# Patient Record
Sex: FEMALE | Race: WHITE | Employment: OTHER | URBAN - METROPOLITAN AREA
[De-identification: names, ages, dates, MRNs, and addresses within clinical notes are randomized per-mention and may not be internally consistent; named-entity substitution may affect disease eponyms.]

---

## 2017-01-18 ENCOUNTER — ANTICOAGULATION THERAPY VISIT (OUTPATIENT)
Dept: ANTICOAGULATION | Facility: CLINIC | Age: 82
End: 2017-01-18
Payer: MEDICARE

## 2017-01-18 DIAGNOSIS — Z79.01 LONG TERM CURRENT USE OF ANTICOAGULANT THERAPY: Primary | ICD-10-CM

## 2017-01-18 DIAGNOSIS — I63.40 CEREBRAL EMBOLISM WITH CEREBRAL INFARCTION (H): ICD-10-CM

## 2017-01-18 LAB — INR POINT OF CARE: 2 (ref 0.86–1.14)

## 2017-01-18 PROCEDURE — 36416 COLLJ CAPILLARY BLOOD SPEC: CPT

## 2017-01-18 PROCEDURE — 99207 ZZC NO CHARGE NURSE ONLY: CPT

## 2017-01-18 PROCEDURE — 85610 PROTHROMBIN TIME: CPT | Mod: QW

## 2017-01-18 NOTE — PROGRESS NOTES
ANTICOAGULATION FOLLOW-UP CLINIC VISIT    Patient Name:  Shanika Stahl  Date:  1/18/2017  Contact Type:  Face to Face    SUBJECTIVE:     Patient Findings     Positives Diet Changes (had a small amount of spinach last night)           OBJECTIVE    INR PROTIME   Date Value Ref Range Status   01/18/2017 2.0* 0.86 - 1.14 Final       ASSESSMENT / PLAN  INR assessment THER    Recheck INR In: 6 WEEKS    INR Location Clinic      Anticoagulation Summary as of 1/18/2017     INR goal 2.0-3.0   Selected INR 2.0 (1/18/2017)   Maintenance plan 2.5 mg (2.5 mg x 1) on Mon, Fri; 5 mg (2.5 mg x 2) all other days   Full instructions 2.5 mg on Mon, Fri; 5 mg all other days   Weekly total 30 mg   No change documented Verenice Morillo, RN   Plan last modified Mabel Serna RN (8/25/2015)   Next INR check 3/1/2017   Priority INR   Target end date Indefinite    Indications   Atrial fibrillation (H) (Resolved) [I48.91]  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 Uses 2.5mg tablets.       Anticoagulation Care Providers     Provider Role Specialty Phone number    JaimeDebbiMilena Sara,  [facility] Internal [facility] [phone]            See the Encounter Report to view Anticoagulation Flowsheet and Dosing Calendar (Go to Encounters tab in chart review, and find the Anticoagulation Therapy Visit)        Verenice Morillo, RN

## 2017-01-18 NOTE — MR AVS SNAPSHOT
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  Please call [phone] with any problems or questions regarding your therapy          January 2017 Details    Sun Mon Tue Wed Thu Fri Sat     1               2               3               4               5               6               7                 8               9               10               11               12               13               14                 15               16               17               18      5 mg   See details      19      5 mg         20      2.5 mg         21      5 mg           22      5 mg         23      2.5 mg         24      5 mg         25      5 mg         26      5 mg         27      2.5 mg         28      5 mg           29      5 mg         30      2.5 mg         31      5 mg              Date Details   01/18 This INR check               How to take your warfarin dose     To take:  2.5 mg Take 1 of the 2.5 mg tablets.    To take:  5 mg Take 2 of the 2.5 mg tablets.           February 2017 Details    Sun Mon Tue Wed Thu Fri Sat        1      5 mg         2      5 mg         3      2.5 mg         4      5 mg           5      5 mg         6      2.5 mg         7      5 mg         8      5 mg         9      5 mg         10      2.5 mg         11      5 mg           12      5 mg         13      2.5 mg          14      5 mg         15      5 mg         16      5 mg         17      2.5 mg         18      5 mg           19      5 mg         20      2.5 mg         21      5 mg         22      5 mg         23      5 mg         24      2.5 mg         25      5 mg           26      5 mg         27      2.5 mg         28      5 mg              Date Details   No additional details            How to take your warfarin dose     To take:  2.5 mg Take 1 of the 2.5 mg tablets.    To take:  5 mg Take 2 of the 2.5 mg tablets.           March 2017 Details    Sun Mon Tue Wed Thu Fri Sat        1            2               3               4                 5               6               7               8               9               10               11                 12               13               14               15               16               17               18                 19               20               21               22               23               24               25                 26               27               28               29               30               31                 Date Details   No additional details    Date of next INR:  3/1/2017         How to take your warfarin dose     To take:  5 mg Take 2 of the 2.5 mg tablets.

## 2017-03-01 ENCOUNTER — ANTICOAGULATION THERAPY VISIT (OUTPATIENT)
Dept: ANTICOAGULATION | Facility: CLINIC | Age: 82
End: 2017-03-01
Payer: MEDICARE

## 2017-03-01 DIAGNOSIS — Z79.01 LONG TERM CURRENT USE OF ANTICOAGULANT THERAPY: ICD-10-CM

## 2017-03-01 DIAGNOSIS — I63.40 CEREBRAL EMBOLISM WITH CEREBRAL INFARCTION (H): ICD-10-CM

## 2017-03-01 LAB — INR POINT OF CARE: 2.1 (ref 0.86–1.14)

## 2017-03-01 PROCEDURE — 36416 COLLJ CAPILLARY BLOOD SPEC: CPT

## 2017-03-01 PROCEDURE — 85610 PROTHROMBIN TIME: CPT | Mod: QW

## 2017-03-01 PROCEDURE — 99207 ZZC NO CHARGE NURSE ONLY: CPT

## 2017-03-01 NOTE — PROGRESS NOTES
ANTICOAGULATION FOLLOW-UP CLINIC VISIT    Patient Name:  Shanika Stahl  Date:  3/1/2017  Contact Type:  Face to Face    SUBJECTIVE:     Patient Findings     Positives No Problem Findings           OBJECTIVE    INR Protime   Date Value Ref Range Status   03/01/2017 2.1 (A) 0.86 - 1.14 Final       ASSESSMENT / PLAN  INR assessment THER    Recheck INR In: 6 WEEKS    INR Location Clinic      Anticoagulation Summary as of 3/1/2017     INR goal 2.0-3.0   Today's INR 2.1   Maintenance plan 2.5 mg (2.5 mg x 1) on Mon, Fri; 5 mg (2.5 mg x 2) all other days   Full instructions 2.5 mg on Mon, Fri; 5 mg all other days   Weekly total 30 mg   No change documented Verenice Morilol, RN   Plan last modified Mabel Serna RN (8/25/2015)   Next INR check 4/12/2017   Priority INR   Target end date Indefinite    Indications   Atrial fibrillation (H) (Resolved) [I48.91]  Cerebral embolism with cerebral infarction (H) [I63.40]  Long term current use of anticoagulant therapy [Z79.01]         Anticoagulation Episode Summary     INR check location     Preferred lab     Send INR reminders to [facility]    Comments * Keep on low end of therapeutic range. Uses 2.5mg tablets.       Anticoagulation Care Providers     Provider Role Specialty Phone number    AddisonDebbiMilena Sara,  [facility] Internal Medicine [phone]            See the Encounter Report to view Anticoagulation Flowsheet and Dosing Calendar (Go to Encounters tab in chart review, and find the Anticoagulation Therapy Visit)        Verenice Morillo RN

## 2017-03-01 NOTE — MR AVS SNAPSHOT
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  Please call [phone] with any problems or questions regarding your therapy          March 2017 Details    Sun Mon Tue Wed Thu Fri Sat        1      5 mg   See details      2      5 mg         3      2.5 mg         4      5 mg           5      5 mg         6      2.5 mg         7      5 mg         8      5 mg         9      5 mg         10      2.5 mg         11      5 mg           12      5 mg         13      2.5 mg         14      5 mg         15      5 mg         16      5 mg         17      2.5 mg         18      5 mg           19      5 mg         20      2.5 mg         21      5 mg         22      5 mg         23      5 mg         24      2.5 mg         25      5 mg           26      5 mg         27      2.5 mg         28      5 mg         29      5 mg         30      5 mg         31      2.5 mg           Date Details   03/01 This INR check               How to take your warfarin dose     To take:  2.5 mg Take 1 of the 2.5 mg tablets.    To take:  5 mg Take 2 of the 2.5 mg tablets.           April 2017 Details    Sun Mon Tue Wed Thu Fri Sat           1      5 mg           2      5 mg         3      2.5 mg         4      5 mg         5      5 mg         6      5 mg         7      2.5 mg         8      5 mg           9      5 mg         10      2.5 mg         11       5 mg         12            13               14               15                 16               17               18               19               20               21               22                 23               24               25               26               27               28               29                 30                      Date Details   No additional details    Date of next INR:  4/12/2017         How to take your warfarin dose     To take:  2.5 mg Take 1 of the 2.5 mg tablets.    To take:  5 mg Take 2 of the 2.5 mg tablets.

## 2017-03-27 ENCOUNTER — HOSPITAL ENCOUNTER (OUTPATIENT)
Dept: CARDIOLOGY | Facility: CLINIC | Age: 82
Discharge: HOME | End: 2017-03-27
Attending: INTERNAL MEDICINE | Admitting: INTERNAL MEDICINE
Payer: MEDICARE

## 2017-03-27 PROCEDURE — 93294 REM INTERROG EVL PM/LDLS PM: CPT | Performed by: INTERNAL MEDICINE

## 2017-03-27 PROCEDURE — 93296 REM INTERROG EVL PM/IDS: CPT

## 2017-03-27 NOTE — PROGRESS NOTES
Carelink Remote PPM Device Check  : 71.1 %  Mode: VVIR        Presenting Rhythm:   Heart Rate: Adequate per the histograms. Mostly 60-70's.  Sensing: Stable    Pacing Threshold: Stable    Impedance: Stable  Battery Status: 4.5-7 yrs remain.  Atrial Arrhythmia: Chronic A-fib, pt taking Warfarin.  Ventricular Arrhythmia: None.     Care Plan: Carelink Remote PPM q 3 months.

## 2017-03-28 ENCOUNTER — TELEPHONE (OUTPATIENT)
Dept: FAMILY MEDICINE | Facility: CLINIC | Age: 82
End: 2017-03-28

## 2017-03-28 ENCOUNTER — OFFICE VISIT (OUTPATIENT)
Dept: AUDIOLOGY | Facility: CLINIC | Age: 82
End: 2017-03-28
Payer: MEDICARE

## 2017-03-28 DIAGNOSIS — H90.3 SENSORINEURAL HEARING LOSS, BILATERAL: Primary | ICD-10-CM

## 2017-03-28 PROCEDURE — V5299 HEARING SERVICE: HCPCS | Performed by: AUDIOLOGIST

## 2017-03-28 NOTE — MR AVS SNAPSHOT
After Visit Summary   3/28/2017    Shanika Stahl    MRN: [id number]           Patient Information     Date Of Birth          5/26/1924        Visit Information        Provider Department      3/28/2017 11:00 AM Gregoria Heath [facility]        Today's Diagnoses     Sensorineural hearing loss, bilateral    -  1       Follow-ups after your visit        Your next 10 appointments already scheduled     Mar 30, 2017 10:30 AM CDT   [facility]    [address] [address]   [phone]            Mar 30, 2017 11:00 AM CDT   [facility]red   [facility]    [address] [address]   [phone]            Apr 12, 2017 10:45 AM CDT   [place] Visit with [place] [facility]   [facility]    [address] [address]   [phone]            Jul 17, 2017 10:00 AM CDT   [facility]   [facility] ([facility])    [address] [address]   [phone]           This appointment is for a remote check of your pacemaker.  This is not an appointment at the office.              Who to contact     If you have questions or need follow up information about today's clinic visit or your schedule please contact [facility] directly at [phone].  Normal or non-critical lab and imaging results will be communicated to you by MyChart, letter or phone within 4 business days after the clinic has received the results. If you do not hear from us within 7 days, please contact the clinic through MyChart or phone. If you have a critical or abnormal lab result, we will notify you by phone as soon as possible.  Submit refill requests through [employer] or call your pharmacy and  "they will forward the refill request to us. Please allow 3 business days for your refill to be completed.          Additional Information About Your Visit        [employer] Information     [employer] lets you send messages to your doctor, view your test results, renew your prescriptions, schedule appointments and more. To sign up, go to [email or web address] . Click on \"Log in\" on the left side of the screen, which will take you to the Welcome page. Then click on \"Sign up Now\" on the right side of the page.     You will be asked to enter the access code listed below, as well as some personal information. Please follow the directions to create your username and password.     Your access code is: [email or web address]  Expires: 2017  2:52 PM     Your access code will  in 90 days. If you need help or a new code, please call your [facility] clinic or [phone].        Care EveryWhere ID     This is your [facility] EveryWhere ID. This could be used by other organizations to access your [facility] medical records  [id number]         Blood Pressure from Last 3 Encounters:   16 150/68   16 154/82   16 151/77    Weight from Last 3 Encounters:   16 86 lb (39 kg)   16 88 lb 9.6 oz (40.2 kg)   16 86 lb 12.8 oz (39.4 kg)              We Performed the Following     HEARING AID CHECK/NO CHARGE        Primary Care Provider Office Phone # Fax #    Milena Taylor Jaime [phone]       [facility] [address] [facility] [address] [facility] [address]        Thank you!     Thank you for choosing [facility]  for your care. Our goal is always to provide you with excellent care. Hearing back from our patients is one way we can continue to improve our services. Please take a few minutes to complete the written survey that you may receive in the mail after your visit with us. Thank you!             Your Updated Medication List - Protect others around you: Learn how to safely use, " store and throw away your medicines at www.disposemymeds.org.          This list is accurate as of: 3/28/17  2:52 PM.  Always use your most recent med list.                   Brand Name Dispense Instructions for use    calcium + D 600-200 MG-UNIT Tabs   Generic drug:  calcium carbonate-vitamin D      1 tablet by mouth daily       cholecalciferol 1000 UNIT tablet    vitamin D    30    1 TABLET DAILY       fish oil-omega-3 fatty acids 1000 MG capsule     90 capsule    Take 1 capsule by mouth 2 times daily       loratadine 10 MG tablet    CLARITIN     Take 10 mg by mouth daily       meclizine 25 MG tablet    ANTIVERT    30 tablet    Take 1 tablet (25 mg) by mouth every 6 hours as needed for dizziness       metoprolol 25 MG 24 hr tablet    TOPROL XL    180 tablet    Take 1 tablet (25 mg) by mouth 2 times daily       ONE-A-DAY WOMENS 50 PLUS PO      Take 1 tablet by mouth daily       simvastatin 20 MG tablet    ZOCOR    90 tablet    Take 1 tablet (20 mg) by mouth At Bedtime       TYLENOL 325 MG Caps   Generic drug:  Acetaminophen      Take by mouth as needed       warfarin 2.5 MG tablet    COUMADIN    200 tablet    As directed by Anticoagulation Clinic (current dose 2.5mg Mondays and Fridays, 5mg rest of the week)

## 2017-03-28 NOTE — PROGRESS NOTES
89 year old female comes in with her son for a hearing aid check. She is currently wearing 2016 [employer] V50-P hearing aids and earmolds. She reports she is having some pain with the left earmold.    Examination of her ear reveals she does not have the left earmold inserted correctly in the helix area. Again reviewed correct insertion.Replaced earmold tubing bilaterally. Verified hearing aid functionality.  See chart in the hearing aid room.     Return to clinic as needed.    Gregoria Heath M.A. -[facility], #[id number]

## 2017-03-28 NOTE — TELEPHONE ENCOUNTER
Reason for Call: Request for an order or referral:    Order or referral being requested: Audiology     Date needed: as soon as possible    Has the patient been seen by the PCP for this problem? YES    Additional comments: pt stopped in and is requesting referral for pending apt on 3/30/17 at 10:30 am With Gregoria Heath      Phone number Patient can be reached at:  Home number on file [phone] (home)    Best Time:  Any     Can we leave a detailed message on this number?  NO    Call taken on 3/28/2017 at 12:12 PM by Eri Ceja

## 2017-03-28 NOTE — TELEPHONE ENCOUNTER
Done per protocol. Left message on answering machine for patient to call back.  CSS , when she calls back, please advise her the referral for audiology has been done , thanks!       Joanna Garcia RNC

## 2017-03-30 ENCOUNTER — OFFICE VISIT (OUTPATIENT)
Dept: AUDIOLOGY | Facility: CLINIC | Age: 82
End: 2017-03-30
Payer: MEDICARE

## 2017-03-30 DIAGNOSIS — H90.3 SENSORINEURAL HEARING LOSS, BILATERAL: Primary | ICD-10-CM

## 2017-03-30 PROCEDURE — V5299 HEARING SERVICE: HCPCS | Performed by: AUDIOLOGIST

## 2017-03-30 PROCEDURE — 92557 COMPREHENSIVE HEARING TEST: CPT | Performed by: AUDIOLOGIST

## 2017-03-30 NOTE — MR AVS SNAPSHOT
"              After Visit Summary   3/30/2017    Shanika Stahl    MRN: [id number]           Patient Information     Date Of Birth          5/26/1924        Visit Information        Provider Department      3/30/2017 10:30 AM Gregoria Heath [facility]        Today's Diagnoses     Sensorineural hearing loss, bilateral    -  1       Follow-ups after your visit        Your next 10 appointments already scheduled     Apr 12, 2017 10:45 AM CDT   [place] Visit with [place] [facility]   [facility]    [address]  [facility] [address] [phone]            Jul 17, 2017 10:00 AM CDT   [place] [facility] [place] [facility] [facility]   [facility] ([facility])    [address] [facility] [address] [phone]           This appointment is for a remote check of your pacemaker.  This is not an appointment at the office.              Who to contact     If you have questions or need follow up information about today's clinic visit or your schedule please contact [facility] directly at [phone].  Normal or non-critical lab and imaging results will be communicated to you by [employer], letter or phone within 4 business days after the clinic has received the results. If you do not hear from us within 7 days, please contact the clinic through [employer] or phone. If you have a critical or abnormal lab result, we will notify you by phone as soon as possible.  Submit refill requests through [employer] or call your pharmacy and they will forward the refill request to us. Please allow 3 business days for your refill to be completed.          Additional Information About Your Visit        MyChart Information     [employer] lets you send messages to your doctor, view your test results, renew your prescriptions, schedule appointments and more. To sign up, go to [email or web address] . Click on \"Log in\" on the left " "side of the screen, which will take you to the Welcome page. Then click on \"Sign up Now\" on the right side of the page.     You will be asked to enter the access code listed below, as well as some personal information. Please follow the directions to create your username and password.     Your access code is: [email or web address]  Expires: 2017  2:52 PM     Your access code will  in 90 days. If you need help or a new code, please call your [place] clinic or [phone].        Care EveryWhere ID     This is your Care EveryWhere ID. This could be used by other organizations to access your [place] medical records  [id number]         Blood Pressure from Last 3 Encounters:   16 150/68   16 154/82   16 151/77    Weight from Last 3 Encounters:   16 86 lb (39 kg)   16 88 lb 9.6 oz (40.2 kg)   16 86 lb 12.8 oz (39.4 kg)              We Performed the Following     COMPREHENSIVE HEARING TEST        Primary Care Provider Office Phone # Fax #    Milena Jaime,  [phone]       [address] [address]        Thank you!     Thank you for choosing [facility]  for your care. Our goal is always to provide you with excellent care. Hearing back from our patients is one way we can continue to improve our services. Please take a few minutes to complete the written survey that you may receive in the mail after your visit with us. Thank you!             Your Updated Medication List - Protect others around you: Learn how to safely use, store and throw away your medicines at www.disposemymeds.org.          This list is accurate as of: 3/30/17 12:01 PM.  Always use your most recent med list.                   Brand Name Dispense Instructions for use    calcium + D 600-200 MG-UNIT Tabs   Generic drug:  calcium carbonate-vitamin D      1 tablet by mouth daily       cholecalciferol 1000 UNIT tablet    vitamin D    30    1 " TABLET DAILY       fish oil-omega-3 fatty acids 1000 MG capsule     90 capsule    Take 1 capsule by mouth 2 times daily       loratadine 10 MG tablet    CLARITIN     Take 10 mg by mouth daily       meclizine 25 MG tablet    ANTIVERT    30 tablet    Take 1 tablet (25 mg) by mouth every 6 hours as needed for dizziness       metoprolol 25 MG 24 hr tablet    TOPROL XL    180 tablet    Take 1 tablet (25 mg) by mouth 2 times daily       ONE-A-DAY WOMENS 50 PLUS PO      Take 1 tablet by mouth daily       simvastatin 20 MG tablet    ZOCOR    90 tablet    Take 1 tablet (20 mg) by mouth At Bedtime       TYLENOL 325 MG Caps   Generic drug:  Acetaminophen      Take by mouth as needed       warfarin 2.5 MG tablet    COUMADIN    200 tablet    As directed by Anticoagulation Clinic (current dose 2.5mg Mondays and Fridays, 5mg rest of the week)

## 2017-03-30 NOTE — MR AVS SNAPSHOT
"              After Visit Summary   3/30/2017    Shanika Stahl    MRN: [id number]           Patient Information     Date Of Birth          5/26/1924        Visit Information        Provider Department      3/30/2017 11:00 AM Gregoria Heath AuD [facility]        Today's Diagnoses     Sensorineural hearing loss, bilateral    -  1       Follow-ups after your visit        Your next 10 appointments already scheduled     Apr 12, 2017 10:45 AM CDT   [place]tico[place] Visit with [place] [facility]   [facility] ([facility])    [address]  [facility] [address] [phone]            Jul 17, 2017 10:00 AM CDT   Remote PPM Check with [place] [facility]   [facility] ([facility])    [address] [facility] [address] [phone]           This appointment is for a remote check of your pacemaker.  This is not an appointment at the office.              Who to contact     If you have questions or need follow up information about today's clinic visit or your schedule please contact [facility] directly at [phone].  Normal or non-critical lab and imaging results will be communicated to you by [employer], letter or phone within 4 business days after the clinic has received the results. If you do not hear from us within 7 days, please contact the clinic through [employer] or phone. If you have a critical or abnormal lab result, we will notify you by phone as soon as possible.  Submit refill requests through Spool or call your pharmacy and they will forward the refill request to us. Please allow 3 business days for your refill to be completed.          Additional Information About Your Visit        MyChart Information     Spool lets you send messages to your doctor, view your test results, renew your prescriptions, schedule appointments and more. To sign up, go to [email or web address] . Click on \"Log in\" on the left " "side of the screen, which will take you to the Welcome page. Then click on \"Sign up Now\" on the right side of the page.     You will be asked to enter the access code listed below, as well as some personal information. Please follow the directions to create your username and password.     Your access code is: [email or web address]  Expires: 2017  2:52 PM     Your access code will  in 90 days. If you need help or a new code, please call your [place] clinic or [phone].        Care EveryWhere ID     This is your Care EveryWhere ID. This could be used by other organizations to access your [place] medical records  [id number]         Blood Pressure from Last 3 Encounters:   16 150/68   16 154/82   16 151/77    Weight from Last 3 Encounters:   16 86 lb (39 kg)   16 88 lb 9.6 oz (40.2 kg)   16 86 lb 12.8 oz (39.4 kg)              We Performed the Following     AUDIOGRAM/TYMPANOGRAM - INTERFACE     HEARING AID CHECK/NO CHARGE        Primary Care Provider Office Phone # Fax #    Milena [place],  [phone]       [facility] [address] [place] [id number]        Thank you!     Thank you for choosing [facility]  for your care. Our goal is always to provide you with excellent care. Hearing back from our patients is one way we can continue to improve our services. Please take a few minutes to complete the written survey that you may receive in the mail after your visit with us. Thank you!             Your Updated Medication List - Protect others around you: Learn how to safely use, store and throw away your medicines at www.disposemymeds.org.          This list is accurate as of: 3/30/17  1:51 PM.  Always use your most recent med list.                   Brand Name Dispense Instructions for use    calcium + D 600-200 MG-UNIT Tabs   Generic drug:  calcium carbonate-vitamin D      1 tablet by mouth daily       cholecalciferol 1000 " UNIT tablet    vitamin D    30    1 TABLET DAILY       fish oil-omega-3 fatty acids 1000 MG capsule     90 capsule    Take 1 capsule by mouth 2 times daily       loratadine 10 MG tablet    CLARITIN     Take 10 mg by mouth daily       meclizine 25 MG tablet    ANTIVERT    30 tablet    Take 1 tablet (25 mg) by mouth every 6 hours as needed for dizziness       metoprolol 25 MG 24 hr tablet    TOPROL XL    180 tablet    Take 1 tablet (25 mg) by mouth 2 times daily       ONE-A-DAY WOMENS 50 PLUS PO      Take 1 tablet by mouth daily       simvastatin 20 MG tablet    ZOCOR    90 tablet    Take 1 tablet (20 mg) by mouth At Bedtime       TYLENOL 325 MG Caps   Generic drug:  Acetaminophen      Take by mouth as needed       warfarin 2.5 MG tablet    COUMADIN    200 tablet    As directed by Anticoagulation Clinic (current dose 2.5mg Mondays and Fridays, 5mg rest of the week)

## 2017-03-30 NOTE — PROGRESS NOTES
AUDIOLOGY REPORT    SUBJECTIVE:  Shanika Stahl is a 89 year old female who was seen in the Audiology Clinic at [facility] for an audiologic evaluation, referred by Sr. Jaime.  The patient has been seen previously in this clinic for assessment and results indicated a moderate to severe sensorineural hearing loss bilaterally. The patient reports her son feels she is not hearing as well. Patient is currently wearing 2016  [employer] V50-P hearing aids with custom earmolds bilaterally. The patient denies bilateral otalgia and bilateral drainage.     OBJECTIVE:  Otoscopic exam indicates ears are clear of cerumen bilaterally     Pure Tone Thresholds assessed using conventional audiometry with good  reliability from 250-8000 Hz bilaterally using circumaural headphones     RIGHT:  mild and severe sensorineural hearing loss    LEFT:    mild and severe sensorineural hearing loss    Tympanogram:    RIGHT: DNT    LEFT:   DNT    Speech Reception Threshold:    RIGHT: 60 dB HL    LEFT:   60 dB HL  Word Recognition Score:     RIGHT: 68% at 85 dB HL using W22 recorded word list.    LEFT:   80% at 85 dB HL using W22 recorded word list.      ASSESSMENT:   Mild to severe sensorineural hearing loss bilaterally.     Compared to patient's previous audiogram dated 1/4/2016, hearing has remained stable bilaterally.  Today s results were discussed with the patient in detail.     PLAN: It is recommended that the patient return to clinic for follow up as needed.. Patient was counseled regarding hearing loss and impact on communication.   Please call this clinic with questions regarding these results or recommendations.        LYNDSAY [facility]John [facility]  Clinical audiologist [place] # [id number]  3/30/2017

## 2017-03-30 NOTE — PROGRESS NOTES
89 year old female comes in with her son for possible hearing aid reprogramming. Patient feels she is hearing well.    Audiometry revealed stable hearing thresholds bilaterally.     Reviewed hearing aid use on the telephone. Also discussed use of the VC. See chart in the hearing aid room.     Return to clinic as needed.    Gregoria GIVENS, #[id number]

## 2017-04-10 ENCOUNTER — ANTICOAGULATION THERAPY VISIT (OUTPATIENT)
Dept: ANTICOAGULATION | Facility: CLINIC | Age: 82
End: 2017-04-10
Payer: MEDICARE

## 2017-04-10 DIAGNOSIS — Z79.01 LONG TERM CURRENT USE OF ANTICOAGULANT THERAPY: ICD-10-CM

## 2017-04-10 DIAGNOSIS — I63.40 CEREBRAL EMBOLISM WITH CEREBRAL INFARCTION (H): ICD-10-CM

## 2017-04-10 LAB — INR POINT OF CARE: 2.5 (ref 0.86–1.14)

## 2017-04-10 PROCEDURE — 99207 ZZC NO CHARGE NURSE ONLY: CPT

## 2017-04-10 PROCEDURE — 36416 COLLJ CAPILLARY BLOOD SPEC: CPT

## 2017-04-10 PROCEDURE — 85610 PROTHROMBIN TIME: CPT | Mod: QW

## 2017-04-10 NOTE — MR AVS SNAPSHOT
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  Please call [phone] with any problems or questions regarding your therapy          April 2017 Details    Sun Mon Tue Wed Thu Fri Sat           1                 2               3               4               5               6               7               8                 9               10      2.5 mg   See details      11      5 mg         12      5 mg         13      5 mg         14      2.5 mg         15      5 mg           16      5 mg         17      2.5 mg         18      5 mg         19      5 mg         20      5 mg         21      2.5 mg         22      5 mg           23      5 mg         24      2.5 mg         25      5 mg         26      5 mg         27      5 mg         28      2.5 mg         29      5 mg           30      5 mg                Date Details   04/10 This INR check               How to take your warfarin dose     To take:  2.5 mg Take 1 of the 2.5 mg tablets.    To take:  5 mg Take 2 of the 2.5 mg tablets.           May 2017 Details    Sun Mon Tue Wed Thu Fri Sat      1      2.5 mg         2       5 mg         3      5 mg         4      5 mg         5      2.5 mg         6      5 mg           7      5 mg         8      2.5 mg         9      5 mg         10      5 mg         11      5 mg         12      2.5 mg         13      5 mg           14      5 mg         15      2.5 mg         16      5 mg         17      5 mg         18      5 mg         19      2.5 mg         20      5 mg           21      5 mg         22            23               24               25               26               27                 28               29               30               31                   Date Details   No additional details    Date of next INR:  5/22/2017         How to take your warfarin dose     To take:  2.5 mg Take 1 of the 2.5 mg tablets.    To take:  5 mg Take 2 of the 2.5 mg tablets.

## 2017-04-10 NOTE — PROGRESS NOTES
ANTICOAGULATION FOLLOW-UP CLINIC VISIT    Patient Name:  Shanika Stahl  Date:  4/10/2017  Contact Type:  Face to Face    SUBJECTIVE:        OBJECTIVE    INR Protime   Date Value Ref Range Status   04/10/2017 2.5 (A) 0.86 - 1.14 Final       ASSESSMENT / PLAN  INR assessment THER    Recheck INR In: 6 WEEKS    INR Location Clinic      Anticoagulation Summary as of 4/10/2017     INR goal 2.0-3.0   Today's INR 2.5   Maintenance plan 2.5 mg (2.5 mg x 1) on Mon, Fri; 5 mg (2.5 mg x 2) all other days   Full instructions 2.5 mg on Mon, Fri; 5 mg all other days   Weekly total 30 mg   No change documented Jane Zhao RN   Plan last modified Mabel Serna RN (8/25/2015)   Next INR check 5/22/2017   Priority INR   Target end date Indefinite    Indications   Atrial fibrillation (H) (Resolved) [I48.91]  Cerebral embolism with cerebral infarction (H) [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 Uses 2.5mg tablets.       Anticoagulation Care Providers     Provider Role Specialty Phone number    Addison Milena Taylor,  [facility] Internal Medicine [phone]            See the Encounter Report to view Anticoagulation Flowsheet and Dosing Calendar (Go to Encounters tab in chart review, and find the Anticoagulation Therapy Visit)        Jane Zhao, RN

## 2017-04-15 DIAGNOSIS — I48.20 CHRONIC ATRIAL FIBRILLATION (H): Chronic | ICD-10-CM

## 2017-04-17 RX ORDER — WARFARIN SODIUM 2.5 MG/1
TABLET ORAL
Qty: 144 TABLET | Refills: 0 | Status: SHIPPED | OUTPATIENT
Start: 2017-04-17 | End: 2017-09-07

## 2017-04-17 NOTE — TELEPHONE ENCOUNTER
Shruthi    Last Written Prescription Date: 10/21/16  Last Fill Qty: 200, # refills: 1  Last Office Visit with G, P or [facility] prescribing provider: 12/02/16       Date and Result of Last PT/INR:   Lab Results   Component Value Date    INR 2.5 04/10/2017    INR 2.1 03/01/2017    INR 2.45 01/11/2016    INR 0.96 02/10/2014

## 2017-05-24 ENCOUNTER — ANTICOAGULATION THERAPY VISIT (OUTPATIENT)
Dept: ANTICOAGULATION | Facility: CLINIC | Age: 82
End: 2017-05-24
Payer: MEDICARE

## 2017-05-24 DIAGNOSIS — Z79.01 LONG TERM CURRENT USE OF ANTICOAGULANT THERAPY: ICD-10-CM

## 2017-05-24 LAB — INR POINT OF CARE: 2.2 (ref 0.86–1.14)

## 2017-05-24 PROCEDURE — 36416 COLLJ CAPILLARY BLOOD SPEC: CPT

## 2017-05-24 PROCEDURE — 99207 ZZC NO CHARGE NURSE ONLY: CPT

## 2017-05-24 PROCEDURE — 85610 PROTHROMBIN TIME: CPT | Mod: QW

## 2017-05-24 NOTE — MR AVS SNAPSHOT
"              After Visit Summary   5/24/2017    Shanika Stahl    MRN: [id number]           Patient Information     Date Of Birth          5/26/1924        Visit Information        Provider Department      5/24/2017 10:45 AM [facility]        Today's Diagnoses     Long term current use of anticoagulant therapy          Care Instructions      Potassium-Rich Foods  The normal adult diet usually contains 2,000 mcg to 4,000 mcg (50 to 100 mEq) of potassium per day. More potassium is needed when there is excess loss of potassium from the body. Diuretic medicines (\"water pills\"), diarrhea, and vomiting can cause this. To increase the amount of potassium in your diet, include these high potassium foods.    [The (*) indicates foods highest in potassium.]  Vegetables  Artichokes - cooked 1/2 cup, 200 mg to 300 mg*  Asparagus - cooked 1/2 cup, 200 mg to 300 mg  Beans, white, red, mujica cooked 1/2 cup, 300 mg to 500 mg*  Beets - cooked 1/2 cup, 200 mg to 300 mg  Broccoli - cooked or raw 1 cup, 200 mg to 500 mg*  [place] sprouts - cooked 1/2 cup, 200 mg to 300 mg  Cabbage - raw 1 cup, 100 mg to 200 mg  Carrots - raw or cooked 1/2 cup, 100 mg to 200 mg  Celery - raw 1 cup, 300 mg to 400 mg   Lima Beans - fresh or frozen 1/2 cup, 300 mg to 500 mg*   Mushrooms - raw or cooked 1/2 cup, 100 mg to 300 mg  Peas - cooked 1/2 cup, 200 mg to 300 mg   Potatoes - baked 1 medium, 500 mg to 900 mg*   Spinach - raw 2 cups, 300 mg to 400 mg *  Spinach - cooked 1 cups, 800 mg to 900 mg*   Squash, winter - fresh, frozen, or cooked 1/2 cup, 200 mg to 400 mg   Tomato - fresh 1 medium, 200 mg to 300 mg   Tomato juice - canned 1/2 cup, 200 mg to 300 mg   Fruits  Apple juice - unsweetened 1 cup, 200 mg to 300 mg   Apricots - canned 1/2 cup, 200 mg to 300 mg   Apricots - dried 4 pieces, 100 mg to 200 mg   Avocado - raw 1/2 cup, 300 mg to 400 mg*  Banana - fresh 1 small, 300 mg to 400 mg*   Blackberries - fresh or frozen 1 " cup, 200 mg to 300 mg   Cantaloupe - fresh 1 cup diced, 300 mg to 400 mg*   Grape juice - unsweetened 1 cup, 200 mg to 300 mg   Honeydew melon - fresh 1 cup diced, 300 mg to 400 mg*   Orange juice - unsweetened, fresh or frozen 1/2 cup, 200 mg to 300 mg  Orange - fresh 1 medium, 200 mg to 300 mg    Pineapple juice - unsweetened 1 cup, 300 mg to 400 mg   Prune juice - unsweetened 1/2 cup, 300 mg to 400 mg*   Prunes - dried 5 pieces, 300 mg to 400 mg*   Strawberries - fresh or frozen 1 cup, 200 mg to 300 mg  Meat  Red Meat - cooked 3 oz, 100 mg to 300 mg   Shrimp - cooked 3/4 cup, 100 mg to 200 mg   Tuna - fresh or canned 3/4 cup, 200 mg to 500 mg   Cod, flounder, halibut - cooked 3 oz, 100 mg to 300 mg*  [place] - cooked 3 oz, 300 mg to 400 mg*   Scallops - cooked 3 oz, 200 mg to 300 mg*    [id number] [employer]. [address] [place] [address]. All rights reserved. This information is not intended as a substitute for professional medical care. Always follow your healthcare professional's instructions.                      Follow-ups after your visit        Your next 10 appointments already scheduled     Jul 17, 2017 10:00 AM CDT   [place] PPM Check with [place] [facility]   [facility] ([facility])    [facility] [address] [facility] [address] [phone]           This appointment is for a remote check of your pacemaker.  This is not an appointment at the office.              Who to contact     If you have questions or need follow up information about today's clinic visit or your schedule please contact [facility] directly at [phone].  Normal or non-critical lab and imaging results will be communicated to you by MyChart, letter or phone within 4 business days after the clinic has received the results. If you do not hear from us within 7 days, please contact the clinic through MyChart or phone. If you have a critical or abnormal lab  "result, we will notify you by phone as soon as possible.  Submit refill requests through [employer] or call your pharmacy and they will forward the refill request to us. Please allow 3 business days for your refill to be completed.          Additional Information About Your Visit        Turnhart Information     [employer] lets you send messages to your doctor, view your test results, renew your prescriptions, schedule appointments and more. To sign up, go to [email or web address] . Click on \"Log in\" on the left side of the screen, which will take you to the Welcome page. Then click on \"Sign up Now\" on the right side of the page.     You will be asked to enter the access code listed below, as well as some personal information. Please follow the directions to create your username and password.     Your access code is: [email or web address]  Expires: 2017  2:52 PM     Your access code will  in 90 days. If you need help or a new code, please call your [place] clinic or [phone].        Care EveryWhere ID     This is your Care EveryWhere ID. This could be used by other organizations to access your [place] medical records  [id number]         Blood Pressure from Last 3 Encounters:   16 150/68   16 154/82   16 151/77    Weight from Last 3 Encounters:   16 86 lb (39 kg)   16 88 lb 9.6 oz (40.2 kg)   16 86 lb 12.8 oz (39.4 kg)              We Performed the Following     INR point of care        Primary Care Provider Office Phone # Fax #    Milena JaimeDO [phone]       [facility] [address] [facility] [address]        Thank you!     Thank you for choosing [facility]  for your care. Our goal is always to provide you with excellent care. Hearing back from our patients is one way we can continue to improve our services. Please take a few minutes to complete the written survey that you may receive in the mail after your visit with " us. Thank you!             Your Updated Medication List - Protect others around you: Learn how to safely use, store and throw away your medicines at www.disposemymeds.org.          This list is accurate as of: 5/24/17 10:55 AM.  Always use your most recent med list.                   Brand Name Dispense Instructions for use    calcium + D 600-200 MG-UNIT Tabs   Generic drug:  calcium carbonate-vitamin D      1 tablet by mouth daily       cholecalciferol 1000 UNIT tablet    vitamin D    30    1 TABLET DAILY       fish oil-omega-3 fatty acids 1000 MG capsule     90 capsule    Take 1 capsule by mouth 2 times daily       JANTOVEN 2.5 MG tablet   Generic drug:  warfarin     144 tablet    TAKE 1 TABLET BY MOUTH MONDAY AND FRIDAY AND TAKE 2 TABLETS ALL OTHER DAYS OR AS DIRECTED       loratadine 10 MG tablet    CLARITIN     Take 10 mg by mouth daily       meclizine 25 MG tablet    ANTIVERT    30 tablet    Take 1 tablet (25 mg) by mouth every 6 hours as needed for dizziness       metoprolol 25 MG 24 hr tablet    TOPROL XL    180 tablet    Take 1 tablet (25 mg) by mouth 2 times daily       ONE-A-DAY WOMENS 50 PLUS PO      Take 1 tablet by mouth daily       simvastatin 20 MG tablet    ZOCOR    90 tablet    Take 1 tablet (20 mg) by mouth At Bedtime       TYLENOL 325 MG Caps   Generic drug:  Acetaminophen      Take by mouth as needed

## 2017-05-24 NOTE — PROGRESS NOTES
ANTICOAGULATION FOLLOW-UP CLINIC VISIT    Patient Name:  Shanika Stahl  Date:  5/24/2017  Contact Type:  Face to Face    SUBJECTIVE:     Patient Findings     Positives Other complaints (Patient has had leg cramping that sounds like a robert horse. She also has brittle nails)    Comments Patient and her son asked about foods with potassium to help with leg cramps. Writer provided a list attached in patient instructions. She also might try an over the counter supplement to help with brittle nails.           OBJECTIVE    INR Protime   Date Value Ref Range Status   05/24/2017 2.2 (A) 0.86 - 1.14 Final       ASSESSMENT / PLAN  INR assessment THER    Recheck INR In: 6 WEEKS    INR Location Clinic      Anticoagulation Summary as of 5/24/2017     INR goal 2.0-3.0   Today's INR 2.2   Maintenance plan 2.5 mg (2.5 mg x 1) on Mon, Fri; 5 mg (2.5 mg x 2) all other days   Full instructions 2.5 mg on Mon, Fri; 5 mg all other days   Weekly total 30 mg   No change documented Verenice Morillo, RN   Plan last modified Mabel eSrna RN (8/25/2015)   Next INR check 7/5/2017   Priority INR   Target end date Indefinite    Indications   Atrial fibrillation (H) (Resolved) [I48.91]  Cerebral embolism with cerebral infarction (H) [I63.40]  Long term current use of anticoagulant therapy [Z79.01]         Anticoagulation Episode Summary     INR check location     Preferred lab     Send INR reminders to [employer] PO[facility]    Comments * Keep on low end of therapeutic range. Uses 2.5mg tablets.       Anticoagulation Care Providers     Provider Role Specialty Phone number    JaimeDebbiMilena Sara,  [facility] Internal Medicine [phone]            See the Encounter Report to view Anticoagulation Flowsheet and Dosing Calendar (Go to Encounters tab in chart review, and find the Anticoagulation Therapy Visit)        Verenice Morillo, RN

## 2017-05-24 NOTE — PATIENT INSTRUCTIONS
"  Potassium-Rich Foods  The normal adult diet usually contains 2,000 mcg to 4,000 mcg (50 to 100 mEq) of potassium per day. More potassium is needed when there is excess loss of potassium from the body. Diuretic medicines (\"water pills\"), diarrhea, and vomiting can cause this. To increase the amount of potassium in your diet, include these high potassium foods.    [The (*) indicates foods highest in potassium.]  Vegetables  Artichokes - cooked 1/2 cup, 200 mg to 300 mg*  Asparagus - cooked 1/2 cup, 200 mg to 300 mg  Beans, white, red, mujica cooked 1/2 cup, 300 mg to 500 mg*  Beets - cooked 1/2 cup, 200 mg to 300 mg  Broccoli - cooked or raw 1 cup, 200 mg to 500 mg*  [place] sprouts - cooked 1/2 cup, 200 mg to 300 mg  Cabbage - raw 1 cup, 100 mg to 200 mg  Carrots - raw or cooked 1/2 cup, 100 mg to 200 mg  Celery - raw 1 cup, 300 mg to 400 mg   Lima Beans - fresh or frozen 1/2 cup, 300 mg to 500 mg*   Mushrooms - raw or cooked 1/2 cup, 100 mg to 300 mg  Peas - cooked 1/2 cup, 200 mg to 300 mg   Potatoes - baked 1 medium, 500 mg to 900 mg*   Spinach - raw 2 cups, 300 mg to 400 mg *  Spinach - cooked 1 cups, 800 mg to 900 mg*   Squash, winter - fresh, frozen, or cooked 1/2 cup, 200 mg to 400 mg   Tomato - fresh 1 medium, 200 mg to 300 mg   Tomato juice - canned 1/2 cup, 200 mg to 300 mg   Fruits  Apple juice - unsweetened 1 cup, 200 mg to 300 mg   Apricots - canned 1/2 cup, 200 mg to 300 mg   Apricots - dried 4 pieces, 100 mg to 200 mg   Avocado - raw 1/2 cup, 300 mg to 400 mg*  Banana - fresh 1 small, 300 mg to 400 mg*   Blackberries - fresh or frozen 1 cup, 200 mg to 300 mg   Cantaloupe - fresh 1 cup diced, 300 mg to 400 mg*   Grape juice - unsweetened 1 cup, 200 mg to 300 mg   Honeydew melon - fresh 1 cup diced, 300 mg to 400 mg*   Orange juice - unsweetened, fresh or frozen 1/2 cup, 200 mg to 300 mg  Orange - fresh 1 medium, 200 mg to 300 mg    Pineapple juice - unsweetened 1 cup, 300 mg to 400 mg   Prune juice - " unsweetened 1/2 cup, 300 mg to 400 mg*   Prunes - dried 5 pieces, 300 mg to 400 mg*   Strawberries - fresh or frozen 1 cup, 200 mg to 300 mg  Meat  Red Meat - cooked 3 oz, 100 mg to 300 mg   Shrimp - cooked 3/4 cup, 100 mg to 200 mg   Tuna - fresh or canned 3/4 cup, 200 mg to 500 mg   Cod, flounder, halibut - cooked 3 oz, 100 mg to 300 mg*  [place] - cooked 3 oz, 300 mg to 400 mg*   Scallops - cooked 3 oz, 200 mg to 300 mg*    [id number] [employer]. [address] [place] [address], [place] [address]. All rights reserved. This information is not intended as a substitute for professional medical care. Always follow your healthcare professional's instructions.

## 2017-05-24 NOTE — MR AVS SNAPSHOT
"     Shanika EMILY Stahl   5/24/2017 10:45 AM   Anticoagulation Therapy Visit    Description:  89 year old female   Provider:  [facility]   Department:  [facility]           INR as of 5/24/2017     Today's INR 2.2      Anticoagulation Summary as of 5/24/2017     INR goal 2.0-3.0   Today's INR 2.2   Full instructions 2.5 mg on Mon, Fri; 5 mg all other days   Next INR check 7/5/2017    Indications   Atrial fibrillation (H) (Resolved) [I48.91]  Cerebral embolism with cerebral infarction (H) [I63.40]  Long term current use of anticoagulant therapy [Z79.01]         Instructions      Potassium-Rich Foods  The normal adult diet usually contains 2,000 mcg to 4,000 mcg (50 to 100 mEq) of potassium per day. More potassium is needed when there is excess loss of potassium from the body. Diuretic medicines (\"water pills\"), diarrhea, and vomiting can cause this. To increase the amount of potassium in your diet, include these high potassium foods.    [The (*) indicates foods highest in potassium.]  Vegetables  Artichokes - cooked 1/2 cup, 200 mg to 300 mg*  Asparagus - cooked 1/2 cup, 200 mg to 300 mg  Beans, white, red, mujica cooked 1/2 cup, 300 mg to 500 mg*  Beets - cooked 1/2 cup, 200 mg to 300 mg  Broccoli - cooked or raw 1 cup, 200 mg to 500 mg*  [place] sprouts - cooked 1/2 cup, 200 mg to 300 mg  Cabbage - raw 1 cup, 100 mg to 200 mg  Carrots - raw or cooked 1/2 cup, 100 mg to 200 mg  Celery - raw 1 cup, 300 mg to 400 mg   [place] Beans - fresh or frozen 1/2 cup, 300 mg to 500 mg*   Mushrooms - raw or cooked 1/2 cup, 100 mg to 300 mg  Peas - cooked 1/2 cup, 200 mg to 300 mg   Potatoes - baked 1 medium, 500 mg to 900 mg*   Spinach - raw 2 cups, 300 mg to 400 mg *  Spinach - cooked 1 cups, 800 mg to 900 mg*   Squash, winter - fresh, frozen, or cooked 1/2 cup, 200 mg to 400 mg   Tomato - fresh 1 medium, 200 mg to 300 mg   Tomato juice - canned 1/2 cup, 200 mg to 300 mg   Fruits  Apple juice - unsweetened 1 cup, 200 mg to 300 " mg   Apricots - canned 1/2 cup, 200 mg to 300 mg   Apricots - dried 4 pieces, 100 mg to 200 mg   Avocado - raw 1/2 cup, 300 mg to 400 mg*  Banana - fresh 1 small, 300 mg to 400 mg*   Blackberries - fresh or frozen 1 cup, 200 mg to 300 mg   Cantaloupe - fresh 1 cup diced, 300 mg to 400 mg*   Grape juice - unsweetened 1 cup, 200 mg to 300 mg   Honeydew melon - fresh 1 cup diced, 300 mg to 400 mg*   Orange juice - unsweetened, fresh or frozen 1/2 cup, 200 mg to 300 mg  Orange - fresh 1 medium, 200 mg to 300 mg    Pineapple juice - unsweetened 1 cup, 300 mg to 400 mg   Prune juice - unsweetened 1/2 cup, 300 mg to 400 mg*   Prunes - dried 5 pieces, 300 mg to 400 mg*   Strawberries - fresh or frozen 1 cup, 200 mg to 300 mg  Meat  Red Meat - cooked 3 oz, 100 mg to 300 mg   Shrimp - cooked 3/4 cup, 100 mg to 200 mg   Tuna - fresh or canned 3/4 cup, 200 mg to 500 mg   Cod, flounder, halibut - cooked 3 oz, 100 mg to 300 mg*  [place] - cooked 3 oz, 300 mg to 400 mg*   Scallops - cooked 3 oz, 200 mg to 300 mg*    1909-3303 [employer]. [address] [place] [address]. All rights reserved. This information is not intended as a substitute for professional medical care. Always follow your healthcare professional's instructions.                     Contact Numbers     Please call [phone] to cancel and/or reschedule your appointment.  Please call [phone] with any problems or questions regarding your therapy          May 2017 Details    Sun Mon Tue Wed Thu Fri Sat      1               2               3               4               5               6                 7               8               9               10               11               12               13                 14               15               16               17               18               19               20                 21               22               23               24      5 mg   See details      25      5 mg          26      2.5 mg         27      5 mg           28      5 mg         29      2.5 mg         30      5 mg         31      5 mg             Date Details   05/24 This INR check               How to take your warfarin dose     To take:  2.5 mg Take 1 of the 2.5 mg tablets.    To take:  5 mg Take 2 of the 2.5 mg tablets.           June 2017 Details    Sun Mon Tue Wed Thu Fri Sat         1      5 mg         2      2.5 mg         3      5 mg           4      5 mg         5      2.5 mg         6      5 mg         7      5 mg         8      5 mg         9      2.5 mg         10      5 mg           11      5 mg         12      2.5 mg         13      5 mg         14      5 mg         15      5 mg         16      2.5 mg         17      5 mg           18      5 mg         19      2.5 mg         20      5 mg         21      5 mg         22      5 mg         23      2.5 mg         24      5 mg           25      5 mg         26      2.5 mg         27      5 mg         28      5 mg         29      5 mg         30      2.5 mg           Date Details   No additional details            How to take your warfarin dose     To take:  2.5 mg Take 1 of the 2.5 mg tablets.    To take:  5 mg Take 2 of the 2.5 mg tablets.           July 2017 Details    Sun Mon Tue Wed Thu Fri Sat           1      5 mg           2      5 mg         3      2.5 mg         4      5 mg         5            6               7               8                 9               10               11               12               13               14               15                 16               17               18               19               20               21               22                 23               24               25               26               27               28               29                 30               31                     Date Details   No additional details    Date of next INR:  7/5/2017         How to take your warfarin dose     To take:   2.5 mg Take 1 of the 2.5 mg tablets.    To take:  5 mg Take 2 of the 2.5 mg tablets.

## 2017-06-05 DIAGNOSIS — I12.9 RENAL HYPERTENSION, STAGE 1-4 OR UNSPECIFIED CHRONIC KIDNEY DISEASE: ICD-10-CM

## 2017-06-05 NOTE — TELEPHONE ENCOUNTER
[employer]     Last Written Prescription Date: 10/21/16  Last Fill Quantity: 180, # refills: 1    Last Office Visit with G, P or [facility] prescribing provider:  12/02/16   Future Office Visit:        BP Readings from Last 3 Encounters:   12/02/16 150/68   11/01/16 154/82   09/30/16 151/77

## 2017-06-07 RX ORDER — METOPROLOL SUCCINATE 25 MG/1
TABLET, EXTENDED RELEASE ORAL
Qty: 180 TABLET | Refills: 0 | Status: SHIPPED | OUTPATIENT
Start: 2017-06-07 | End: 2017-09-07

## 2017-07-05 ENCOUNTER — ANTICOAGULATION THERAPY VISIT (OUTPATIENT)
Dept: ANTICOAGULATION | Facility: CLINIC | Age: 82
End: 2017-07-05
Payer: MEDICARE

## 2017-07-05 DIAGNOSIS — Z79.01 LONG TERM CURRENT USE OF ANTICOAGULANT THERAPY: ICD-10-CM

## 2017-07-05 LAB — INR POINT OF CARE: 2.6 (ref 0.86–1.14)

## 2017-07-05 PROCEDURE — 85610 PROTHROMBIN TIME: CPT | Mod: QW

## 2017-07-05 PROCEDURE — 99207 ZZC NO CHARGE NURSE ONLY: CPT

## 2017-07-05 PROCEDURE — 36416 COLLJ CAPILLARY BLOOD SPEC: CPT

## 2017-07-05 NOTE — PROGRESS NOTES
ANTICOAGULATION FOLLOW-UP CLINIC VISIT    Patient Name:  Shanika Stahl  Date:  7/5/2017  Contact Type:  Face to Face with patient and her son    SUBJECTIVE:     Patient Findings     Positives No Problem Findings           OBJECTIVE    INR Protime   Date Value Ref Range Status   07/05/2017 2.6 (A) 0.86 - 1.14 Final       ASSESSMENT / PLAN  INR assessment THER    Recheck INR In: 6 WEEKS    INR Location Clinic      Anticoagulation Summary as of 7/5/2017     INR goal 2.0-3.0   Today's INR 2.6   Maintenance plan 2.5 mg (2.5 mg x 1) on Mon, Fri; 5 mg (2.5 mg x 2) all other days   Full instructions 2.5 mg on Mon, Fri; 5 mg all other days   Weekly total 30 mg   No change documented Ellen Winchester RPH   Plan last modified Mabel Serna RN (8/25/2015)   Next INR check 8/16/2017   Priority INR   Target end date Indefinite    Indications   Atrial fibrillation (H) (Resolved) [I48.91]  Cerebral embolism with cerebral infarction (H) [I63.40]  Long term current use of anticoagulant therapy [Z79.01]         Anticoagulation Episode Summary     INR check location     Preferred lab     Send INR reminders to [facility] POOL    Comments * Keep on low end of therapeutic range. Uses 2.5mg tablets.       Anticoagulation Care Providers     Provider Role Specialty Phone number    Milena Jaime,  [facility] Internal Medicine [phone]            See the Encounter Report to view Anticoagulation Flowsheet and Dosing Calendar (Go to Encounters tab in chart review, and find the Anticoagulation Therapy Visit)      Ellen Winchester RPH

## 2017-07-05 NOTE — MR AVS SNAPSHOT
Shanika Stahl   7/5/2017 10:45 AM   Anticoagulation Therapy Visit    Description:  89 year old female   Provider:  [facility]   Department:  [facility]           INR as of 7/5/2017     Today's INR 2.6      Anticoagulation Summary as of 7/5/2017     INR goal 2.0-3.0   Today's INR 2.6   Full instructions 2.5 mg on Mon, Fri; 5 mg all other days   Next INR check 8/16/2017    Indications   Atrial fibrillation (H) (Resolved) [I48.91]  Cerebral embolism with cerebral infarction (H) [I63.40]  Long term current use of anticoagulant therapy [Z79.01]         Description     Continue 2.5 mg on Mon, Fri and 5 mg rest of week. Recheck INR on 8/16/17 at 10:45 am.      July 2017 Details    Sun Mon Tue Wed Thu Fri Sat           1                 2               3               4               5      5 mg   See details      6      5 mg         7      2.5 mg         8      5 mg           9      5 mg         10      2.5 mg         11      5 mg         12      5 mg         13      5 mg         14      2.5 mg         15      5 mg           16      5 mg         17      2.5 mg         18      5 mg         19      5 mg         20      5 mg         21      2.5 mg         22      5 mg           23      5 mg         24      2.5 mg         25      5 mg         26      5 mg         27      5 mg         28      2.5 mg         29      5 mg           30      5 mg         31      2.5 mg               Date Details   07/05 This INR check               How to take your warfarin dose     To take:  2.5 mg Take 1 of the 2.5 mg tablets.    To take:  5 mg Take 2 of the 2.5 mg tablets.           August 2017 Details    Sun Mon Tue Wed Thu Fri Sat       1      5 mg         2      5 mg         3      5 mg         4      2.5 mg         5      5 mg           6      5 mg         7      2.5 mg         8      5 mg         9      5 mg         10      5 mg         11      2.5 mg         12      5 mg           13      5 mg         14      2.5 mg         15       5 mg         16            17               18               19                 20               21               22               23               24               25               26                 27               28               29               30               31                  Date Details   No additional details    Date of next INR:  8/16/2017         How to take your warfarin dose     To take:  2.5 mg Take 1 of the 2.5 mg tablets.    To take:  5 mg Take 2 of the 2.5 mg tablets.

## 2017-07-07 ENCOUNTER — OFFICE VISIT (OUTPATIENT)
Dept: FAMILY MEDICINE | Facility: CLINIC | Age: 82
End: 2017-07-07
Payer: MEDICARE

## 2017-07-07 ENCOUNTER — RADIANT APPOINTMENT (OUTPATIENT)
Dept: GENERAL RADIOLOGY | Facility: CLINIC | Age: 82
End: 2017-07-07
Attending: INTERNAL MEDICINE
Payer: MEDICARE

## 2017-07-07 VITALS
HEIGHT: 62 IN | TEMPERATURE: 98.2 F | DIASTOLIC BLOOD PRESSURE: 58 MMHG | HEART RATE: 65 BPM | SYSTOLIC BLOOD PRESSURE: 123 MMHG | WEIGHT: 88.2 LBS | BODY MASS INDEX: 16.23 KG/M2

## 2017-07-07 DIAGNOSIS — M25.552 BILATERAL HIP PAIN: ICD-10-CM

## 2017-07-07 DIAGNOSIS — M25.551 BILATERAL HIP PAIN: ICD-10-CM

## 2017-07-07 DIAGNOSIS — M79.89 LEG SWELLING: ICD-10-CM

## 2017-07-07 DIAGNOSIS — I27.20 PULMONARY HYPERTENSION (H): Primary | ICD-10-CM

## 2017-07-07 PROCEDURE — 72170 X-RAY EXAM OF PELVIS: CPT

## 2017-07-07 PROCEDURE — 99214 OFFICE O/P EST MOD 30 MIN: CPT | Performed by: INTERNAL MEDICINE

## 2017-07-07 RX ORDER — FUROSEMIDE 20 MG
20 TABLET ORAL DAILY
Qty: 30 TABLET | Refills: 0 | Status: SHIPPED | OUTPATIENT
Start: 2017-07-07 | End: 2017-07-31

## 2017-07-07 NOTE — LETTER
[facility]  [address] [facility] [address]  Phone: [phone]    July 7, 2017        Shanika Stahl  [address] [address]          Dear Shanika,      IMAGING RESULTS:     The results of your recent pelvic x-ray were No fracture.  No significant hip arthritis is seen.  Continue with plan of care discussed during office visit .  If you have any further questions or problems, please contact our office.      Sincerely,        Milena Jaime DO / woo

## 2017-07-07 NOTE — PATIENT INSTRUCTIONS
1. Xray of hips today  2. Referral to Sports Medicine for consideration of injection.  3. Consider physical therapy to improve strength  4. For leg swelling, start lasix 20 mg once daily.  Will make you urinate more.  Try to eat higher potassium foods.  High Potassium Foods: banana, apricot, artichoke, cantaloupe, broccoli, brussel sprouts, carrots, beets and beet greens, orange juice sweet potato, baked potato, tomatos, yogurt, squash  5. Come back and see Dr. Jaime in 2 weeks, get blood work prior to visit.        Low-Salt Diet  This diet removes foods that are high in salt. It also limits the amount of salt you use when cooking. It is most often used for people with high blood pressure, edema (fluid retention), and kidney, liver, or heart disease.  Table salt contains the mineral sodium. Your body needs sodium to work normally. But too much sodium can make your health problems worse. Your healthcare provider is recommending a low-salt (also called low-sodium) diet for you. Your total daily allowance of salt is 1,500 to 2,300 milligrams (mg). It is less than 1 teaspoon of table salt. This means you can have only about 500 to 700 mg of sodium at each meal. People with certain health problems should limit salt intake to the lower end of the recommended range.    When you cook, don t add much salt. If you can cook without using salt, even better. Don t add salt to your food at the table.  When shopping, read food labels. Salt is often called sodium on the label. Choose foods that are salt-free, low salt, or very low salt. Note that foods with reduced salt may not lower your salt intake enough.    Beans, potatoes, and pasta  Ok: Dry beans, split peas, lentils, potatoes, rice, macaroni, pasta, spaghetti without added salt  Avoid: Potato chips, tortilla chips, and similar products  Breads and cereals  Ok: Low-sodium breads, rolls, cereals, and cakes; low-salt crackers, matzo crackers  Avoid: Salted crackers, pretzels,  popcorn, Romansh toast, pancakes, muffins  Dairy  Ok: Milk, chocolate milk, hot chocolate mix, low-salt cheeses, and yogurt  Avoid: Processed cheese and cheese spreads; Roquefort, Camembert, and cottage cheese; buttermilk, instant breakfast drink  Desserts  Ok: Ice cream, frozen yogurt, juice bars, gelatin, cookies and pies, sugar, honey, jelly, hard candy  Avoid: Most pies, cakes and cookies prepared or processed with salt; instant pudding  Drinks  Ok: Tea, coffee, fizzy (carbonated) drinks, juices  Avoid: Flavored coffees, electrolyte replacement drinks, sports drinks  Meats  Ok: All fresh meat, fish, poultry, low-salt tuna, eggs, egg substitute  Avoid: Smoked, pickled, brine-cured, or salted meats and fish. This includes abbott, chipped beef, corned beef, hot dogs, deli meats, ham, kosher meats, salt pork, sausage, canned tuna, salted codfish, smoked salmon, herring, sardines, or anchovies.  Seasonings and spices  Ok: Most seasonings are okay. Good substitutes for salt include: fresh herb blends, hot sauce, lemon, garlic, avila, vinegar, dry mustard, parsley, cilantro, horseradish, tomato paste, regular margarine, mayonnaise, unsalted butter, cream cheese, vegetable oil, cream, low-salt salad dressing and gravy.  Avoid: Regular ketchup, relishes, pickles, soy sauce, teriyaki sauce, Worcestershire sauce, BBQ sauce, tartar sauce, meat tenderizer, chili sauce, regular gravy, regular salad dressing, salted butter  Soups  Ok: Low-salt soups and broths made with allowed foods  Avoid: Bouillon cubes, soups with smoked or salted meats, regular soup and broth  Vegetables  Ok: Most vegetables are okay; also low-salt tomato and vegetable juices  Avoid: Sauerkraut and other brine-soaked vegetables; pickles and other pickled vegetables; tomato juice, olives  Date Last Reviewed: 8/1/2016 2000-2017 [employer]. [address] [place] [address], [place] [address]. All rights reserved. This information is not intended as a  substitute for professional medical care. Always follow your healthcare professional's instructions.

## 2017-07-07 NOTE — NURSING NOTE
"Chief Complaint   Patient presents with     Swelling     swelling in both legs for several months.  Also a red like area to check.      Musculoskeletal Problem     wondering if can get another hip injection.  Taking tylenol more often and wondering if affecting swelling.        Initial /67  Pulse 71  Temp 98.2  F (36.8  C) (Tympanic)  Ht 5' 2\" (1.575 m)  Wt 88 lb 3.2 oz (40 kg)  BMI 16.13 kg/m2 Estimated body mass index is 16.13 kg/(m^2) as calculated from the following:    Height as of this encounter: 5' 2\" (1.575 m).    Weight as of this encounter: 88 lb 3.2 oz (40 kg).  Medication Reconciliation: complete  "

## 2017-07-07 NOTE — PROGRESS NOTES
No fracture.  No significant hip arthritis is seen.  Continue with plan of care discussed during office visit

## 2017-07-07 NOTE — PROGRESS NOTES
SUBJECTIVE:                                                    Shanika Stahl is a 89 year old female who presents to clinic today for the following health issues:    Chief Complaint   Patient presents with     Swelling     swelling in both legs for several months.  Also a red like area to check.      Musculoskeletal Problem     wondering if can get another hip injection.  Taking tylenol more often and wondering if affecting swelling.    Use to be on something like HCTZ and wondering if needs to go back on.     CHF:  Last echo 12/2016 - EF 55-60%; severe TR, moderately severe pulmonary HTN; likely mild to moderate MR and AI. Appears MR and AI are relatively stable but TR is worse and pulmonary HTN is new  2 months worsening leg swelling.  Has mild dyspnea with exertion x 1-2 months.  No chest pain.  No orthopnea, PND.  Hasn't noted any weight gain.  No cough.    Cardiology notes mild intermittent leg swelling for years, but patient denies this.  She states this is the worst her legs have ever been swollen.    Wt Readings from Last 5 Encounters:   07/07/17 88 lb 3.2 oz (40 kg)   12/02/16 86 lb (39 kg)   11/01/16 88 lb 9.6 oz (40.2 kg)   09/30/16 86 lb 12.8 oz (39.4 kg)   02/16/16 91 lb (41.3 kg)     Back/hip pain:  Worsening pain, needing more Tylenol.  Son reports cortisone shot years ago, but isn't sure if the shot was done in the back or hip.  Son and patient are hoping for another shot to help with pain.    Xray lumbar spine 2016 - Degenerative disc disease and narrowing of the lumbosacral interspace which has developed since the previous study. In addition there has been interval development of low-grade degenerative spondylolisthesis of L5 on S1 of 6 mm of anterior subluxation of L5 on S1. Osteoporosis. Calcification of the abdominal aorta.     Current Outpatient Prescriptions   Medication Sig Dispense Refill     Acetaminophen (TYLENOL) 325 MG CAPS Take by mouth as needed       metoprolol (TOPROL-XL) 25 MG 24  "hr tablet TAKE 1 TABLET BY MOUTH TWIC E DAILY 180 tablet 0     JANTOVEN 2.5 MG tablet TAKE 1 TABLET BY MOUTH MONDAY AND FRIDAY AND TAKE 2 TABLETS ALL OTHER DAYS OR AS DIRECTED 144 tablet 0     simvastatin (ZOCOR) 20 MG tablet Take 1 tablet (20 mg) by mouth At Bedtime 90 tablet 3     meclizine (ANTIVERT) 25 MG tablet Take 1 tablet (25 mg) by mouth every 6 hours as needed for dizziness 30 tablet 1     loratadine (CLARITIN) 10 MG tablet Take 10 mg by mouth daily       Multiple Vitamins-Minerals (ONE-A-DAY WOMENS 50 PLUS PO) Take 1 tablet by mouth daily        fish oil-omega-3 fatty acids (OMEGA 3) 1000 MG capsule Take 1 capsule by mouth 2 times daily 90 capsule 0     CALCIUM + D 600-200 MG-UNIT PO TABS 1 tablet by mouth daily       VITAMIN D 1000 UNIT OR TABS 1 TABLET DAILY 30 0       Reviewed and updated as needed this visit by clinical staff  Tobacco  Allergies  Meds  Problems       Reviewed and updated as needed this visit by Provider  Allergies  Meds  Problems         ROS:  Constitutional, HEENT, cardiovascular, pulmonary, gi and gu systems are negative, except as otherwise noted.    OBJECTIVE:     /67  Pulse 71  Temp 98.2  F (36.8  C) (Tympanic)  Ht 5' 2\" (1.575 m)  Wt 88 lb 3.2 oz (40 kg)  BMI 16.13 kg/m2  Body mass index is 16.13 kg/(m^2).  GENERAL APPEARANCE: healthy, alert and no distress  RESP: lungs clear to auscultation - no rales, rhonchi or wheezes  CV: regular rate, irreg rhythm  LYMPHATICS: 3+ pitting edema bilateral legs to knees.  Skin: hemosiderin deposits bilateral   MS: no pain with palpation of bilateral hips, paralumbar muscles, lumbar spinous processes.  4/5 strength bilateral LE      ASSESSMENT/PLAN:       1. Pulmonary hypertension (H) - seen on Echo in dec.  Likely cause of her leg swelling.  Minimal respiratory symptoms, weight is stable.  consider cards visit if swelling not significantly improved at next visit    2. Leg swelling - recheck in 2 weeks.  BMP prior.    - " furosemide (LASIX) 20 MG tablet; Take 1 tablet (20 mg) by mouth daily  Dispense: 30 tablet; Refill: 0  - **Basic metabolic panel FUTURE anytime; Future    3. Bilateral hip pain - OA of hips vs spinal pathology.  - XR Pelvis 1/2 Views; Future  - ORTHO  REFERRAL  - PHYSICAL THERAPY REFERRAL      1. Xray of hips today  2. Referral to Sports Medicine for consideration of injection.  3. Consider physical therapy to improve strength  4. For leg swelling, start lasix 20 mg once daily.  Will make you urinate more.  Try to eat higher potassium foods.  High Potassium Foods: banana, apricot, artichoke, cantaloupe, broccoli, brussel sprouts, carrots, beets and beet greens, orange juice sweet potato, baked potato, tomatos, yogurt, squash  5. Come back and see Dr. Jaime in 2 weeks, get blood work prior to visit.    Milena Jaime,   [facility]

## 2017-07-07 NOTE — MR AVS SNAPSHOT
After Visit Summary   7/7/2017    Shanika Stahl    MRN: [id number]           Patient Information     Date Of Birth          5/26/1924        Visit Information        Provider Department      7/7/2017 10:40 AM Milena Jaime,  [facility]        Today's Diagnoses     Bilateral hip pain    -  1    Leg swelling          Care Instructions    1. Xray of hips today  2. Referral to Sports Medicine for consideration of injection.  3. Consider physical therapy to improve strength  4. For leg swelling, start lasix 20 mg once daily.  Will make you urinate more.  Try to eat higher potassium foods.  High Potassium Foods: banana, apricot, artichoke, cantaloupe, broccoli, brussel sprouts, carrots, beets and beet greens, orange juice sweet potato, baked potato, tomatos, yogurt, squash  5. Come back and see Dr. Jaime in 2 weeks, get blood work prior to visit.        Low-Salt Diet  This diet removes foods that are high in salt. It also limits the amount of salt you use when cooking. It is most often used for people with high blood pressure, edema (fluid retention), and kidney, liver, or heart disease.  Table salt contains the mineral sodium. Your body needs sodium to work normally. But too much sodium can make your health problems worse. Your healthcare provider is recommending a low-salt (also called low-sodium) diet for you. Your total daily allowance of salt is 1,500 to 2,300 milligrams (mg). It is less than 1 teaspoon of table salt. This means you can have only about 500 to 700 mg of sodium at each meal. People with certain health problems should limit salt intake to the lower end of the recommended range.    When you cook, don t add much salt. If you can cook without using salt, even better. Don t add salt to your food at the table.  When shopping, read food labels. Salt is often called sodium on the label. Choose foods that are salt-free, low salt, or very low salt. Note that foods with  reduced salt may not lower your salt intake enough.    Beans, potatoes, and pasta  Ok: Dry beans, split peas, lentils, potatoes, rice, macaroni, pasta, spaghetti without added salt  Avoid: Potato chips, tortilla chips, and similar products  Breads and cereals  Ok: Low-sodium breads, rolls, cereals, and cakes; low-salt crackers, matzo crackers  Avoid: Salted crackers, pretzels, popcorn, [place]ican toast, pancakes, muffins  Dairy  Ok: Milk, chocolate milk, hot chocolate mix, low-salt cheeses, and yogurt  Avoid: Processed cheese and cheese spreads; Roquefort, Camembert, and cottage cheese; buttermilk, instant breakfast drink  Desserts  Ok: Ice cream, frozen yogurt, juice bars, gelatin, cookies and pies, sugar, honey, jelly, hard candy  Avoid: Most pies, cakes and cookies prepared or processed with salt; instant pudding  Drinks  Ok: Tea, coffee, fizzy (carbonated) drinks, juices  Avoid: Flavored coffees, electrolyte replacement drinks, sports drinks  Meats  Ok: All fresh meat, fish, poultry, low-salt tuna, eggs, egg substitute  Avoid: Smoked, pickled, brine-cured, or salted meats and fish. This includes abbott, chipped beef, corned beef, hot dogs, deli meats, ham, kosher meats, salt pork, sausage, canned tuna, salted codfish, smoked salmon, herring, sardines, or anchovies.  Seasonings and spices  Ok: Most seasonings are okay. Good substitutes for salt include: fresh herb blends, hot sauce, lemon, garlic, avila, vinegar, dry mustard, parsley, cilantro, horseradish, tomato paste, regular margarine, mayonnaise, unsalted butter, cream cheese, vegetable oil, cream, low-salt salad dressing and gravy.  Avoid: Regular ketchup, relishes, pickles, soy sauce, teriyaki sauce, Worcestershire sauce, BBQ sauce, tartar sauce, meat tenderizer, chili sauce, regular gravy, regular salad dressing, salted butter  Soups  Ok: Low-salt soups and broths made with allowed foods  Avoid: Bouillon cubes, soups with smoked or salted meats, regular soup  and broth  Vegetables  Ok: Most vegetables are okay; also low-salt tomato and vegetable juices  Avoid: Sauerkraut and other brine-soaked vegetables; pickles and other pickled vegetables; tomato juice, olives  Date Last Reviewed: 8/1/2016 2000-2017 [employer]. [address] [place] [address]. All rights reserved. This information is not intended as a substitute for professional medical care. Always follow your healthcare professional's instructions.                Follow-ups after your visit        Additional Services     ORTHO  REFERRAL       [facility] is referring you to the Orthopedic  Services at [facility].       The  Representative will assist you in the coordination of your Orthopedic and Musculoskeletal Care as prescribed by your physician.    The  Representative will call you within 1 business day to help schedule your appointment, or you may contact the  Representative at:    All areas ~ [phone]     Type of Referral : Non Surgical       Timeframe requested: Within 2 weeks    Coverage of these services is subject to the terms and limitations of your health insurance plan.  Please call member services at your health plan with any benefit or coverage questions.      If X-rays, CT or MRI's have been performed, please contact the facility where they were done to arrange for , prior to your scheduled appointment.  Please bring this referral request to your appointment and present it to your specialist.            PHYSICAL THERAPY REFERRAL       *This therapy referral will be filtered to a centralized scheduling office at [facility] and the patient will receive a call to schedule an appointment at a [facility] location most convenient for them. *     [facility] provides Physical Therapy evaluation and treatment and many specialty services across  "the [facility] system.  If requesting a specialty program, please choose from the list below.    If you have not heard from the scheduling office within 2 business days, please call [phone] for all locations, with the exception of [place], please call [phone].  Treatment: Evaluation & Treatment  Special Instructions/Modalities:   Special Programs:     Please be aware that coverage of these services is subject to the terms and limitations of your health insurance plan.  Call member services at your health plan with any benefit or coverage questions.      **Note to Provider:  If you are referring outside of [facility] for the therapy appointment, please list the name of the location in the \"special instructions\" above, print the referral and give to the patient to schedule the appointment.                  Your next 10 appointments already scheduled     Jul 17, 2017 10:00 AM CDT   Remote PPM Check with [facility]   [facility] ([facility])    [address] [id number] [phone]           This appointment is for a remote check of your pacemaker.  This is not an appointment at the office.              Future tests that were ordered for you today     Open Future Orders        Priority Expected Expires Ordered    **Basic metabolic panel FUTURE anytime STAT 7/7/2017 7/7/2018 7/7/2017    XR Pelvis 1/2 Views Routine 7/7/2017 7/7/2018 7/7/2017            Who to contact     If you have questions or need follow up information about today's clinic visit or your schedule please contact [facility] directly at [phone].  Normal or non-critical lab and imaging results will be communicated to you by MyChart, letter or phone within 4 business days after the clinic has received the results. If you do not hear from us within 7 days, please contact the clinic through MyChart or phone. If you have a critical or abnormal lab result, we will notify you by " "phone as soon as possible.  Submit refill requests through [employer] or call your pharmacy and they will forward the refill request to us. Please allow 3 business days for your refill to be completed.          Additional Information About Your Visit        [employer] Information     [employer] lets you send messages to your doctor, view your test results, renew your prescriptions, schedule appointments and more. To sign up, go to [email or web address] . Click on \"Log in\" on the left side of the screen, which will take you to the Welcome page. Then click on \"Sign up Now\" on the right side of the page.     You will be asked to enter the access code listed below, as well as some personal information. Please follow the directions to create your username and password.     Your access code is: [email or web address]  Expires: 10/5/2017 11:02 AM     Your access code will  in 90 days. If you need help or a new code, please call your [facility] clinic or [phone].        Care EveryWhere ID     This is your Care EveryWhere ID. This could be used by other organizations to access your [facility] medical records  [id number]        Your Vitals Were     Pulse Temperature Height BMI (Body Mass Index)          71 98.2  F (36.8  C) (Tympanic) 5' 2\" (1.575 m) 16.13 kg/m2         Blood Pressure from Last 3 Encounters:   17 151/67   16 150/68   16 154/82    Weight from Last 3 Encounters:   17 88 lb 3.2 oz (40 kg)   16 86 lb (39 kg)   16 88 lb 9.6 oz (40.2 kg)              We Performed the Following     ORTHO  REFERRAL     PHYSICAL THERAPY REFERRAL          Today's Medication Changes          These changes are accurate as of: 17 11:02 AM.  If you have any questions, ask your nurse or doctor.               Start taking these medicines.        Dose/Directions    furosemide 20 MG tablet   Commonly known as:  LASIX   Used for:  Leg swelling   Started by:  Milena Jaime, DO        Dose:  20 mg "   Take 1 tablet (20 mg) by mouth daily   Quantity:  30 tablet   Refills:  0            Where to get your medicines      These medications were sent to [id number] - [place], [place] - [address]  [address], [facility] [address]     Phone:  [phone]     furosemide 20 MG tablet                Primary Care Provider Office Phone # Fax #    Milena Jaime, [phone]       [facility] [address] [address]        Equal Access to Services     JAYCEE MOLINA : Hadii aad ku hadasho Soomaali, waaxda luqadaha, qaybta kaalmada adeegyada, waxtejas gillis . So [facility] [phone].    ATENCIÓN: Si habla español, tiene a holly disposición servicios gratuitos de asistencia lingüística. Llame al [phone].    We comply with applicable federal civil rights laws and [place] laws. We do not discriminate on the basis of race, color, national origin, age, disability sex, sexual orientation or gender identity.            Thank you!     Thank you for choosing [facility]  for your care. Our goal is always to provide you with excellent care. Hearing back from our patients is one way we can continue to improve our services. Please take a few minutes to complete the written survey that you may receive in the mail after your visit with us. Thank you!             Your Updated Medication List - Protect others around you: Learn how to safely use, store and throw away your medicines at www.disposemymeds.org.          This list is accurate as of: 7/7/17 11:02 AM.  Always use your most recent med list.                   Brand Name Dispense Instructions for use Diagnosis    calcium + D 600-200 MG-UNIT Tabs   Generic drug:  calcium carbonate-vitamin D      1 tablet by mouth daily        cholecalciferol 1000 UNIT tablet    vitamin D    30    1 TABLET DAILY        fish oil-omega-3 fatty acids 1000 MG capsule     90 capsule     Take 1 capsule by mouth 2 times daily        furosemide 20 MG tablet    LASIX    30 tablet    Take 1 tablet (20 mg) by mouth daily    Leg swelling       JANTOVEN 2.5 MG tablet   Generic drug:  warfarin     144 tablet    TAKE 1 TABLET BY MOUTH MONDAY AND FRIDAY AND TAKE 2 TABLETS ALL OTHER DAYS OR AS DIRECTED    Chronic atrial fibrillation (H)       loratadine 10 MG tablet    CLARITIN     Take 10 mg by mouth daily        meclizine 25 MG tablet    ANTIVERT    30 tablet    Take 1 tablet (25 mg) by mouth every 6 hours as needed for dizziness        metoprolol 25 MG 24 hr tablet    TOPROL-XL    180 tablet    TAKE 1 TABLET BY MOUTH TWIC E DAILY    Renal hypertension, stage 1-4 or unspecified chronic kidney disease       ONE-A-DAY WOMENS 50 PLUS PO      Take 1 tablet by mouth daily        simvastatin 20 MG tablet    ZOCOR    90 tablet    Take 1 tablet (20 mg) by mouth At Bedtime    Hyperlipidemia LDL goal <100       TYLENOL 325 MG Caps   Generic drug:  Acetaminophen      Take by mouth as needed

## 2017-07-17 ENCOUNTER — HOSPITAL ENCOUNTER (OUTPATIENT)
Dept: CARDIOLOGY | Facility: CLINIC | Age: 82
Discharge: HOME | End: 2017-07-17
Attending: INTERNAL MEDICINE | Admitting: INTERNAL MEDICINE
Payer: MEDICARE

## 2017-07-17 PROCEDURE — 93296 REM INTERROG EVL PM/IDS: CPT

## 2017-07-17 PROCEDURE — 93294 REM INTERROG EVL PM/LDLS PM: CPT | Performed by: INTERNAL MEDICINE

## 2017-07-17 NOTE — PROGRESS NOTES
[facility] Remote PPM Device Check  AP: N/A  % : 71.1 %  Mode: VVIR        Presenting Rhythm:   Heart Rate: Adequate per the histograms.  Sensing: Stable    Pacing Threshold: Stable    Impedance: Stable  Battery Status: 4.5-7 yrs remain.  Atrial Arrhythmia: Chronic A-Fib. Pt taking Warfarin.  Ventricular Arrhythmia: None.     Care Plan: Annual thresholds due. Pt called with results and appt time.

## 2017-07-26 ENCOUNTER — OFFICE VISIT (OUTPATIENT)
Dept: ORTHOPEDICS | Facility: CLINIC | Age: 82
End: 2017-07-26
Payer: MEDICARE

## 2017-07-26 VITALS
DIASTOLIC BLOOD PRESSURE: 76 MMHG | BODY MASS INDEX: 15.7 KG/M2 | WEIGHT: 85.3 LBS | SYSTOLIC BLOOD PRESSURE: 112 MMHG | HEIGHT: 62 IN

## 2017-07-26 DIAGNOSIS — M43.16 SPONDYLOLISTHESIS OF LUMBAR REGION: Primary | ICD-10-CM

## 2017-07-26 DIAGNOSIS — M79.89 LEG SWELLING: ICD-10-CM

## 2017-07-26 LAB
ANION GAP SERPL CALCULATED.3IONS-SCNC: 5 MMOL/L (ref 3–14)
BUN SERPL-MCNC: 25 MG/DL (ref 7–30)
CALCIUM SERPL-MCNC: 9.3 MG/DL (ref 8.5–10.1)
CHLORIDE SERPL-SCNC: 107 MMOL/L (ref 94–109)
CO2 SERPL-SCNC: 29 MMOL/L (ref 20–32)
CREAT SERPL-MCNC: 0.73 MG/DL (ref 0.52–1.04)
GFR SERPL CREATININE-BSD FRML MDRD: 74 ML/MIN/1.7M2
GLUCOSE SERPL-MCNC: 106 MG/DL (ref 70–99)
POTASSIUM SERPL-SCNC: 4 MMOL/L (ref 3.4–5.3)
SODIUM SERPL-SCNC: 141 MMOL/L (ref 133–144)

## 2017-07-26 PROCEDURE — 36415 COLL VENOUS BLD VENIPUNCTURE: CPT | Performed by: INTERNAL MEDICINE

## 2017-07-26 PROCEDURE — 80048 BASIC METABOLIC PNL TOTAL CA: CPT | Performed by: INTERNAL MEDICINE

## 2017-07-26 PROCEDURE — 99204 OFFICE O/P NEW MOD 45 MIN: CPT | Performed by: PEDIATRICS

## 2017-07-26 NOTE — PROGRESS NOTES
Sports Medicine Clinic Visit    PCP: Milena Jaime EMILY Stahl is a 89 year old female who is seen  in consultation at the request of Milena Jaime presenting with low back pain.  - Patient is seen with her son and is a difficult historian.    Injury: Patient reports low back pain, both chronically and over the past 2 weeks.  No injury or overuse.  Pain is across her low back, worse on the left side.  Worse when she's getting up from a seated position.  No radiating pain into legs, no numbness or tingling.  - Possibly had a steroid injection some time ago, is unsure where (hip or back)    Location of Pain: lower back  Duration of Pain: 2 week(s)  Rating of Pain at worst: 9/10  Rating of Pain Currently: 2/10  Symptoms are better with: sitting, lying down  Symptoms are worse with: first thing in the morning, bending, sit to stand transition   Additional Features:   Positive: pain   Negative: swelling, bruising, popping, grinding, catching, locking, instability, paresthesias, numbness, weakness, pain in other joints and systemic symptoms  Other evaluation and/or treatments so far consists of: physical therapy, tylenol, heat patches  Prior History of related problems: previous back pain years ago    Social History: She lives with her son    Review of Systems  Skin: no bruising, no swelling  Musculoskeletal: as above  Neurologic: no numbness, paresthesias  Remainder of review of systems is negative including constitutional, CV, pulmonary, GI, except as noted in HPI or medical history.    Patient's current problem list, past medical and surgical history, and family history were reviewed.    Patient Active Problem List   Diagnosis     Renal hypertension     Atherosclerosis of renal artery (H)     Paroxysmal supraventricular tachycardia (H)     Other osteoporosis     Abnormal CXR (chest x-ray)     Family history of breast cancer     Hyperlipidemia LDL goal <100     Advanced directives, counseling/discussion  "    Syncope     Cerebral embolism with cerebral infarction (H)     Health Care Home     Long term current use of anticoagulant therapy     Chronic atrial fibrillation (H)     Pacemaker     Sensorineural hearing loss, bilateral     Past Medical History:   Diagnosis Date     Abnormal CXR (chest x-ray)     Read as emphysema     Atherosclerosis of renal artery (H)      Atrial fibrillation (H) 3/27/2014     Basal cell carcinoma      ESSENTIAL TREMOR 5/9/2005     Family history of breast cancer      Hyperlipidemia LDL goal <100 10/31/2010    CHOL      175   7/6/2011 HDL       69   7/6/2011 LDL       93   7/6/2011 TRIG       61   7/6/2011 CHOLHDLRATIO      3.0   7/6/2011 On simvastatin 20mg      Hypertension goal BP (blood pressure) < 140/90 10/3/2013     Other and unspecified hyperlipidemia      Other osteoporosis      Pacemaker      Paroxysmal supraventricular tachycardia (H)      Postmenopausal atrophic vaginitis      Stroke (H) 2/10/2014     Syncope 10/9/2013     Unspecified essential hypertension      Past Surgical History:   Procedure Laterality Date     EYE SURGERY  2/21/12    left eye cataract     IMPLANT PACEMAKER       SURGICAL HISTORY OF -   1965    FEMORAL HERNIA REPAIR     Family History   Problem Relation Age of Onset     CEREBROVASCULAR DISEASE Mother      DIABETES Mother      Breast Cancer Sister      HEART DISEASE Brother      CEREBROVASCULAR DISEASE Brother      HEART DISEASE Brother      Prostate Cancer Brother      HEART DISEASE Brother      HEART DISEASE Sister      Gynecology Daughter      CANCER Son      Lung       Objective  /76  Ht 5' 2\" (1.575 m)  Wt 85 lb 4.8 oz (38.7 kg)  BMI 15.6 kg/m2    GENERAL APPEARANCE: healthy and alert   GAIT: antalgic  SKIN: no suspicious lesions or rashes  HEENT: Sclera clear, anicteric  CV: good peripheral pulses  RESP: Breathing not labored  NEURO: Normal strength and tone and mentation intact  PSYCH:  mentation appears normal and affect flat    Low back " exam:    Inspection:     no visible deformity in the low back       normal skin       normal vascular       normal lymphatic    Posture:      rounded shoulders and upper back    Tender:     midline       paraspinal muscles    Non Tender:     remainder of lumbar spine    ROM:      limited flexion due to pain       limited extension due to pain    Strength:     hip flexion 5/5 bilateral       knee extension 5/5 bilateral       ankle dorsiflexion 5/5 bilateral       ankle plantarflexion 5/5 bilateral       dorsiflexion of the great toe 5/5 bilateral    Reflexes:     patellar (L3, L4) symmetric normal       achilles tendons (S1) symmetric normal    Sensation:    grossly intact throughout lower extremities    Special tests:      straight leg raise left positive with pain in low back        straight leg raise right positive with pain in low back       positive (+) MIRNA  with pain in low back       positive (+) FADIR  with pain in low back    Bilateral hip exam    Inspection:      no edema or ecchymosis in hip area    Tender:      SI joint left    Non Tender:      remainder of the hip area bilateral    ROM:     Full active and passive ROM  bilateral    Strength:      flexion 4/5 bilateral       abduction 4/5 bilateral       adduction 4/5 bilateral    Sensation:      grossly intact in hip and thigh    Radiology  I visualized and reviewed these images with the patient  LUMBAR SPINE TWO TO THREE VIEWS September 30, 2016 10:57 AM   HISTORY: Low back pain.  COMPARISON: 1/8/2009.  IMPRESSION: Degenerative disc disease and narrowing of the lumbosacral  interspace which has developed since the previous study. In addition  there has been interval development of low-grade degenerative  spondylolisthesis of L5 on S1 of 6 mm of anterior subluxation of L5 on  S1. Osteoporosis. Calcification of the abdominal aorta.     XR PELVIS 1/2 VW 7/7/2017 11:30 AM   HISTORY: Bilateral hip pain  COMPARISON: None  IMPRESSION: No evidence of  fracture on this single view. The hip joint  spaces are maintained.    Assessment:  1. Spondylolisthesis of lumbar region      Low back pain with evidence of spondylolisthesis on prior x-ray.  Unlikely hip joint related pain given today's exam.  I discussed that she would likely benefit from physical therapy given her core weakness and pain with transitions.  We discussed referral to PM&R for consideration of injections in low back as well.    Plan:  - Today's Plan of Care:  Referral to PM&R  Begin physical therapy as ordered    Follow Up: as needed    Concerning signs and symptoms were reviewed.  The patient expressed understanding of this management plan and all questions were answered at this time.    Thanks for the opportunity to participate in the care of this patient, I will keep you updated on their progress.    CC: Milena Morales MD [place]  Primary Care Sports Medicine  [facility]

## 2017-07-26 NOTE — MR AVS SNAPSHOT
After Visit Summary   7/26/2017    Shanika Stahl    MRN: [id number]           Patient Information     Date Of Birth          5/26/1924        Visit Information        Provider Department      7/26/2017 2:20 PM Carey Morales MD [facility] Orthopedic [facility]        Today's Diagnoses     Spondylolisthesis    -  1      Care Instructions    Plan:  - Today's Plan of Care:  Referral to a Spine Surgeon  Begin physical therapy as ordered    Follow Up: as needed              Follow-ups after your visit        Additional Services     ORTHO  REFERRAL       [facility] is referring you to the Orthopedic  Services at [facility].       The  Representative will assist you in the coordination of your Orthopedic and Musculoskeletal Care as prescribed by your physician.    The  Representative will call you within 24 hours to help schedule your appointment, or you may contact the  Representative at:    [facility] ~ [phone]  [facility] ~ [phone]  [facility] ~ [phone]    Type of Referral : Spine: Lumbar  **Choose Medical Spine Specialist (unless patient was seen by a Medical Spine Specialist within the past 6 months).**  Surgical Evaluation is advised if the patient presents with one or more of the following red flags: Evidence of Spinal Tumor, Infection or Fracture, Cauda Equina Syndrome, Sudden or Progressive Weakness, Loss of Bowel or Bladder Control, or any other documented emergent neurological condition resulting from a Lumbar Spinal Condition. Spine Surgeon  - Dr. Martinez      Timeframe requested: 3 - 5 days     Coverage of these services is subject to the terms and limitations of your health insurance plan.  Please call member services at your health plan with any benefit or coverage questions.      If X-rays, CT or MRI's have been performed, please contact  "the facility where they were done to arrange for , prior to your scheduled appointment.  Please bring this referral request to your appointment and present it to your specialist.                  Your next 10 appointments already scheduled     Jul 31, 2017  9:40 AM CDT   Office [facility] Milena Jaime DO   [facility]    [address] [facility] [address] [phone]           Bring a current list of meds and any records pertaining to this visit. For Physicals, please bring immunization records and any forms needing to be filled out. Please arrive 10 minutes early to complete paperwork.            Oct 10, 2017 10:00 AM CDT   Pacemaker Check with [place] [facility]   [facility] ([facility])    [address] [address] [phone]              Who to contact     If you have questions or need follow up information about today's clinic visit or your schedule please contact [facility] directly at [phone].  Normal or non-critical lab and imaging results will be communicated to you by Zen[employer], letter or phone within 4 business days after the clinic has received the results. If you do not hear from us within 7 days, please contact the clinic through Zenprise[employer] or phone. If you have a critical or abnormal lab result, we will notify you by phone as soon as possible.  Submit refill requests through [employer] or call your pharmacy and they will forward the refill request to us. Please allow 3 business days for your refill to be completed.          Additional Information About Your Visit        [employer] Information     [employer] lets you send messages to your doctor, view your test results, renew your prescriptions, schedule appointments and more. To sign up, go to [email or web address] . Click on \"Log in\" on the left side of the screen, which will take you to the Welcome " "page. Then click on \"Sign up Now\" on the right side of the page.     You will be asked to enter the access code listed below, as well as some personal information. Please follow the directions to create your username and password.     Your access code is: [email or web address]  Expires: 10/5/2017 11:02 AM     Your access code will  in 90 days. If you need help or a new code, please call your [place] clinic or [phone].        Care EveryWhere ID     This is your Care EveryWhere ID. This could be used by other organizations to access your [place] medical records  [id number]        Your Vitals Were     Height BMI (Body Mass Index)                5' 2\" (1.575 m) 15.6 kg/m2           Blood Pressure from Last 3 Encounters:   17 112/76   17 123/58   16 150/68    Weight from Last 3 Encounters:   17 85 lb 4.8 oz (38.7 kg)   17 88 lb 3.2 oz (40 kg)   16 86 lb (39 kg)              We Performed the Following     ORTHO  REFERRAL        Primary Care Provider Office Phone # Fax #    Milena Taylor Jaime [phone]       [facility] [address] [address]        Equal Access to Services     JAYCEE MOLINA AH: Hadii aad ku hadasho Soomaali, waaxda luqadaha, qaybta kaalmada adestanley, shaquille garcia. So [facility] [phone].    ATENCIÓN: Si habla español, tiene a holly disposición servicios gratuitos de asistencia lingüística. [place] al [phone].    We comply with applicable federal civil rights laws and [place] laws. We do not discriminate on the basis of race, color, national origin, age, disability sex, sexual orientation or gender identity.            Thank you!     Thank you for choosing [facility]  for your care. Our goal is always to provide you with excellent care. Hearing back from our patients is one way we can continue to improve our services. Please take a few minutes to " complete the written survey that you may receive in the mail after your visit with us. Thank you!             Your Updated Medication List - Protect others around you: Learn how to safely use, store and throw away your medicines at www.disposemymeds.org.          This list is accurate as of: 7/26/17  3:17 PM.  Always use your most recent med list.                   Brand Name Dispense Instructions for use Diagnosis    calcium + D 600-200 MG-UNIT Tabs   Generic drug:  calcium carbonate-vitamin D      1 tablet by mouth daily        cholecalciferol 1000 UNIT tablet    vitamin D    30    1 TABLET DAILY        fish oil-omega-3 fatty acids 1000 MG capsule     90 capsule    Take 1 capsule by mouth 2 times daily        furosemide 20 MG tablet    LASIX    30 tablet    Take 1 tablet (20 mg) by mouth daily    Leg swelling       JANTOVEN 2.5 MG tablet   Generic drug:  warfarin     144 tablet    TAKE 1 TABLET BY MOUTH MONDAY AND FRIDAY AND TAKE 2 TABLETS ALL OTHER DAYS OR AS DIRECTED    Chronic atrial fibrillation (H)       loratadine 10 MG tablet    CLARITIN     Take 10 mg by mouth daily        meclizine 25 MG tablet    ANTIVERT    30 tablet    Take 1 tablet (25 mg) by mouth every 6 hours as needed for dizziness        metoprolol 25 MG 24 hr tablet    TOPROL-XL    180 tablet    TAKE 1 TABLET BY MOUTH TWIC E DAILY    Renal hypertension, stage 1-4 or unspecified chronic kidney disease       ONE-A-DAY WOMENS 50 PLUS PO      Take 1 tablet by mouth daily        simvastatin 20 MG tablet    ZOCOR    90 tablet    Take 1 tablet (20 mg) by mouth At Bedtime    Hyperlipidemia LDL goal <100       TYLENOL 325 MG Caps   Generic drug:  Acetaminophen      Take by mouth as needed

## 2017-07-26 NOTE — PATIENT INSTRUCTIONS
Plan:  - Today's Plan of Care:  Referral to a Spine Surgeon  Begin physical therapy as ordered    Follow Up: as needed

## 2017-07-31 ENCOUNTER — OFFICE VISIT (OUTPATIENT)
Dept: FAMILY MEDICINE | Facility: CLINIC | Age: 82
End: 2017-07-31
Payer: MEDICARE

## 2017-07-31 VITALS
HEART RATE: 68 BPM | BODY MASS INDEX: 15.36 KG/M2 | SYSTOLIC BLOOD PRESSURE: 130 MMHG | WEIGHT: 84 LBS | OXYGEN SATURATION: 97 % | DIASTOLIC BLOOD PRESSURE: 80 MMHG

## 2017-07-31 DIAGNOSIS — I27.20 PULMONARY HYPERTENSION (H): ICD-10-CM

## 2017-07-31 DIAGNOSIS — M79.89 LEG SWELLING: Primary | ICD-10-CM

## 2017-07-31 PROCEDURE — 99213 OFFICE O/P EST LOW 20 MIN: CPT | Performed by: INTERNAL MEDICINE

## 2017-07-31 RX ORDER — FUROSEMIDE 20 MG
20 TABLET ORAL EVERY OTHER DAY
Qty: 45 TABLET | Refills: 3 | Status: SHIPPED | OUTPATIENT
Start: 2017-07-31 | End: 2018-06-06

## 2017-07-31 NOTE — PROGRESS NOTES
"  SUBJECTIVE:                                                    Shanika Stahl is a 89 year old female who presents to clinic today for the following health issues:      Follow up Edema, states it is better but her left leg had some pain.      Last visit 7/7 for leg swelling \"2 months worsening leg swelling.  Has mild dyspnea with exertion x 1-2 months.  No chest pain.  No orthopnea, PND.  Hasn't noted any weight gain.  No cough.    Cardiology notes mild intermittent leg swelling for years, but patient denies this.  She states this is the worst her legs have ever been swollen\"  We started lasix 20 mg daily.  BMP recheck is normal.  Was consider cardiology referral due to degree of pulm hypertension on echo in 2016.  She had 3+ edema in legs.    She and son report the leg swelling is still present but much improved.  She reports she tolerated the lasix well, did not notice any significant urinary frequency, other side effects.  She hasn't noted any change in her breathing.  She has tried compression stockings in the past but they were too tight.    Wt Readings from Last 5 Encounters:   07/31/17 84 lb (38.1 kg)   07/26/17 85 lb 4.8 oz (38.7 kg)   07/07/17 88 lb 3.2 oz (40 kg)   12/02/16 86 lb (39 kg)   11/01/16 88 lb 9.6 oz (40.2 kg)       Current Outpatient Prescriptions   Medication Sig Dispense Refill     furosemide (LASIX) 20 MG tablet Take 1 tablet (20 mg) by mouth daily 30 tablet 0     metoprolol (TOPROL-XL) 25 MG 24 hr tablet TAKE 1 TABLET BY MOUTH TWIC E DAILY 180 tablet 0     JANTOVEN 2.5 MG tablet TAKE 1 TABLET BY MOUTH MONDAY AND FRIDAY AND TAKE 2 TABLETS ALL OTHER DAYS OR AS DIRECTED 144 tablet 0     simvastatin (ZOCOR) 20 MG tablet Take 1 tablet (20 mg) by mouth At Bedtime 90 tablet 3     meclizine (ANTIVERT) 25 MG tablet Take 1 tablet (25 mg) by mouth every 6 hours as needed for dizziness 30 tablet 1     loratadine (CLARITIN) 10 MG tablet Take 10 mg by mouth daily       Multiple Vitamins-Minerals " (ONE-A-DAY WOMENS 50 PLUS PO) Take 1 tablet by mouth daily        fish oil-omega-3 fatty acids (OMEGA 3) 1000 MG capsule Take 1 capsule by mouth 2 times daily 90 capsule 0     CALCIUM + D 600-200 MG-UNIT PO TABS 1 tablet by mouth daily       VITAMIN D 1000 UNIT OR TABS 1 TABLET DAILY 30 0     Acetaminophen (TYLENOL) 325 MG CAPS Take by mouth as needed         Reviewed and updated as needed this visit by clinical staff  Allergies  Meds  Problems       Reviewed and updated as needed this visit by Provider  Allergies  Meds  Problems         ROS:  Constitutional, HEENT, cardiovascular, pulmonary, gi and gu systems are negative, except as otherwise noted.      OBJECTIVE:   /60 (BP Location: Right arm, Patient Position: Chair, Cuff Size: Adult Regular)  Pulse 68  Wt 84 lb (38.1 kg)  SpO2 97%  BMI 15.36 kg/m2  Body mass index is 15.36 kg/(m^2).  GENERAL APPEARANCE: healthy, alert, no distress and very thin  RESP: lungs clear to auscultation - no rales, rhonchi or wheezes  CV: irreg irreg  Lymph: 1-2+ pitting edema to mid-shin bilateral   Skin:  Hemosiderin deposits in bilateral lower legs    Diagnostic Test Results:  Results for orders placed or performed in visit on 07/26/17   **Basic metabolic panel FUTURE anytime   Result Value Ref Range    Sodium 141 133 - 144 mmol/L    Potassium 4.0 3.4 - 5.3 mmol/L    Chloride 107 94 - 109 mmol/L    Carbon Dioxide 29 20 - 32 mmol/L    Anion Gap 5 3 - 14 mmol/L    Glucose 106 (H) 70 - 99 mg/dL    Urea Nitrogen 25 7 - 30 mg/dL    Creatinine 0.73 0.52 - 1.04 mg/dL    GFR Estimate 74 >60 mL/min/1.7m2    GFR Estimate If Black 89 >60 mL/min/1.7m2    Calcium 9.3 8.5 - 10.1 mg/dL       ASSESSMENT/PLAN:     1. Leg swelling - improved.  Continue low dose intermittent lasix, start compression if tolerates  - order for DME; Knee high compression stockings 10-15 mm Hg  Dispense: 1 each; Refill: 1  - furosemide (LASIX) 20 MG tablet; Take 1 tablet (20 mg) by mouth every other day   Dispense: 45 tablet; Refill: 3    2. Pulmonary hypertension (H) - no pulm symptoms.    1. Order written for compression stockings  2. Take the lasix 20 mg every other day.  Do not take if feeling dizzy, prone to dehydration (sitting out in sun for the day, stomach flu with vomiting or diarrhea).  3. Agree with plan for physical therapy for back/hip pain    Milena Jaime, DO  [facility]

## 2017-07-31 NOTE — NURSING NOTE
"Initial /60 (BP Location: Right arm, Patient Position: Chair, Cuff Size: Adult Regular)  Pulse 68  Wt 84 lb (38.1 kg)  SpO2 97%  BMI 15.36 kg/m2 Estimated body mass index is 15.36 kg/(m^2) as calculated from the following:    Height as of 7/26/17: 5' 2\" (1.575 m).    Weight as of this encounter: 84 lb (38.1 kg). .    Madelin Cheng    "

## 2017-07-31 NOTE — MR AVS SNAPSHOT
After Visit Summary   7/31/2017    Shanika Stahl    MRN: [id number]           Patient Information     Date Of Birth          5/26/1924        Visit Information        Provider Department      7/31/2017 9:40 AM Milena Jaime, DO [facility]        Today's Diagnoses     Leg swelling    -  1      Care Instructions    1. Order written for compression stockings  2. Take the lasix 20 mg every other day.  Do not take if feeling dizzy, prone to dehydration (sitting out in sun for the day, stomach flu with vomiting or diarrhea).  3. Agree with plan for physical therapy           Follow-ups after your visit        Your next 10 appointments already scheduled     Aug 01, 2017  3:00 PM CDT   Evaluation with Ann Luu PT   [facility] ([facility])    [address]  [address]  [facility] [place] [facility] [place] [address] [phone]            Oct 10, 2017 10:00 AM CDT   Pacemaker Check with [place] [facility]   [facility] ([facility])    [address] [id number] [phone]              Who to contact     If you have questions or need follow up information about today's clinic visit or your schedule please contact [facility] directly at [phone].  Normal or non-critical lab and imaging results will be communicated to you by [employer], letter or phone within 4 business days after the clinic has received the results. If you do not hear from us within 7 days, please contact the clinic through [employer] or phone. If you have a critical or abnormal lab result, we will notify you by phone as soon as possible.  Submit refill requests through [employer] or call your pharmacy and they will forward the refill request to us. Please allow 3 business days for your refill to be completed.          Additional Information About Your Visit        [employer] Information     [employer] lets you send messages to your doctor,  "view your test results, renew your prescriptions, schedule appointments and more. To sign up, go to [email or web address] . Click on \"Log in\" on the left side of the screen, which will take you to the Welcome page. Then click on \"Sign up Now\" on the right side of the page.     You will be asked to enter the access code listed below, as well as some personal information. Please follow the directions to create your username and password.     Your access code is: [email or web address]  Expires: 10/5/2017 11:02 AM     Your access code will  in 90 days. If you need help or a new code, please call your [place] clinic or [phone].        Care EveryWhere ID     This is your Care EveryWhere ID. This could be used by other organizations to access your [facility] medical records  [id number]        Your Vitals Were     Pulse Pulse Oximetry BMI (Body Mass Index)             68 97% 15.36 kg/m2          Blood Pressure from Last 3 Encounters:   17 160/60   17 112/76   17 123/58    Weight from Last 3 Encounters:   17 84 lb (38.1 kg)   17 85 lb 4.8 oz (38.7 kg)   17 88 lb 3.2 oz (40 kg)              Today, you had the following     No orders found for display         Today's Medication Changes          These changes are accurate as of: 17 10:08 AM.  If you have any questions, ask your nurse or doctor.               Start taking these medicines.        Dose/Directions    order for DME   Used for:  Leg swelling        Knee high compression stockings 10-15 mm Hg   Quantity:  1 each   Refills:  1         These medicines have changed or have updated prescriptions.        Dose/Directions    furosemide 20 MG tablet   Commonly known as:  LASIX   This may have changed:  when to take this   Used for:  Leg swelling        Dose:  20 mg   Take 1 tablet (20 mg) by mouth every other day   Quantity:  45 tablet   Refills:  3            Where to get your medicines      [facility] [place], [place] - [address]  [address], [facility] [place] [facility] [address]     Phone:  [phone]     furosemide 20 MG tablet         Some of these will need a paper prescription and others can be bought over the counter.  Ask your nurse if you have questions.     Bring a paper prescription for each of these medications     order for DME                Primary Care Provider Office Phone # Fax #    Milena Jaime,  [phone]       [facility] [address] [address]        Equal Access to Services     JAYCEE MOLINA : Hadii matthew ku hadasho Soomaali, waaxda luqadaha, qaybta kaalmada adeletyyabaylee, shaquille garcia. So [facility] [phone].    ATENCIÓN: Si habla español, tiene a holly disposición servicios gratuitos de asistencia lingüística. [place] [facility] [phone].    We comply with applicable federal civil rights laws and [place] laws. We do not discriminate on the basis of race, color, national origin, age, disability sex, sexual orientation or gender identity.            Thank you!     Thank you for choosing [facility]  for your care. Our goal is always to provide you with excellent care. Hearing back from our patients is one way we can continue to improve our services. Please take a few minutes to complete the written survey that you may receive in the mail after your visit with us. Thank you!             Your Updated Medication List - Protect others around you: Learn how to safely use, store and throw away your medicines at www.disposemymeds.org.          This list is accurate as of: 7/31/17 10:08 AM.  Always use your most recent med list.                   Brand Name Dispense Instructions for use Diagnosis    calcium + D 600-200 MG-UNIT Tabs   Generic drug:  calcium carbonate-vitamin D      1 tablet by mouth daily        cholecalciferol 1000 UNIT tablet    vitamin D    30    1 TABLET DAILY        fish  oil-omega-3 fatty acids 1000 MG capsule     90 capsule    Take 1 capsule by mouth 2 times daily        furosemide 20 MG tablet    LASIX    45 tablet    Take 1 tablet (20 mg) by mouth every other day    Leg swelling       JANTOVEN 2.5 MG tablet   Generic drug:  warfarin     144 tablet    TAKE 1 TABLET BY MOUTH MONDAY AND FRIDAY AND TAKE 2 TABLETS ALL OTHER DAYS OR AS DIRECTED    Chronic atrial fibrillation (H)       loratadine 10 MG tablet    CLARITIN     Take 10 mg by mouth daily        meclizine 25 MG tablet    ANTIVERT    30 tablet    Take 1 tablet (25 mg) by mouth every 6 hours as needed for dizziness        metoprolol 25 MG 24 hr tablet    TOPROL-XL    180 tablet    TAKE 1 TABLET BY MOUTH TWIC E DAILY    Renal hypertension, stage 1-4 or unspecified chronic kidney disease       ONE-A-DAY WOMENS 50 PLUS PO      Take 1 tablet by mouth daily        order for DME     1 each    Knee high compression stockings 10-15 mm Hg    Leg swelling       simvastatin 20 MG tablet    ZOCOR    90 tablet    Take 1 tablet (20 mg) by mouth At Bedtime    Hyperlipidemia LDL goal <100       TYLENOL 325 MG Caps   Generic drug:  Acetaminophen      Take by mouth as needed

## 2017-08-01 ENCOUNTER — HOSPITAL ENCOUNTER (OUTPATIENT)
Dept: PHYSICAL THERAPY | Facility: CLINIC | Age: 82
Setting detail: THERAPIES SERIES
End: 2017-08-01
Attending: PEDIATRICS
Payer: MEDICARE

## 2017-08-01 PROCEDURE — 97161 PT EVAL LOW COMPLEX 20 MIN: CPT | Mod: GP | Performed by: PHYSICAL THERAPIST

## 2017-08-01 PROCEDURE — G8982 BODY POS GOAL STATUS: HCPCS | Mod: GP,CI | Performed by: PHYSICAL THERAPIST

## 2017-08-01 PROCEDURE — G8981 BODY POS CURRENT STATUS: HCPCS | Mod: GP,CJ | Performed by: PHYSICAL THERAPIST

## 2017-08-01 PROCEDURE — 97110 THERAPEUTIC EXERCISES: CPT | Mod: GP | Performed by: PHYSICAL THERAPIST

## 2017-08-01 PROCEDURE — 40000718 ZZHC STATISTIC PT DEPARTMENT ORTHO VISIT: Performed by: PHYSICAL THERAPIST

## 2017-08-01 NOTE — PROGRESS NOTES
[facility]          OUTPATIENT PHYSICAL THERAPY ORTHOPEDIC EVALUATION  PLAN OF TREATMENT FOR OUTPATIENT REHABILITATION  (COMPLETE FOR INITIAL CLAIMS ONLY)  Patient's Last Name, First Name, M.I.  YOB: 1924  FavioShanika  W    Provider s Name:  [facility]   Medical Record No.  [id number]   Start of Care Date:  08/01/17   Onset Date:  04/01/17   Type:     _X__PT   ___OT   ___SLP Medical Diagnosis:  lumbar spondylolisthesis     PT Diagnosis:  LBP, spondylolisthesis   Visits from SOC:  1      _________________________________________________________________________________  Plan of Treatment/Functional Goals:  strengthening, stretching, manual therapy  Heat/ ice on own      Goals  Goal Description: 1.  Pt will be able to transfer sit to stand w/ back pain no > 3/10  Target Date: 09/15/17    Goal Description: 2.  Pt will be independent and consistent w/ HEP  Target Date: 09/15/17          Therapy Frequency:  1 time/week  Predicted Duration of Therapy Intervention:  3 weeks then 1X in 2 weeks  = 4 visits.      Ann Luu, PT                 I CERTIFY THE NEED FOR THESE SERVICES FURNISHED UNDER        THIS PLAN OF TREATMENT AND WHILE UNDER MY CARE     (Physician co-signature of this document indicates review and certification of the therapy plan).                         Certification Date From:  08/01/17   Certification Date To:  09/15/17    Referring Provider:  Dr. Morales    Initial Assessment        See Epic Evaluation Start of Care Date: 08/01/17

## 2017-08-01 NOTE — PROGRESS NOTES
08/01/17 1500   General Information   Type of Visit Initial OP Ortho PT Evaluation   Start of Care Date 08/01/17   Referring Physician Dr. Morales   Patient/Family Goals Statement For my back not to hurt w/ walking.  To get stronger   Orders Evaluate and Treat   Date of Order 07/26/17   Insurance Type Medicare  ([employer] [facility])   Medical Diagnosis LBP,  spondylolisthesis   Body Part(s)   Body Part(s) Lumbar Spine/SI   Presentation and Etiology   Pertinent history of current problem (include personal factors and/or comorbidities that impact the POC) Pt presents w/ B LBP L > R X 4 months.  No specific injjury.  No radiating pain.  No numbness / tingling.  Negative cough/ sneeze.  Pt notes intermittent pain 7/10.   Xray in chart:  deg changes , spondylolisthesis and osteoporosis.  Meds:  tylenol prn.   PMHX:   minor stroke, PACEMAKER,  osteoporosis,  HBP--medication controlled.  LE swelling just started latex.  Mod complexity:  comorbidiities   Impairments A. Pain;B. Decreased WB tolerance;D. Decreased ROM;E. Decreased flexibility;F. Decreased strength and endurance;I. Impaired skin integrity;P. Bowel or bladder problems   Onset date of current episode/exacerbation 04/01/17   Pain quality C. Aching   Pain exacerbation comment Sit to stand especially from soft couch   Pain/symptoms eased by C. Rest;A. Sitting   Progression of symptoms since onset: Worsened  (noting pain more frequently)   Prior Level of Function   Functional Level Prior Comment furniture walker.  Walks w/ someone holding onto her when out in public    Pt states she has a cane / walker but doesn't use.  Has used UE to assist sit to stand since her stroke.    Pt states she does treadmill 20-30 min /day.     Current Level of Function   Patient role/employment history F. Retired   [facility]   Fall Risk Screen   Fall screen completed by PT   Per patient - Fall 2 or more times in past year? No   Per patient - Fall with  injury in past year? No   Is patient a fall risk? No   Lumbar Spine/SI Objective Findings   Observation sit to stand w/ UE assist   Posture increased kyphosis, FH,  decreased lumbar lordosis.    PSIS/ crest level.     Gait/Locomotion Pt walked into clinic w/ assist of her son.  In treatment room walked independently no limp --slower pace.     Flexion ROM WFL    Extension ROM NT   Right Side Bending ROM WFL   Left Side Bending ROM WFL   Hip Screen B scour/ MIRNA/ FADIR negative.   Hip Flexion (L2) Strength B 5/5   Knee Flexion Strength B 5-/5   Knee Extension (L3) Strength B 5/5   Ankle Dorsiflexion (L4) Strength B 5/5   Hamstring Flexibility moderate tightness B    Palpation Moderate tenderness L piriformis,  Mild tenderness R QL and piriformis   Planned Therapy Interventions   Planned Therapy Interventions strengthening;stretching;manual therapy   Clinical Impression   Criteria for Skilled Therapeutic Interventions Met yes, treatment indicated   PT Diagnosis LBP, spondylolisthesis   Influenced by the following impairments pain, stiffness, decreased strength   Functional limitations due to impairments sit to stand   Clinical Presentation Stable/Uncomplicated   Clinical Presentation Rationale no recent changes   Clinical Decision Making (Complexity) Low complexity   Therapy Frequency 1 time/week   Predicted Duration of Therapy Intervention (days/wks) 3 weeks then 1X in 2 weeks  = 4 visits.     Risk & Benefits of therapy have been explained Yes   Patient, Family & other staff in agreement with plan of care Yes   Ortho Goal 1   Goal Description 1.  Pt will be able to transfer sit to stand w/ back pain no > 3/10   Target Date 09/15/17   Ortho Goal 2   Goal Description 2.  Pt will be independent and consistent w/ HEP   Target Date 09/15/17   Total Evaluation Time   Total Evaluation Time 30   Therapy Certification   Certification date from 08/01/17   Certification date to 09/15/17   Medical Diagnosis lumbar  spondylolisthesis     Thank you for this referral,    Ann Luu, PT,  CEAS   #[id number]  [facility]  [phone]

## 2017-08-10 ENCOUNTER — HOSPITAL ENCOUNTER (OUTPATIENT)
Dept: PHYSICAL THERAPY | Facility: CLINIC | Age: 82
Setting detail: THERAPIES SERIES
End: 2017-08-10
Attending: PEDIATRICS
Payer: MEDICARE

## 2017-08-10 PROCEDURE — 40000718 ZZHC STATISTIC PT DEPARTMENT ORTHO VISIT: Performed by: PHYSICAL THERAPIST

## 2017-08-10 PROCEDURE — 97110 THERAPEUTIC EXERCISES: CPT | Mod: GP | Performed by: PHYSICAL THERAPIST

## 2017-08-15 ENCOUNTER — HOSPITAL ENCOUNTER (OUTPATIENT)
Dept: PHYSICAL THERAPY | Facility: CLINIC | Age: 82
Setting detail: THERAPIES SERIES
End: 2017-08-15
Attending: PEDIATRICS
Payer: MEDICARE

## 2017-08-15 PROCEDURE — 40000718 ZZHC STATISTIC PT DEPARTMENT ORTHO VISIT: Performed by: PHYSICAL THERAPIST

## 2017-08-15 PROCEDURE — 97110 THERAPEUTIC EXERCISES: CPT | Mod: GP | Performed by: PHYSICAL THERAPIST

## 2017-08-23 ENCOUNTER — ANTICOAGULATION THERAPY VISIT (OUTPATIENT)
Dept: ANTICOAGULATION | Facility: CLINIC | Age: 82
End: 2017-08-23
Payer: MEDICARE

## 2017-08-23 DIAGNOSIS — I48.20 CHRONIC ATRIAL FIBRILLATION (H): Primary | ICD-10-CM

## 2017-08-23 DIAGNOSIS — Z79.01 LONG TERM CURRENT USE OF ANTICOAGULANT THERAPY: ICD-10-CM

## 2017-08-23 DIAGNOSIS — I63.40 CEREBRAL EMBOLISM WITH CEREBRAL INFARCTION (H): ICD-10-CM

## 2017-08-23 DIAGNOSIS — I48.91 ATRIAL FIBRILLATION (H): ICD-10-CM

## 2017-08-23 LAB — INR POINT OF CARE: 2.6 (ref 0.86–1.14)

## 2017-08-23 PROCEDURE — 85610 PROTHROMBIN TIME: CPT | Mod: QW

## 2017-08-23 PROCEDURE — 36416 COLLJ CAPILLARY BLOOD SPEC: CPT

## 2017-08-23 PROCEDURE — 99207 ZZC NO CHARGE NURSE ONLY: CPT

## 2017-08-23 NOTE — MR AVS SNAPSHOT
Shanika EMILY Stahl   8/23/2017 11:00 AM   Anticoagulation Therapy Visit    Description:  89 year old female   Provider:  [facility]   Department:  [facility]           INR as of 8/23/2017     Today's INR 2.6      Anticoagulation Summary as of 8/23/2017     INR goal 2.0-3.0   Today's INR 2.6   Full instructions 2.5 mg on Mon, Fri; 5 mg all other days   Next INR check 10/4/2017    Indications   Atrial fibrillation (H) (Resolved) [I48.91]  Cerebral embolism with cerebral infarction (H) [I63.40]  Long term current use of anticoagulant therapy [Z79.01]         Your next Anticoagulation Clinic appointment(s)     Oct 04, 2017 10:45 AM CDT   [facility]agulation Visit with [facility]   [facility]    [address] [address] [phone]              Contact Numbers     Please call [phone] to cancel and/or reschedule your appointment.  Please call [phone] with any problems or questions regarding your therapy          August 2017 Details    Sun Mon Tue Wed Thu Fri Sat       1               2               3               4               5                 6               7               8               9               10               11               12                 13               14               15               16               17               18               19                 20               21               22               23      5 mg   See details      24      5 mg         25      2.5 mg         26      5 mg           27      5 mg         28      2.5 mg         29      5 mg         30      5 mg         31      5 mg            Date Details   08/23 This INR check               How to take your warfarin dose     To take:  2.5 mg Take 1 of the 2.5 mg tablets.    To take:  5 mg Take 2 of the 2.5 mg tablets.           September 2017 Details    Sun Mon Tue Wed Thu Fri Sat          1      2.5 mg         2      5 mg           3      5 mg          4      2.5 mg         5      5 mg         6      5 mg         7      5 mg         8      2.5 mg         9      5 mg           10      5 mg         11      2.5 mg         12      5 mg         13      5 mg         14      5 mg         15      2.5 mg         16      5 mg           17      5 mg         18      2.5 mg         19      5 mg         20      5 mg         21      5 mg         22      2.5 mg         23      5 mg           24      5 mg         25      2.5 mg         26      5 mg         27      5 mg         28      5 mg         29      2.5 mg         30      5 mg          Date Details   No additional details            How to take your warfarin dose     To take:  2.5 mg Take 1 of the 2.5 mg tablets.    To take:  5 mg Take 2 of the 2.5 mg tablets.           October 2017 Details    Sun Mon Tue Wed Thu Fri Sat     1      5 mg         2      2.5 mg         3      5 mg         4            5               6               7                 8               9               10               11               12               13               14                 15               16               17               18               19               20               21                 22               23               24               25               26               27               28                 29               30               31                    Date Details   No additional details    Date of next INR:  10/4/2017         How to take your warfarin dose     To take:  2.5 mg Take 1 of the 2.5 mg tablets.    To take:  5 mg Take 2 of the 2.5 mg tablets.

## 2017-08-23 NOTE — PROGRESS NOTES
ANTICOAGULATION FOLLOW-UP CLINIC VISIT    Patient Name:  Shanika Stahl  Date:  8/23/2017  Contact Type:  Face to Face    SUBJECTIVE:     Patient Findings     Positives Change in medications (lasix 20 mg every other day for leg edema. this has improved since starting lasix.)           OBJECTIVE    INR Protime   Date Value Ref Range Status   08/23/2017 2.6 (A) 0.86 - 1.14 Final       ASSESSMENT / PLAN  INR assessment THER    Recheck INR In: 6 WEEKS    INR Location Clinic      Anticoagulation Summary as of 8/23/2017     INR goal 2.0-3.0   Today's INR 2.6   Maintenance plan 2.5 mg (2.5 mg x 1) on Mon, Fri; 5 mg (2.5 mg x 2) all other days   Full instructions 2.5 mg on Mon, Fri; 5 mg all other days   Weekly total 30 mg   No change documented Verenice Morillo, RN   Plan last modified Mabel Serna RN (8/25/2015)   Next INR check 10/4/2017   Priority INR   Target end date Indefinite    Indications   Atrial fibrillation (H) (Resolved) [I48.91]  Cerebral embolism with cerebral infarction (H) [I63.40]  Long term current use of anticoagulant therapy [Z79.01]         Anticoagulation Episode Summary     INR check location     Preferred lab     Send INR reminders to [facility]    Comments * Keep on low end of therapeutic range. Uses 2.5mg tablets.       Anticoagulation Care Providers     Provider Role Specialty Phone number    Addison Milena DO [facility] Medicine [phone]            See the Encounter Report to view Anticoagulation Flowsheet and Dosing Calendar (Go to Encounters tab in chart review, and find the Anticoagulation Therapy Visit)        Verenice Morillo RN

## 2017-09-07 DIAGNOSIS — I48.20 CHRONIC ATRIAL FIBRILLATION (H): Chronic | ICD-10-CM

## 2017-09-07 DIAGNOSIS — I12.9 RENAL HYPERTENSION, STAGE 1-4 OR UNSPECIFIED CHRONIC KIDNEY DISEASE: ICD-10-CM

## 2017-09-07 DIAGNOSIS — E78.5 HYPERLIPIDEMIA LDL GOAL <100: Chronic | ICD-10-CM

## 2017-09-07 NOTE — TELEPHONE ENCOUNTER
Shruthi    Last Written Prescription Date: 04/17/2017  Last Fill Qty: 144, # refills: 0  Last Office Visit with [facility] or  Health prescribing provider: 07/31/2017       Date and Result of Last PT/INR:   Lab Results   Component Value Date    INR 2.6 08/23/2017    INR 2.6 07/05/2017    INR 2.45 01/11/2016    INR 0.96 02/10/2014          Metoprolol      Last Written Prescription Date: 06/07/2017  Last Fill Quantity: 180, # refills: 0  Last Office Visit with [facility], [facility] or [facility] prescribing provider: 07/31/2017       Potassium   Date Value Ref Range Status   07/26/2017 4.0 3.4 - 5.3 mmol/L Final     Creatinine   Date Value Ref Range Status   07/26/2017 0.73 0.52 - 1.04 mg/dL Final     BP Readings from Last 3 Encounters:   07/31/17 130/80   07/26/17 112/76   07/07/17 123/58     Simvastatin     Last Written Prescription Date: 10/21/2016  Last Fill Quantity: 90, # refills: 3  Last Office Visit with [facility], [facility] or  Health prescribing provider: 07/31/2017       Lab Results   Component Value Date    CHOL 131 02/11/2014     Lab Results   Component Value Date    HDL 42 02/11/2014     Lab Results   Component Value Date    LDL 78 02/11/2014     Lab Results   Component Value Date    TRIG 56 02/11/2014     Lab Results   Component Value Date    CHOLHDLRATIO 3.1 02/11/2014     Jayy KEATING (R)

## 2017-09-11 RX ORDER — WARFARIN SODIUM 2.5 MG/1
TABLET ORAL
Qty: 180 TABLET | Refills: 0 | Status: SHIPPED | OUTPATIENT
Start: 2017-09-11 | End: 2017-12-02

## 2017-09-11 RX ORDER — METOPROLOL SUCCINATE 25 MG/1
TABLET, EXTENDED RELEASE ORAL
Qty: 180 TABLET | Refills: 2 | Status: SHIPPED | OUTPATIENT
Start: 2017-09-11 | End: 2018-06-06

## 2017-09-11 NOTE — TELEPHONE ENCOUNTER
Refilled jantoven and metoprolol per protocol.     Routing refill request to provider for review/approval because:  Labs not current:    Recent Labs   Lab Test  02/11/14   0645  04/04/13   0914   CHOL  131  184   HDL  42*  68   LDL  78  104   TRIG  56  60   CHOLHDLRATIO  3.1  3.0     Joanna Garcia RNC

## 2017-09-12 RX ORDER — SIMVASTATIN 20 MG
TABLET ORAL
Qty: 90 TABLET | Refills: 3 | Status: SHIPPED | OUTPATIENT
Start: 2017-09-12 | End: 2018-06-06

## 2017-09-20 NOTE — ADDENDUM NOTE
Encounter addended by: Ann Luu, PT on: 9/20/2017 12:01 PM<BR>     Actions taken: Sign clinical note, Flowsheet accepted, Episode resolved

## 2017-09-20 NOTE — PROGRESS NOTES
OUTPATIENT PHYSICAL THERAPY DISCHARGE SUMMARY   Dr. Carey Morales 8/1/17 to 08/15/17 1600   Signing Clinician's Name / Credentials   Signing clinician's name / credentials Ann Luu, PT [id number]   Session Number   [facility] Number 3  [facility]   Ortho Goal 1   Goal Description 1.  Pt will be able to transfer sit to stand w/ back pain no > 3/10.  8/15/17 pt notes a little bit of pain w/ sit to stand.   MET   Target Date 09/15/17   Ortho Goal 2   Goal Description 2.  Pt will be independent and consistent w/ HEP.  8/15/17 MET   Target Date 09/15/17   Subjective Report   Subjective Report Pt states she is doing pretty good.  Pt needs ex sheets to remember ex.  Pain level 3-4/10.     Therapeutic Procedure/exercise   Skilled Intervention ex to improve mobility/strength to improve function   Patient Response Pt needed some cuing on technique.  Pt tended to overdo w/ the clam ex   Treatment Detail LTRS .  Piriformis stretch > 90* B.    B clam X 15.   Sitting TA sets.  Sitting marching X 20 B.  Sitting KE  X 20 B   Sit to stand from plinth lower thigh level x 10 w/ hands resting on thighs.  Reinstructed pt on using pillow for LE when sleeping.  Instructed pt how to get in/out of car to avoid back irritation   Plan   Home program ex as above   Plan  Pt will cont on own w/ ex program.  Pt did not need to return for final visit.  Discharge from physical therapy.   Comments   Comments Pt met goals.  No final G code as patient did not return for final visit.

## 2017-10-04 ENCOUNTER — ALLIED HEALTH/NURSE VISIT (OUTPATIENT)
Dept: FAMILY MEDICINE | Facility: CLINIC | Age: 82
End: 2017-10-04
Payer: MEDICARE

## 2017-10-04 ENCOUNTER — ANTICOAGULATION THERAPY VISIT (OUTPATIENT)
Dept: ANTICOAGULATION | Facility: CLINIC | Age: 82
End: 2017-10-04
Payer: MEDICARE

## 2017-10-04 DIAGNOSIS — Z79.01 LONG TERM CURRENT USE OF ANTICOAGULANT THERAPY: ICD-10-CM

## 2017-10-04 DIAGNOSIS — Z23 NEED FOR VACCINATION: ICD-10-CM

## 2017-10-04 DIAGNOSIS — Z23 NEED FOR PROPHYLACTIC VACCINATION AND INOCULATION AGAINST INFLUENZA: Primary | ICD-10-CM

## 2017-10-04 LAB — INR POINT OF CARE: 2.3 (ref 0.86–1.14)

## 2017-10-04 PROCEDURE — G0008 ADMIN INFLUENZA VIRUS VAC: HCPCS

## 2017-10-04 PROCEDURE — 36416 COLLJ CAPILLARY BLOOD SPEC: CPT

## 2017-10-04 PROCEDURE — 90670 PCV13 VACCINE IM: CPT

## 2017-10-04 PROCEDURE — G0009 ADMIN PNEUMOCOCCAL VACCINE: HCPCS

## 2017-10-04 PROCEDURE — 90662 IIV NO PRSV INCREASED AG IM: CPT

## 2017-10-04 PROCEDURE — 99207 ZZC NO CHARGE NURSE ONLY: CPT

## 2017-10-04 PROCEDURE — 85610 PROTHROMBIN TIME: CPT | Mod: QW

## 2017-10-04 NOTE — MR AVS SNAPSHOT
After Visit Summary   10/4/2017    Shanika Stahl    MRN: [id number]           Patient Information     Date Of Birth          5/26/1924        Visit Information        Provider Department      10/4/2017 10:20 AM [place] [facility]        Today's Diagnoses     Need for prophylactic vaccination and inoculation against influenza    -  1    Need for vaccination           Follow-ups after your visit        Your next 10 appointments already scheduled     Oct 04, 2017 10:45 AM CDT   Anticoagulation Visit with [facility]   [facility] ([facility])    [address] [address]   [phone]            Oct 10, 2017 10:00 AM CDT   Pacemaker Check with [facility] Device Rn   [facility] ([facility])    [address] [address]   [phone]            Nov 01, 2017 10:30 AM CDT   Return Visit with Jazimn Allred   [facility] ([facility])    [address] [address]   [phone]              Who to contact     If you have questions or need follow up information about today's clinic visit or your schedule please contact [facility] directly at [phone].  Normal or non-critical lab and imaging results will be communicated to you by [employer], letter or phone within 4 business days after the clinic has received the results. If you do not hear from us within 7 days, please contact the clinic through [employer] or phone. If you have a critical or abnormal lab result, we will notify you by phone as soon as possible.  Submit refill requests through [employer] or call your pharmacy and they will forward the refill request to us. Please allow 3 business days for your refill to be completed.          Additional Information About Your Visit        [employer] Information     [employer] gives you secure access to your electronic health record. If  you see a primary care provider, you can also send messages to your care team and make appointments. If you have questions, please call your primary care clinic.  If you do not have a primary care provider, please call [phone] and they will assist you.        Care EveryWhere ID     This is your Care EveryWhere ID. This could be used by other organizations to access your [place] medical records  [id number]         Blood Pressure from Last 3 Encounters:   07/31/17 130/80   07/26/17 112/76   07/07/17 123/58    Weight from Last 3 Encounters:   07/31/17 84 lb (38.1 kg)   07/26/17 85 lb 4.8 oz (38.7 kg)   07/07/17 88 lb 3.2 oz (40 kg)              We Performed the Following     ADMIN INFLUENZA (For MEDICARE Patients ONLY) []     EA ADD'L VACCINE     FLU VACCINE, INCREASED ANTIGEN, PRESV FREE, AGE 65+ [37955]     Pneumococcal vaccine 13 valent PCV13 IM (Prevnar) [94092]        Primary Care Provider Office Phone # Fax #    Milena Vazquez DO [facility] [phone] [address] [address]        Equal Access to Services     JAYCEE MOLINA AH: Hadii aad ku hadasho Soomaali, waaxda luqadaha, qaybta kaalmada adeegyada, shaquille garcia. So [facility] [phone].    ATENCIÓN: Si habla español, tiene a holly disposición servicios gratuitos de asistencia lingüística. Llame al [phone].    We comply with applicable federal civil rights laws and [place] laws. We do not discriminate on the basis of race, color, national origin, age, disability, sex, sexual orientation, or gender identity.            Thank you!     Thank you for choosing [facility]  for your care. Our goal is always to provide you with excellent care. Hearing back from our patients is one way we can continue to improve our services. Please take a few minutes to complete the written survey that you may receive in the mail after your visit with us. Thank you!             Your Updated Medication  List - Protect others around you: Learn how to safely use, store and throw away your medicines at www.disposemymeds.org.          This list is accurate as of: 10/4/17 10:41 AM.  Always use your most recent med list.                   Brand Name Dispense Instructions for use Diagnosis    calcium + D 600-200 MG-UNIT Tabs   Generic drug:  calcium carbonate-vitamin D      1 tablet by mouth daily        cholecalciferol 1000 UNIT tablet    vitamin D    30    1 TABLET DAILY        fish oil-omega-3 fatty acids 1000 MG capsule     90 capsule    Take 1 capsule by mouth 2 times daily        furosemide 20 MG tablet    LASIX    45 tablet    Take 1 tablet (20 mg) by mouth every other day    Leg swelling       JANTOVEN 2.5 MG tablet   Generic drug:  warfarin     180 tablet    TAKE 1 TABLET BY MOUTH DAILY ON MONDAY AND FRIDAY AND TAKE 2 TABLETSBY MOUTH ALL OTHER DAYS AS DIRECTED    Chronic atrial fibrillation (H)       loratadine 10 MG tablet    CLARITIN     Take 10 mg by mouth daily        meclizine 25 MG tablet    ANTIVERT    30 tablet    Take 1 tablet (25 mg) by mouth every 6 hours as needed for dizziness        metoprolol 25 MG 24 hr tablet    TOPROL-XL    180 tablet    TAKE 1 TABLET BY MOUTH TWICE DAILY    Renal hypertension, stage 1-4 or unspecified chronic kidney disease       ONE-A-DAY WOMENS 50 PLUS PO      Take 1 tablet by mouth daily        order for DME     1 each    Knee high compression stockings 10-15 mm Hg    Leg swelling       simvastatin 20 MG tablet    ZOCOR    90 tablet    TAKE 1 TABLET BY MOUTH DAILY AT BEDTIME    Hyperlipidemia LDL goal <100       TYLENOL 325 MG Caps   Generic drug:  Acetaminophen      Take by mouth as needed

## 2017-10-04 NOTE — MR AVS SNAPSHOT
Shanika EMILY Stahl   10/4/2017 10:45 AM   Anticoagulation Therapy Visit    Description:  89 year old female   Provider:  [facility]   Department:  [facility]           INR as of 10/4/2017     Today's INR 2.3      Anticoagulation Summary as of 10/4/2017     INR goal 2.0-3.0   Today's INR 2.3   Full instructions 2.5 mg on Mon, Fri; 5 mg all other days   Next INR check 11/15/2017    Indications   Atrial fibrillation (H) (Resolved) [I48.91]  Cerebral embolism with cerebral infarction (H) [I63.40]  Long term current use of anticoagulant therapy [Z79.01]         Your next Anticoagulation Clinic appointment(s)     Nov 15, 2017 10:45 AM CST   Anticoagulation Visit with [facility] [facility]   [facility]    [address] [address] [phone]              Contact Numbers     Please call [phone] to cancel and/or reschedule your appointment.  Please call [phone] with any problems or questions regarding your therapy          October 2017 Details    Sun Mon Tue Wed Thu Fri Sat     1               2               3               4      5 mg   See details      5      5 mg         6      2.5 mg         7      5 mg           8      5 mg         9      2.5 mg         10      5 mg         11      5 mg         12      5 mg         13      2.5 mg         14      5 mg           15      5 mg         16      2.5 mg         17      5 mg         18      5 mg         19      5 mg         20      2.5 mg         21      5 mg           22      5 mg         23      2.5 mg         24      5 mg         25      5 mg         26      5 mg         27      2.5 mg         28      5 mg           29      5 mg         30      2.5 mg         31      5 mg              Date Details   10/04 This INR check               How to take your warfarin dose     To take:  2.5 mg Take 1 of the 2.5 mg tablets.    To take:  5 mg Take 2 of the 2.5 mg tablets.           November 2017 Details    Sun Mon  Tue Wed Thu Fri Sat        1      5 mg         2      5 mg         3      2.5 mg         4      5 mg           5      5 mg         6      2.5 mg         7      5 mg         8      5 mg         9      5 mg         10      2.5 mg         11      5 mg           12      5 mg         13      2.5 mg         14      5 mg         15            16               17               18                 19               20               21               22               23               24               25                 26               27               28               29               30                  Date Details   No additional details    Date of next INR:  11/15/2017         How to take your warfarin dose     To take:  2.5 mg Take 1 of the 2.5 mg tablets.    To take:  5 mg Take 2 of the 2.5 mg tablets.

## 2017-10-04 NOTE — PROGRESS NOTES
ANTICOAGULATION FOLLOW-UP CLINIC VISIT    Patient Name:  Shanika Stahl  Date:  10/4/2017  Contact Type:  Face to Face    SUBJECTIVE:     Patient Findings     Positives No Problem Findings    Comments Writer discussed fish oil and the contraindication with warfarin. This has not been prescribed by a physician. Patient will likely cut back on the dose of fish oil she has been taking.           OBJECTIVE    INR Protime   Date Value Ref Range Status   10/04/2017 2.3 (A) 0.86 - 1.14 Final       ASSESSMENT / PLAN  INR assessment THER    Recheck INR In: 6 WEEKS    INR Location Clinic      Anticoagulation Summary as of 10/4/2017     INR goal 2.0-3.0   Today's INR 2.3   Maintenance plan 2.5 mg (2.5 mg x 1) on Mon, Fri; 5 mg (2.5 mg x 2) all other days   Full instructions 2.5 mg on Mon, Fri; 5 mg all other days   Weekly total 30 mg   No change documented Verenice Morillo, RN   Plan last modified Mabel Serna RN (8/25/2015)   Next INR check 11/15/2017   Priority INR   Target end date Indefinite    Indications   Atrial fibrillation (H) (Resolved) [I48.91]  Cerebral embolism with cerebral infarction (H) [I63.40]  Long term current use of anticoagulant therapy [Z79.01]         Anticoagulation Episode Summary     INR check location     Preferred lab     Send INR reminders to [facility]    Comments * Keep on low end of therapeutic range. Uses 2.5mg tablets.       Anticoagulation Care Providers     Provider Role Specialty Phone number    Milena Jaime DO [facility] Internal Medicine [phone]            See the Encounter Report to view Anticoagulation Flowsheet and Dosing Calendar (Go to Encounters tab in chart review, and find the Anticoagulation Therapy Visit)        Verenice Morillo, RN

## 2017-10-04 NOTE — PROGRESS NOTES
Injectable Influenza Immunization Documentation    1.  Is the person to be vaccinated sick today?   No    2. Does the person to be vaccinated have an allergy to a component   of the vaccine?   No    3. Has the person to be vaccinated ever had a serious reaction   to influenza vaccine in the past?   No    4. Has the person to be vaccinated ever had Guillain-Barré syndrome?   No    Form completed by Vonnie Julio CMA

## 2017-10-04 NOTE — NURSING NOTE
Screening Questionnaire for Adult Immunization    Are you sick today?   No   Do you have allergies to medications, food, a vaccine component or latex?   No   Have you ever had a serious reaction after receiving a vaccination?   No   Do you have a long-term health problem with heart disease, lung disease, asthma, kidney disease, metabolic disease (e.g. diabetes), anemia, or other blood disorder?   No   Do you have cancer, leukemia, HIV/AIDS, or any other immune system problem?   No   In the past 3 months, have you taken medications that affect  your immune system, such as prednisone, other steroids, or anticancer drugs; drugs for the treatment of rheumatoid arthritis, Crohn s disease, or psoriasis; or have you had radiation treatments?   No   Have you had a seizure, or a brain or other nervous system problem?   No   During the past year, have you received a transfusion of blood or blood     products, or been given immune (gamma) globulin or antiviral drug?   No   For women: Are you pregnant or is there a chance you could become        pregnant during the next month?   No   Have you received any vaccinations in the past 4 weeks?   No     Immunization questionnaire answers were all negative.        Per orders of Dr. Jaime, injection of prevnar 13 given by Vonnie Julio. Patient instructed to remain in clinic for 15 minutes afterwards, and to report any adverse reaction to me immediately.       Screening performed by Vonnie Julio on 10/4/2017 at 10:44 AM.

## 2017-10-10 ENCOUNTER — HOSPITAL ENCOUNTER (OUTPATIENT)
Dept: CARDIOLOGY | Facility: CLINIC | Age: 82
Discharge: HOME | End: 2017-10-10
Attending: INTERNAL MEDICINE | Admitting: INTERNAL MEDICINE
Payer: MEDICARE

## 2017-10-10 ENCOUNTER — DOCUMENTATION ONLY (OUTPATIENT)
Dept: CARDIOLOGY | Facility: CLINIC | Age: 82
End: 2017-10-10

## 2017-10-10 PROCEDURE — 93288 INTERROG EVL PM/LDLS PM IP: CPT | Mod: 26 | Performed by: INTERNAL MEDICINE

## 2017-10-10 PROCEDURE — 93288 INTERROG EVL PM/LDLS PM IP: CPT

## 2017-10-10 NOTE — PROGRESS NOTES
Medtronic (S) Remote PPM Device Check/ [place]  : 71 %  Mode: VVIR        Presenting Rhythm: AF/ Controlled VR  Heart Rate: Stable and adequate HR  Sensing: Stable    Pacing Threshold: Stable    Impedance: WNL  Battery Status: 4 years estimated longevity  Atrial Arrhythmia: permanent AF/ continues on Jantoven  Ventricular Arrhythmia: NONE     Care Plan: Remote in 3 months. sml

## 2017-11-01 ENCOUNTER — OFFICE VISIT (OUTPATIENT)
Dept: AUDIOLOGY | Facility: CLINIC | Age: 82
End: 2017-11-01
Payer: MEDICARE

## 2017-11-01 DIAGNOSIS — H90.3 SENSORINEURAL HEARING LOSS, BILATERAL: Primary | ICD-10-CM

## 2017-11-01 PROCEDURE — 99207 ZZC NO CHARGE LOS: CPT | Performed by: AUDIOLOGIST

## 2017-11-01 PROCEDURE — V5299 HEARING SERVICE: HCPCS | Performed by: AUDIOLOGIST

## 2017-11-01 NOTE — MR AVS SNAPSHOT
After Visit Summary   11/1/2017    Shanika Stahl    MRN: [id number]           Patient Information     Date Of Birth          5/26/1924        Visit Information        Provider Department      11/1/2017 10:30 AM Gregoria Heath AuD [facility]        Today's Diagnoses     Sensorineural hearing loss, bilateral    -  1       Follow-ups after your visit        Your next 10 appointments already scheduled     Nov 15, 2017 10:45 AM CST   [place]ticoagulation Visit with [place] [facility]   [facility]    [address]  [facility] [address] [phone]            Feb 05, 2018  7:30 AM CST   Remote PPM Check with [place] [facility]   [facility] ([facility])    [address] [facility] [address] [facility] [address] [phone]           This appointment is for a remote check of your pacemaker.  This is not an appointment at the office.              Who to contact     If you have questions or need follow up information about today's clinic visit or your schedule please contact [facility] directly at [phone].  Normal or non-critical lab and imaging results will be communicated to you by [employer], letter or phone within 4 business days after the clinic has received the results. If you do not hear from us within 7 days, please contact the clinic through [employer] or phone. If you have a critical or abnormal lab result, we will notify you by phone as soon as possible.  Submit refill requests through [employer] or call your pharmacy and they will forward the refill request to us. Please allow 3 business days for your refill to be completed.          Additional Information About Your Visit        MyChart Information     [employer] gives you secure access to your electronic health record. If you see a primary care provider, you can also send messages to your care team and make appointments. If you have questions, please call  your primary care clinic.  If you do not have a primary care provider, please call [phone] and they will assist you.        Care EveryWhere ID     This is your Care EveryWhere ID. This could be used by other organizations to access your [place] medical records  [id number]         Blood Pressure from Last 3 Encounters:   07/31/17 130/80   07/26/17 112/76   07/07/17 123/58    Weight from Last 3 Encounters:   07/31/17 84 lb (38.1 kg)   07/26/17 85 lb 4.8 oz (38.7 kg)   07/07/17 88 lb 3.2 oz (40 kg)              We Performed the Following     HEARING AID CHECK/NO CHARGE        Primary Care Provider Office Phone # Fax #    Milena JaimeDO [phone] [address] [facility] [address]        Equal Access to Services     JAYCEE MOLINA : Hadii aad ku hadasho Soomaali, waaxda luqadaha, qaybta kaalmada adeegyada, waxay briannein haypiedadn russell gillis . So [facility] [phone].    ATENCIÓN: Si habla español, tiene a holly disposición servicios gratuitos de asistencia lingüística. Llame al [phone].    We comply with applicable federal civil rights laws and [place] laws. We do not discriminate on the basis of race, color, national origin, age, disability, sex, sexual orientation, or gender identity.            Thank you!     Thank you for choosing [facility]  for your care. Our goal is always to provide you with excellent care. Hearing back from our patients is one way we can continue to improve our services. Please take a few minutes to complete the written survey that you may receive in the mail after your visit with us. Thank you!             Your Updated Medication List - Protect others around you: Learn how to safely use, store and throw away your medicines at www.disposemymeds.org.          This list is accurate as of: 11/1/17 11:44 AM.  Always use your most recent med list.                   Brand Name Dispense Instructions for use Diagnosis    calcium + D 600-200  MG-UNIT Tabs   Generic drug:  calcium carbonate-vitamin D      1 tablet by mouth daily        cholecalciferol 1000 UNIT tablet    vitamin D3    30    1 TABLET DAILY        fish oil-omega-3 fatty acids 1000 MG capsule     90 capsule    Take 1 capsule by mouth 2 times daily        furosemide 20 MG tablet    LASIX    45 tablet    Take 1 tablet (20 mg) by mouth every other day    Leg swelling       JANTOVEN 2.5 MG tablet   Generic drug:  warfarin     180 tablet    TAKE 1 TABLET BY MOUTH DAILY ON MONDAY AND FRIDAY AND TAKE 2 TABLETSBY MOUTH ALL OTHER DAYS AS DIRECTED    Chronic atrial fibrillation (H)       loratadine 10 MG tablet    CLARITIN     Take 10 mg by mouth daily        meclizine 25 MG tablet    ANTIVERT    30 tablet    Take 1 tablet (25 mg) by mouth every 6 hours as needed for dizziness        metoprolol 25 MG 24 hr tablet    TOPROL-XL    180 tablet    TAKE 1 TABLET BY MOUTH TWICE DAILY    Renal hypertension, stage 1-4 or unspecified chronic kidney disease       ONE-A-DAY WOMENS 50 PLUS PO      Take 1 tablet by mouth daily        order for DME     1 each    Knee high compression stockings 10-15 mm Hg    Leg swelling       simvastatin 20 MG tablet    ZOCOR    90 tablet    TAKE 1 TABLET BY MOUTH DAILY AT BEDTIME    Hyperlipidemia LDL goal <100       TYLENOL 325 MG Caps   Generic drug:  Acetaminophen      Take by mouth as needed

## 2017-11-01 NOTE — PROGRESS NOTES
89 year old female comes in with her son for a hearing aid recheck. She is currently wearing 2016 Phonak Bolero V50-P hearing aids with custom earmolds that were fit 2/3/2016. Her son lives with her and helps with the hearing aid care and cleaning.    Replaced earmold tubing and hearing aid earhook. Verified hearing aid functionality.  Reviewed care and cleaning with patient and son. Discussed use of the VC and correct phone placement. Reviewed warranty expiration date. See chart in the hearing aid room.     Return to clinic for recheck and reprogramming prior to the warranty expiring.    Gregoria TORRES-CHELSEA, #[id number]

## 2017-11-14 DIAGNOSIS — I48.20 CHRONIC ATRIAL FIBRILLATION (H): Primary | Chronic | ICD-10-CM

## 2017-11-14 DIAGNOSIS — I27.20 PULMONARY HYPERTENSION (H): ICD-10-CM

## 2017-11-14 DIAGNOSIS — E78.5 HYPERLIPIDEMIA LDL GOAL <100: Chronic | ICD-10-CM

## 2017-11-15 ENCOUNTER — ANTICOAGULATION THERAPY VISIT (OUTPATIENT)
Dept: ANTICOAGULATION | Facility: CLINIC | Age: 82
End: 2017-11-15
Payer: MEDICARE

## 2017-11-15 DIAGNOSIS — Z79.01 LONG TERM CURRENT USE OF ANTICOAGULANT THERAPY: ICD-10-CM

## 2017-11-15 LAB — INR POINT OF CARE: 2.7 (ref 0.86–1.14)

## 2017-11-15 PROCEDURE — 85610 PROTHROMBIN TIME: CPT | Mod: QW

## 2017-11-15 PROCEDURE — 99207 ZZC NO CHARGE NURSE ONLY: CPT

## 2017-11-15 PROCEDURE — 36416 COLLJ CAPILLARY BLOOD SPEC: CPT

## 2017-11-15 NOTE — MR AVS SNAPSHOT
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  Please call [phone] with any problems or questions regarding your therapy          November 2017 Details    Sun Mon Tue Wed Thu Fri Sat        1               2               3               4                 5               6               7               8               9               10               11                 12               13               14               15      5 mg   See details      16      5 mg         17      2.5 mg         18      5 mg           19      5 mg         20      2.5 mg         21      5 mg         22      5 mg         23      5 mg         24      2.5 mg         25      5 mg           26      5 mg         27      2.5 mg         28      5 mg         29      5 mg         30      5 mg            Date Details   11/15 This INR check               How to take your warfarin dose     To take:  2.5 mg Take 1 of the 2.5 mg tablets.    To take:  5 mg Take 2 of the 2.5 mg tablets.           December 2017 Details    Sun Mon Tue Wed Thu Fri Sat          1      2.5 mg         2      5 mg            3      5 mg         4      2.5 mg         5      5 mg         6      5 mg         7      5 mg         8      2.5 mg         9      5 mg           10      5 mg         11      2.5 mg         12      5 mg         13      5 mg         14      5 mg         15      2.5 mg         16      5 mg           17      5 mg         18      2.5 mg         19      5 mg         20      5 mg         21      5 mg         22      2.5 mg         23      5 mg           24      5 mg         25      2.5 mg         26      5 mg         27            28               29               30                 31                      Date Details   No additional details    Date of next INR:  12/27/2017         How to take your warfarin dose     To take:  2.5 mg Take 1 of the 2.5 mg tablets.    To take:  5 mg Take 2 of the 2.5 mg tablets.

## 2017-11-15 NOTE — PROGRESS NOTES
ANTICOAGULATION FOLLOW-UP CLINIC VISIT    Patient Name:  Shanika Stahl  Date:  11/15/2017  Contact Type:  Face to Face    SUBJECTIVE:     Patient Findings     Positives No Problem Findings           OBJECTIVE    INR Protime   Date Value Ref Range Status   11/15/2017 2.7 (A) 0.86 - 1.14 Final       ASSESSMENT / PLAN  INR assessment THER    Recheck INR In: 7 WEEKS    INR Location Clinic      Anticoagulation Summary as of 11/15/2017     INR goal 2.0-3.0   Today's INR 2.7   Maintenance plan 2.5 mg (2.5 mg x 1) on Mon, Fri; 5 mg (2.5 mg x 2) all other days   Full instructions 2.5 mg on Mon, Fri; 5 mg all other days   Weekly total 30 mg   No change documented Verenice Morillo, RN   Plan last modified Mabel Serna RN (8/25/2015)   Next INR check 1/3/2018   Priority INR   Target end date Indefinite    Indications   Atrial fibrillation (H) (Resolved) [I48.91]  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    Comments * Keep on low end of therapeutic range. Uses 2.5mg tablets.       Anticoagulation Care Providers     Provider Role Specialty Phone number    AddisonDebbiMilena Sara,  [facility] [phone]            See the Encounter Report to view Anticoagulation Flowsheet and Dosing Calendar (Go to Encounters tab in chart review, and find the Anticoagulation Therapy Visit)        Verenice Morillo RN

## 2017-12-02 DIAGNOSIS — I48.20 CHRONIC ATRIAL FIBRILLATION (H): Chronic | ICD-10-CM

## 2017-12-05 RX ORDER — WARFARIN SODIUM 2.5 MG/1
TABLET ORAL
Qty: 155 TABLET | Refills: 0 | Status: SHIPPED | OUTPATIENT
Start: 2017-12-05 | End: 2018-03-24

## 2017-12-05 NOTE — TELEPHONE ENCOUNTER
Signed Prescriptions:                        Disp   Refills    JANTOVEN 2.5 MG tablet                     155 ta*0        Sig: TAKE 1 TABLET BY MOUTH ON MONDAY AND FRIDAY AND TAKE           2 TABLETS ALL OTHER DAYS AS DIRECTED  Authorizing Provider: PRINCE BURGESS  Ordering User: IVANNA MORILLO RN refilled medication per [facility] refill protocol.  Ivanna Morillo RN

## 2017-12-14 ENCOUNTER — HOSPITAL ENCOUNTER (OUTPATIENT)
Dept: CARDIOLOGY | Facility: CLINIC | Age: 82
Discharge: HOME | End: 2017-12-14
Attending: PHYSICIAN ASSISTANT | Admitting: PHYSICIAN ASSISTANT
Payer: MEDICARE

## 2017-12-14 DIAGNOSIS — I27.20 PULMONARY HYPERTENSION (H): ICD-10-CM

## 2017-12-14 PROCEDURE — 93306 TTE W/DOPPLER COMPLETE: CPT

## 2017-12-14 PROCEDURE — 93306 TTE W/DOPPLER COMPLETE: CPT | Mod: 26 | Performed by: INTERNAL MEDICINE

## 2017-12-19 ENCOUNTER — OFFICE VISIT (OUTPATIENT)
Dept: CARDIOLOGY | Facility: CLINIC | Age: 82
End: 2017-12-19
Attending: PHYSICIAN ASSISTANT
Payer: MEDICARE

## 2017-12-19 VITALS
BODY MASS INDEX: 15.95 KG/M2 | OXYGEN SATURATION: 99 % | SYSTOLIC BLOOD PRESSURE: 165 MMHG | WEIGHT: 87.2 LBS | HEART RATE: 93 BPM | DIASTOLIC BLOOD PRESSURE: 86 MMHG

## 2017-12-19 DIAGNOSIS — E78.5 HYPERLIPIDEMIA LDL GOAL <100: Chronic | ICD-10-CM

## 2017-12-19 DIAGNOSIS — I27.20 PULMONARY HYPERTENSION (H): ICD-10-CM

## 2017-12-19 DIAGNOSIS — I48.20 CHRONIC ATRIAL FIBRILLATION (H): Chronic | ICD-10-CM

## 2017-12-19 PROCEDURE — 99214 OFFICE O/P EST MOD 30 MIN: CPT | Performed by: INTERNAL MEDICINE

## 2017-12-19 NOTE — MR AVS SNAPSHOT
After Visit Summary   12/19/2017    Shanika Stahl    MRN: [id number]           Patient Information     Date Of Birth          5/26/1924        Visit Information        Provider Department      12/19/2017 10:30 AM Dixon Amato MD [facility]        Today's Diagnoses     Chronic atrial fibrillation (H)        Hyperlipidemia LDL goal <100        Pulmonary hypertension           Follow-ups after your visit        Additional Services     Follow-Up with Cardiac Advanced Practice Provider                 Your next 10 appointments already scheduled     Jan 03, 2018 10:45 AM CST   Anticoagulation Visit with [place] [facility]   [facility]    [address] [address]   [phone]            Feb 05, 2018  7:30 AM CST   Remote PPM Check with [place] [facility]   [facility] ([facility])    [address] [address]   [phone]           This appointment is for a remote check of your pacemaker.  This is not an appointment at the office.              Future tests that were ordered for you today     Open Future Orders        Priority Expected Expires Ordered    Follow-Up with Cardiac Advanced Practice Provider Routine 12/19/2018 12/20/2018 12/19/2017            Who to contact     If you have questions or need follow up information about today's clinic visit or your schedule please contact [facility] directly at [phone].  Normal or non-critical lab and imaging results will be communicated to you by MyChart, letter or phone within 4 business days after the clinic has received the results. If you do not hear from us within 7 days, please contact the clinic through MyChart or phone. If you have a critical or abnormal lab result, we will notify you by phone as soon as possible.  Submit refill requests through  [employer] or call your pharmacy and they will forward the refill request to us. Please allow 3 business days for your refill to be completed.          Additional Information About Your Visit        MyChart Information     [employer] gives you secure access to your electronic health record. If you see a primary care provider, you can also send messages to your care team and make appointments. If you have questions, please call your primary care clinic.  If you do not have a primary care provider, please call [phone] and they will assist you.        Care EveryWhere ID     This is your Care EveryWhere ID. This could be used by other organizations to access your [place] medical records  [id number]        Your Vitals Were     Pulse Pulse Oximetry BMI (Body Mass Index)             93 99% 15.95 kg/m2          Blood Pressure from Last 3 Encounters:   12/19/17 165/86   07/31/17 130/80   07/26/17 112/76    Weight from Last 3 Encounters:   12/19/17 39.6 kg (87 lb 3.2 oz)   07/31/17 38.1 kg (84 lb)   07/26/17 38.7 kg (85 lb 4.8 oz)              We Performed the Following     Follow-Up with Cardiologist        Primary Care Provider Office Phone # Fax #    Milena Taylor Jaime [phone] [address] [address]        Equal Access to Services     JAYCEE MOLINA : Hadii aad ku hadasho Soomaali, waaxda luqadaha, qaybta kaalmada adeegyada, shaquille garcia. So [facility] [phone].    ATENCIÓN: Si habla español, tiene a holly disposición servicios gratuitos de asistencia lingüística. Llame al [phone].    We comply with applicable federal civil rights laws and [place] laws. We do not discriminate on the basis of race, color, national origin, age, disability, sex, sexual orientation, or gender identity.            Thank you!     Thank you for choosing [facility]  for your care. Our goal is always to provide you with excellent care.  Hearing back from our patients is one way we can continue to improve our services. Please take a few minutes to complete the written survey that you may receive in the mail after your visit with us. Thank you!             Your Updated Medication List - Protect others around you: Learn how to safely use, store and throw away your medicines at www.disposemymeds.org.          This list is accurate as of: 12/19/17 10:45 AM.  Always use your most recent med list.                   Brand Name Dispense Instructions for use Diagnosis    calcium + D 600-200 MG-UNIT Tabs   Generic drug:  calcium carbonate-vitamin D      1 tablet by mouth daily        cholecalciferol 1000 UNIT tablet    vitamin D3    30    1 TABLET DAILY        fish oil-omega-3 fatty acids 1000 MG capsule     90 capsule    Take 1 capsule by mouth 2 times daily        furosemide 20 MG tablet    LASIX    45 tablet    Take 1 tablet (20 mg) by mouth every other day    Leg swelling       JANTOVEN 2.5 MG tablet   Generic drug:  warfarin     155 tablet    TAKE 1 TABLET BY MOUTH ON MONDAY AND FRIDAY AND TAKE 2 TABLETS ALL OTHER DAYS AS DIRECTED    Chronic atrial fibrillation (H)       loratadine 10 MG tablet    CLARITIN     Take 10 mg by mouth daily        meclizine 25 MG tablet    ANTIVERT    30 tablet    Take 1 tablet (25 mg) by mouth every 6 hours as needed for dizziness        metoprolol 25 MG 24 hr tablet    TOPROL-XL    180 tablet    TAKE 1 TABLET BY MOUTH TWICE DAILY    Renal hypertension, stage 1-4 or unspecified chronic kidney disease       ONE-A-DAY WOMENS 50 PLUS PO      Take 1 tablet by mouth daily        order for DME     1 each    Knee high compression stockings 10-15 mm Hg    Leg swelling       simvastatin 20 MG tablet    ZOCOR    90 tablet    TAKE 1 TABLET BY MOUTH DAILY AT BEDTIME    Hyperlipidemia LDL goal <100       TYLENOL 325 MG Caps   Generic drug:  Acetaminophen      Take by mouth as needed

## 2017-12-19 NOTE — LETTER
12/19/2017    Milena Taylor Addison, DO  [address] [address] [facility] [facility] [address]    RE: Shanika Stahl       Dear Colleague,    I had the pleasure of seeing Shanika Stahl in the [facility].    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     Overall she's been doing well in the past one year.  She has a small house and is able to walk around her house with no difficulty.  She feel steady on her feet, she does not use a walker or cane.  In the summer she had some mild lower extremity edema and was started on Lasix every other day.  She denies any lightheadedness, dizziness, or palpitations.    Echo December 2017 showed EF 60%, normal RV size and function, moderate biatrial enlargement, moderate TR, RVSP 31 mmHg.     Device interrogation in October 2017 showed normal functioning device, 5 years battery remaining, 71% of the paced (mode VVIR).     MEDICATIONS:  Current Outpatient Prescriptions   Medication     JANTOVEN 2.5 MG tablet     simvastatin (ZOCOR) 20 MG tablet     metoprolol (TOPROL-XL) 25 MG 24 hr tablet     order for DME     furosemide (LASIX) 20 MG tablet     Acetaminophen (TYLENOL) 325 MG CAPS     meclizine (ANTIVERT) 25 MG tablet     loratadine (CLARITIN) 10 MG tablet     Multiple Vitamins-Minerals (ONE-A-DAY WOMENS 50 PLUS PO)     fish oil-omega-3 fatty acids (OMEGA 3) 1000 MG capsule     CALCIUM + D 600-200 MG-UNIT PO TABS     VITAMIN D 1000 UNIT OR TABS     No current facility-administered medications for this visit.        ALLERGIES:  Allergies   Allergen Reactions     Allopurinol      Ampicillin Diarrhea     Cipro [Benzyl Alcohol] Nausea and Vomiting     Levaquin GI Disturbance     Lisinopril Swelling     AngioEdema     Paxil [Paroxetine] Nausea and Vomiting     Penicillins Diarrhea     Sulfa  Drugs GI Disturbance     Zithromax [Macrolides] Nausea and Vomiting     Zoloft Nausea and Vomiting       REVIEW OF SYSTEMS:  Constitutional:  No weight loss, fever, chills, weakness or fatigue.  HEENT:  Eyes:  No visual loss, blurred vision, double vision or yellow sclerae. No hearing loss, sneezing, congestion, runny nose or sore throat.  Skin:  No rash or itching.  Cardiovascular: per HPI  Respiratory: per HPI  GI:  No anorexia, nausea, vomiting or diarrhea. No abdominal pain or blood.  :  No dysurea, hematuria  Neurologic:  No headache, dizziness, syncope, paralysis, ataxia, numbness or tingling in the extremities. No change in bowel or bladder control.  Musculoskeletal:  No muscle, back pain, joint pain or stiffness.  Hematologic:  No anemia, bleeding or bruising.  Lymphatics:  No enlarged nodes. No history of splenectomy.  Psychiatric:  No history of depression or anxiety.  Endocrine:  No reports of sweating, cold or heat intolerance. No polyuria or polydipsia.  Allergies:  No history of asthma, hives, eczema or rhinitis.    PHYSICAL EXAM:  /86  Pulse 93  Wt 39.6 kg (87 lb 3.2 oz)  SpO2 99%  BMI 15.95 kg/m2  Constitutional: awake, alert, no distress  Eyes: PERRL, sclera nonicteric  ENT: trachea midline  Respiratory: Lungs clear  Cardiovascular: Regular rate and rhythm, 2/6 systolic murmur at the upper sternal border, no lower extremity edema  GI: nondistended, nontender, bowel sounds present  Lymph/Hematologic: no lymphadenopathy  Skin: dry, no rash  Musculoskeletal: good muscle tone, strength 5/5 in upper and lower extremities  Neurologic: no focal deficits  Neuropsychiatric: appropriate affact    ASSESSMENT:  89-year-old female seen for follow-up of atrial fibrillation and pacemaker.  Clinically she is doing very well.  She is euvolemic on exam.  She had some moderate tricuspid regurgitation on her recent echo, which could be from the RV pacemaker lead.  This could be contributing to her lower  extremity edema.  However it seems under good control on the low-dose Lasix.  She seems stable on the Coumadin.  She has not had any bleeding issues or any falls.    RECOMMENDATIONS:  1. Persistent atrial fibrillation  - Rates overall are good  - Continue warfarin    2.  Pacemaker  - Normal function, continue with routine device checks  - About 5 years battery remaining    Follow-up in 1 year.    Dixon Amato MD  Cardiology - [facility]  Pager:  [phone]  Text Page  December 19, 2017    Thank you for allowing me to participate in the care of your patient.    Sincerely,     Dixon Amato MD     [facility]

## 2018-01-01 ENCOUNTER — MEDICAL CORRESPONDENCE (OUTPATIENT)
Dept: HEALTH INFORMATION MANAGEMENT | Facility: CLINIC | Age: 83
End: 2018-01-01

## 2018-01-01 ENCOUNTER — RECORDS - HEALTHEAST (OUTPATIENT)
Dept: LAB | Facility: CLINIC | Age: 83
End: 2018-01-01

## 2018-01-01 ENCOUNTER — DOCUMENTATION ONLY (OUTPATIENT)
Dept: CARDIOLOGY | Facility: CLINIC | Age: 83
End: 2018-01-01

## 2018-01-01 ENCOUNTER — HOSPITAL ENCOUNTER (OUTPATIENT)
Dept: CARDIOLOGY | Facility: CLINIC | Age: 83
Discharge: HOME | End: 2018-11-27
Attending: INTERNAL MEDICINE | Admitting: INTERNAL MEDICINE
Payer: MEDICARE

## 2018-01-01 ENCOUNTER — ANTICOAGULATION THERAPY VISIT (OUTPATIENT)
Dept: ANTICOAGULATION | Facility: CLINIC | Age: 83
End: 2018-01-01
Payer: MEDICARE

## 2018-01-01 ENCOUNTER — OFFICE VISIT (OUTPATIENT)
Dept: FAMILY MEDICINE | Facility: CLINIC | Age: 83
End: 2018-01-01
Payer: MEDICARE

## 2018-01-01 ENCOUNTER — TELEPHONE (OUTPATIENT)
Dept: FAMILY MEDICINE | Facility: CLINIC | Age: 83
End: 2018-01-01

## 2018-01-01 VITALS
BODY MASS INDEX: 15.99 KG/M2 | SYSTOLIC BLOOD PRESSURE: 174 MMHG | OXYGEN SATURATION: 98 % | RESPIRATION RATE: 14 BRPM | DIASTOLIC BLOOD PRESSURE: 68 MMHG | WEIGHT: 84.6 LBS | TEMPERATURE: 98.7 F | HEART RATE: 68 BPM

## 2018-01-01 DIAGNOSIS — I63.40 CEREBRAL EMBOLISM WITH CEREBRAL INFARCTION (H): ICD-10-CM

## 2018-01-01 DIAGNOSIS — L72.0 EPIDERMOID CYST OF FACE: Primary | ICD-10-CM

## 2018-01-01 DIAGNOSIS — Z79.01 LONG TERM CURRENT USE OF ANTICOAGULANT THERAPY: ICD-10-CM

## 2018-01-01 LAB
INR PPP: 2.39
INR PPP: 2.39 (ref 0.9–1.1)
INR PPP: 2.4
INR PPP: 2.44 (ref 0.9–1.1)

## 2018-01-01 PROCEDURE — 99207 ZZC NO CHARGE NURSE ONLY: CPT

## 2018-01-01 PROCEDURE — 93288 INTERROG EVL PM/LDLS PM IP: CPT

## 2018-01-01 PROCEDURE — 93288 INTERROG EVL PM/LDLS PM IP: CPT | Mod: 26 | Performed by: INTERNAL MEDICINE

## 2018-01-01 PROCEDURE — 10060 I&D ABSCESS SIMPLE/SINGLE: CPT | Performed by: INTERNAL MEDICINE

## 2018-01-01 RX ORDER — WARFARIN SODIUM 5 MG/1
TABLET ORAL
Qty: 30 TABLET | Refills: 0 | COMMUNITY
Start: 2018-01-01 | End: 2019-01-01

## 2018-01-03 ENCOUNTER — ANTICOAGULATION THERAPY VISIT (OUTPATIENT)
Dept: ANTICOAGULATION | Facility: CLINIC | Age: 83
End: 2018-01-03
Payer: MEDICARE

## 2018-01-03 DIAGNOSIS — Z79.01 LONG TERM CURRENT USE OF ANTICOAGULANT THERAPY: ICD-10-CM

## 2018-01-03 LAB — INR POINT OF CARE: 2.4 (ref 0.86–1.14)

## 2018-01-03 PROCEDURE — 36416 COLLJ CAPILLARY BLOOD SPEC: CPT

## 2018-01-03 PROCEDURE — 85610 PROTHROMBIN TIME: CPT | Mod: QW

## 2018-01-03 PROCEDURE — 99207 ZZC NO CHARGE NURSE ONLY: CPT

## 2018-01-03 NOTE — PROGRESS NOTES
ANTICOAGULATION FOLLOW-UP CLINIC VISIT    Patient Name:  Shanika Stahl  Date:  1/3/2018  Contact Type:  Face to Face    SUBJECTIVE:     Patient Findings     Positives No Problem Findings    Comments No changes or concerns.           OBJECTIVE    INR Protime   Date Value Ref Range Status   01/03/2018 2.4 (A) 0.86 - 1.14 Final       ASSESSMENT / PLAN  INR assessment THER    Recheck INR In: 7 WEEKS    INR Location Clinic      Anticoagulation Summary as of 1/3/2018     INR goal 2.0-3.0   Today's INR 2.4   Maintenance plan 2.5 mg (2.5 mg x 1) on Mon, Fri; 5 mg (2.5 mg x 2) all other days   Full instructions 2.5 mg on Mon, Fri; 5 mg all other days   Weekly total 30 mg   No change documented Verenice Morillo, RN   Plan last modified Mabel Serna RN (8/25/2015)   Next INR check 2/21/2018   Priority INR   Target end date Indefinite    Indications   Atrial fibrillation (H) (Resolved) [I48.91]  Cerebral embolism with cerebral infarction (H) [I63.40]  Long term current use of anticoagulant therapy [Z79.01]         Anticoagulation Episode Summary     INR check location     Preferred lab     Send INR reminders to [facility] POOL    Comments * Keep on low end of therapeutic range. Uses 2.5mg tablets.       Anticoagulation Care Providers     Provider Role Specialty Phone number    Milena Jaime,  [facility] [phone]            See the Encounter Report to view Anticoagulation Flowsheet and Dosing Calendar (Go to Encounters tab in chart review, and find the Anticoagulation Therapy Visit)        Verenice Morillo, RN

## 2018-01-03 NOTE — MR AVS SNAPSHOT
Shanika Stahl   1/3/2018 10:45 AM   Anticoagulation Therapy Visit    Description:  89 year old female   Provider:  [facility]   Department:  [facility]           INR as of 1/3/2018     Today's INR 2.4      Anticoagulation Summary as of 1/3/2018     INR goal 2.0-3.0   Today's INR 2.4   Full instructions 2.5 mg on Mon, Fri; 5 mg all other days   Next INR check 2/21/2018    Indications   Atrial fibrillation (H) (Resolved) [I48.91]  Cerebral embolism with cerebral infarction (H) [I63.40]  Long term current use of anticoagulant therapy [Z79.01]         January 2018 Details    Sun Mon Tue Wed Thu Fri Sat      1               2               3      5 mg   See details      4      5 mg         5      2.5 mg         6      5 mg           7      5 mg         8      2.5 mg         9      5 mg         10      5 mg         11      5 mg         12      2.5 mg         13      5 mg           14      5 mg         15      2.5 mg         16      5 mg         17      5 mg         18      5 mg         19      2.5 mg         20      5 mg           21      5 mg         22      2.5 mg         23      5 mg         24      5 mg         25      5 mg         26      2.5 mg         27      5 mg           28      5 mg         29      2.5 mg         30      5 mg         31      5 mg             Date Details   01/03 This INR check               How to take your warfarin dose     To take:  2.5 mg Take 1 of the 2.5 mg tablets.    To take:  5 mg Take 2 of the 2.5 mg tablets.           February 2018 Details    Sun Mon Tue Wed Thu Fri Sat         1      5 mg         2      2.5 mg         3      5 mg           4      5 mg         5      2.5 mg         6      5 mg         7      5 mg         8      5 mg         9      2.5 mg         10      5 mg           11      5 mg         12      2.5 mg         13      5 mg         14      5 mg         15      5 mg         16      2.5 mg         17      5 mg           18      5 mg         19      2.5 mg          20      5 mg         21            22               23               24                 25               26               27               28                   Date Details   No additional details    Date of next INR:  2/21/2018         How to take your warfarin dose     To take:  2.5 mg Take 1 of the 2.5 mg tablets.    To take:  5 mg Take 2 of the 2.5 mg tablets.

## 2018-02-01 ENCOUNTER — APPOINTMENT (OUTPATIENT)
Dept: GENERAL RADIOLOGY | Facility: CLINIC | Age: 83
End: 2018-02-01
Attending: FAMILY MEDICINE
Payer: MEDICARE

## 2018-02-01 ENCOUNTER — HOSPITAL ENCOUNTER (EMERGENCY)
Facility: CLINIC | Age: 83
Discharge: ANOTHER HOSPITAL | End: 2018-02-02
Attending: FAMILY MEDICINE | Admitting: FAMILY MEDICINE
Payer: MEDICARE

## 2018-02-01 ENCOUNTER — APPOINTMENT (OUTPATIENT)
Dept: CT IMAGING | Facility: CLINIC | Age: 83
End: 2018-02-01
Attending: FAMILY MEDICINE
Payer: MEDICARE

## 2018-02-01 DIAGNOSIS — J18.9 COMMUNITY ACQUIRED PNEUMONIA OF LEFT LOWER LOBE OF LUNG: ICD-10-CM

## 2018-02-01 DIAGNOSIS — J96.01 ACUTE RESPIRATORY FAILURE WITH HYPOXIA (H): ICD-10-CM

## 2018-02-01 LAB
BASOPHILS # BLD AUTO: 0 10E9/L (ref 0–0.2)
BASOPHILS NFR BLD AUTO: 0.2 %
DIFFERENTIAL METHOD BLD: ABNORMAL
EOSINOPHIL # BLD AUTO: 0 10E9/L (ref 0–0.7)
EOSINOPHIL NFR BLD AUTO: 0.1 %
ERYTHROCYTE [DISTWIDTH] IN BLOOD BY AUTOMATED COUNT: 13.4 % (ref 10–15)
HCT VFR BLD AUTO: 43.4 % (ref 35–47)
HGB BLD-MCNC: 14 G/DL (ref 11.7–15.7)
IMM GRANULOCYTES # BLD: 0 10E9/L (ref 0–0.4)
IMM GRANULOCYTES NFR BLD: 0.2 %
INR PPP: 2.41 (ref 0.86–1.14)
LYMPHOCYTES # BLD AUTO: 0.8 10E9/L (ref 0.8–5.3)
LYMPHOCYTES NFR BLD AUTO: 6.3 %
MCH RBC QN AUTO: 29.7 PG (ref 26.5–33)
MCHC RBC AUTO-ENTMCNC: 32.3 G/DL (ref 31.5–36.5)
MCV RBC AUTO: 92 FL (ref 78–100)
MONOCYTES # BLD AUTO: 0.8 10E9/L (ref 0–1.3)
MONOCYTES NFR BLD AUTO: 6.3 %
NEUTROPHILS # BLD AUTO: 10.4 10E9/L (ref 1.6–8.3)
NEUTROPHILS NFR BLD AUTO: 86.9 %
PLATELET # BLD AUTO: 195 10E9/L (ref 150–450)
RBC # BLD AUTO: 4.71 10E12/L (ref 3.8–5.2)
WBC # BLD AUTO: 12 10E9/L (ref 4–11)

## 2018-02-01 PROCEDURE — 80053 COMPREHEN METABOLIC PANEL: CPT | Performed by: STUDENT IN AN ORGANIZED HEALTH CARE EDUCATION/TRAINING PROGRAM

## 2018-02-01 PROCEDURE — 70450 CT HEAD/BRAIN W/O DYE: CPT

## 2018-02-01 PROCEDURE — 99285 EMERGENCY DEPT VISIT HI MDM: CPT | Mod: 25 | Performed by: FAMILY MEDICINE

## 2018-02-01 PROCEDURE — 87804 INFLUENZA ASSAY W/OPTIC: CPT | Performed by: FAMILY MEDICINE

## 2018-02-01 PROCEDURE — 85025 COMPLETE CBC W/AUTO DIFF WBC: CPT | Performed by: STUDENT IN AN ORGANIZED HEALTH CARE EDUCATION/TRAINING PROGRAM

## 2018-02-01 PROCEDURE — 93005 ELECTROCARDIOGRAM TRACING: CPT

## 2018-02-01 PROCEDURE — 96374 THER/PROPH/DIAG INJ IV PUSH: CPT

## 2018-02-01 PROCEDURE — 84484 ASSAY OF TROPONIN QUANT: CPT | Performed by: FAMILY MEDICINE

## 2018-02-01 PROCEDURE — 93010 ELECTROCARDIOGRAM REPORT: CPT | Mod: Z6 | Performed by: FAMILY MEDICINE

## 2018-02-01 PROCEDURE — 71046 X-RAY EXAM CHEST 2 VIEWS: CPT

## 2018-02-01 PROCEDURE — 85610 PROTHROMBIN TIME: CPT | Performed by: FAMILY MEDICINE

## 2018-02-01 PROCEDURE — 96375 TX/PRO/DX INJ NEW DRUG ADDON: CPT

## 2018-02-01 PROCEDURE — 25000128 H RX IP 250 OP 636: Performed by: EMERGENCY MEDICINE

## 2018-02-01 PROCEDURE — 99285 EMERGENCY DEPT VISIT HI MDM: CPT | Mod: 25

## 2018-02-01 RX ORDER — ONDANSETRON 2 MG/ML
4 INJECTION INTRAMUSCULAR; INTRAVENOUS ONCE
Status: COMPLETED | OUTPATIENT
Start: 2018-02-01 | End: 2018-02-01

## 2018-02-01 RX ADMIN — ONDANSETRON 4 MG: 2 INJECTION INTRAMUSCULAR; INTRAVENOUS at 22:41

## 2018-02-02 VITALS
TEMPERATURE: 97.6 F | DIASTOLIC BLOOD PRESSURE: 80 MMHG | RESPIRATION RATE: 16 BRPM | SYSTOLIC BLOOD PRESSURE: 147 MMHG | OXYGEN SATURATION: 94 %

## 2018-02-02 LAB
ALBUMIN SERPL-MCNC: 3.5 G/DL (ref 3.4–5)
ALP SERPL-CCNC: 124 U/L (ref 40–150)
ALT SERPL W P-5'-P-CCNC: 45 U/L (ref 0–50)
ANION GAP SERPL CALCULATED.3IONS-SCNC: 12 MMOL/L (ref 3–14)
AST SERPL W P-5'-P-CCNC: 58 U/L (ref 0–45)
BILIRUB SERPL-MCNC: 1.2 MG/DL (ref 0.2–1.3)
BUN SERPL-MCNC: 24 MG/DL (ref 7–30)
CALCIUM SERPL-MCNC: 8.9 MG/DL (ref 8.5–10.1)
CHLORIDE SERPL-SCNC: 105 MMOL/L (ref 94–109)
CO2 SERPL-SCNC: 25 MMOL/L (ref 20–32)
CREAT SERPL-MCNC: 0.57 MG/DL (ref 0.52–1.04)
FLUAV+FLUBV AG SPEC QL: NEGATIVE
FLUAV+FLUBV AG SPEC QL: NEGATIVE
GFR SERPL CREATININE-BSD FRML MDRD: >90 ML/MIN/1.7M2
GLUCOSE SERPL-MCNC: 215 MG/DL (ref 70–99)
POTASSIUM SERPL-SCNC: 4.5 MMOL/L (ref 3.4–5.3)
PROT SERPL-MCNC: 7.8 G/DL (ref 6.8–8.8)
SODIUM SERPL-SCNC: 142 MMOL/L (ref 133–144)
SPECIMEN SOURCE: NORMAL
TROPONIN I SERPL-MCNC: 0.05 UG/L (ref 0–0.04)

## 2018-02-02 PROCEDURE — 96375 TX/PRO/DX INJ NEW DRUG ADDON: CPT

## 2018-02-02 PROCEDURE — 25000128 H RX IP 250 OP 636: Performed by: FAMILY MEDICINE

## 2018-02-02 RX ORDER — CEFTRIAXONE SODIUM 2 G/50ML
2 INJECTION, SOLUTION INTRAVENOUS EVERY 24 HOURS
Status: DISCONTINUED | OUTPATIENT
Start: 2018-02-02 | End: 2018-02-02 | Stop reason: HOSPADM

## 2018-02-02 RX ADMIN — SODIUM CHLORIDE 500 MG: 9 INJECTION, SOLUTION INTRAVENOUS at 00:48

## 2018-02-02 RX ADMIN — SODIUM CHLORIDE 1000 ML: 9 INJECTION, SOLUTION INTRAVENOUS at 00:48

## 2018-02-02 NOTE — ED NOTES
Son reports pt is not at her baseline; pt is usually really sharp. All parties comfortable with plan to transfer.

## 2018-02-02 NOTE — ED NOTES
Pt presents to ED by EMS with concerns of nausea and vomiting. Pt has vomit on night gown on arrival. Pt does not have any complaints. Pt is confused on arrival; oriented to self. Pt unable to answer most questions during assessment. EMS reports decreased SpO2 on room air, mid 80's.

## 2018-02-02 NOTE — ED PROVIDER NOTES
HPI  Patient is a 89-year-old female presenting by EMS transport for nausea, vomiting, and change in mental status.  The patient's son is present in the room.  He lives with her on a regular basis in a private home.  She has a known history of atrial fibrillation and pacemaker placement.  She takes Coumadin.      The patient was seen by her sone as well early this afternoon.  The patient told EMS that she began to feel sick at about 4:00 PM.  She told them she laid down on the couch at about that time.  When the patient's son came home tonight she was still lying on the couch and all of the lights in the house were off.  He turned the lights on and found that she had thrown up on her self.  She was lying on her back on the couch.  She seemed to be tired and confused.  She was not responsive as she normally is.  The patient has apparently been sick with a cough and rhinorrhea and fever symptoms over the past 24 hours.  The patient tells me she has a mild to moderate headache.  No neck pain or stiffness.  No sore throat reported.  She does acknowledge that she has been coughing.  She denies feeling shortness of breath.  She acknowledges that she has been nauseous recently and has thrown up but she denies nausea currently.  No report of diarrhea.  No reported falls or injury.  She denies chest pain.    ROS: All other review of systems are negative other than that noted above.     Past Medical History:   Diagnosis Date     Abnormal CXR (chest x-ray)     Read as emphysema     Atherosclerosis of renal artery (H)      Atrial fibrillation (H) 3/27/2014     Basal cell carcinoma      ESSENTIAL TREMOR 5/9/2005     Family history of breast cancer      Hyperlipidemia LDL goal <100 10/31/2010    CHOL      175   7/6/2011 HDL       69   7/6/2011 LDL       93   7/6/2011 TRIG       61   7/6/2011 CHOLHDLRATIO      3.0   7/6/2011 On simvastatin 20mg      Hypertension goal BP (blood pressure) < 140/90 10/3/2013     Other and unspecified  hyperlipidemia      Other osteoporosis      Pacemaker      Paroxysmal supraventricular tachycardia (H)      Postmenopausal atrophic vaginitis      Stroke (H) 2/10/2014     Syncope 10/9/2013     Unspecified essential hypertension      Past Surgical History:   Procedure Laterality Date     EYE SURGERY  2/21/12    left eye cataract     IMPLANT PACEMAKER       SURGICAL HISTORY OF -   1965    FEMORAL HERNIA REPAIR     Family History   Problem Relation Age of Onset     CEREBROVASCULAR DISEASE Mother      DIABETES Mother      Breast Cancer Sister      HEART DISEASE Brother      CEREBROVASCULAR DISEASE Brother      HEART DISEASE Brother      Prostate Cancer Brother      HEART DISEASE Brother      HEART DISEASE Sister      Gynecology Daughter      CANCER Son      Lung     Social History   Substance Use Topics     Smoking status: Never Smoker     Smokeless tobacco: Never Used     Alcohol use No         PHYSICAL  /87  Temp 97.6  F (36.4  C) (Oral)  Resp 16  SpO2 96%  General: Patient is alert and in moderate to severe distress.  He is lying supine with her head turned to the left side.  She will straighten her head and cooperate with examination.  She will open her eyes and talk to me if I ask questions.  She is answering appropriately.  She is slow to respond though and will close her eyes in between questions.  Neurological: Responds to questions by opening her eyes and communicating verbally.  She then closes her eyes between questions.  Moving upper and lower extremities equally, bilaterally.  Cranial nerves II through VIII are intact grossly.  No dysarthria or dysphasia.  No evidence of incoordination identified with finger to nose testing.  Head / Neck: Atraumatic.  Ears: Not done.  Eyes: Pupils are equal, round, and reactive.  Normal conjunctiva.  Nose: Midline.  No epistaxis.  Mouth / Throat: No ulcerations or lesions.  Upper pharynx is not erythematous.  Dry throughout.  Respiratory: Mild respiratory  distress.  Decreased breath sounds at the bases?  Perhaps right greater than left question.  Cardiovascular: Regular rhythm.  Peripheral extremities are warm.  Trace edema.  No calf tenderness.  Abdomen / Pelvis: Not tender.  No distention.  Soft throughout.  Genitalia: Not done.  Musculoskeletal: No tenderness over major muscles and joints.  Skin: No evidence of rash or trauma.        ED COURSE  2330.  Patient presents by EMS with reported nausea and vomiting and decreased mental status.  Nonspecific findings on the examination as described above.  EKG is abnormal compared to one performed about two years and four years ago.  Troponin pending.  CT of her head.  Chest x-ray.  Other labs and urine analysis pending as well.  O2 saturation is 87% on RA.    Labs Ordered and Resulted from Time of ED Arrival Up to the Time of Departure from the ED   CBC WITH PLATELETS DIFFERENTIAL - Abnormal; Notable for the following:        Result Value    WBC 12.0 (*)     Absolute Neutrophil 10.4 (*)     All other components within normal limits   COMPREHENSIVE METABOLIC PANEL - Abnormal; Notable for the following:     Glucose 215 (*)     AST 58 (*)     All other components within normal limits   TROPONIN I - Abnormal; Notable for the following:     Troponin I ES 0.051 (*)     All other components within normal limits   INR - Abnormal; Notable for the following:     INR 2.41 (*)     All other components within normal limits   INFLUENZA A/B ANTIGEN     IMAGING  Images reviewed by me.  Radiology report also reviewed.  CT Head w/o Contrast   Preliminary Result   IMPRESSION: No acute abnormality.         Chest XR,  PA & LAT   Preliminary Result   IMPRESSION:   1. Cardiomegaly and interstitial pulmonary edema.   2. Superimposed left-sided pneumonia.        EKG  (2328)   Interpretation performed by me.  Rate: 87     Rhythm: atrial fib     Axis: nl  Intervals: DE (12-2) -, QRS (<12) 92, QTc (>5) 433  P wave: -     QRS complex: nl  ST segment  / T-wave: T-wave inversion V1-V5  Conclusion: Patient is atrial fibrillation with normal rate.  New T-wave inversion in the lateral leads, unknown source.    0026.  Patient has an infiltrate seen in the left lung, consistent with pneumonia.  Ceftriaxone and azithromycin given.  Troponin negative.  CT scan of the head negative.  White blood cell count elevated consistent with pneumonia.  No evidence of sepsis at this time.  The patient will need hospitalization for oxygen and IV antibiotics.  I am told we do not have beds available here.    0049.  Dr. Castillo at [facility] is accepting the patient to her service.      IMPRESSION    ICD-10-CM    1. Community acquired pneumonia of left lower lobe of lung (H) J18.1    2. Acute respiratory failure with hypoxia (H) J96.01        Critical Care time:  none                    Kaiser Carmichael MD  02/02/18 005

## 2018-02-05 ENCOUNTER — HOSPITAL ENCOUNTER (OUTPATIENT)
Dept: CARDIOLOGY | Facility: CLINIC | Age: 83
Discharge: HOME | End: 2018-02-05
Attending: INTERNAL MEDICINE | Admitting: INTERNAL MEDICINE
Payer: MEDICARE

## 2018-02-05 PROCEDURE — 93296 REM INTERROG EVL PM/IDS: CPT

## 2018-02-05 PROCEDURE — 93294 REM INTERROG EVL PM/LDLS PM: CPT | Performed by: INTERNAL MEDICINE

## 2018-02-06 ENCOUNTER — DOCUMENTATION ONLY (OUTPATIENT)
Dept: CARDIOLOGY | Facility: CLINIC | Age: 83
End: 2018-02-06

## 2018-02-06 NOTE — PROGRESS NOTES
[facility] PPM Device Check  : 67.1 %  Mode: VVIR        Presenting Rhythm: /VS(92bpm).  Heart Rate: Adequate per the histograms, mostly 60's.  Sensing: Stable    Pacing Threshold: Stable   Impedance: Stable  Battery Status: 3.5-6 yrs remain.  Atrial Arrhythmia: Permanent A-Fib, continues on Jantoven.  Ventricular Arrhythmia: 1 VHR episode lasting 4 secs with the rate of 183 bpm. EGM's show fairly regular cycle length, no change in morphology. EF 60-65 % (echo 12/14/14). Reviewed w/ S. Langebrunner RN.     Care Plan: [facility] Remote q 3 months. Pt mailed letter w/ results and next appt date.

## 2018-02-08 ENCOUNTER — OFFICE VISIT (OUTPATIENT)
Dept: FAMILY MEDICINE | Facility: CLINIC | Age: 83
End: 2018-02-08
Payer: MEDICARE

## 2018-02-08 ENCOUNTER — TELEPHONE (OUTPATIENT)
Dept: FAMILY MEDICINE | Facility: CLINIC | Age: 83
End: 2018-02-08

## 2018-02-08 VITALS
TEMPERATURE: 97 F | HEIGHT: 62 IN | BODY MASS INDEX: 16.12 KG/M2 | DIASTOLIC BLOOD PRESSURE: 70 MMHG | WEIGHT: 87.6 LBS | SYSTOLIC BLOOD PRESSURE: 140 MMHG | HEART RATE: 75 BPM | OXYGEN SATURATION: 96 %

## 2018-02-08 DIAGNOSIS — Z09 HOSPITAL DISCHARGE FOLLOW-UP: Primary | ICD-10-CM

## 2018-02-08 DIAGNOSIS — R19.7 DIARRHEA, UNSPECIFIED TYPE: ICD-10-CM

## 2018-02-08 DIAGNOSIS — B00.1 COLD SORE: ICD-10-CM

## 2018-02-08 PROCEDURE — 99214 OFFICE O/P EST MOD 30 MIN: CPT | Performed by: FAMILY MEDICINE

## 2018-02-08 RX ORDER — ACYCLOVIR 50 MG/G
OINTMENT TOPICAL
Qty: 15 G | Refills: 3 | Status: SHIPPED | OUTPATIENT
Start: 2018-02-08 | End: 2018-08-10

## 2018-02-08 NOTE — PROGRESS NOTES
"  SUBJECTIVE:                                                    Shanika Stahl is 89 year old female   Chief Complaint   Patient presents with     Hospital F/U     2/2/18-2/3/18 Hospitalization at [facility]. Notes in care everywhere.         Hospital Follow-up Visit:    Hospital/Nursing Home/IP Rehab Facility: [facility]  Date of Admission: 2/2/18  Date of Discharge: 2/3/18  Reason(s) for Admission: aspiration pneumonia              Problems taking medications regularly:  Has had terrible diarrhea since discharge,  at [facility] discontinued antibiotic. Son states this doctor does not think she actually had pneumonia.       Medication changes since discharge: none other than discontinuation or antibiotic.       Problems adhering to non- medication therapy:  None      Below are notes from 2/2/18 hospitalization    \"BRIEF HOSPITAL COURSE: This 89 y.o. female was brought to outside emergency department after she was found vomiting on her self with decreased level of consciousness. She was noted to have possible infiltrate and possible pulmonary edema on x-ray and was slightly hypoxic. She had a mild Troponin elevation and anterior T wave inversions. She was sent to [facility]. She was started on antibiotics. She did not appear ill and was not hypoxic when assessed later that day. Troponin normalized. Most likely she vomited at home, had aspiration causing the infiltrate, and the Troponin leak was secondary to the strain of all this. She will discharge to complete a short course of ceftin based on her numerous antibiotic intolerance.\"          Summary of hospitalization:  [facility] discharge summary reviewed  Diagnostic Tests/Treatments reviewed.  Follow up needed: none  Other Healthcare Providers Involved in Patient s Care:         None  Update since discharge: improved.     Post Discharge Medication Reconciliation: discharge medications reconciled, continue medications without change.  Plan of care " communicated with patient     Coding guidelines for this visit:  Type of Medical   Decision Making Face-to-Face Visit       within 7 Days of discharge Face-to-Face Visit        within 14 days of discharge   Moderate Complexity [id number]   High Complexity [id number]              Problem list and histories reviewed & adjusted, as indicated.  Additional history: as documented    Patient Active Problem List   Diagnosis     Renal hypertension     Atherosclerosis of renal artery (H)     Paroxysmal supraventricular tachycardia (H)     Other osteoporosis     Abnormal CXR (chest x-ray)     Family history of breast cancer     Hyperlipidemia LDL goal <100     Advanced directives, counseling/discussion     Syncope     Cerebral embolism with cerebral infarction (H)     Health Care Home     Long term current use of anticoagulant therapy     Chronic atrial fibrillation (H)     Pacemaker     Sensorineural hearing loss, bilateral     Pulmonary hypertension     Past Surgical History:   Procedure Laterality Date     EYE SURGERY  2/21/12    left eye cataract     IMPLANT PACEMAKER       SURGICAL HISTORY OF -   1965    FEMORAL HERNIA REPAIR       Social History   Substance Use Topics     Smoking status: Never Smoker     Smokeless tobacco: Never Used     Alcohol use No     Family History   Problem Relation Age of Onset     CEREBROVASCULAR DISEASE Mother      DIABETES Mother      Breast Cancer Sister      HEART DISEASE Brother      CEREBROVASCULAR DISEASE Brother      HEART DISEASE Brother      Prostate Cancer Brother      HEART DISEASE Brother      HEART DISEASE Sister      Gynecology Daughter      CANCER Son      Lung         Current Outpatient Prescriptions   Medication Sig Dispense Refill     acyclovir (ZOVIRAX) 5 % ointment Apply topically 6 times daily 15 g 3     JANTOVEN 2.5 MG tablet TAKE 1 TABLET BY MOUTH ON MONDAY AND FRIDAY AND TAKE 2 TABLETS ALL OTHER DAYS AS DIRECTED 155 tablet 0     simvastatin (ZOCOR) 20 MG tablet TAKE 1  TABLET BY MOUTH DAILY AT BEDTIME 90 tablet 3     metoprolol (TOPROL-XL) 25 MG 24 hr tablet TAKE 1 TABLET BY MOUTH TWICE DAILY 180 tablet 2     Acetaminophen (TYLENOL) 325 MG CAPS Take by mouth as needed       loratadine (CLARITIN) 10 MG tablet Take 10 mg by mouth daily       CALCIUM + D 600-200 MG-UNIT PO TABS 1 tablet by mouth daily       order for DME Knee high compression stockings 10-15 mm Hg 1 each 1     furosemide (LASIX) 20 MG tablet Take 1 tablet (20 mg) by mouth every other day (Patient not taking: Reported on 2/8/2018) 45 tablet 3     fish oil-omega-3 fatty acids (OMEGA 3) 1000 MG capsule Take 1 capsule by mouth 2 times daily 90 capsule 0     VITAMIN D 1000 UNIT OR TABS 1 TABLET DAILY 30 0     Allergies   Allergen Reactions     Allopurinol      Ampicillin Diarrhea     Cipro [Benzyl Alcohol] Nausea and Vomiting     Levaquin GI Disturbance     Lisinopril Swelling     AngioEdema     Paxil [Paroxetine] Nausea and Vomiting     Penicillins Diarrhea     Sulfa Drugs GI Disturbance     Zithromax [Macrolides] Nausea and Vomiting     Zoloft Nausea and Vomiting     Recent Labs   Lab Test  02/01/18   2247  07/26/17   1342  01/11/16   1705   02/11/14   0645   02/10/14   0810   01/22/14   1234   04/04/13   0914   07/30/12   0751   A1C   --    --    --    --    --    --   5.5   --    --    --    --    --    --    LDL   --    --    --    --   78   --    --    --    --    --   104   --   104   HDL   --    --    --    --   42*   --    --    --    --    --   68   --   68   TRIG   --    --    --    --   56   --    --    --    --    --   60   --   67   ALT  45   --   63*   --    --    --    --    --   39   < >  37   < >  16   CR  0.57  0.73  0.58   < >  0.67   < >  0.77   < >  0.83   < >  0.89   < >  0.91   GFRESTIMATED  >90  74  >90  Non  GFR Calc     < >  83   < >  71   < >  65   < >  60*   < >  58*   GFRESTBLACK  >90  89  >90   GFR Calc     < >  >90   < >  85   < >  78   < >  72   < >  71  "  POTASSIUM  4.5  4.0  3.5   < >  4.2   < >  9.9*   < >  4.1   < >  4.4   < >  4.4   TSH   --    --    --    --    --    --    --    --   1.69   --    --    --    --     < > = values in this interval not displayed.      BP Readings from Last 3 Encounters:   02/08/18 140/70   02/02/18 147/80   12/19/17 165/86    Wt Readings from Last 3 Encounters:   02/08/18 39.7 kg (87 lb 9.6 oz)   12/19/17 39.6 kg (87 lb 3.2 oz)   07/31/17 38.1 kg (84 lb)         ROS:  Constitutional, HEENT, cardiovascular, pulmonary, gi and gu systems are negative, except as otherwise noted.    OBJECTIVE:                                                    /70  Pulse 75  Temp 97  F (36.1  C) (Oral)  Ht 1.562 m (5' 1.5\")  Wt 39.7 kg (87 lb 9.6 oz)  SpO2 96%  BMI 16.28 kg/m2  GENERAL APPEARANCE ADULT: Alert, no acute distress, dry lips with sore right lower lip  RESP: lungs clear to auscultation   CV: normal rate, regular rhythm, no murmur or gallop  PSYCH: mentation appears normal., affect and mood normal  Diagnostic Test Results:  none      ASSESSMENT/PLAN:                                                    1. Hospital discharge follow-up  Consider day care or other respite for family  - CARE COORDINATION REFERRAL    2. Diarrhea, unspecified type, asssociated with antibiotics  Getting better so will not check for stool infection.  If not better in a week or 2 will check for c diff.    3. Cold sore  Lower lip  - acyclovir (ZOVIRAX) 5 % ointment; Apply topically 6 times daily  Dispense: 15 g; Refill: 3    Alena Tapia MD  [facility]  "

## 2018-02-08 NOTE — NURSING NOTE
"Initial /70  Pulse 75  Temp 97  F (36.1  C) (Oral)  Ht 5' 1.5\" (1.562 m)  Wt 87 lb 9.6 oz (39.7 kg)  SpO2 96%  BMI 16.28 kg/m2 Estimated body mass index is 16.28 kg/(m^2) as calculated from the following:    Height as of this encounter: 5' 1.5\" (1.562 m).    Weight as of this encounter: 87 lb 9.6 oz (39.7 kg). .      "

## 2018-02-08 NOTE — MR AVS SNAPSHOT
After Visit Summary   2/8/2018    Shanika Stahl    MRN: [id number]           Patient Information     Date Of Birth          5/26/1924        Visit Information        Provider Department      2/8/2018 11:20 AM Alena Tapia MD [facility]        Today's Diagnoses     Hospital discharge follow-up    -  1    Diarrhea, unspecified type, asssociated with antibiotics        Cold sore           Follow-ups after your visit        Additional Services     CARE COORDINATION REFERRAL       Services are provided by a Care Coordinator for people with complex needs such as: medical, social, or financial troubles.  The Care Coordinator works with the patient and their Primary Care Provider to determine health goals, obtain resources, achieve outcomes, and develop care plans that help coordinate the patient's care.     Reason for Referral: Patient/Caregiver Support: Home Safety, Navigation of Long Term Care/County Services, Resources for Support and Respite Care    Additional pertinent details:      Clinical Staff have discussed the Care Coordination Referral with the patient and/or caregiver: yes                  Your next 10 appointments already scheduled     Feb 21, 2018 10:30 AM CST   [place] Visit with [place] [facility]   [facility] ([facility])    [address] [address]   [phone]            May 29, 2018  7:30 AM CDT   Remote PPM Check with [place] [facility]   [facility] ([facility])    [address] [address]   [phone]           This appointment is for a remote check of your pacemaker.  This is not an appointment at the office.              Who to contact     If you have questions or need follow up information about today's clinic visit or your schedule please contact [facility] directly at [phone].  Normal or non-critical lab and imaging results will be  "communicated to you by RockeTalkhart, letter or phone within 4 business days after the clinic has received the results. If you do not hear from us within 7 days, please contact the clinic through Odimax or phone. If you have a critical or abnormal lab result, we will notify you by phone as soon as possible.  Submit refill requests through Odimax or call your pharmacy and they will forward the refill request to us. Please allow 3 business days for your refill to be completed.          Additional Information About Your Visit        Odimax Information     O[employer] gives you secure access to your electronic health record. If you see a primary care provider, you can also send messages to your care team and make appointments. If you have questions, please call your primary care clinic.  If you do not have a primary care provider, please call [phone] and they will assist you.        Care EveryWhere ID     This is your Care EveryWhere ID. This could be used by other organizations to access your [facility] medical records  [id number]        Your Vitals Were     Pulse Temperature Height Pulse Oximetry BMI (Body Mass Index)       75 97  F (36.1  C) (Oral) 5' 1.5\" (1.562 m) 96% 16.28 kg/m2        Blood Pressure from Last 3 Encounters:   02/08/18 140/70   02/02/18 147/80   12/19/17 165/86    Weight from Last 3 Encounters:   02/08/18 87 lb 9.6 oz (39.7 kg)   12/19/17 87 lb 3.2 oz (39.6 kg)   07/31/17 84 lb (38.1 kg)              We Performed the Following     CARE COORDINATION REFERRAL          Today's Medication Changes          These changes are accurate as of 2/8/18 12:16 PM.  If you have any questions, ask your nurse or doctor.               Start taking these medicines.        Dose/Directions    acyclovir 5 % ointment   Commonly known as:  ZOVIRAX   Used for:  Cold sore   Started by:  Alena Tapia MD        Apply topically 6 times daily   Quantity:  15 g   Refills:  3         Stop taking these medicines if you " haven't already. Please contact your care team if you have questions.     meclizine 25 MG tablet   Commonly known as:  ANTIVERT   Stopped by:  Alena Tapia MD           ONE-A-DAY WOMENS 50 PLUS PO   Stopped by:  Alena Tapia MD                Where to get your medicines      These medications were sent to [id number] - [place], [place] - [address]  [address], [place] [address]     Phone:  [phone]     acyclovir 5 % ointment                Primary Care Provider Office Phone # Fax #    Milena Jaime  [phone] [address] [address]        Equal Access to Services     JAYCEE MOLINA : Hadii matthew dickson hadasho Sojenniferali, waaxda luqadaha, qaybta kaalmada adeegyada, shaquille gillis . So [facility] [phone].    ATENCIÓN: Si habla español, tiene a holly disposición servicios gratuitos de asistencia lingüística. [facility] [phone].    We comply with applicable federal civil rights laws and [place] laws. We do not discriminate on the basis of race, color, national origin, age, disability, sex, sexual orientation, or gender identity.            Thank you!     Thank you for choosing [facility]  for your care. Our goal is always to provide you with excellent care. Hearing back from our patients is one way we can continue to improve our services. Please take a few minutes to complete the written survey that you may receive in the mail after your visit with us. Thank you!             Your Updated Medication List - Protect others around you: Learn how to safely use, store and throw away your medicines at www.disposemymeds.org.          This list is accurate as of 2/8/18 12:16 PM.  Always use your most recent med list.                   Brand Name Dispense Instructions for use Diagnosis    acyclovir 5 % ointment    ZOVIRAX    15 g    Apply topically 6 times daily    Cold sore       calcium +  D 600-200 MG-UNIT Tabs   Generic drug:  calcium carbonate-vitamin D      1 tablet by mouth daily        cholecalciferol 1000 UNIT tablet    vitamin D3    30    1 TABLET DAILY        fish oil-omega-3 fatty acids 1000 MG capsule     90 capsule    Take 1 capsule by mouth 2 times daily        furosemide 20 MG tablet    LASIX    45 tablet    Take 1 tablet (20 mg) by mouth every other day    Leg swelling       JANTOVEN 2.5 MG tablet   Generic drug:  warfarin     155 tablet    TAKE 1 TABLET BY MOUTH ON MONDAY AND FRIDAY AND TAKE 2 TABLETS ALL OTHER DAYS AS DIRECTED    Chronic atrial fibrillation (H)       loratadine 10 MG tablet    CLARITIN     Take 10 mg by mouth daily        metoprolol succinate 25 MG 24 hr tablet    TOPROL-XL    180 tablet    TAKE 1 TABLET BY MOUTH TWICE DAILY    Renal hypertension, stage 1-4 or unspecified chronic kidney disease       order for DME     1 each    Knee high compression stockings 10-15 mm Hg    Leg swelling       simvastatin 20 MG tablet    ZOCOR    90 tablet    TAKE 1 TABLET BY MOUTH DAILY AT BEDTIME    Hyperlipidemia LDL goal <100       TYLENOL 325 MG Caps   Generic drug:  Acetaminophen      Take by mouth as needed

## 2018-02-09 ENCOUNTER — CARE COORDINATION (OUTPATIENT)
Dept: CARE COORDINATION | Facility: CLINIC | Age: 83
End: 2018-02-09

## 2018-02-09 NOTE — LETTER
[facility]ARE [facility]ORDIN[facility]  [address] [place] [address]  [phone]      February 9, 2018    Shanika Stahl  [address] [place] [address]      Dear Shanika Schultz,    I am the clinic care coordinator- who you Antoine with on the phone today & I want to thank you for your time and sharing your information with me so that I can better assist you with some cares in the future, if needed.    As we discussed, I am sending you some information about private pay Home Health Care agencies for our geographic area.  I strongly encourage you to review this information, do some research on the Internet if able, and/or call to schedule appointments with a few different agencies before you settle on the final one.    Some questions to ask each agency may include, but certainly are not limited to:  1.   Rate of pay for a Home Health Aide?  2.   Minimum number of hour required per week?  3.   Is a nurse required to oversee the Home Health Aide, if so, what is the  cost of the nurse visit?  4.   What is the job description of the Home Health Aide?    5.   What is the expectation of the family while the Home Health Aide is in the  home?  6.   Does your agency conduct background checks on your staff?  7.   How does your agency cover when a staff member is out ill?  8.  Is your agency staff trained & certified in CPR, First Aide and what other  areas?  9. Is your agency insured/licensed/bonded?  10. Can you supply references upon request?    If you have any questions along this journey, please contact me at [phone].     Sincerely,     NAKITA Moreno

## 2018-02-09 NOTE — LETTER
Health Care Home - Access Care Plan    About Me  Patient Name:  Shanika Stahl    YOB: 1924  Age:                             89 year old   [employer] MRN:            [id number] Telephone Information:     Home Phone [phone]   Mobile [phone]       Address:    [address] [address] Email address:  [email or web address]      Emergency Contact(s)  Name Relationship Lgl Grd Work Phone Home Phone Mobile Phone   1Sherry VO Son  [phone]   2TRACY RIVERA Son   [phone]            My Access Plan  Medical Emergency 918   Questions or concerns during clinic hours Primary Clinic Line, I will call the clinic directly: Primary Clinic: [facility]- [phone]   24 Hour Appointment Line [phone] or  [phone]) (toll free)   24 Hour Nurse Line [phone] (toll free)   Questions or concerns outside clinic hours 24 Hour Appointment Line, I will call the after-hours on-call line:   [facility] [phone] or [phone]) (toll-free)   Preferred Urgent Care Preferred Urgent Care: [facility], [phone]   Preferred Hospital Preferred Hospital: [place], [place]  [phone]   Preferred Pharmacy [id number] - [place], [place] - [address]     Behavioral Health Crisis Line The National Suicide Prevention Lifeline at 1-613.691.5373 or 911               My Care Team Members  Patient Care Team       Relationship Specialty Notifications Start End    Milena Jaime DO PCP - General Internal Medicine  9/30/15     Phone: [phone] Pager: [phone] Fax: [phone] [address] [address]    Jael-Ellen Man   Admissions 2/9/18     Phone: [phone] Fax: [phone]            My Medical and Care Information  Problem List   Patient Active Problem List   Diagnosis     Renal  hypertension     Atherosclerosis of renal artery (H)     Paroxysmal supraventricular tachycardia (H)     Other osteoporosis     Abnormal CXR (chest x-ray)     Family history of breast cancer     Hyperlipidemia LDL goal <100     Advanced directives, counseling/discussion     Syncope     Cerebral embolism with cerebral infarction (H)     Health Care Home     Long term current use of anticoagulant therapy     Chronic atrial fibrillation (H)     Pacemaker     Sensorineural hearing loss, bilateral     Pulmonary hypertension      Current Medications and Allergies:  See printed Medication Report

## 2018-02-09 NOTE — PROGRESS NOTES
"Clinic Care Coordination Contact  OUTREACH    Referral Information:  Referral Source: PCP  Reason for Contact: \"Home Safety, Navigation of Long Term Care/County Services, Resources for Support and Respite Care\"  [facility] placed call to pt, however, she was busy.  Son, Antoine, spoke with writer.  JOSH on file.  Care Conference: No     Universal Utilization:   ED Visits in last year: 1  Hospital visits in last year: 1  Last PCP appointment: 02/08/18  Missed Appointments: 0     Clinical Concerns:  Current Medical Concerns: Per Antoine, no current medical concerns that have not been addressed at appointment yesterday.  Current Behavioral Concerns: None, per Antoine.    Education Provided to patient:  ANCA and Antoine discussed pt's cares at home, home safety and pt's ability to navigate in her home.  Antonie shared that the pt is weak after her hospitalization, however, walks independently.  There is a cane & walker in the home, but pt will not use them as she doesn't need them.    Clinical Pathway: None    Medication Management:  Antoine informed this writer that he did not  the Acyclovir due to the $200 cost, but did purchase and OTC that was recommended by the Pharmacist as a comparable product, especially if it's unknown if the pt has a cold sore or not.  He believes he purchased Abreva instead.    Functional Status:  Mobility Status: Independent  Equipment Currently Used at Home: straight cane & walker are available, but pt does not use them.  Her sons have installed bathroom grab bars and are in the process of installing a taller toilet for the pt in her bathroom.  Antoine also shared that they have added height to the sofa so it is easier for the pt to get up/down from sitting on the sofa.  ANCA discussed that there are items called \"bed risers\" at [employer], [employer], [employer], etc., that can be purchased to add 4-6 inches of height to furniture safely.  Antoine said he will go and view product next time he goes shopping.  "   Transportation: Pt's sons drive her to all appointments.        Psychosocial:  Current living arrangement:: I live in a private home with family (Son, Antoine, lives with the pt)  Financial/Insurance: Pt receives pension, social security.  Medicare & Labor Care.      ANCA and Antoine discussed the [place] Choice Assessment and at what point financially is a good time for a person to apply for assistance.  Per Antoine, the pt has assets that will prevent her from applying for assistance for quite some time.  However, we did discuss the option of privately paying for a Home Health Aide to assist with showering & bathing at some point down the road.  [facility] to mail list & brochures of private pay Home Care agencies to pt & family for review.  Antoine expressed gratitude.     Resources and Interventions:  Current Resources: None      Advanced Care Plans/Directives on file:: Yes  Referrals Placed: Home Care--Antoine would like private pay home care information for future use     Goals:  NA   Barriers: NA  Strengths: NA    Patient/Caregiver understanding:  Antoine will review information sent to him by this CC.  He says that at this time, he nor his brotherUlises need any assistance caring for the pt at home at this time, but also recognizes that this can change at any moment.  Antoine states he will keep my business card and contact this writer with further questions regarding his mom's care needs.    Ellen Man  Social Work Care Coordinator  [facility]  [phone]

## 2018-02-12 NOTE — TELEPHONE ENCOUNTER
Varies, will use as needed for cold sores on lips, continue treatment 24 hours after lesion has resolved.

## 2018-02-21 ENCOUNTER — ANTICOAGULATION THERAPY VISIT (OUTPATIENT)
Dept: ANTICOAGULATION | Facility: CLINIC | Age: 83
End: 2018-02-21
Payer: MEDICARE

## 2018-02-21 DIAGNOSIS — Z79.01 LONG TERM CURRENT USE OF ANTICOAGULANT THERAPY: ICD-10-CM

## 2018-02-21 LAB — INR POINT OF CARE: 5.7 (ref 0.86–1.14)

## 2018-02-21 PROCEDURE — 36416 COLLJ CAPILLARY BLOOD SPEC: CPT

## 2018-02-21 PROCEDURE — 99207 ZZC NO CHARGE NURSE ONLY: CPT

## 2018-02-21 PROCEDURE — 85610 PROTHROMBIN TIME: CPT | Mod: QW

## 2018-02-21 NOTE — MR AVS SNAPSHOT
Shanika DIAZ Favio   2/21/2018 10:30 AM   Anticoagulation Therapy Visit    Description:  89 year old female   Provider:  [place] [facility]   [place]:  [place] [facility]           INR as of 2/21/2018     Today's INR 5.7!      Anticoagulation Summary as of 2/21/2018     INR goal 2.0-3.0   Today's INR 5.7!   Full instructions 2/21: Hold; 2/22: Hold; Otherwise 2.5 mg on Mon, Fri; 5 mg all other days   Next INR check 2/23/2018    Indications   Atrial fibrillation (H) (Resolved) [I48.91]  Cerebral embolism with cerebral infarction (H) [I63.40]  Long term current use of anticoagulant therapy [Z79.01]         Description     Work on eating regular meals. Get plenty of water. No warfarin today or tomorrow. Be careful to avoid injury.      Your next Anticoagulation Clinic appointment(s)     Feb 23, 2018 10:30 AM CST   [place] Visit with [place] [facility]   [facility] ([facility])    [address] [address] [phone]              Contact Numbers     Please call [phone] with any problems or questions regarding your therapy.    If you need to cancel and/or reschedule your appointment please call one of the following numbers:  [facility] - [phone]  [place] - [phone]  [place] - [phone]  [place] - [phone]  [place] - [phone]            February 2018 Details    Sun Mon Tue Wed Thu Fri Sat         1               2               3                 4               5               6               7               8               9               10                 11               12               13               14               15               16               17                 18               19               20               21      Hold   See details      22      Hold         23            24                 25               26               27               28                   Date Details   02/21 This INR check       Date of next INR:   2/23/2018         How to take your warfarin dose     To take:  2.5 mg Take 1 of the 2.5 mg tablets.    Hold Do not take your warfarin dose. See the Details table to the right for additional instructions.

## 2018-02-21 NOTE — PROGRESS NOTES
ANTICOAGULATION FOLLOW-UP CLINIC VISIT    Patient Name:  Shanika tSahl  Date:  2/21/2018  Contact Type:  Face to Face    SUBJECTIVE:     Patient Findings     Positives Change in diet/appetite (no greens and not eating well)    Comments Patient hospitalized at [facility] from 2-2 to 2-3 for aspiration pneumonia. She was discharged on a short dose of ceftin (4 days). Patient had bad diarrhea while on Ceftin and has GI side effects to many abx. She has not been on any abx for over 2 weeks now. Pt and son report her diarrhea has mostly cleared, although she still has some days with more than one looser (but not watery) stool. She has not been eating well since back home. States she does not have an appetite and son reports yesterday all she ate was a bowl of soup and one piece of toast. She has not had her usual greens since back home.     INR could be elevated due to lack of protein in her diet, no greens and recent issues with loose stools. The patient feels okay and does not seem to be having any breathing concerns. No bleeding issues either. Will chart cc this to PCP due to elevated INR. Patient advised to be very careful to avoid injury due to increased bleed risk with supra therapeutic INR.           OBJECTIVE    INR Protime   Date Value Ref Range Status   02/21/2018 5.7 (A) 0.86 - 1.14 Final       ASSESSMENT / PLAN  INR assessment SUPRA    Recheck INR In: 2 DAYS    INR Location Clinic      Anticoagulation Summary as of 2/21/2018     INR goal 2.0-3.0   Today's INR 5.7!   Maintenance plan 2.5 mg (2.5 mg x 1) on Mon, Fri; 5 mg (2.5 mg x 2) all other days   Full instructions 2/21: Hold; 2/22: Hold; Otherwise 2.5 mg on Mon, Fri; 5 mg all other days   Weekly total 30 mg   Plan last modified Mabel Serna, RN (8/25/2015)   Next INR check 2/23/2018   Priority INR   Target end date Indefinite    Indications   Atrial fibrillation (H) (Resolved) [I48.91]  Cerebral embolism with cerebral infarction (H) [I63.40]  Long term  current use of anticoagulant therapy [Z79.01]         Anticoagulation Episode Summary     INR check location     Preferred lab     Send INR reminders to [facility] POOL    Comments * Keep on low end of therapeutic range. Uses 2.5mg tablets.       Anticoagulation Care Providers     Provider Role Specialty Phone number    Milena Jaime DO [facility] Internal Medicine [phone]            See the Encounter Report to view Anticoagulation Flowsheet and Dosing Calendar (Go to Encounters tab in chart review, and find the Anticoagulation Therapy Visit)        Verenice Morillo RN

## 2018-02-23 ENCOUNTER — ANTICOAGULATION THERAPY VISIT (OUTPATIENT)
Dept: ANTICOAGULATION | Facility: CLINIC | Age: 83
End: 2018-02-23
Payer: MEDICARE

## 2018-02-23 DIAGNOSIS — Z79.01 LONG TERM CURRENT USE OF ANTICOAGULANT THERAPY: ICD-10-CM

## 2018-02-23 LAB — INR POINT OF CARE: 1.9 (ref 0.86–1.14)

## 2018-02-23 PROCEDURE — 85610 PROTHROMBIN TIME: CPT | Mod: QW

## 2018-02-23 PROCEDURE — 36416 COLLJ CAPILLARY BLOOD SPEC: CPT

## 2018-02-23 PROCEDURE — 99207 ZZC NO CHARGE NURSE ONLY: CPT

## 2018-02-23 NOTE — MR AVS SNAPSHOT
Shanika EMILY Stahl   2/23/2018 10:30 AM   Anticoagulation Therapy Visit    Description:  89 year old female   Provider:  [place] [facility]   Department:  [place]           INR as of 2/23/2018     Today's INR 1.9!      Anticoagulation Summary as of 2/23/2018     INR goal 2.0-3.0   Today's INR 1.9!   Full instructions 2/23: 3.75 mg; 2/28: 2.5 mg; Otherwise 2.5 mg on Mon, Fri; 5 mg all other days   Next INR check 3/2/2018    Indications   Atrial fibrillation (H) (Resolved) [I48.91]  Cerebral embolism with cerebral infarction (H) [I63.40]  Long term current use of anticoagulant therapy [Z79.01]         Description     Take 1.5 tablets of warfarin today. Then follow dosing calendar. I am reducing Wednesdays dose slightly.      Your next Anticoagulation Clinic appointment(s)     Mar 02, 2018 11:15 AM CST   Anticoagulation Visit with [place] [facility]   [facility]    [address] [address] [phone]              Contact Numbers     Please call [phone] with any problems or questions regarding your therapy.    If you need to cancel and/or reschedule your appointment please call one of the following numbers:  [facility] [phone]  [place] - [phone]  [place] - [phone]  [place] - [phone]  [place] - [phone]            February 2018 Details    Sun Mon Tue Wed Thu Fri Sat         1               2               3                 4               5               6               7               8               9               10                 11               12               13               14               15               16               17                 18               19               20               21               22               23      3.75 mg   See details      24      5 mg           25      5 mg         26      2.5 mg         27      5 mg         28      2.5 mg             Date Details   02/23 This INR check                How to take your warfarin dose     To take:  2.5 mg Take 1 of the 2.5 mg tablets.    To take:  3.75 mg Take 1.5 of the 2.5 mg tablets.    To take:  5 mg Take 2 of the 2.5 mg tablets.           March 2018 Details    Sun Mon Tue Wed Thu Fri Sat         1      5 mg         2            3                 4               5               6               7               8               9               10                 11               12               13               14               15               16               17                 18               19               20               21               22               23               24                 25               26               27               28               29               30               31                Date Details   No additional details    Date of next INR:  3/2/2018         How to take your warfarin dose     To take:  2.5 mg Take 1 of the 2.5 mg tablets.    To take:  5 mg Take 2 of the 2.5 mg tablets.

## 2018-02-23 NOTE — PROGRESS NOTES
ANTICOAGULATION FOLLOW-UP CLINIC VISIT    Patient Name:  Shanika Stahl  Date:  2/23/2018  Contact Type:  Face to Face    SUBJECTIVE:     Patient Findings     Positives Change in diet/appetite (patient ate a small amount of spinach, brenna salad and brenna lettuce on toast since elevated INR on Wed), Intentional hold of therapy (2-21 and 2-22)    Comments See prior [facility] notes. It is unclear exactly why INR was elevated two days ago. Could be from not eating well, lack of usual greens or prior diarrhea. The pt will start eating better and go back to usual amount of greens. Recheck in one week and will start coming Fridays now. Writer is reducing dosing next Wed slightly since increasing today's dose a little to try to even out weekly total.           OBJECTIVE    INR Protime   Date Value Ref Range Status   02/23/2018 1.9 (A) 0.86 - 1.14 Final       ASSESSMENT / PLAN  INR assessment THER    Recheck INR In: 1 WEEK    INR Location Clinic      Anticoagulation Summary as of 2/23/2018     INR goal 2.0-3.0   Today's INR 1.9!   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 Uses 2.5mg tablets.       Anticoagulation Care Providers     Provider Role Specialty Phone number    Milena Jaime,  [facility] Internal Medicine [phone]            See the Encounter Report to view  Anticoagulation Flowsheet and Dosing Calendar (Go to Encounters tab in chart review, and find the Anticoagulation Therapy Visit)        Verenice Morillo RN

## 2018-03-02 ENCOUNTER — ANTICOAGULATION THERAPY VISIT (OUTPATIENT)
Dept: ANTICOAGULATION | Facility: CLINIC | Age: 83
End: 2018-03-02
Payer: MEDICARE

## 2018-03-02 DIAGNOSIS — Z79.01 LONG TERM CURRENT USE OF ANTICOAGULANT THERAPY: ICD-10-CM

## 2018-03-02 DIAGNOSIS — I63.40 CEREBRAL EMBOLISM WITH CEREBRAL INFARCTION (H): ICD-10-CM

## 2018-03-02 LAB — INR POINT OF CARE: 2.1 (ref 0.86–1.14)

## 2018-03-02 PROCEDURE — 36416 COLLJ CAPILLARY BLOOD SPEC: CPT

## 2018-03-02 PROCEDURE — 99207 ZZC NO CHARGE NURSE ONLY: CPT

## 2018-03-02 PROCEDURE — 85610 PROTHROMBIN TIME: CPT | Mod: QW

## 2018-03-02 NOTE — PROGRESS NOTES
ANTICOAGULATION FOLLOW-UP CLINIC VISIT    Patient Name:  Shanika Stahl  Date:  3/2/2018  Contact Type:  Face to Face    SUBJECTIVE:     Patient Findings     Positives No Problem Findings    Comments Pt denies changes in medications, diet, activity or health, reports taking warfarin as directed.             OBJECTIVE    INR Protime   Date Value Ref Range Status   03/02/2018 2.1 (A) 0.86 - 1.14 Final       ASSESSMENT / PLAN  INR assessment THER    Recheck INR In: 6 WEEKS    INR Location Clinic      Anticoagulation Summary as of 3/2/2018     INR goal 2.0-3.0   Today's INR 2.1   Maintenance plan 2.5 mg (2.5 mg x 1) on Mon, Fri; 5 mg (2.5 mg x 2) all other days   Full instructions 2.5 mg on Mon, Fri; 5 mg all other days   Weekly total 30 mg   Plan last modified Mabel Serna, RN (8/25/2015)   Next INR check 4/11/2018   Priority INR   Target end date Indefinite    Indications   Atrial fibrillation (H) (Resolved) [I48.91]  Cerebral embolism with cerebral infarction (H) [I63.40]  Long term current use of anticoagulant therapy [Z79.01]         Anticoagulation Episode Summary     INR check location     Preferred lab     Send INR reminders to [facility]    Comments * Keep on low end of therapeutic range. Uses 2.5mg tablets.       Anticoagulation Care Providers     Provider Role Specialty Phone number    JaimeMilena,  [facility] Internal Medicine [phone]            See the Encounter Report to view Anticoagulation Flowsheet and Dosing Calendar (Go to Encounters tab in chart review, and find the Anticoagulation Therapy Visit)        Christina Gaston RN

## 2018-04-11 ENCOUNTER — ANTICOAGULATION THERAPY VISIT (OUTPATIENT)
Dept: ANTICOAGULATION | Facility: CLINIC | Age: 83
End: 2018-04-11
Payer: MEDICARE

## 2018-04-11 DIAGNOSIS — I48.20 CHRONIC ATRIAL FIBRILLATION (H): Chronic | ICD-10-CM

## 2018-04-11 DIAGNOSIS — Z79.01 LONG TERM CURRENT USE OF ANTICOAGULANT THERAPY: ICD-10-CM

## 2018-04-11 DIAGNOSIS — I63.40 CEREBRAL EMBOLISM WITH CEREBRAL INFARCTION (H): ICD-10-CM

## 2018-04-11 LAB — INR POINT OF CARE: 3.2 (ref 0.86–1.14)

## 2018-04-11 PROCEDURE — 99207 ZZC NO CHARGE NURSE ONLY: CPT

## 2018-04-11 PROCEDURE — 36416 COLLJ CAPILLARY BLOOD SPEC: CPT

## 2018-04-11 PROCEDURE — 85610 PROTHROMBIN TIME: CPT | Mod: QW

## 2018-04-11 RX ORDER — WARFARIN SODIUM 2.5 MG/1
TABLET ORAL
Qty: 150 TABLET | Refills: 0 | COMMUNITY
Start: 2018-04-11 | End: 2018-06-06

## 2018-04-11 NOTE — MR AVS SNAPSHOT
Shanika EMILY Stahl   4/11/2018 10:45 AM   Anticoagulation Therapy Visit    Description:  89 year old female   Provider:  [facility]   Department:  [facility]           INR as of 4/11/2018     Today's INR 3.2!      Anticoagulation Summary as of 4/11/2018     INR goal 2.0-3.0   Today's INR 3.2!   Full instructions 4/11: 2.5 mg; Otherwise 2.5 mg on Mon, Fri; 5 mg all other days   Next INR check 5/25/2018    Indications   Atrial fibrillation (H) (Resolved) [I48.91]  Cerebral embolism with cerebral infarction (H) [I63.40]  Long term current use of anticoagulant therapy [Z79.01]         Your next Anticoagulation Clinic appointment(s)     May 25, 2018 11:45 AM CDT   [facility]agulation Visit with [facility] [facility]   [facility]    [address] [address] [phone]              Contact Numbers     Please call [phone] with any problems or questions regarding your therapy.    If you need to cancel and/or reschedule your appointment please call one of the following numbers:  [facility] [phone]  [place] - [phone]  [facility] [phone]  [facility] [phone]  [place] - [phone]            April 2018 Details    Sun Mon Tue Wed Thu Fri Sat     1               2               3               4               5               6               7                 8               9               10               11      2.5 mg   See details      12      5 mg         13      2.5 mg         14      5 mg           15      5 mg         16      2.5 mg         17      5 mg         18      5 mg         19      5 mg         20      2.5 mg         21      5 mg           22      5 mg         23      2.5 mg         24      5 mg         25      5 mg         26      5 mg         27      2.5 mg         28      5 mg           29      5 mg         30      2.5 mg               Date Details   04/11 This INR check               How to take your warfarin dose     To  take:  2.5 mg Take 1 of the 2.5 mg tablets.    To take:  5 mg Take 2 of the 2.5 mg tablets.           May 2018 Details    Sun Mon Tue Wed Thu Fri Sat       1      5 mg         2      5 mg         3      5 mg         4      2.5 mg         5      5 mg           6      5 mg         7      2.5 mg         8      5 mg         9      5 mg         10      5 mg         11      2.5 mg         12      5 mg           13      5 mg         14      2.5 mg         15      5 mg         16      5 mg         17      5 mg         18      2.5 mg         19      5 mg           20      5 mg         21      2.5 mg         22      5 mg         23      5 mg         24      5 mg         25            26                 27               28               29               30               31                  Date Details   No additional details    Date of next INR:  5/25/2018         How to take your warfarin dose     To take:  2.5 mg Take 1 of the 2.5 mg tablets.    To take:  5 mg Take 2 of the 2.5 mg tablets.

## 2018-04-11 NOTE — PROGRESS NOTES
ANTICOAGULATION FOLLOW-UP CLINIC VISIT    Patient Name:  Shanika Stahl  Date:  4/11/2018  Contact Type:  Face to Face, accompanied by her son    SUBJECTIVE:     Patient Findings     Positives Change in diet/appetite (Much less greens (vitamin K intake) than usual - she does routine have a salad at least daily, often including spinach)    Comments Will plan to decrease warfarin dose today only then resume usual maintenance dose. Patient plans to resume eating her normal amount of greens. No other changes noted, took warfarin as prescribed. No issues with bleeding or unusual bruising noted.    Patient's son was instructed to return to [facility] sooner than 6 weeks if he should have any concerns.              OBJECTIVE    INR Protime   Date Value Ref Range Status   04/11/2018 3.2 (A) 0.86 - 1.14 Final       ASSESSMENT / PLAN  INR assessment SUPRA    Recheck INR In: 6 WEEKS    INR Location Clinic      Anticoagulation Summary as of 4/11/2018     INR goal 2.0-3.0   Today's INR 3.2!   Maintenance plan 2.5 mg (2.5 mg x 1) on Mon, Fri; 5 mg (2.5 mg x 2) all other days   Full instructions 4/11: 2.5 mg; Otherwise 2.5 mg on Mon, Fri; 5 mg all other days   Weekly total 30 mg   Plan last modified Mabel Serna, RN (8/25/2015)   Next INR check 5/25/2018   Priority INR   Target end date Indefinite    Indications   Atrial fibrillation (H) (Resolved) [I48.91]  Cerebral embolism with cerebral infarction (H) [I63.40]  Long term current use of anticoagulant therapy [Z79.01]         Anticoagulation Episode Summary     INR check location     Preferred lab     Send INR reminders to [facility]    Comments * Keep on low end of therapeutic range. Uses 2.5mg tablets.       Anticoagulation Care Providers     Provider Role Specialty Phone number    Milena Jaime DO [facility] Internal Medicine [phone]            See the Encounter Report to view Anticoagulation Flowsheet and Dosing Calendar (Go to Encounters tab in  chart review, and find the Anticoagulation Therapy Visit)        Tori oTledo RN

## 2018-05-25 ENCOUNTER — ANTICOAGULATION THERAPY VISIT (OUTPATIENT)
Dept: ANTICOAGULATION | Facility: CLINIC | Age: 83
End: 2018-05-25
Payer: MEDICARE

## 2018-05-25 DIAGNOSIS — Z79.01 LONG TERM CURRENT USE OF ANTICOAGULANT THERAPY: ICD-10-CM

## 2018-05-25 LAB — INR POINT OF CARE: 2.9 (ref 0.86–1.14)

## 2018-05-25 PROCEDURE — 99207 ZZC NO CHARGE NURSE ONLY: CPT

## 2018-05-25 PROCEDURE — 36416 COLLJ CAPILLARY BLOOD SPEC: CPT

## 2018-05-25 PROCEDURE — 85610 PROTHROMBIN TIME: CPT | Mod: QW

## 2018-05-25 NOTE — PROGRESS NOTES
Addendum: Patient's son, Antoine, called [facility] back and reported that Shanika did miss her dose last night. This would mean the INR would have been supra therapeutic had she not missed her dose and she was slightly elevated at last visit also. Will lower maintenance dose by 8.3% and recheck INR in two weeks.    Verenice Morillo RN, BSN, PHN  5-25-18  ANTICOAGULATION FOLLOW-UP CLINIC VISIT    Patient Name:  Shanika Stahl  Date:  5/25/2018  Contact Type:      SUBJECTIVE:     Patient Findings     Positives No Problem Findings    Comments Patient may have missed a dose last night or taken the wrong day's dosing. Her son will check the medication planner when he gets her home and will call [facility] if there was an error or omission. No other changes or concerns.           OBJECTIVE    INR Protime   Date Value Ref Range Status   05/25/2018 2.9 (A) 0.86 - 1.14 Final       ASSESSMENT / PLAN  INR assessment THER    Recheck INR In: 6 WEEKS    INR Location Clinic      Anticoagulation Summary as of 5/25/2018     INR goal 2.0-3.0   Today's INR 2.9   Warfarin maintenance plan 2.5 mg (2.5 mg x 1) on Mon, Fri; 5 mg (2.5 mg x 2) all other days   Full warfarin instructions 2.5 mg on Mon, Fri; 5 mg all other days   Weekly warfarin total 30 mg   No change documented Verenice Morillo, RN   Plan last modified Mabel Serna RN (8/25/2015)   Next INR check 7/6/2018   Priority INR   Target end date Indefinite    Indications   Atrial fibrillation (H) (Resolved) [I48.91]  Cerebral embolism with cerebral infarction (H) [I63.40]  Long term current use of anticoagulant therapy [Z79.01]         Anticoagulation Episode Summary     INR check location     Preferred lab     Send INR reminders to [facility]    Comments * Keep on low end of therapeutic range. Uses 2.5mg tablets.       Anticoagulation Care Providers     Provider Role Specialty Phone number    Milena Jaime,  [facility] Internal Medicine [phone]            See the  Encounter Report to view Anticoagulation Flowsheet and Dosing Calendar (Go to Encounters tab in chart review, and find the Anticoagulation Therapy Visit)        Verenice Morillo RN

## 2018-05-25 NOTE — MR AVS SNAPSHOT
Shanika EMILY Stahl   5/25/2018 11:45 AM   Anticoagulation Therapy Visit    Description:  89 year old female   Provider:  [facility] [facility]   Department:  [facility]           INR as of 5/25/2018     Today's INR 2.9      Anticoagulation Summary as of 5/25/2018     INR goal 2.0-3.0   Today's INR 2.9   Full warfarin instructions 2.5 mg on Mon, Fri; 5 mg all other days   Next INR check 7/6/2018    Indications   Atrial fibrillation (H) (Resolved) [I48.91]  Cerebral embolism with cerebral infarction (H) [I63.40]  Long term current use of anticoagulant therapy [Z79.01]         Your next Anticoagulation Clinic appointment(s)     May 25, 2018 11:45 AM CDT   [facility] with [facility] [facility]   [facility]    [address] [address]   [phone]            Jul 06, 2018 11:45 AM CDT   Anticoagulation Visit with [place] [facility]   [facility]    [address] [address]   [phone]              Contact Numbers     Please call [phone] with any problems or questions regarding your therapy.    If you need to cancel and/or reschedule your appointment please call one of the following numbers:  [facility] - [phone]  [place] - [phone]  [place] - [phone]  [facility] [phone]  [place] - [phone]            May 2018 Details    Sun Mon Tue Wed Thu Fri Sat       1               2               3               4               5                 6               7               8               9               10               11               12                 13               14               15               16               17               18               19                 20               21               22               23               24               25      2.5 mg   See details      26      5 mg           27      5 mg         28      2.5 mg         29      5 mg          30      5 mg         31      5 mg            Date Details   05/25 This INR check               How to take your warfarin dose     To take:  2.5 mg Take 1 of the 2.5 mg tablets.    To take:  5 mg Take 2 of the 2.5 mg tablets.           June 2018 Details    Sun Mon Tue Wed Thu Fri Sat          1      2.5 mg         2      5 mg           3      5 mg         4      2.5 mg         5      5 mg         6      5 mg         7      5 mg         8      2.5 mg         9      5 mg           10      5 mg         11      2.5 mg         12      5 mg         13      5 mg         14      5 mg         15      2.5 mg         16      5 mg           17      5 mg         18      2.5 mg         19      5 mg         20      5 mg         21      5 mg         22      2.5 mg         23      5 mg           24      5 mg         25      2.5 mg         26      5 mg         27      5 mg         28      5 mg         29      2.5 mg         30      5 mg          Date Details   No additional details            How to take your warfarin dose     To take:  2.5 mg Take 1 of the 2.5 mg tablets.    To take:  5 mg Take 2 of the 2.5 mg tablets.           July 2018 Details    Sun Mon Tue Wed Thu Fri Sat     1      5 mg         2      2.5 mg         3      5 mg         4      5 mg         5      5 mg         6            7                 8               9               10               11               12               13               14                 15               16               17               18               19               20               21                 22               23               24               25               26               27               28                 29               30               31                    Date Details   No additional details    Date of next INR:  7/6/2018         How to take your warfarin dose     To take:  2.5 mg Take 1 of the 2.5 mg tablets.    To take:  5 mg Take 2 of the 2.5 mg tablets.

## 2018-05-29 ENCOUNTER — DOCUMENTATION ONLY (OUTPATIENT)
Dept: CARDIOLOGY | Facility: CLINIC | Age: 83
End: 2018-05-29

## 2018-05-29 ENCOUNTER — HOSPITAL ENCOUNTER (OUTPATIENT)
Dept: CARDIOLOGY | Facility: CLINIC | Age: 83
Discharge: HOME | End: 2018-05-29
Attending: INTERNAL MEDICINE | Admitting: INTERNAL MEDICINE
Payer: MEDICARE

## 2018-05-29 PROCEDURE — 93296 REM INTERROG EVL PM/IDS: CPT

## 2018-05-29 PROCEDURE — 93294 REM INTERROG EVL PM/LDLS PM: CPT | Performed by: INTERNAL MEDICINE

## 2018-05-29 NOTE — PROGRESS NOTES
[employer] Remote PPM Device Check  : 64.9 %  Mode: VVIR        Presenting Rhythm:   Heart Rate: Adequate per the histograms  Sensing: Stable    Pacing Threshold: Stable    Impedance: Stable  Battery Status: 3-5.5 yrs remain.  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  Ventricular Arrhythmia: None.     Care Plan: F/U [employer] Remote PPM q 3 months. Patient was mailed letter w/ results and next appt date.

## 2018-06-06 ENCOUNTER — OFFICE VISIT (OUTPATIENT)
Dept: FAMILY MEDICINE | Facility: CLINIC | Age: 83
End: 2018-06-06
Payer: MEDICARE

## 2018-06-06 VITALS
SYSTOLIC BLOOD PRESSURE: 128 MMHG | DIASTOLIC BLOOD PRESSURE: 62 MMHG | HEART RATE: 72 BPM | BODY MASS INDEX: 15.8 KG/M2 | TEMPERATURE: 97.8 F | OXYGEN SATURATION: 97 % | WEIGHT: 85 LBS

## 2018-06-06 DIAGNOSIS — E78.5 HYPERLIPIDEMIA LDL GOAL <100: Chronic | ICD-10-CM

## 2018-06-06 DIAGNOSIS — M54.2 CERVICALGIA: Primary | ICD-10-CM

## 2018-06-06 DIAGNOSIS — I12.9 RENAL HYPERTENSION, STAGE 1-4 OR UNSPECIFIED CHRONIC KIDNEY DISEASE: ICD-10-CM

## 2018-06-06 DIAGNOSIS — M79.89 LEG SWELLING: ICD-10-CM

## 2018-06-06 DIAGNOSIS — I48.20 CHRONIC ATRIAL FIBRILLATION (H): Chronic | ICD-10-CM

## 2018-06-06 PROCEDURE — 99214 OFFICE O/P EST MOD 30 MIN: CPT | Performed by: INTERNAL MEDICINE

## 2018-06-06 RX ORDER — WARFARIN SODIUM 2.5 MG/1
TABLET ORAL
Qty: 150 TABLET | Refills: 0 | Status: SHIPPED | OUTPATIENT
Start: 2018-06-06 | End: 2018-07-19

## 2018-06-06 RX ORDER — SIMVASTATIN 20 MG
20 TABLET ORAL AT BEDTIME
Qty: 90 TABLET | Refills: 3 | Status: SHIPPED | OUTPATIENT
Start: 2018-06-06 | End: 2018-09-04

## 2018-06-06 RX ORDER — METOPROLOL SUCCINATE 25 MG/1
25 TABLET, EXTENDED RELEASE ORAL 2 TIMES DAILY
Qty: 180 TABLET | Refills: 3 | Status: SHIPPED | OUTPATIENT
Start: 2018-06-06 | End: 2018-09-04

## 2018-06-06 RX ORDER — FUROSEMIDE 20 MG
20 TABLET ORAL EVERY OTHER DAY
Qty: 45 TABLET | Refills: 3 | Status: SHIPPED | OUTPATIENT
Start: 2018-06-06 | End: 2019-01-01

## 2018-06-06 NOTE — MR AVS SNAPSHOT
After Visit Summary   6/6/2018    Shanika Stahl    MRN: [id number]           Patient Information     Date Of Birth          5/26/1924        Visit Information        Provider Department      6/6/2018 7:00 AM Milena Jaime,  [facility]        Today's Diagnoses     Leg swelling        Renal hypertension, stage 1-4 or unspecified chronic kidney disease        Hyperlipidemia LDL goal <100        Chronic atrial fibrillation (H)          Care Instructions    1. Try Tylenol 500-1000 mg up to 3 x day for the neck pain  2. Use heat.  Make your own rice sock - long clean sock, put uncooked rice in it, tie off end, microwave for 1-2 minutes.   3. Try topicals such as Aspercream with Lidocaine, Capsaicin, Icy Hot, Biofreeze, Salon Pas.   Use caution with Salicylate topicals - can interfere with INR  4. Try gentle stretching.  5. May consider very low dose muscle relaxer and/or physical therapy for persisting neck pain.        Neck Problems: Relieving Your Symptoms  The first goal of treatment is to relieve your symptoms. Your healthcare provider may recommend self-care treatments. These include resting, applying ice and heat, taking medicine, and doing exercises. Your healthcare provider may also recommend that you see a physical therapist who can teach you ways to care for and strengthen your neck.     Heat relaxes sore muscles and helps relieve spasms.   Self-care treatments  Pain can end quickly or last a while. Either way, you ll want relief as soon as possible. Your healthcare provider can tell you which treatments to do at home to help relieve your pain:    Lying down for a short time takes pressure from the head off the neck.    Ice and heat can help reduce pain. To bring down swelling, rest an ice pack wrapped in a thin towel on your neck for 10 to 15 minutes. To relax sore muscles, apply a warm, wet towel to the area. Or you can take a warm bath or shower.    Over-the-counter  medicines, such as ibuprofen, naproxen, and aspirin, can help reduce pain and swelling. Acetaminophen can help relieve pain. Use these only as directed.    Exercises can relax muscles and ease stiffness. To prepare, drape a warm, wet towel around your neck and shoulders for 5 minutes. Remove the towel. Then do any exercises recommended to you by your healthcare provider.  Physical therapy  If self-care treatments aren t helping relieve neck pain, your healthcare provider may suggest physical therapy. Physical therapy is done by a specialist trained to treat injuries and musculoskeletal disorders. Your physical therapist (PT) will teach you how to strengthen muscles, improve the spine s alignment, and help you move properly. Treatment methods used in physical therapy may include:    Heat. A special heating pad called a neck pack may be applied to your neck.    Exercises. Your PT will teach you exercises to help strengthen your neck and improve its range of motion.    Joint mobilization. The PT gently moves your vertebrae to help restore motion in your neck joints and reduce neck pain.    Soft tissue mobilization. The PT massages and stretches the muscles in your neck and shoulders.    Electrical stimulation. Electrical impulses are sent into your neck. This helps reduce soreness and inflammation.    Education in body mechanics. The PT shows you ways to position and move your body that protect the neck.  Other treatments  If physical therapy doesn t relieve your neck pain, your healthcare provider may suggest other treatments. For example, medicines or injections can help relieve pain and swelling. In some cases, surgery may be needed to treat neck problems.  Date Last Reviewed: 10/1/2017    9559-2418 [employer] [address] [place] [address], [place] [address]. All rights reserved. This information is not intended as a substitute for professional medical care. Always follow your healthcare professional's  instructions.                Follow-ups after your visit        Your next 10 appointments already scheduled     Jun 08, 2018 11:45 AM CDT   Anticoagulation Visit with [place] [facility]   [facility]    [address]  [facility] [address] [phone]            Aug 28, 2018  9:00 AM CDT   Remote PPM Check with [facility] [facility]   [facility] ([facility])    [address]  [facility] [address]   [phone]           This appointment is for a remote check of your pacemaker.  This is not an appointment at the office.              Who to contact     If you have questions or need follow up information about today's clinic visit or your schedule please contact [facility] directly at [phone].  Normal or non-critical lab and imaging results will be communicated to you by [employer], letter or phone within 4 business days after the clinic has received the results. If you do not hear from us within 7 days, please contact the clinic through [employer] or phone. If you have a critical or abnormal lab result, we will notify you by phone as soon as possible.  Submit refill requests through [employer] website or call your pharmacy and they will forward the refill request to us. Please allow 3 business days for your refill to be completed.          Additional Information About Your Visit        [employer] Information     The [employer] website gives you secure access to your electronic health record. If you see a primary care provider, you can also send messages to your care team and make appointments. If you have questions, please call your primary care clinic.  If you do not have a primary care provider, please call [phone] and they will assist you.        Care EveryWhere ID     This is your Care EveryWhere ID. This could be used by other organizations to access your [place] medical records  [id number]        Your Vitals Were     Pulse  Temperature Pulse Oximetry Breastfeeding? BMI (Body Mass Index)       72 97.8  F (36.6  C) (Tympanic) 97% No 15.8 kg/m2        Blood Pressure from Last 3 Encounters:   06/06/18 128/62   02/08/18 140/70   02/02/18 147/80    Weight from Last 3 Encounters:   06/06/18 85 lb (38.6 kg)   02/08/18 87 lb 9.6 oz (39.7 kg)   12/19/17 87 lb 3.2 oz (39.6 kg)              Today, you had the following     No orders found for display         Today's Medication Changes          These changes are accurate as of 6/6/18  7:28 AM.  If you have any questions, ask your nurse or doctor.               These medicines have changed or have updated prescriptions.        Dose/Directions    metoprolol succinate 25 MG 24 hr tablet   Commonly known as:  TOPROL-XL   This may have changed:  See the new instructions.   Used for:  Renal hypertension, stage 1-4 or unspecified chronic kidney disease   Changed by:  Milena Jaime DO        Dose:  25 mg   Take 1 tablet (25 mg) by mouth 2 times daily   Quantity:  180 tablet   Refills:  3       simvastatin 20 MG tablet   Commonly known as:  ZOCOR   This may have changed:  See the new instructions.   Used for:  Hyperlipidemia LDL goal <100   Changed by:  Milena Jaime DO        Dose:  20 mg   Take 1 tablet (20 mg) by mouth At Bedtime   Quantity:  90 tablet   Refills:  3            Where to get your medicines      These medications were sent to [facility] [place] [id number] [address] [address]     Phone:  [phone]     furosemide 20 MG tablet    metoprolol succinate 25 MG 24 hr tablet    simvastatin 20 MG tablet    warfarin 2.5 MG tablet                Primary Care Provider Office Phone # Fax #    Milena Jaime [phone] [address] [facility] [address]        Equal Access to Services     JAYCEE MOLINA AH: Jadyn Bolton, harsha ha, shaquille bailey  kelsie mimilety lindsayblanca laLisbetaapadmini ah. So [facility] [phone].    ATENCIÓN: Si nella dae, tiene a holly disposición servicios gratuitos de asistencia lingüística. Aquilino olmstead [phone].    We comply with applicable federal civil rights laws and [place] laws. We do not discriminate on the basis of race, color, national origin, age, disability, sex, sexual orientation, or gender identity.            Thank you!     Thank you for choosing [facility]  for your care. Our goal is always to provide you with excellent care. Hearing back from our patients is one way we can continue to improve our services. Please take a few minutes to complete the written survey that you may receive in the mail after your visit with us. Thank you!             Your Updated Medication List - Protect others around you: Learn how to safely use, store and throw away your medicines at www.disposemymeds.org.          This list is accurate as of 6/6/18  7:28 AM.  Always use your most recent med list.                   Brand Name Dispense Instructions for use Diagnosis    acyclovir 5 % ointment    ZOVIRAX    15 g    Apply topically 6 times daily    Cold sore       calcium + D 600-200 MG-UNIT Tabs   Generic drug:  calcium carbonate-vitamin D      1 tablet by mouth daily        cholecalciferol 1000 UNIT tablet    vitamin D3    30    1 TABLET DAILY        fish oil-omega-3 fatty acids 1000 MG capsule     90 capsule    Take 1 capsule by mouth 2 times daily        furosemide 20 MG tablet    LASIX    45 tablet    Take 1 tablet (20 mg) by mouth every other day    Leg swelling       loratadine 10 MG tablet    CLARITIN     Take 10 mg by mouth daily        metoprolol succinate 25 MG 24 hr tablet    TOPROL-XL    180 tablet    Take 1 tablet (25 mg) by mouth 2 times daily    Renal hypertension, stage 1-4 or unspecified chronic kidney disease       order for DME     1 each    Knee high compression stockings 10-15 mm Hg    Leg swelling       simvastatin 20 MG  tablet    ZOCOR    90 tablet    Take 1 tablet (20 mg) by mouth At Bedtime    Hyperlipidemia LDL goal <100       TYLENOL 325 MG Caps   Generic drug:  Acetaminophen      Take by mouth as needed        warfarin 2.5 MG tablet    JANTOVEN    150 tablet    Take 2.5 mg on Mon/Wed/Fri; 5 mg all other days or as directed by the Anticoagulation Clinic    Chronic atrial fibrillation (H)

## 2018-06-06 NOTE — PROGRESS NOTES
SUBJECTIVE:   Shanika Stahl is a 89 year old female who presents to clinic today for the following health issues:    The neck is still achy  Son would like to have an X-ray.       Back/Neck Pain       Duration: a week ago        Specific cause: none    Description:   Location of pain: hip left radiates to the neck  Character of pain: dull ache (neck) still present.  Pain radiation:to the neck  New numbness or weakness in legs, not attributed to pain:  no     Intensity: Currently 1/10, At its worst 9/10 (1st day)    History:   Pain interferes with job: Not applicable  History of back problems: no prior back problems  Any previous MRI or X-rays: None  Sees a specialist for back pain:  No  Therapies tried without relief: na    Alleviating factors:   Improved by: heat      Precipitating factors:  Worsened by: Nothing    Functional and Psychosocial Screen (Emilee STarT Back):   Not performed today    Accompanying Signs & Symptoms:  Risk of Fracture:  Age >64  Risk of Cauda Equina:  None  Risk of Infection:  None  Risk of Cancer:  None  Risk of Ankylosing Spondylitis:  Onset at age <35, male, AND morning back stiffness. no       --pain started left lower back above hip.  Used heat which helped significantly.  Pain started w/out injury, upon awakening.  Pain caused her to walk 'bent over'.  This pain is now gone, lasted 2-3 days.  --at the same time as the left back pain, she developed left sided neck pain.  Feels grinding in neck at times.  No radiation of pain.  --neck pain is constant, but waxes/wanes  --nothing makes it worse  --hasn't tried anything yet for neck pain.  --pain is not disturbing sleep.  --no numbness/tingling in arm, no arm/hand weakness        Current Outpatient Prescriptions   Medication Sig Dispense Refill     Acetaminophen (TYLENOL) 325 MG CAPS Take by mouth as needed       acyclovir (ZOVIRAX) 5 % ointment Apply topically 6 times daily 15 g 3     CALCIUM + D 600-200 MG-UNIT PO TABS 1 tablet by  mouth daily       fish oil-omega-3 fatty acids (OMEGA 3) 1000 MG capsule Take 1 capsule by mouth 2 times daily 90 capsule 0     furosemide (LASIX) 20 MG tablet Take 1 tablet (20 mg) by mouth every other day 45 tablet 3     loratadine (CLARITIN) 10 MG tablet Take 10 mg by mouth daily       metoprolol (TOPROL-XL) 25 MG 24 hr tablet TAKE 1 TABLET BY MOUTH TWICE DAILY 180 tablet 2     order for DME Knee high compression stockings 10-15 mm Hg 1 each 1     simvastatin (ZOCOR) 20 MG tablet TAKE 1 TABLET BY MOUTH DAILY AT BEDTIME 90 tablet 3     VITAMIN D 1000 UNIT OR TABS 1 TABLET DAILY 30 0     warfarin (JANTOVEN) 2.5 MG tablet Take 2.5 mg on Mon/Wed/Fri; 5 mg all other days or as directed by the Anticoagulation Clinic 150 tablet 0       Reviewed and updated as needed this visit by clinical staff  Tobacco  Allergies  Meds  Problems  Med Hx  Surg Hx  Fam Hx  Soc Hx        Reviewed and updated as needed this visit by Provider  Allergies  Meds  Problems         ROS:  Constitutional, HEENT, cardiovascular, pulmonary, gi and gu systems are negative, except as otherwise noted.    OBJECTIVE:     /62 (BP Location: Right arm, Patient Position: Chair, Cuff Size: Adult Small)  Pulse 72  Temp 97.8  F (36.6  C) (Tympanic)  Wt 85 lb (38.6 kg)  SpO2 97%  Breastfeeding? No  BMI 15.8 kg/m2  Body mass index is 15.8 kg/(m^2).  GENERAL APPEARANCE: healthy, alert and no distress  ORTHO: Cervical Spine Exam: Inspection: normal cervical lordosis  Tender:  left paracervical muscles, left trapezius muscles  Non-tender:  occipital nerves, spinous processes, scapula, medial border of scapula, superior angle of scapula, right paracervical muscles, right trapezius muscles  Range of Motion:  Full ROM of cervical spine, but pain in left paraspinal cervical muscles with ROM  Strength: mildly reduced strength of neck muscles, bilateral hand , bilateral UE  Special tests:  Spurling's - negative - left, Spurling's - negative -  right    ASSESSMENT/PLAN:       ICD-10-CM    1. Cervicalgia M54.2    2. Leg swelling M79.89 furosemide (LASIX) 20 MG tablet   3. Renal hypertension, stage 1-4 or unspecified chronic kidney disease I12.9 metoprolol succinate (TOPROL-XL) 25 MG 24 hr tablet   4. Hyperlipidemia LDL goal <100 E78.5 simvastatin (ZOCOR) 20 MG tablet   5. Chronic atrial fibrillation (H) I48.2 warfarin (JANTOVEN) 2.5 MG tablet       1. Try Tylenol 500-1000 mg up to 3 x day for the neck pain  2. Use heat.  Make your own rice sock - long clean sock, put uncooked rice in it, tie off end, microwave for 1-2 minutes.   3. Try topicals such as Aspercream with Lidocaine, Capsaicin, Icy Hot, Biofreeze, Salon Pas.   Use caution with Salicylate topicals - can interfere with INR  4. Try gentle stretching.  5. May consider very low dose muscle relaxer and/or physical therapy for persisting neck pain.    Milena Jaime,   [facility]

## 2018-06-06 NOTE — PATIENT INSTRUCTIONS
1. Try Tylenol 500-1000 mg up to 3 x day for the neck pain  2. Use heat.  Make your own rice sock - long clean sock, put uncooked rice in it, tie off end, microwave for 1-2 minutes.   3. Try topicals such as Aspercream with Lidocaine, Capsaicin, Icy Hot, Biofreeze, Salon Pas.   Use caution with Salicylate topicals - can interfere with INR  4. Try gentle stretching.  5. May consider very low dose muscle relaxer and/or physical therapy for persisting neck pain.        Neck Problems: Relieving Your Symptoms  The first goal of treatment is to relieve your symptoms. Your healthcare provider may recommend self-care treatments. These include resting, applying ice and heat, taking medicine, and doing exercises. Your healthcare provider may also recommend that you see a physical therapist who can teach you ways to care for and strengthen your neck.     Heat relaxes sore muscles and helps relieve spasms.   Self-care treatments  Pain can end quickly or last a while. Either way, you ll want relief as soon as possible. Your healthcare provider can tell you which treatments to do at home to help relieve your pain:    Lying down for a short time takes pressure from the head off the neck.    Ice and heat can help reduce pain. To bring down swelling, rest an ice pack wrapped in a thin towel on your neck for 10 to 15 minutes. To relax sore muscles, apply a warm, wet towel to the area. Or you can take a warm bath or shower.    Over-the-counter medicines, such as ibuprofen, naproxen, and aspirin, can help reduce pain and swelling. Acetaminophen can help relieve pain. Use these only as directed.    Exercises can relax muscles and ease stiffness. To prepare, drape a warm, wet towel around your neck and shoulders for 5 minutes. Remove the towel. Then do any exercises recommended to you by your healthcare provider.  Physical therapy  If self-care treatments aren t helping relieve neck pain, your healthcare provider may suggest physical  therapy. Physical therapy is done by a specialist trained to treat injuries and musculoskeletal disorders. Your physical therapist (PT) will teach you how to strengthen muscles, improve the spine s alignment, and help you move properly. Treatment methods used in physical therapy may include:    Heat. A special heating pad called a neck pack may be applied to your neck.    Exercises. Your PT will teach you exercises to help strengthen your neck and improve its range of motion.    Joint mobilization. The PT gently moves your vertebrae to help restore motion in your neck joints and reduce neck pain.    Soft tissue mobilization. The PT massages and stretches the muscles in your neck and shoulders.    Electrical stimulation. Electrical impulses are sent into your neck. This helps reduce soreness and inflammation.    Education in body mechanics. The PT shows you ways to position and move your body that protect the neck.  Other treatments  If physical therapy doesn t relieve your neck pain, your healthcare provider may suggest other treatments. For example, medicines or injections can help relieve pain and swelling. In some cases, surgery may be needed to treat neck problems.  Date Last Reviewed: 10/1/2017    6457-3329 [employer]. [address] [place] [address], [place] [address]. All rights reserved. This information is not intended as a substitute for professional medical care. Always follow your healthcare professional's instructions.

## 2018-06-08 ENCOUNTER — ANTICOAGULATION THERAPY VISIT (OUTPATIENT)
Dept: ANTICOAGULATION | Facility: CLINIC | Age: 83
End: 2018-06-08
Payer: MEDICARE

## 2018-06-08 DIAGNOSIS — I63.40 CEREBRAL EMBOLISM WITH CEREBRAL INFARCTION (H): ICD-10-CM

## 2018-06-08 DIAGNOSIS — Z79.01 LONG TERM CURRENT USE OF ANTICOAGULANT THERAPY: ICD-10-CM

## 2018-06-08 LAB — INR POINT OF CARE: 3.1 (ref 0.86–1.14)

## 2018-06-08 PROCEDURE — 36416 COLLJ CAPILLARY BLOOD SPEC: CPT

## 2018-06-08 PROCEDURE — 85610 PROTHROMBIN TIME: CPT | Mod: QW

## 2018-06-08 PROCEDURE — 99207 ZZC NO CHARGE NURSE ONLY: CPT

## 2018-06-08 NOTE — PROGRESS NOTES
ANTICOAGULATION FOLLOW-UP CLINIC VISIT    Patient Name:  Shanika Stahl  Date:  6/8/2018  Contact Type:  Face to Face/Son present    SUBJECTIVE:     Patient Findings     Positives Change in diet/appetite    Comments INR is therapeutic although it is at the higher range goal. Maintenance dose was lowered on the  last visit. Per son, patient is not eating brenna lettuce but iceberg. I instructed son to have patient continue with the maintenance dose and incorporate spinach into the diet 2 x week. Patient will f/u with [facility] in 4 weeks.             OBJECTIVE    INR Protime   Date Value Ref Range Status   06/08/2018 3.1 (A) 0.86 - 1.14 Final       ASSESSMENT / PLAN  INR assessment THER +/-1   Recheck INR In: 4 WEEKS    INR Location Clinic      Anticoagulation Summary as of 6/8/2018     INR goal 2.0-3.0   Today's INR 3.1!   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 Uses 2.5mg tablets.       Anticoagulation Care Providers     Provider Role Specialty Phone number    JaimeMilena DO Responsible Internal Medicine [phone]            See the Encounter Report to view Anticoagulation Flowsheet and Dosing Calendar (Go to Encounters tab in chart review, and find the Anticoagulation Therapy Visit)        Dre Phillips, RN

## 2018-06-08 NOTE — MR AVS SNAPSHOT
Shanika EMILY Stahl   6/8/2018 11:45 AM   Anticoagulation Therapy Visit    Description:  89 year old female   Provider:  [place] [facility]   Department:  [facility]           INR as of 6/8/2018     Today's INR 3.1!      Anticoagulation Summary as of 6/8/2018     INR goal 2.0-3.0   Today's INR 3.1!   Full warfarin instructions 2.5 mg on Mon, Wed, Fri; 5 mg all other days   Next INR check 7/6/2018    Indications   Atrial fibrillation (H) (Resolved) [I48.91]  Cerebral embolism with cerebral infarction (H) [I63.40]  Long term current use of anticoagulant therapy [Z79.01]         Your next Anticoagulation Clinic appointment(s)     Jun 08, 2018 11:45 AM CDT   [facility] with [facility]   [facility]    [address] [facility] [address]   [phone]            Jul 06, 2018 11:45 AM CDT   Anticoagulation Visit with [place] [facility]   [facility]    [address] [facility] [address]   [phone]              Contact Numbers     Please call [phone] with any problems or questions regarding your therapy.    If you need to cancel and/or reschedule your appointment please call one of the following numbers:  [facility] - [phone]  [place] - [phone]  [place] - [phone]  [facility] [phone]  [place] - [phone]            June 2018 Details    Sun Mon Tue Wed Thu Fri Sat          1               2                 3               4               5               6               7               8      2.5 mg   See details      9      5 mg           10      5 mg         11      2.5 mg         12      5 mg         13      2.5 mg         14      5 mg         15      2.5 mg         16      5 mg           17      5 mg         18      2.5 mg         19      5 mg         20      2.5 mg         21      5 mg         22      2.5 mg         23      5 mg           24      5 mg         25      2.5 mg          26      5 mg         27      2.5 mg         28      5 mg         29      2.5 mg         30      5 mg          Date Details   06/08 This INR check               How to take your warfarin dose     To take:  2.5 mg Take 1 of the 2.5 mg tablets.    To take:  5 mg Take 2 of the 2.5 mg tablets.           July 2018 Details    Sun Mon Tue Wed Thu Fri Sat     1      5 mg         2      2.5 mg         3      5 mg         4      2.5 mg         5      5 mg         6            7                 8               9               10               11               12               13               14                 15               16               17               18               19               20               21                 22               23               24               25               26               27               28                 29               30               31                    Date Details   No additional details    Date of next INR:  7/6/2018         How to take your warfarin dose     To take:  2.5 mg Take 1 of the 2.5 mg tablets.    To take:  5 mg Take 2 of the 2.5 mg tablets.

## 2018-06-20 ENCOUNTER — HOSPITAL ENCOUNTER (EMERGENCY)
Facility: CLINIC | Age: 83
Discharge: HOME | End: 2018-06-20
Attending: FAMILY MEDICINE | Admitting: FAMILY MEDICINE
Payer: MEDICARE

## 2018-06-20 ENCOUNTER — TRANSFERRED RECORDS (OUTPATIENT)
Dept: HEALTH INFORMATION MANAGEMENT | Facility: CLINIC | Age: 83
End: 2018-06-20

## 2018-06-20 ENCOUNTER — APPOINTMENT (OUTPATIENT)
Dept: CT IMAGING | Facility: CLINIC | Age: 83
End: 2018-06-20
Attending: FAMILY MEDICINE
Payer: MEDICARE

## 2018-06-20 VITALS
TEMPERATURE: 97.9 F | RESPIRATION RATE: 18 BRPM | SYSTOLIC BLOOD PRESSURE: 198 MMHG | OXYGEN SATURATION: 97 % | DIASTOLIC BLOOD PRESSURE: 95 MMHG | HEART RATE: 97 BPM
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
## 2018-06-20 DIAGNOSIS — S01.01XA LACERATION OF SCALP, INITIAL ENCOUNTER: ICD-10-CM

## 2018-06-20 LAB
ALBUMIN SERPL-MCNC: 3.9 G/DL (ref 3.4–5)
ALP SERPL-CCNC: 132 U/L (ref 40–150)
ALT SERPL W P-5'-P-CCNC: 43 U/L (ref 0–50)
ANION GAP SERPL CALCULATED.3IONS-SCNC: 10 MMOL/L (ref 3–14)
AST SERPL W P-5'-P-CCNC: 52 U/L (ref 0–45)
BASOPHILS # BLD AUTO: 0 10E9/L (ref 0–0.2)
BASOPHILS NFR BLD AUTO: 0.5 %
BILIRUB SERPL-MCNC: 1 MG/DL (ref 0.2–1.3)
BUN SERPL-MCNC: 24 MG/DL (ref 7–30)
CALCIUM SERPL-MCNC: 10.2 MG/DL (ref 8.5–10.1)
CHLORIDE SERPL-SCNC: 103 MMOL/L (ref 94–109)
CO2 SERPL-SCNC: 28 MMOL/L (ref 20–32)
CREAT SERPL-MCNC: 0.82 MG/DL (ref 0.52–1.04)
DIFFERENTIAL METHOD BLD: NORMAL
EOSINOPHIL # BLD AUTO: 0.4 10E9/L (ref 0–0.7)
EOSINOPHIL NFR BLD AUTO: 5.2 %
ERYTHROCYTE [DISTWIDTH] IN BLOOD BY AUTOMATED COUNT: 13.2 % (ref 10–15)
GFR SERPL CREATININE-BSD FRML MDRD: 65 ML/MIN/1.7M2
GLUCOSE SERPL-MCNC: 95 MG/DL (ref 70–99)
HCT VFR BLD AUTO: 43.7 % (ref 35–47)
HGB BLD-MCNC: 13.9 G/DL (ref 11.7–15.7)
IMM GRANULOCYTES # BLD: 0 10E9/L (ref 0–0.4)
IMM GRANULOCYTES NFR BLD: 0.5 %
INR PPP: 2.46 (ref 0.86–1.14)
LYMPHOCYTES # BLD AUTO: 2.2 10E9/L (ref 0.8–5.3)
LYMPHOCYTES NFR BLD AUTO: 30.3 %
MCH RBC QN AUTO: 29.8 PG (ref 26.5–33)
MCHC RBC AUTO-ENTMCNC: 31.8 G/DL (ref 31.5–36.5)
MCV RBC AUTO: 94 FL (ref 78–100)
MONOCYTES # BLD AUTO: 0.4 10E9/L (ref 0–1.3)
MONOCYTES NFR BLD AUTO: 5 %
NEUTROPHILS # BLD AUTO: 4.3 10E9/L (ref 1.6–8.3)
NEUTROPHILS NFR BLD AUTO: 58.5 %
NRBC # BLD AUTO: 0 10*3/UL
NRBC BLD AUTO-RTO: 0 /100
PLATELET # BLD AUTO: 209 10E9/L (ref 150–450)
POTASSIUM SERPL-SCNC: 3.2 MMOL/L (ref 3.4–5.3)
PROT SERPL-MCNC: 8.5 G/DL (ref 6.8–8.8)
RBC # BLD AUTO: 4.67 10E12/L (ref 3.8–5.2)
SODIUM SERPL-SCNC: 141 MMOL/L (ref 133–144)
WBC # BLD AUTO: 7.4 10E9/L (ref 4–11)

## 2018-06-20 PROCEDURE — 85025 COMPLETE CBC W/AUTO DIFF WBC: CPT | Performed by: FAMILY MEDICINE

## 2018-06-20 PROCEDURE — 99284 EMERGENCY DEPT VISIT MOD MDM: CPT | Mod: 25 | Performed by: FAMILY MEDICINE

## 2018-06-20 PROCEDURE — 72125 CT NECK SPINE W/O DYE: CPT

## 2018-06-20 PROCEDURE — 70450 CT HEAD/BRAIN W/O DYE: CPT

## 2018-06-20 PROCEDURE — 99285 EMERGENCY DEPT VISIT HI MDM: CPT | Mod: 25 | Performed by: FAMILY MEDICINE

## 2018-06-20 PROCEDURE — 12002 RPR S/N/AX/GEN/TRNK2.6-7.5CM: CPT | Performed by: FAMILY MEDICINE

## 2018-06-20 PROCEDURE — 85610 PROTHROMBIN TIME: CPT | Performed by: FAMILY MEDICINE

## 2018-06-20 PROCEDURE — 80053 COMPREHEN METABOLIC PANEL: CPT | Performed by: FAMILY MEDICINE

## 2018-06-20 NOTE — ED NOTES
Pt taking something out a drawer at home and lost her balance, fell back hitting the back or her head.  Quite a bit of bleeding in back of head.  Pt able to get up herself and call her son for help.  Alert and oriented.  Denies any pain.  Denies neck or back pain.  Denies headache.  Pt is on Warfarin.  Moving all extremities without difficulty.

## 2018-06-20 NOTE — ED AVS SNAPSHOT
[facility] Emergency Department    [address] [address]    Phone:  [phone]    Fax:  [phone]                                       Shanika Stahl   MRN: [id number]    Department:  [facility] Emergency Department   Date of Visit:  6/20/2018           After Visit Summary Signature Page     I have received my discharge instructions, and my questions have been answered. I have discussed any challenges I see with this plan with the nurse or doctor.    ..........................................................................................................................................  Patient/Patient Representative Signature      ..........................................................................................................................................  Patient Representative Print Name and Relationship to Patient    ..................................................               ................................................  Date                                            Time    ..........................................................................................................................................  Reviewed by Signature/Title    ...................................................              ..............................................  Date                                                            Time

## 2018-06-20 NOTE — ED AVS SNAPSHOT
[facility] Emergency Department    [address] [address]    Phone:  [phone]    Fax:  [phone]                                       Shanika Stahl   MRN: [id number]    Department:  [facility] Emergency Department   Date of Visit:  6/20/2018           Patient Information     Date Of Birth          5/26/1924        Your diagnoses for this visit were:     Fall, initial encounter     Laceration of scalp, initial encounter        You were seen by Cayden Lara MD.        Discharge Instructions       Do not use your warfarin tonight or tomorrow and then resume her normal dosing.  Have stitches removed in 10 days.  Return if significant continued bleeding, headache, abnormal behavior, or other new concerning symptoms.      Your next 10 appointments already scheduled     Jul 06, 2018 11:45 AM CDT   [facility] with [place] [facility]   [facility]    [address] [address] [phone]            Aug 28, 2018  9:00 AM CDT   Remote PPM Check with [place] [facility]   [facility] ([facility])    [address] [address]   [phone]           This appointment is for a remote check of your pacemaker.  This is not an appointment at the office.              24 Hour Appointment Hotline       To make an appointment at any [facility] ([facility]), call [phone] ([phone]). If you don't have a family doctor or clinic, we will help you find one. [facility] are conveniently located to serve the needs of you and your family.             Review of your medicines      Our records show that you are taking the medicines listed below. If these are incorrect, please call your family doctor or clinic.        Dose / Directions Last dose taken    acyclovir 5 % ointment   Commonly known as:  ZOVIRAX   Quantity:  15 g        Apply topically 6 times daily   Refills:  3         calcium + D 600-200 MG-UNIT Tabs   Generic drug:  calcium carbonate-vitamin D        1 tablet by mouth daily   Refills:  0        cholecalciferol 1000 UNIT tablet   Commonly known as:  vitamin D3   Quantity:  30        1 TABLET DAILY   Refills:  0        fish oil-omega-3 fatty acids 1000 MG capsule   Dose:  1 capsule   Quantity:  90 capsule        Take 1 capsule by mouth 2 times daily   Refills:  0        furosemide 20 MG tablet   Commonly known as:  LASIX   Dose:  20 mg   Quantity:  45 tablet        Take 1 tablet (20 mg) by mouth every other day   Refills:  3        loratadine 10 MG tablet   Commonly known as:  CLARITIN   Dose:  10 mg        Take 10 mg by mouth daily   Refills:  0        metoprolol succinate 25 MG 24 hr tablet   Commonly known as:  TOPROL-XL   Dose:  25 mg   Quantity:  180 tablet        Take 1 tablet (25 mg) by mouth 2 times daily   Refills:  3        order for DME   Quantity:  1 each        Knee high compression stockings 10-15 mm Hg   Refills:  1        simvastatin 20 MG tablet   Commonly known as:  ZOCOR   Dose:  20 mg   Quantity:  90 tablet        Take 1 tablet (20 mg) by mouth At Bedtime   Refills:  3        TYLENOL 325 MG Caps   Indication:  Pain   Generic drug:  Acetaminophen        Take by mouth as needed   Refills:  0        warfarin 2.5 MG tablet   Commonly known as:  JANTOVEN   Quantity:  150 tablet        Take 2.5 mg on Mon/Wed/Fri; 5 mg all other days or as directed by the Anticoagulation Clinic   Refills:  0                Procedures and tests performed during your visit     CBC with platelets differential    CT Cervical Spine w/o Contrast    CT Head w/o Contrast    Comprehensive metabolic panel    INR      Orders Needing Specimen Collection     None      Pending Results     No orders found from 6/18/2018 to 6/21/2018.            Pending Culture Results     No orders found from 6/18/2018 to 6/21/2018.            Pending Results Instructions     If you had any lab results that were  not finalized at the time of your Discharge, you can call the ED Lab Result RN at [phone]. You will be contacted by this team for any positive Lab results or changes in treatment. The nurses are available 7 days a week from 10A to 6:30P.  You can leave a message 24 hours per day and they will return your call.        Test Results From Your Hospital Stay        6/20/2018  2:26 PM      Component Results     Component Value Ref Range & Units Status    WBC 7.4 4.0 - 11.0 10e9/L Final    RBC Count 4.67 3.8 - 5.2 10e12/L Final    Hemoglobin 13.9 11.7 - 15.7 g/dL Final    Hematocrit 43.7 35.0 - 47.0 % Final    MCV 94 78 - 100 fl Final    MCH 29.8 26.5 - 33.0 pg Final    MCHC 31.8 31.5 - 36.5 g/dL Final    RDW 13.2 10.0 - 15.0 % Final    Platelet Count 209 150 - 450 10e9/L Final    Diff Method Automated Method  Final    % Neutrophils 58.5 % Final    % Lymphocytes 30.3 % Final    % Monocytes 5.0 % Final    % Eosinophils 5.2 % Final    % Basophils 0.5 % Final    % Immature Granulocytes 0.5 % Final    Nucleated RBCs 0 0 /100 Final    Absolute Neutrophil 4.3 1.6 - 8.3 10e9/L Final    Absolute Lymphocytes 2.2 0.8 - 5.3 10e9/L Final    Absolute Monocytes 0.4 0.0 - 1.3 10e9/L Final    Absolute Eosinophils 0.4 0.0 - 0.7 10e9/L Final    Absolute Basophils 0.0 0.0 - 0.2 10e9/L Final    Abs Immature Granulocytes 0.0 0 - 0.4 10e9/L Final    Absolute Nucleated RBC 0.0  Final         6/20/2018  2:34 PM      Component Results     Component Value Ref Range & Units Status    INR 2.46 (H) 0.86 - 1.14 Final         6/20/2018  2:44 PM      Component Results     Component Value Ref Range & Units Status    Sodium 141 133 - 144 mmol/L Final    Potassium 3.2 (L) 3.4 - 5.3 mmol/L Final    Chloride 103 94 - 109 mmol/L Final    Carbon Dioxide 28 20 - 32 mmol/L Final    Anion Gap 10 3 - 14 mmol/L Final    Glucose 95 70 - 99 mg/dL Final    Urea Nitrogen 24 7 - 30 mg/dL Final    Creatinine 0.82 0.52 - 1.04 mg/dL Final    GFR Estimate 65 >60  mL/min/1.7m2 Final    Non  GFR Calc    GFR Estimate If Black 79 >60 mL/min/1.7m2 Final    African American GFR Calc    Calcium 10.2 (H) 8.5 - 10.1 mg/dL Final    Bilirubin Total 1.0 0.2 - 1.3 mg/dL Final    Albumin 3.9 3.4 - 5.0 g/dL Final    Protein Total 8.5 6.8 - 8.8 g/dL Final    Alkaline Phosphatase 132 40 - 150 U/L Final    ALT 43 0 - 50 U/L Final    AST 52 (H) 0 - 45 U/L Final         6/20/2018  3:00 PM      Narrative     CT SCAN OF THE HEAD WITHOUT CONTRAST   6/20/2018 2:40 PM     HISTORY: fall; head injury; on warfarin;     TECHNIQUE:  Axial images of the head and coronal reformations without  IV contrast material. Radiation dose for this scan was reduced using  automated exposure control, adjustment of the mA and/or kV according  to patient size, or iterative reconstruction technique.    COMPARISON: None.    FINDINGS:  There is generalized atrophy of the brain.  White matter  changes are present in the cerebral hemispheres that are consistent  with small vessel ischemic disease in this age patient. There is no  evidence of intracranial hemorrhage, mass, acute infarct or anomaly.  The visualized portions of the sinuses and mastoids appear normal. No  intracranial hemorrhage or skull fractures are identified. There is  soft tissue swelling over the occipital and parietal regions.        Impression     IMPRESSION: No intracranial hemorrhage or skull fractures.      GLORIA KING MD         6/20/2018  3:00 PM      Narrative     CT CERVICAL SPINE WITHOUT CONTRAST   6/20/2018 2:40 PM     HISTORY: fall; head injury;      TECHNIQUE: Axial images of the cervical spine were obtained without  intravenous contrast. Multiplanar reformations were performed.   Radiation dose for this scan was reduced using automated exposure  control, adjustment of the mA and/or kV according to patient size, or  iterative reconstruction technique.    COMPARISON: None.    FINDINGS: Patient motion degrades quality of some of  these images.  There is no evidence of fracture. Alignment is normal.      Craniocervical junction: Normal.     C1-C2:  Normal.     C2-C3:  Normal disc, facet joints, spinal canal and neural foramina.     C3-C4:  Loss of disc space height. Central canal and neural foramen  are patent.     C4-C5:  Loss of disc space height. Central canal normal. Degenerative  change in the facet joints.     C5-C6:  Central canal and neural foramen are normal. Degenerative  change in the facet joints.      C6-C7:  Normal disc, facet joints, spinal canal and neural foramina.      C7-T1:   Normal disc, facet joints, spinal canal and neural foramina.        Impression     IMPRESSION:    1. No fractures are identified.  2. Degenerative changes.    GLORIA KING MD                Thank you for choosing [facility]       Thank you for choosing [facility] for your care. Our goal is always to provide you with excellent care. Hearing back from our patients is one way we can continue to improve our services. Please take a few minutes to complete the written survey that you may receive in the mail after you visit with us. Thank you!        [employer] Information     [facility] gives you secure access to your electronic health record. If you see a primary care provider, you can also send messages to your care team and make appointments. If you have questions, please call your primary care clinic.  If you do not have a primary care provider, please call [phone] and they will assist you.        Care EveryWhere ID     This is your Care EveryWhere ID. This could be used by other organizations to access your [facility] medical records  [id number]        Equal Access to Services     JAYCEE MOLINA AH: Hadii matthew Bolton, wavirginie ha, qapernell kaalmada valorie, shaquille garcia. So [facility] [phone].    ATENCIÓN: Si habla español, tiene a holly disposición servicios gratuitos de asistencia lingüística. Llame al  [phone].    We comply with applicable federal civil rights laws and [place] laws. We do not discriminate on the basis of race, color, national origin, age, disability, sex, sexual orientation, or gender identity.            After Visit Summary       This is your record. Keep this with you and show to your community pharmacist(s) and doctor(s) at your next visit.

## 2018-06-20 NOTE — ED PROVIDER NOTES
"  History     Chief Complaint   Patient presents with     Head Injury     trauma eval pt on warfarin     HPI    Shanika Stahl is a 89 year old female who presents to the emergency department for evaluation after a fall.  Patient was opening a dresser drawer close to the floor in the home she shares with her son, when she fell backwards and hit the back of her head.  Patient is unsure of what she hit, although her son believes she hit either the stationary bicycle or treadmill.  She was able to stand up, walk to her bedroom, and call her son for help, who brought her to the emergency department.  She denies loss of consciousness, headache, and upper or lower extremity injury or pain.  She denies neck or back pain.  She states she \"feels fine although her head does not want to stop bleeding\".  Patient is currently on Warfarin.  She has a history of tricuspid regurgitation from an echocardiogram done in 2017, atrial fibrillation and paroxysmal supraventricular tachycardia.  She uses a cane at home occasionally.  She denies cold symptoms, cough, shortness of breath, abdominal pain, and bowel symptoms.  Patient has occational \"leaky\" bladder although this is not new.    Problem List:    Patient Active Problem List    Diagnosis Date Noted     Pulmonary hypertension 07/31/2017     Priority: Medium     Sensorineural hearing loss, bilateral 04/06/2016     Priority: Medium     Chronic atrial fibrillation (H) 10/02/2015     Priority: Medium     On warfarin       Pacemaker 10/02/2015     Priority: Medium     Long term current use of anticoagulant therapy 02/27/2015     Priority: Medium     Problem list name updated by automated process. Provider to review       Health Care Home 02/13/2014     Priority: Medium     **See Letters for [facility] Care Plan: Emergency Care Plan         Cerebral embolism with cerebral infarction (H) 02/10/2014     Priority: Medium     Tachy-pernell and a-fib       Syncope 10/09/2013     Priority: Medium     " Advanced directives, counseling/discussion 02/15/2012     Priority: Medium       Advance Care Planning:   Receipt of ACP document:  Received: Health Care Directive which was witnessed or notarized on 5/14/2013.  Document not previously scanned.  Validation form completed and sent with document to be scanned.  Code Status reflects choices in most recent ACP document.  Confirmed/documented designated decision maker(s). See permanent comments section of demographics in clinical tab. View document(s) and details by clicking on code status.   Added by Joanna Garcia on 5/21/2013.          Health Directive notarized  March 30 1999  Sent to scanning February 15, 2012  [facility]  Ashley GODFREY MA         Hyperlipidemia LDL goal <100 10/31/2010     Priority: Medium     CHOL      175   7/6/2011  HDL       69   7/6/2011  LDL       93   7/6/2011  TRIG       61   7/6/2011  CHOLHDLRATIO      3.0   7/6/2011  On simvastatin 20mg       Family history of breast cancer      Priority: Medium     Abnormal CXR (chest x-ray)      Priority: Medium     Read as emphysema       Renal hypertension 05/09/2005     Priority: Medium     Known STU--seen on 2001 MRA--did not want angioplasty in past  Controlled on diltiazem, losartan and spironolactone  Problem list name updated by automated process. Provider to review       Atherosclerosis of renal artery (H) 05/09/2005     Priority: Medium     STU by MRA (?records)--medical therapy elected by patient.       Paroxysmal supraventricular tachycardia (H) 05/09/2005     Priority: Medium     SVT-infrequent episode on holter in 2000  Negative stress echo in 2002       Other osteoporosis 05/09/2005     Priority: Medium     Dx 2004 and put on fosamax   BMD 3/05 (T scores):  Lumbar -4.3, Right prox femur -4.0, Left prox femur -4.0  BMD 7/08 (T scores):  Lumbar -4.2, Right prox femur -3.9, Left prox femur -4.1  Fosamax changed to IV reclast and miacalcin 3/09 by Dr. Vanessa  Had second reclast injection  8/11          Past Medical History:    Past Medical History:   Diagnosis Date     Abnormal CXR (chest x-ray)      Atherosclerosis of renal artery (H)      Atrial fibrillation (H) 3/27/2014     Basal cell carcinoma      ESSENTIAL TREMOR 5/9/2005     Family history of breast cancer      Hyperlipidemia LDL goal <100 10/31/2010     Hypertension goal BP (blood pressure) < 140/90 10/3/2013     Other and unspecified hyperlipidemia      Other osteoporosis      Pacemaker      Paroxysmal supraventricular tachycardia (H)      Postmenopausal atrophic vaginitis      Stroke (H) 2/10/2014     Syncope 10/9/2013     Unspecified essential hypertension        Past Surgical History:    Past Surgical History:   Procedure Laterality Date     EYE SURGERY  2/21/12    left eye cataract     IMPLANT PACEMAKER       SURGICAL HISTORY OF -   1965    FEMORAL HERNIA REPAIR       Family History:    Family History   Problem Relation Age of Onset     Cerebrovascular Disease Mother      Diabetes Mother      Breast Cancer Sister      HEART DISEASE Brother      Cerebrovascular Disease Brother      HEART DISEASE Brother      Prostate Cancer Brother      HEART DISEASE Brother      HEART DISEASE Sister      Gynecology Daughter      Cancer Son      Lung       Social History:  Marital Status:   [5]  Social History   Substance Use Topics     Smoking status: Never Smoker     Smokeless tobacco: Never Used     Alcohol use No        Medications:      Acetaminophen (TYLENOL) 325 MG CAPS   acyclovir (ZOVIRAX) 5 % ointment   CALCIUM + D 600-200 MG-UNIT PO TABS   fish oil-omega-3 fatty acids (OMEGA 3) 1000 MG capsule   furosemide (LASIX) 20 MG tablet   loratadine (CLARITIN) 10 MG tablet   metoprolol succinate (TOPROL-XL) 25 MG 24 hr tablet   order for DME   simvastatin (ZOCOR) 20 MG tablet   VITAMIN D 1000 UNIT OR TABS   warfarin (JANTOVEN) 2.5 MG tablet         Review of Systems  All other systems are reviewed and are negative    Physical Exam   BP: (!)  206/104  Pulse: 97  Temp: 97.9  F (36.6  C)  Resp: 18  SpO2: 98 %      Physical Exam  Nursing note and vitals were reviewed.  Constitutional: Awake and alert, adequately nourished and developed appearing 89-year-old in no apparent discomfort, who does not appear acutely ill, and who answers questions appropriately and cooperates with examination.  HEENT: There is a 5 cm laceration on the occiput which bleeds only remove the pressure dressing.  It is not contaminated.  No other lacerations present.  EACs clear.  TMs normal.  No hemotympanum.  Oropharynx normal.  PERRLA. No tenderness to palpation or percussion over the cervical spine.  EOMI.   Neck:  Full pain-free range of motion of the neck in 6 directions without discomfort.  Cardiovascular: Cardiac examination reveals normal heart rate and regular rhythm with 3-4/6 systolic murmur heard best at the upper right sternal border.  Pulmonary/Chest: Breathing is unlabored.  Breath sounds are clear and equal bilaterally.  There no retractions, tachypnea, rales, wheezes, or rhonchi.  Chest wall nontender to palpation.  No areas of ecchymosis swelling or deformity.  Abdomen: Soft, nontender, no HSM or masses rebound or guarding.  Musculoskeletal: Extremities are warm and well-perfused and without edema.  She moves them all freely and without discomfort.  Neurological: Alert, oriented, thought content logical, coherent   Skin: Warm, dry, no rashes.  Psychiatric: Affect broad and appropriate.      ED Course     ED Course     Procedures               Critical Care time:  none               Results for orders placed or performed during the hospital encounter of 06/20/18 (from the past 24 hour(s))   CBC with platelets differential   Result Value Ref Range    WBC 7.4 4.0 - 11.0 10e9/L    RBC Count 4.67 3.8 - 5.2 10e12/L    Hemoglobin 13.9 11.7 - 15.7 g/dL    Hematocrit 43.7 35.0 - 47.0 %    MCV 94 78 - 100 fl    MCH 29.8 26.5 - 33.0 pg    MCHC 31.8 31.5 - 36.5 g/dL    RDW 13.2  10.0 - 15.0 %    Platelet Count 209 150 - 450 10e9/L    Diff Method Automated Method     % Neutrophils 58.5 %    % Lymphocytes 30.3 %    % Monocytes 5.0 %    % Eosinophils 5.2 %    % Basophils 0.5 %    % Immature Granulocytes 0.5 %    Nucleated RBCs 0 0 /100    Absolute Neutrophil 4.3 1.6 - 8.3 10e9/L    Absolute Lymphocytes 2.2 0.8 - 5.3 10e9/L    Absolute Monocytes 0.4 0.0 - 1.3 10e9/L    Absolute Eosinophils 0.4 0.0 - 0.7 10e9/L    Absolute Basophils 0.0 0.0 - 0.2 10e9/L    Abs Immature Granulocytes 0.0 0 - 0.4 10e9/L    Absolute Nucleated RBC 0.0    INR   Result Value Ref Range    INR 2.46 (H) 0.86 - 1.14   Comprehensive metabolic panel   Result Value Ref Range    Sodium 141 133 - 144 mmol/L    Potassium 3.2 (L) 3.4 - 5.3 mmol/L    Chloride 103 94 - 109 mmol/L    Carbon Dioxide 28 20 - 32 mmol/L    Anion Gap 10 3 - 14 mmol/L    Glucose 95 70 - 99 mg/dL    Urea Nitrogen 24 7 - 30 mg/dL    Creatinine 0.82 0.52 - 1.04 mg/dL    GFR Estimate 65 >60 mL/min/1.7m2    GFR Estimate If Black 79 >60 mL/min/1.7m2    Calcium 10.2 (H) 8.5 - 10.1 mg/dL    Bilirubin Total 1.0 0.2 - 1.3 mg/dL    Albumin 3.9 3.4 - 5.0 g/dL    Protein Total 8.5 6.8 - 8.8 g/dL    Alkaline Phosphatase 132 40 - 150 U/L    ALT 43 0 - 50 U/L    AST 52 (H) 0 - 45 U/L   CT Head w/o Contrast    Narrative    CT SCAN OF THE HEAD WITHOUT CONTRAST   6/20/2018 2:40 PM     HISTORY: fall; head injury; on warfarin;     TECHNIQUE:  Axial images of the head and coronal reformations without  IV contrast material. Radiation dose for this scan was reduced using  automated exposure control, adjustment of the mA and/or kV according  to patient size, or iterative reconstruction technique.    COMPARISON: None.    FINDINGS:  There is generalized atrophy of the brain.  White matter  changes are present in the cerebral hemispheres that are consistent  with small vessel ischemic disease in this age patient. There is no  evidence of intracranial hemorrhage, mass, acute infarct  or anomaly.  The visualized portions of the sinuses and mastoids appear normal. No  intracranial hemorrhage or skull fractures are identified. There is  soft tissue swelling over the occipital and parietal regions.      Impression    IMPRESSION: No intracranial hemorrhage or skull fractures.      GLORIA KING MD   CT Cervical Spine w/o Contrast    Narrative    CT CERVICAL SPINE WITHOUT CONTRAST   6/20/2018 2:40 PM     HISTORY: fall; head injury;      TECHNIQUE: Axial images of the cervical spine were obtained without  intravenous contrast. Multiplanar reformations were performed.   Radiation dose for this scan was reduced using automated exposure  control, adjustment of the mA and/or kV according to patient size, or  iterative reconstruction technique.    COMPARISON: None.    FINDINGS: Patient motion degrades quality of some of these images.  There is no evidence of fracture. Alignment is normal.      Craniocervical junction: Normal.     C1-C2:  Normal.     C2-C3:  Normal disc, facet joints, spinal canal and neural foramina.     C3-C4:  Loss of disc space height. Central canal and neural foramen  are patent.     C4-C5:  Loss of disc space height. Central canal normal. Degenerative  change in the facet joints.     C5-C6:  Central canal and neural foramen are normal. Degenerative  change in the facet joints.      C6-C7:  Normal disc, facet joints, spinal canal and neural foramina.      C7-T1:   Normal disc, facet joints, spinal canal and neural foramina.      Impression    IMPRESSION:    1. No fractures are identified.  2. Degenerative changes.    GLORIA KING MD       Medications - No data to display  1403 Patient assessed. Course of care outlined.    [facility] Procedure Note        Laceration Repair:    Performed by: Cayden Lara  Authorized by: Cayden Lara  Consent given by: Patient who states understanding of the procedure being performed after discussing the risks, benefits and  alternatives.    Preparation: Patient was prepped and draped in usual sterile fashion.  Irrigation solution: saline    Body area:scalp  Laceration length: 5cm  Contamination: The wound is not contaminated.  Foreign bodies:none  Tendon involvement: none  Anesthesia: Local  Local anesthetic: Bupivacaine 0.25%  Anesthetic total: 5ml    Debridement: none  Skin closure: Closed with 7 x 3.0 Ethilon  Technique: interrupted  Approximation: close  Approximation difficulty: simple    Patient tolerance: Patient tolerated the procedure well with no immediate complications.    Assessments & Plan (with Medical Decision Making)     89-year-old female presents after a fall through the mechanism above.  This is a low risk mechanism.  She has no symptoms of concussion.  CT scanning of the brain did not show evidence of intracranial injury despite her warfarin use.  She had a large laceration on the occiput which was repaired as above after copious irrigation.  Advised holding her warfarin for 2 days and then resuming normal doses.  Return if signs of infection.  Sutures out in 10 days.  Return if headache, abnormal behavior, or other new concerning symptoms.  Reviewed instructions with the patient and her son and they are comfortable with the plan and their questions were all answered.    I have reviewed the nursing notes.    I have reviewed the findings, diagnosis, plan and need for follow up with the patient.       Discharge Medication List as of 6/20/2018  4:49 PM          Final diagnoses:   Fall, initial encounter   Laceration of scalp, initial encounter     This document serves as a record of the services and decisions personally performed and made by Cayden Lara MD. It was created on HIS/HER behalf by Britt Barrera, a trained medical scribe. The creation of this document is based the provider's statements to the medical scribe.  Britt Barrera 2:44 PM 6/20/2018    Provider:   The information in this document, created by the  medical scribe for me, accurately reflects the services I personally performed and the decisions made by me. I have reviewed and approved this document for accuracy prior to leaving the patient care area.  Cayden Lara MD 2:44 PM 6/20/2018 6/20/2018   [facility] EMERGENCY DEPARTMENT     Cayden Lara MD  06/20/18 2710

## 2018-06-20 NOTE — ED NOTES
DC instructions reviewed with pt and son states understanding. Son has concerns over bleeding showing on lower part of ACE wrap around head, wants laceration assessed prior to DC. ACE and kirlex removed, pt has very minimal bleeding from laceration site, blood on dressing appears to have come from pt's hair presumably from irrigation solution. Son reassured. Pt's head wrapped again with clean kirlex and ACE wrap. Pt assisted out of ED via w/c.

## 2018-06-20 NOTE — DISCHARGE INSTRUCTIONS
Do not use your warfarin tonight or tomorrow and then resume her normal dosing.  Have stitches removed in 10 days.  Return if significant continued bleeding, headache, abnormal behavior, or other new concerning symptoms.

## 2018-06-29 ENCOUNTER — OFFICE VISIT (OUTPATIENT)
Dept: FAMILY MEDICINE | Facility: CLINIC | Age: 83
End: 2018-06-29
Payer: MEDICARE

## 2018-06-29 VITALS
BODY MASS INDEX: 15.58 KG/M2 | RESPIRATION RATE: 20 BRPM | WEIGHT: 83.8 LBS | OXYGEN SATURATION: 97 % | HEART RATE: 75 BPM | SYSTOLIC BLOOD PRESSURE: 142 MMHG | DIASTOLIC BLOOD PRESSURE: 64 MMHG | TEMPERATURE: 99.2 F

## 2018-06-29 DIAGNOSIS — M53.3 PAIN IN THE COCCYX: ICD-10-CM

## 2018-06-29 DIAGNOSIS — Z79.01 LONG TERM CURRENT USE OF ANTICOAGULANT THERAPY: ICD-10-CM
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
## 2018-06-29 DIAGNOSIS — Z48.02 ENCOUNTER FOR REMOVAL OF SUTURES: Primary | ICD-10-CM

## 2018-06-29 DIAGNOSIS — K59.01 SLOW TRANSIT CONSTIPATION: ICD-10-CM

## 2018-06-29 PROCEDURE — 99214 OFFICE O/P EST MOD 30 MIN: CPT | Performed by: INTERNAL MEDICINE

## 2018-06-29 NOTE — PROGRESS NOTES
"  SUBJECTIVE:   Shanika Stahl is a 89 year old female who presents to clinic today for the following health issues:    ED/UC Followup:    Facility:  [facility] Emergency  Department  Date of visit: 6/20/2018  Reason for visit: Head injury  Current Status: Patient states that her head is doing good. Her son states there are 4 or 5 stitches in her head. Patient reports that her tailbone is sore from when she fell. Patient thinks her stomach has been sore because of the pressure from falling and landing on her tailbone. Her son stated she was constipated for awhile but he had gave her a half a dulcolax and that seemed to help.     \"presents to the emergency department for evaluation after a fall.  Patient was opening a dresser drawer close to the floor in the home she shares with her son, when she fell backwards and hit the back of her head.  Patient is unsure of what she hit, although her son believes she hit either the stationary bicycle or treadmill.  She was able to stand up, walk to her bedroom, and call her son for help, who brought her to the emergency department.  She denies loss of consciousness, headache, and upper or lower extremity injury or pain.  She denies neck or back pain.  She states she \"feels fine although her head does not want to stop bleeding\".  Patient is currently on Warfarin.\"    No imaging of pelvis done in ER.  She has mild-moderate tailbone pain with changing positions.  She also has mild right sided low back pain.  Food seems to make the pain worse.  No pain with walking.    She is having increase in constipation since the fall.  She took 1/2 dulcolax.  When took whole in the past it caused diarrhea.  The 1/2 pill x 2 doses seemed to work.  Feels bloated after eating.  Had bowel movement today and prior to that was 4-5 days prior.  It is normal for her to have bowel movement every 1-2 days.  Not on regular bowel meds. She has been eating more brownies recently.    Current Outpatient " Prescriptions   Medication Sig Dispense Refill     CALCIUM + D 600-200 MG-UNIT PO TABS 1 tablet by mouth daily       furosemide (LASIX) 20 MG tablet Take 1 tablet (20 mg) by mouth every other day 45 tablet 3     loratadine (CLARITIN) 10 MG tablet Take 10 mg by mouth daily       metoprolol succinate (TOPROL-XL) 25 MG 24 hr tablet Take 1 tablet (25 mg) by mouth 2 times daily 180 tablet 3     simvastatin (ZOCOR) 20 MG tablet Take 1 tablet (20 mg) by mouth At Bedtime 90 tablet 3     VITAMIN D 1000 UNIT OR TABS 1 TABLET DAILY 30 0     warfarin (JANTOVEN) 2.5 MG tablet Take 2.5 mg on Mon/Wed/Fri; 5 mg all other days or as directed by the Anticoagulation Clinic 150 tablet 0     Acetaminophen (TYLENOL) 325 MG CAPS Take by mouth as needed       acyclovir (ZOVIRAX) 5 % ointment Apply topically 6 times daily (Patient not taking: Reported on 6/29/2018) 15 g 3     fish oil-omega-3 fatty acids (OMEGA 3) 1000 MG capsule Take 1 capsule by mouth 2 times daily 90 capsule 0     order for DME Knee high compression stockings 10-15 mm Hg (Patient not taking: Reported on 6/29/2018) 1 each 1       Reviewed and updated as needed this visit by clinical staff  Tobacco  Allergies  Meds  Problems  Med Hx  Surg Hx  Fam Hx  Soc Hx        Reviewed and updated as needed this visit by Provider  Allergies  Meds  Problems         ROS:  Constitutional, HEENT, cardiovascular, pulmonary, gi and gu systems are negative, except as otherwise noted.    OBJECTIVE:     /64  Pulse 75  Temp 99.2  F (37.3  C) (Tympanic)  Resp 20  Wt 83 lb 12.8 oz (38 kg)  SpO2 97%  BMI 15.58 kg/m2  Body mass index is 15.58 kg/(m^2).  GENERAL APPEARANCE: alert and no distress  LYMPHATICS: 1+ pitting edema bilateral legs to mention  ABDOMEN: Soft, hypoactive bowel sounds, mild diffuse tenderness  MS: Mild pain with palpation over coccyx and in the gluteus muscle area  SKIN: 5 sutures removed without complication.  Patient tolerated procedure  well      ASSESSMENT/PLAN:       1. Encounter for removal of sutures -scalp laceration is healed.  5 sutures removed.    2. Fall, subsequent encounter -son has rearranged the room that she is staying in to remove fall/tripping hazards    3. Long term current use of anticoagulant therapy -likely leading to the prolonged bleeding of the scalp laceration    4. Slow transit constipation - Consider diverticulitis or other causes of her abdominal pain.  Advised her to start with a low-dose of MiraLAX and increase until she has normal bowel movements.  If the abdominal pain persist despite normal bowel movements, would consider CT to evaluate for diverticulitis.    5. Pain in the coccyx -consider occult pelvic fracture.  However the pain started several days after the fall and does not worsen with standing or ambulation, which would be atypical for a pelvic fracture.  Advised to monitor and use Tylenol.      Patient Instructions   1. Start miralax 1/2 capful x 1-2 days, then increase to 1 capful daily to help with constipation  2. Xray of pelvis if tailbone pain is not improving by Monday  3. Try Tylenol 500-1000 mg up to 3 x day for tailbone pain.  4. If the bowel movement normalize and the abdominal pain does not improve, then let Dr. Jaime     Greater than 25 minutes was spent face-to-face with the patient by the provider.  Greater than 50% was spent in counseling or coordinating care for this patient.      Milena Jaime,   [facility]

## 2018-06-29 NOTE — PATIENT INSTRUCTIONS
1. Start miralax 1/2 capful x 1-2 days, then increase to 1 capful daily to help with constipation  2. Xray of pelvis if tailbone pain is not improving by Monday  3. Try Tylenol 500-1000 mg up to 3 x day for tailbone pain.  4. If the bowel movement normalize and the abdominal pain does not improve, then let Dr. Jaime

## 2018-06-29 NOTE — MR AVS SNAPSHOT
After Visit Summary   6/29/2018    Shanika Stahl    MRN: [id number]           Patient Information     Date Of Birth          5/26/1924        Visit Information        Provider Department      6/29/2018 3:20 PM Milena Jaime, DO [facility]        Today's Diagnoses     Encounter for removal of sutures    -  1    Fall, subsequent encounter        Long term current use of anticoagulant therapy        Slow transit constipation        Pain in the coccyx          Care Instructions    1. Start miralax 1/2 capful x 1-2 days, then increase to 1 capful daily to help with constipation  2. Xray of pelvis if tailbone pain is not improving by Monday  3. Try Tylenol 500-1000 mg up to 3 x day for tailbone pain.  4. If the bowel movement normalize and the abdominal pain does not improve, then let Dr. Jaime           Follow-ups after your visit        Your next 10 appointments already scheduled     Jul 05, 2018 11:45 AM CDT   [place]tico[place] Visit with [place] [facility]   [facility] ([facility]    [address] [id number] [phone]            Aug 28, 2018  9:00 AM CDT   Remote PPM Check with [place] [facility]   [facility] ([facility])    [address] [id number] [phone]           This appointment is for a remote check of your pacemaker.  This is not an appointment at the office.              Who to contact     If you have questions or need follow up information about today's clinic visit or your schedule please contact [facility] directly at [phone].  Normal or non-critical lab and imaging results will be communicated to you by MyChart, letter or phone within 4 business days after the clinic has received the results. If you do not hear from us within 7 days, please contact the clinic through MyChart or phone. If you have a critical or abnormal lab result, we will  notify you by phone as soon as possible.  Submit refill requests through [employer] or call your pharmacy and they will forward the refill request to us. Please allow 3 business days for your refill to be completed.          Additional Information About Your Visit        Game [employer] Information     [employer] gives you secure access to your electronic health record. If you see a primary care provider, you can also send messages to your care team and make appointments. If you have questions, please call your primary care clinic.  If you do not have a primary care provider, please call [phone] and they will assist you.        Care EveryWhere ID     This is your Care EveryWhere ID. This could be used by other organizations to access your [facility] medical records  [id number]        Your Vitals Were     Pulse Temperature Respirations Pulse Oximetry BMI (Body Mass Index)       75 99.2  F (37.3  C) (Tympanic) 20 97% 15.58 kg/m2        Blood Pressure from Last 3 Encounters:   06/29/18 142/64   06/20/18 (!) 198/95   06/06/18 128/62    Weight from Last 3 Encounters:   06/29/18 83 lb 12.8 oz (38 kg)   06/06/18 85 lb (38.6 kg)   02/08/18 87 lb 9.6 oz (39.7 kg)              Today, you had the following     No orders found for display       Primary Care Provider Office Phone # Fax #    Milena Jaime [phone] [address] [address]        Equal Access to Services     JAYCEE MOLINA : Hadii matthew ku hadasho Soomaali, waaxda luqadaha, qaybta kaalmada adeegyada, shaquille gillis . So [facility] [phone].    ATENCIÓN: Si habla español, tiene a holly disposición servicios gratuitos de asistencia lingüística. Llame al [phone].    We comply with applicable federal civil rights laws and [place] laws. We do not discriminate on the basis of race, color, national origin, age, disability, sex, sexual orientation, or gender identity.            Thank you!     Thank you for  choosing [facility]  for your care. Our goal is always to provide you with excellent care. Hearing back from our patients is one way we can continue to improve our services. Please take a few minutes to complete the written survey that you may receive in the mail after your visit with us. Thank you!             Your Updated Medication List - Protect others around you: Learn how to safely use, store and throw away your medicines at www.disposemymeds.org.          This list is accurate as of 6/29/18  3:59 PM.  Always use your most recent med list.                   Brand Name Dispense Instructions for use Diagnosis    acyclovir 5 % ointment    ZOVIRAX    15 g    Apply topically 6 times daily    Cold sore       calcium + D 600-200 MG-UNIT Tabs   Generic drug:  calcium carbonate-vitamin D      1 tablet by mouth daily        cholecalciferol 1000 UNIT tablet    vitamin D3    30    1 TABLET DAILY        fish oil-omega-3 fatty acids 1000 MG capsule     90 capsule    Take 1 capsule by mouth 2 times daily        furosemide 20 MG tablet    LASIX    45 tablet    Take 1 tablet (20 mg) by mouth every other day    Leg swelling       loratadine 10 MG tablet    CLARITIN     Take 10 mg by mouth daily        metoprolol succinate 25 MG 24 hr tablet    TOPROL-XL    180 tablet    Take 1 tablet (25 mg) by mouth 2 times daily    Renal hypertension, stage 1-4 or unspecified chronic kidney disease       order for DME     1 each    Knee high compression stockings 10-15 mm Hg    Leg swelling       simvastatin 20 MG tablet    ZOCOR    90 tablet    Take 1 tablet (20 mg) by mouth At Bedtime    Hyperlipidemia LDL goal <100       TYLENOL 325 MG Caps   Generic drug:  Acetaminophen      Take by mouth as needed        warfarin 2.5 MG tablet    JANTOVEN    150 tablet    Take 2.5 mg on Mon/Wed/Fri; 5 mg all other days or as directed by the Anticoagulation Clinic    Chronic atrial fibrillation (H)

## 2018-07-05 ENCOUNTER — TELEPHONE (OUTPATIENT)
Dept: FAMILY MEDICINE | Facility: CLINIC | Age: 83
End: 2018-07-05

## 2018-07-05 ENCOUNTER — ANTICOAGULATION THERAPY VISIT (OUTPATIENT)
Dept: ANTICOAGULATION | Facility: CLINIC | Age: 83
End: 2018-07-05
Payer: MEDICARE

## 2018-07-05 ENCOUNTER — OFFICE VISIT (OUTPATIENT)
Dept: FAMILY MEDICINE | Facility: CLINIC | Age: 83
End: 2018-07-05
Payer: MEDICARE

## 2018-07-05 ENCOUNTER — HOSPITAL ENCOUNTER (OUTPATIENT)
Dept: CT IMAGING | Facility: CLINIC | Age: 83
Discharge: HOME | End: 2018-07-05
Attending: INTERNAL MEDICINE | Admitting: INTERNAL MEDICINE
Payer: MEDICARE

## 2018-07-05 VITALS — SYSTOLIC BLOOD PRESSURE: 136 MMHG | DIASTOLIC BLOOD PRESSURE: 64 MMHG

## 2018-07-05 DIAGNOSIS — R10.84 ABDOMINAL PAIN, GENERALIZED: ICD-10-CM

## 2018-07-05 DIAGNOSIS — Z79.01 LONG TERM CURRENT USE OF ANTICOAGULANT THERAPY: ICD-10-CM

## 2018-07-05 DIAGNOSIS — R93.89 THICKENED ENDOMETRIUM: ICD-10-CM

## 2018-07-05 DIAGNOSIS — I48.20 CHRONIC ATRIAL FIBRILLATION (H): Chronic | ICD-10-CM

## 2018-07-05 DIAGNOSIS — I63.40 CEREBRAL EMBOLISM WITH CEREBRAL INFARCTION (H): ICD-10-CM

## 2018-07-05 DIAGNOSIS — K59.01 SLOW TRANSIT CONSTIPATION: Primary | ICD-10-CM

## 2018-07-05 LAB
ALBUMIN SERPL-MCNC: 3.2 G/DL (ref 3.4–5)
ALP SERPL-CCNC: 180 U/L (ref 40–150)
ALT SERPL W P-5'-P-CCNC: 24 U/L (ref 0–50)
ANION GAP SERPL CALCULATED.3IONS-SCNC: 8 MMOL/L (ref 3–14)
AST SERPL W P-5'-P-CCNC: 37 U/L (ref 0–45)
BASOPHILS # BLD AUTO: 0 10E9/L (ref 0–0.2)
BASOPHILS NFR BLD AUTO: 0.7 %
BILIRUB SERPL-MCNC: 0.6 MG/DL (ref 0.2–1.3)
BUN SERPL-MCNC: 17 MG/DL (ref 7–30)
CALCIUM SERPL-MCNC: 8.7 MG/DL (ref 8.5–10.1)
CHLORIDE SERPL-SCNC: 105 MMOL/L (ref 94–109)
CO2 SERPL-SCNC: 26 MMOL/L (ref 20–32)
CREAT SERPL-MCNC: 0.77 MG/DL (ref 0.52–1.04)
DIFFERENTIAL METHOD BLD: NORMAL
EOSINOPHIL # BLD AUTO: 0.2 10E9/L (ref 0–0.7)
EOSINOPHIL NFR BLD AUTO: 2.6 %
ERYTHROCYTE [DISTWIDTH] IN BLOOD BY AUTOMATED COUNT: 13.2 % (ref 10–15)
GFR SERPL CREATININE-BSD FRML MDRD: 70 ML/MIN/1.7M2
GLUCOSE SERPL-MCNC: 91 MG/DL (ref 70–99)
HCT VFR BLD AUTO: 37.3 % (ref 35–47)
HGB BLD-MCNC: 12.1 G/DL (ref 11.7–15.7)
INR PPP: 4.25 (ref 0.86–1.14)
LYMPHOCYTES # BLD AUTO: 1 10E9/L (ref 0.8–5.3)
LYMPHOCYTES NFR BLD AUTO: 16.8 %
MCH RBC QN AUTO: 30.6 PG (ref 26.5–33)
MCHC RBC AUTO-ENTMCNC: 32.4 G/DL (ref 31.5–36.5)
MCV RBC AUTO: 94 FL (ref 78–100)
MONOCYTES # BLD AUTO: 0.4 10E9/L (ref 0–1.3)
MONOCYTES NFR BLD AUTO: 6.7 %
NEUTROPHILS # BLD AUTO: 4.5 10E9/L (ref 1.6–8.3)
NEUTROPHILS NFR BLD AUTO: 73.2 %
PLATELET # BLD AUTO: 249 10E9/L (ref 150–450)
POTASSIUM SERPL-SCNC: 4.4 MMOL/L (ref 3.4–5.3)
PROT SERPL-MCNC: 7.5 G/DL (ref 6.8–8.8)
RBC # BLD AUTO: 3.96 10E12/L (ref 3.8–5.2)
SODIUM SERPL-SCNC: 139 MMOL/L (ref 133–144)
WBC # BLD AUTO: 6.1 10E9/L (ref 4–11)

## 2018-07-05 PROCEDURE — 80053 COMPREHEN METABOLIC PANEL: CPT | Performed by: INTERNAL MEDICINE

## 2018-07-05 PROCEDURE — 85610 PROTHROMBIN TIME: CPT | Performed by: INTERNAL MEDICINE

## 2018-07-05 PROCEDURE — 99214 OFFICE O/P EST MOD 30 MIN: CPT | Performed by: INTERNAL MEDICINE

## 2018-07-05 PROCEDURE — 36415 COLL VENOUS BLD VENIPUNCTURE: CPT | Performed by: INTERNAL MEDICINE

## 2018-07-05 PROCEDURE — 99207 ZZC NO CHARGE NURSE ONLY: CPT

## 2018-07-05 PROCEDURE — 74176 CT ABD & PELVIS W/O CONTRAST: CPT

## 2018-07-05 PROCEDURE — 85025 COMPLETE CBC W/AUTO DIFF WBC: CPT | Performed by: INTERNAL MEDICINE

## 2018-07-05 NOTE — PATIENT INSTRUCTIONS
1. For the constipation - take full capful of miralax today.  2. If not stool by tomorrow, take the full capful of miralax along with Dulocolax suppository OR 1/2 dulculax orally  3. If no stool by 3rd day, take 1/2 bottle of Magnesium Citrate.  If no bowel movement by 12 hours, take remaining 1/2 bottle  4. For maintenance of constipation, consider 1/2 capful of miralax daily.  5. OK for probiotic for maintenance of constipation.

## 2018-07-05 NOTE — PROGRESS NOTES
"    ANTICOAGULATION FOLLOW-UP CLINIC VISIT    Patient Name:  Shanika Stahl  Date:  7/5/2018  Contact Type:  Face to Face (with patient's son only, patient was downstairs at her clinic visit)    SUBJECTIVE:     Patient Findings     Positives Unexplained INR or factor level change (Constipation / pain ?)    Comments Patient saw PCP today, per EPIC chart review, \"She is now reporting intermittent generalized abdominal pain.  Nothing provokes.  Pain feels tight/cramping.  Tylenol helps some.  This AM had nausea. Slow transit constipation - clearly constipation is main cause of symptoms - no diverticulitis is seen.   Patient Instructions  1. For the constipation - take full capful of miralax today.  2. If not stool by tomorrow, take the full capful of miralax along with Dulocolax suppository OR 1/2 dulculax orally  3. If no stool by 3rd day, take 1/2 bottle of Magnesium Citrate.  If no bowel movement by 12 hours, take remaining 1/2 bottle  4. For maintenance of constipation, consider 1/2 capful of miralax daily.  5. OK for probiotic for maintenance of constipation.\"    -------------------------------------------------------------  Writer spoke with patient's son, he had been accidentally giving patient 5mg on Wednesdays instead of 2.5mg. He was pretty sure she only received 2.5mg last night. No issues with bleeding or unusual bruising noted. Patient will hold today's dose and continue taking the warfarin dose that was previously instructed, 2.5mg MWF, 5mg all other days. She will return in 8 days. If her INR remains elevated, will plan to decrease maintenance dose further.                  OBJECTIVE    INR   Date Value Ref Range Status   07/05/2018 4.25 (H) 0.86 - 1.14 Final       ASSESSMENT / PLAN  INR assessment SUPRA    Recheck INR In: 8 DAYS    INR Location Outside lab [facility]     Anticoagulation Summary as of 7/5/2018     INR goal 2.0-3.0   Today's INR 4.25!   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 Uses 2.5mg tablets.       Anticoagulation Care Providers     Provider Role Specialty Phone number    JaimeDebbi tapiaecjeffry Vazquez,  [facility] [phone]            See the Encounter Report to view Anticoagulation Flowsheet and Dosing Calendar (Go to Encounters tab in chart review, and find the Anticoagulation Therapy Visit)        Tori Toledo RN, CACP

## 2018-07-05 NOTE — MR AVS SNAPSHOT
Shanika EMILY Stahl   7/5/2018 11:45 AM   Anticoagulation Therapy Visit    Description:  89 year old female   Provider:  [place] [facility]   Department:  [facility]           INR as of 7/5/2018     Today's INR 4.25!      Anticoagulation Summary as of 7/5/2018     INR goal 2.0-3.0   Today's INR 4.25!   Full warfarin instructions 7/5: Hold; Otherwise 2.5 mg on Mon, Wed, Fri; 5 mg all other days   Next INR check 7/13/2018    Indications   Atrial fibrillation (H) (Resolved) [I48.91]  Cerebral embolism with cerebral infarction (H) [I63.40]  Long term current use of anticoagulant therapy [Z79.01]         Description     Take extra caution not to injure yourself as your INR is above the goal range. If you have any concerns/troubles with bleeding please seek medical attention. Have an extra serving today of foods rich in vitamin K (dark leafy green vegetables) to help lower your INR.         Your next Anticoagulation Clinic appointment(s)     Jul 13, 2018  3:45 PM CDT   [facility] Visit with [place] [facility]   [facility]    [address] [address] [phone]              Contact Numbers     Please call [phone] with any problems or questions regarding your therapy.    If you need to cancel and/or reschedule your appointment please call one of the following numbers:  [facility] - [phone]  [place] - [phone]  [place] - [phone]  [facility] [phone]  [place] - [phone]            July 2018 Details    Sun Mon Tue Wed Thu Fri Sat     1               2               3               4               5      Hold   See details      6      2.5 mg         7      5 mg           8      5 mg         9      2.5 mg         10      5 mg         11      2.5 mg         12      5 mg         13            14                 15               16               17               18               19               20               21                 22                23               24               25               26               27               28                 29               30               31                    Date Details   07/05 This INR check       Date of next INR:  7/13/2018         How to take your warfarin dose     To take:  2.5 mg Take 1 of the 2.5 mg tablets.    To take:  5 mg Take 2 of the 2.5 mg tablets.    Hold Do not take your warfarin dose. See the Details table to the right for additional instructions.

## 2018-07-05 NOTE — TELEPHONE ENCOUNTER
RN Please call patient.    After visit, CT returned with thickened endometrial lining.  This can be concerning for overactive uterine cells, or even cancer.  Typically recommend ultrasound to further evaluate.  However, similar thickening has been seen since 2010, so this is reassuring.  If patient declines ultrasound, I am OK with that.  Order placed

## 2018-07-05 NOTE — PROGRESS NOTES
SUBJECTIVE:   Shanika Stahl is a 89 year old female who presents to clinic today for the following health issues:    Son verbalize that the pain is worst in the morning when pt is trying to get out of bed.      ABDOMINAL and FLANK PAIN     Onset: 3 to 4 weeks ago    Description:   Character: Sharp  Location: right lower quadrant left lower quadrant right flank left flank  Radiation: Back    Intensity: moderate    Progression of Symptoms:  worsening and constant    Accompanying Signs & Symptoms:  Fever/Chills?: no   Gas/Bloating: YES  Nausea: YES  Vomitting: no   Diarrhea?: no   Constipation:YES - pt usually goes twice a week to do her BM (went today)  Dysuria or Hematuria: no - not clear   History:   Trauma: no   Previous similar pain: no    Previous tests done: none    Precipitating factors:   Does the pain change with:     Food: YES     BM: YES    Urination: no     Alleviating factors:  na    Therapies Tried and outcome: na    LMP:  not applicable     I saw patient 6 days ago for suture removal after a fall.  At that time, she was reporting right sided low back and abdominal pain.  She was also reporting significant constipation.  She was reporting that the abdominal pain improved after having a bowel movement.    She is now reporting intermittent generalized abdominal pain.  Nothing provokes.  wokr up her up at night last few nights. Pain feels tight/cramping.  Tylenol helps some.  This AM had nausea.  Last bowel movement this AM, small.  Prior to that was 3 days ago. No blood in the stool.  No fevers.  Son wonders about a hernia as the cause  They did start miralax 1/2 teaspoon ('just a sprinkle' per son).  They are nervous to increase this because 1/2 dulcolax and it caused severe diarrhea.       Current Outpatient Prescriptions   Medication Sig Dispense Refill     Acetaminophen (TYLENOL) 325 MG CAPS Take by mouth as needed       acyclovir (ZOVIRAX) 5 % ointment Apply topically 6 times daily 15 g 3      CALCIUM + D 600-200 MG-UNIT PO TABS 1 tablet by mouth daily       fish oil-omega-3 fatty acids (OMEGA 3) 1000 MG capsule Take 1 capsule by mouth 2 times daily 90 capsule 0     furosemide (LASIX) 20 MG tablet Take 1 tablet (20 mg) by mouth every other day 45 tablet 3     loratadine (CLARITIN) 10 MG tablet Take 10 mg by mouth daily       metoprolol succinate (TOPROL-XL) 25 MG 24 hr tablet Take 1 tablet (25 mg) by mouth 2 times daily 180 tablet 3     order for DME Knee high compression stockings 10-15 mm Hg 1 each 1     simvastatin (ZOCOR) 20 MG tablet Take 1 tablet (20 mg) by mouth At Bedtime 90 tablet 3     VITAMIN D 1000 UNIT OR TABS 1 TABLET DAILY 30 0     warfarin (JANTOVEN) 2.5 MG tablet Take 2.5 mg on Mon/Wed/Fri; 5 mg all other days or as directed by the Anticoagulation Clinic 150 tablet 0       Reviewed and updated as needed this visit by clinical staff  Tobacco  Allergies  Meds  Problems  Med Hx  Surg Hx  Fam Hx  Soc Hx        Reviewed and updated as needed this visit by Provider  Allergies  Meds  Problems         ROS:  Constitutional, HEENT, cardiovascular, pulmonary, gi and gu systems are negative, except as otherwise noted.    OBJECTIVE:     /64 (BP Location: Right arm, Patient Position: Sitting, Cuff Size: Adult Regular)  Pulse (P) 79  Temp (P) 98.6  F (37  C) (Tympanic)  Wt (P) 82 lb (37.2 kg)  SpO2 (P) 97%  Breastfeeding? No  BMI (P) 15.24 kg/m2  Body mass index is 15.24 kg/(m^2) (pended).  GENERAL APPEARANCE: alert and no distress  ABDOMEN: soft, distended, mild-moderate diffuse pain, hypoactive bowel sounds    Diagnostic Test Results:  Results for orders placed or performed in visit on 07/05/18 (from the past 24 hour(s))   CBC with platelets and differential   Result Value Ref Range    WBC 6.1 4.0 - 11.0 10e9/L    RBC Count 3.96 3.8 - 5.2 10e12/L    Hemoglobin 12.1 11.7 - 15.7 g/dL    Hematocrit 37.3 35.0 - 47.0 %    MCV 94 78 - 100 fl    MCH 30.6 26.5 - 33.0 pg    MCHC 32.4 31.5  - 36.5 g/dL    RDW 13.2 10.0 - 15.0 %    Platelet Count 249 150 - 450 10e9/L    Diff Method Automated Method     % Neutrophils 73.2 %    % Lymphocytes 16.8 %    % Monocytes 6.7 %    % Eosinophils 2.6 %    % Basophils 0.7 %    Absolute Neutrophil 4.5 1.6 - 8.3 10e9/L    Absolute Lymphocytes 1.0 0.8 - 5.3 10e9/L    Absolute Monocytes 0.4 0.0 - 1.3 10e9/L    Absolute Eosinophils 0.2 0.0 - 0.7 10e9/L    Absolute Basophils 0.0 0.0 - 0.2 10e9/L   Comprehensive metabolic panel   Result Value Ref Range    Sodium 139 133 - 144 mmol/L    Potassium 4.4 3.4 - 5.3 mmol/L    Chloride 105 94 - 109 mmol/L    Carbon Dioxide 26 20 - 32 mmol/L    Anion Gap 8 3 - 14 mmol/L    Glucose 91 70 - 99 mg/dL    Urea Nitrogen 17 7 - 30 mg/dL    Creatinine 0.77 0.52 - 1.04 mg/dL    GFR Estimate 70 >60 mL/min/1.7m2    GFR Estimate If Black 85 >60 mL/min/1.7m2    Calcium 8.7 8.5 - 10.1 mg/dL    Bilirubin Total 0.6 0.2 - 1.3 mg/dL    Albumin 3.2 (L) 3.4 - 5.0 g/dL    Protein Total 7.5 6.8 - 8.8 g/dL    Alkaline Phosphatase 180 (H) 40 - 150 U/L    ALT 24 0 - 50 U/L    AST 37 0 - 45 U/L   INR   Result Value Ref Range    INR 4.25 (H) 0.86 - 1.14   CT Abdomen Pelvis w/o Contrast    Narrative    CT ABDOMEN AND PELVIS WITHOUT CONTRAST   7/5/2018 10:24 AM     HISTORY: Generalized abdominal pain.    TECHNIQUE: Volumetric helical sections were acquired from the lung  bases through the ischial tuberosities without IV contrast. IV  contrast was not administered at the request of the referring  physician. Coronal images were also reconstructed. Radiation dose for  this scan was reduced using automated exposure control, adjustment of  the mA and/or kV according to patient size, or iterative  reconstruction technique.    COMPARISON: CT of the abdomen and pelvis performed 6/9/2010.    FINDINGS: There is a moderate to large amount of stool throughout the  colon. No convincing evidence for colitis or diverticulitis. No bowel  obstruction. No free fluid in the  pelvis. There are multiple bilateral  renal cysts, with the largest in the upper pole on the left measuring  3.3 cm. No hydronephrosis. The liver, gallbladder, spleen, adrenal  glands, and pancreas have otherwise unremarkable noncontrast  appearances. The uterus appears retroverted. The endometrium appears  prominent given the patient's age. There is moderate atherosclerotic  aortoiliac calcification. Cardiac enlargement. Coronary artery  calcification. Cardiac leads are noted in the right heart. Mild  scattered scarring and/or atelectasis at both lung bases. Degenerative  changes are noted at the lumbosacral interspace.      Impression    IMPRESSION:   1. Moderate to large amount of stool throughout the colon.  2. The endometrium appears prominent given the patient's age, and  endometrial thickening or mass is not excluded. The endometrium had a  similar appearance on the exam of 6/9/2010. Consider pelvic ultrasound  for further evaluation.  3. Cardiac enlargement.               ASSESSMENT/PLAN:     1. Slow transit constipation - clearly constipation is main cause of symptoms - no diverticulitis is seen.  Discussed treatment plan as below    2. Abdominal pain, generalized  - CBC with platelets and differential  - Comprehensive metabolic panel  - CT Abdomen Pelvis w/o Contrast    3. Chronic atrial fibrillation (H)  - INR    4. Thickened endometrium - seen on CT but similar appearance in 2010, which is reassuring.  Ordered ultrasound, if patient declines further work-up that is reasonable as well  - US Pelvic Complete w Transvaginal; Future    Patient Instructions   1. For the constipation - take full capful of miralax today.  2. If not stool by tomorrow, take the full capful of miralax along with Dulocolax suppository OR 1/2 dulculax orally  3. If no stool by 3rd day, take 1/2 bottle of Magnesium Citrate.  If no bowel movement by 12 hours, take remaining 1/2 bottle  4. For maintenance of constipation, consider 1/2  capful of miralax daily.  5. OK for probiotic for maintenance of constipation.        Milena Jaime,   [facility]

## 2018-07-05 NOTE — MR AVS SNAPSHOT
After Visit Summary   7/5/2018    Shanika Stahl    MRN: [id number]           Patient Information     Date Of Birth          5/26/1924        Visit Information        Provider Department      7/5/2018 8:20 AM Milena Jaime, DO [facility]        Today's Diagnoses     Abdominal pain, generalized    -  1    Chronic atrial fibrillation (H)          Care Instructions    1. For the constipation - take full capful of miralax today.  2. If not stool by tomorrow, take the full capful of miralax along with Dulocolax suppository OR 1/2 dulculax orally  3. If no stool by 3rd day, take 1/2 bottle of Magnesium Citrate.  If no bowel movement by 12 hours, take remaining 1/2 bottle  4. For maintenance of constipation, consider 1/2 capful of miralax daily.  5. OK for probiotic for maintenance of constipation.          Follow-ups after your visit        Your next 10 appointments already scheduled     Aug 28, 2018  9:00 AM CDT   Remote PPM Check with [facility]   [facility] ([facility])    [address] [address] [phone]           This appointment is for a remote check of your pacemaker.  This is not an appointment at the office.              Who to contact     If you have questions or need follow up information about today's clinic visit or your schedule please contact [facility] directly at [phone].  Normal or non-critical lab and imaging results will be communicated to you by MyChart, letter or phone within 4 business days after the clinic has received the results. If you do not hear from us within 7 days, please contact the clinic through MyChart or phone. If you have a critical or abnormal lab result, we will notify you by phone as soon as possible.  Submit refill requests through [email or web address] or call your pharmacy and they will forward the refill request to us. Please allow 3 business days for your refill to be  completed.          Additional Information About Your Visit        MyChart Information     [employer] gives you secure access to your electronic health record. If you see a primary care provider, you can also send messages to your care team and make appointments. If you have questions, please call your primary care clinic.  If you do not have a primary care provider, please call [phone] and they will assist you.        Care EveryWhere ID     This is your Care EveryWhere ID. This could be used by other organizations to access your [facility] medical records  [id number]        Your Vitals Were     Breastfeeding?                   No            Blood Pressure from Last 3 Encounters:   07/05/18 136/64   06/29/18 142/64   06/20/18 (!) 198/95    Weight from Last 3 Encounters:   07/05/18 (P) 82 lb (37.2 kg)   06/29/18 83 lb 12.8 oz (38 kg)   06/06/18 85 lb (38.6 kg)              We Performed the Following     CBC with platelets and differential     Comprehensive metabolic panel     CT Abdomen Pelvis w/o Contrast     INR        Primary Care Provider Office Phone # Fax #    Milena Vazquez [place],  [phone] [address] [address]        Equal Access to Services     JAYCEE MOLINA : Hadii aad ku hadasho Sojenniferali, waaxda luqadaha, qaybta kaalmada adeegyada, shaquille garcia. So [facility] [phone].    ATENCIÓN: Si habla español, tiene a holly disposición servicios gratuitos de asistencia lingüística. Llame al [phone].    We comply with applicable federal civil rights laws and [place] laws. We do not discriminate on the basis of race, color, national origin, age, disability, sex, sexual orientation, or gender identity.            Thank you!     Thank you for choosing [facility]  for your care. Our goal is always to provide you with excellent care. Hearing back from our patients is one way we can continue to improve our services. Please take a few  minutes to complete the written survey that you may receive in the mail after your visit with us. Thank you!             Your Updated Medication List - Protect others around you: Learn how to safely use, store and throw away your medicines at www.disposemymeds.org.          This list is accurate as of 7/5/18 12:16 PM.  Always use your most recent med list.                   Brand Name Dispense Instructions for use Diagnosis    acyclovir 5 % ointment    ZOVIRAX    15 g    Apply topically 6 times daily    Cold sore       calcium + D 600-200 MG-UNIT Tabs   Generic drug:  calcium carbonate-vitamin D      1 tablet by mouth daily        cholecalciferol 1000 UNIT tablet    vitamin D3    30    1 TABLET DAILY        fish oil-omega-3 fatty acids 1000 MG capsule     90 capsule    Take 1 capsule by mouth 2 times daily        furosemide 20 MG tablet    LASIX    45 tablet    Take 1 tablet (20 mg) by mouth every other day    Leg swelling       loratadine 10 MG tablet    CLARITIN     Take 10 mg by mouth daily        metoprolol succinate 25 MG 24 hr tablet    TOPROL-XL    180 tablet    Take 1 tablet (25 mg) by mouth 2 times daily    Renal hypertension, stage 1-4 or unspecified chronic kidney disease       order for DME     1 each    Knee high compression stockings 10-15 mm Hg    Leg swelling       simvastatin 20 MG tablet    ZOCOR    90 tablet    Take 1 tablet (20 mg) by mouth At Bedtime    Hyperlipidemia LDL goal <100       TYLENOL 325 MG Caps   Generic drug:  Acetaminophen      Take by mouth as needed        warfarin 2.5 MG tablet    JANTOVEN    150 tablet    Take 2.5 mg on Mon/Wed/Fri; 5 mg all other days or as directed by the Anticoagulation Clinic    Chronic atrial fibrillation (H)

## 2018-07-05 NOTE — TELEPHONE ENCOUNTER
Notified patient's son, Antoine, of CT results and MD's recommendations.    He verbalized understanding and reports that he would like to scheduled the US  Transferred patient's son to appt line to schedule an appt    Katherine MOTTA Rn

## 2018-07-09 ENCOUNTER — TELEPHONE (OUTPATIENT)
Dept: FAMILY MEDICINE | Facility: CLINIC | Age: 83
End: 2018-07-09

## 2018-07-09 NOTE — TELEPHONE ENCOUNTER
If she is producing that many stools, the spasms may a reaction to the magnesium rather than from ongoing constipation issues.  I'd recommend holding off on further magnesium for now and start the Miralax for maintenance.  If she stops having bowel movements again, then please call for further instructions.  Thanks.    Rohith Landin MD

## 2018-07-09 NOTE — TELEPHONE ENCOUNTER
Reason for call:  Patient reporting a symptom    Symptom or request: constipation    Duration (how long have symptoms been present): on going     Have you been treated for this before? Yes    Additional comments: pt's son Antoine calling stating pt is still having problems. He gave her a bottle of mag citrate yesterday and she had about 9 stools, but is still having problems with cramping and feels like she has more. He gave her a half bottle of mag citrate today and was wondering if that was ok. He states she's not eating very much and he's worried about her. He would like suggestions for him to have her try.    Phone Number patient can be reached at:  Cell number on file:    Telephone Information:   Mobile [phone]       Best Time:  any    Can we leave a detailed message on this number:  YES    Call taken on 7/9/2018 at 2:41 PM by Alesha Montalvo

## 2018-07-09 NOTE — TELEPHONE ENCOUNTER
"S-(situation): bowel habit    B-(background): Seen Dr. Jaime 07/05/18:  1. For the constipation - take full capful of miralax today.  2. If not stool by tomorrow, take the full capful of miralax along with Dulocolax suppository OR 1/2 dulculax orally  3. If no stool by 3rd day, take 1/2 bottle of Magnesium Citrate.  If no bowel movement by 12 hours, take remaining 1/2 bottle  4. For maintenance of constipation, consider 1/2 capful of miralax daily.  5. OK for probiotic for maintenance of constipation.    A-(assessment): Spoke with patient's son Antoine (consent on file). He states patient did not have stools until yesterday. States that patient complains of intermittant \"spasms\" in abdomen. Sitting and laying on couch help. Laying down in bed is worse.  Has not had much to eat since Friday and has been encouraging increased fluids.     Patient is scheduled for US on 07/13/18    R-(recommendations): I reviewed the instructions as noted in last visit. Routing to provider for further recommendations for the abdominal spasms.      Roxanne RODRIGUEZ RN    "

## 2018-07-10 NOTE — TELEPHONE ENCOUNTER
Notified patient's son, Antoine, of Dr. Landin's recommendations regarding stool management  He verbalized understanding and agreed with MD's plan    Katherine MOTTA Rn

## 2018-07-12 ENCOUNTER — APPOINTMENT (OUTPATIENT)
Dept: ULTRASOUND IMAGING | Facility: CLINIC | Age: 83
DRG: 543 | End: 2018-07-12
Attending: EMERGENCY MEDICINE
Payer: MEDICARE

## 2018-07-12 ENCOUNTER — APPOINTMENT (OUTPATIENT)
Dept: GENERAL RADIOLOGY | Facility: CLINIC | Age: 83
DRG: 543 | End: 2018-07-12
Attending: EMERGENCY MEDICINE
Payer: MEDICARE

## 2018-07-12 ENCOUNTER — HOSPITAL ENCOUNTER (INPATIENT)
Facility: CLINIC | Age: 83
LOS: 3 days | Discharge: NURSING, REHAB OR LONG-TERM CARE | DRG: 543 | End: 2018-07-16
Attending: EMERGENCY MEDICINE | Admitting: FAMILY MEDICINE
Payer: MEDICARE

## 2018-07-12 ENCOUNTER — NURSE TRIAGE (OUTPATIENT)
Dept: NURSING | Facility: CLINIC | Age: 83
End: 2018-07-12

## 2018-07-12 DIAGNOSIS — R10.2 SUPRAPUBIC PAIN: ICD-10-CM

## 2018-07-12 DIAGNOSIS — K59.01 SLOW TRANSIT CONSTIPATION: ICD-10-CM

## 2018-07-12 DIAGNOSIS — R10.84 ABDOMINAL PAIN, GENERALIZED: ICD-10-CM

## 2018-07-12 DIAGNOSIS — S22.081D CLOSED STABLE BURST FRACTURE OF ELEVENTH THORACIC VERTEBRA WITH ROUTINE HEALING: Primary | ICD-10-CM

## 2018-07-12 DIAGNOSIS — R94.6 THYROID FUNCTION TEST ABNORMAL: ICD-10-CM

## 2018-07-12 DIAGNOSIS — R93.89 ABNORMAL PELVIC ULTRASOUND: ICD-10-CM

## 2018-07-12 DIAGNOSIS — R79.1 SUPRATHERAPEUTIC INR: ICD-10-CM

## 2018-07-12 DIAGNOSIS — M62.81 MUSCLE WEAKNESS (GENERALIZED): ICD-10-CM

## 2018-07-12 PROBLEM — R10.9 ABDOMINAL PAIN: Status: ACTIVE | Noted: 2018-07-12

## 2018-07-12 LAB
ALBUMIN SERPL-MCNC: 3.4 G/DL (ref 3.4–5)
ALBUMIN UR-MCNC: 30 MG/DL
ALP SERPL-CCNC: 192 U/L (ref 40–150)
ALT SERPL W P-5'-P-CCNC: 23 U/L (ref 0–50)
ANION GAP SERPL CALCULATED.3IONS-SCNC: 12 MMOL/L (ref 3–14)
APPEARANCE UR: ABNORMAL
AST SERPL W P-5'-P-CCNC: 43 U/L (ref 0–45)
BACTERIA #/AREA URNS HPF: ABNORMAL /HPF
BASOPHILS # BLD AUTO: 0 10E9/L (ref 0–0.2)
BASOPHILS NFR BLD AUTO: 0.5 %
BILIRUB SERPL-MCNC: 0.9 MG/DL (ref 0.2–1.3)
BILIRUB UR QL STRIP: NEGATIVE
BUN SERPL-MCNC: 17 MG/DL (ref 7–30)
CALCIUM SERPL-MCNC: 9.4 MG/DL (ref 8.5–10.1)
CHLORIDE SERPL-SCNC: 99 MMOL/L (ref 94–109)
CO2 SERPL-SCNC: 21 MMOL/L (ref 20–32)
COLOR UR AUTO: YELLOW
CREAT SERPL-MCNC: 0.72 MG/DL (ref 0.52–1.04)
DIFFERENTIAL METHOD BLD: NORMAL
EOSINOPHIL # BLD AUTO: 0.1 10E9/L (ref 0–0.7)
EOSINOPHIL NFR BLD AUTO: 0.8 %
ERYTHROCYTE [DISTWIDTH] IN BLOOD BY AUTOMATED COUNT: 13 % (ref 10–15)
GFR SERPL CREATININE-BSD FRML MDRD: 75 ML/MIN/1.7M2
GLUCOSE SERPL-MCNC: 91 MG/DL (ref 70–99)
GLUCOSE UR STRIP-MCNC: NEGATIVE MG/DL
HCT VFR BLD AUTO: 40.4 % (ref 35–47)
HGB BLD-MCNC: 13.1 G/DL (ref 11.7–15.7)
HGB UR QL STRIP: ABNORMAL
HYALINE CASTS #/AREA URNS LPF: 2 /LPF (ref 0–2)
IMM GRANULOCYTES # BLD: 0 10E9/L (ref 0–0.4)
IMM GRANULOCYTES NFR BLD: 0.3 %
INR PPP: 4.59 (ref 0.86–1.14)
KETONES UR STRIP-MCNC: 80 MG/DL
LEUKOCYTE ESTERASE UR QL STRIP: ABNORMAL
LYMPHOCYTES # BLD AUTO: 1 10E9/L (ref 0.8–5.3)
LYMPHOCYTES NFR BLD AUTO: 17.3 %
MCH RBC QN AUTO: 30.1 PG (ref 26.5–33)
MCHC RBC AUTO-ENTMCNC: 32.4 G/DL (ref 31.5–36.5)
MCV RBC AUTO: 93 FL (ref 78–100)
MONOCYTES # BLD AUTO: 0.3 10E9/L (ref 0–1.3)
MONOCYTES NFR BLD AUTO: 5.8 %
MUCOUS THREADS #/AREA URNS LPF: PRESENT /LPF
NEUTROPHILS # BLD AUTO: 4.5 10E9/L (ref 1.6–8.3)
NEUTROPHILS NFR BLD AUTO: 75.3 %
NITRATE UR QL: NEGATIVE
NRBC # BLD AUTO: 0 10*3/UL
NRBC BLD AUTO-RTO: 0 /100
PH UR STRIP: 5 PH (ref 5–7)
PLATELET # BLD AUTO: 308 10E9/L (ref 150–450)
POTASSIUM SERPL-SCNC: 4.2 MMOL/L (ref 3.4–5.3)
PROT SERPL-MCNC: 7.7 G/DL (ref 6.8–8.8)
RBC # BLD AUTO: 4.35 10E12/L (ref 3.8–5.2)
RBC #/AREA URNS AUTO: 162 /HPF (ref 0–2)
SODIUM SERPL-SCNC: 132 MMOL/L (ref 133–144)
SOURCE: ABNORMAL
SP GR UR STRIP: 1.02 (ref 1–1.03)
T4 FREE SERPL-MCNC: 1.83 NG/DL (ref 0.76–1.46)
TSH SERPL DL<=0.005 MIU/L-ACNC: 4.4 MU/L (ref 0.4–4)
UROBILINOGEN UR STRIP-MCNC: 0 MG/DL (ref 0–2)
WBC # BLD AUTO: 5.9 10E9/L (ref 4–11)
WBC #/AREA URNS AUTO: 49 /HPF (ref 0–5)

## 2018-07-12 PROCEDURE — 85610 PROTHROMBIN TIME: CPT | Performed by: EMERGENCY MEDICINE

## 2018-07-12 PROCEDURE — 96360 HYDRATION IV INFUSION INIT: CPT | Performed by: EMERGENCY MEDICINE

## 2018-07-12 PROCEDURE — 76830 TRANSVAGINAL US NON-OB: CPT

## 2018-07-12 PROCEDURE — 99285 EMERGENCY DEPT VISIT HI MDM: CPT | Mod: 25 | Performed by: EMERGENCY MEDICINE

## 2018-07-12 PROCEDURE — 96361 HYDRATE IV INFUSION ADD-ON: CPT | Performed by: EMERGENCY MEDICINE

## 2018-07-12 PROCEDURE — 99220 ZZC INITIAL OBSERVATION CARE,LEVL III: CPT | Performed by: FAMILY MEDICINE

## 2018-07-12 PROCEDURE — 80053 COMPREHEN METABOLIC PANEL: CPT | Performed by: EMERGENCY MEDICINE

## 2018-07-12 PROCEDURE — 85025 COMPLETE CBC W/AUTO DIFF WBC: CPT | Performed by: EMERGENCY MEDICINE

## 2018-07-12 PROCEDURE — 99285 EMERGENCY DEPT VISIT HI MDM: CPT | Mod: Z6 | Performed by: EMERGENCY MEDICINE

## 2018-07-12 PROCEDURE — 84443 ASSAY THYROID STIM HORMONE: CPT | Performed by: EMERGENCY MEDICINE

## 2018-07-12 PROCEDURE — 84439 ASSAY OF FREE THYROXINE: CPT | Performed by: EMERGENCY MEDICINE

## 2018-07-12 PROCEDURE — 25000128 H RX IP 250 OP 636: Performed by: EMERGENCY MEDICINE

## 2018-07-12 PROCEDURE — G0378 HOSPITAL OBSERVATION PER HR: HCPCS

## 2018-07-12 PROCEDURE — 81001 URINALYSIS AUTO W/SCOPE: CPT | Performed by: EMERGENCY MEDICINE

## 2018-07-12 PROCEDURE — 74019 RADEX ABDOMEN 2 VIEWS: CPT

## 2018-07-12 RX ORDER — SIMVASTATIN 20 MG
20 TABLET ORAL AT BEDTIME
Status: DISCONTINUED | OUTPATIENT
Start: 2018-07-13 | End: 2018-07-16 | Stop reason: HOSPADM

## 2018-07-12 RX ORDER — NALOXONE HYDROCHLORIDE 0.4 MG/ML
.1-.4 INJECTION, SOLUTION INTRAMUSCULAR; INTRAVENOUS; SUBCUTANEOUS
Status: DISCONTINUED | OUTPATIENT
Start: 2018-07-12 | End: 2018-07-16 | Stop reason: HOSPADM

## 2018-07-12 RX ORDER — ONDANSETRON 4 MG/1
4 TABLET, ORALLY DISINTEGRATING ORAL EVERY 6 HOURS PRN
Status: DISCONTINUED | OUTPATIENT
Start: 2018-07-12 | End: 2018-07-16 | Stop reason: HOSPADM

## 2018-07-12 RX ORDER — PROCHLORPERAZINE 25 MG
12.5 SUPPOSITORY, RECTAL RECTAL EVERY 12 HOURS PRN
Status: DISCONTINUED | OUTPATIENT
Start: 2018-07-12 | End: 2018-07-16 | Stop reason: HOSPADM

## 2018-07-12 RX ORDER — ACETAMINOPHEN 650 MG/1
650 SUPPOSITORY RECTAL EVERY 4 HOURS PRN
Status: DISCONTINUED | OUTPATIENT
Start: 2018-07-12 | End: 2018-07-15

## 2018-07-12 RX ORDER — METOPROLOL SUCCINATE 25 MG/1
25 TABLET, EXTENDED RELEASE ORAL 2 TIMES DAILY
Status: DISCONTINUED | OUTPATIENT
Start: 2018-07-13 | End: 2018-07-16 | Stop reason: HOSPADM

## 2018-07-12 RX ORDER — SODIUM CHLORIDE 9 MG/ML
INJECTION, SOLUTION INTRAVENOUS CONTINUOUS
Status: DISCONTINUED | OUTPATIENT
Start: 2018-07-12 | End: 2018-07-13

## 2018-07-12 RX ORDER — PROCHLORPERAZINE MALEATE 5 MG
5 TABLET ORAL EVERY 6 HOURS PRN
Status: DISCONTINUED | OUTPATIENT
Start: 2018-07-12 | End: 2018-07-16 | Stop reason: HOSPADM

## 2018-07-12 RX ORDER — ONDANSETRON 2 MG/ML
4 INJECTION INTRAMUSCULAR; INTRAVENOUS EVERY 6 HOURS PRN
Status: DISCONTINUED | OUTPATIENT
Start: 2018-07-12 | End: 2018-07-16 | Stop reason: HOSPADM

## 2018-07-12 RX ORDER — ACETAMINOPHEN 325 MG/1
650 TABLET ORAL EVERY 4 HOURS PRN
Status: DISCONTINUED | OUTPATIENT
Start: 2018-07-12 | End: 2018-07-14

## 2018-07-12 RX ORDER — LORATADINE 10 MG/1
10 TABLET ORAL DAILY
Status: DISCONTINUED | OUTPATIENT
Start: 2018-07-13 | End: 2018-07-16 | Stop reason: HOSPADM

## 2018-07-12 RX ADMIN — SODIUM CHLORIDE 500 ML: 9 INJECTION, SOLUTION INTRAVENOUS at 18:50

## 2018-07-12 ASSESSMENT — ENCOUNTER SYMPTOMS
CARDIOVASCULAR NEGATIVE: 1
MUSCULOSKELETAL NEGATIVE: 1
ACTIVITY CHANGE: 1
RESPIRATORY NEGATIVE: 1
EYES NEGATIVE: 1
ABDOMINAL PAIN: 1
HEMATOLOGIC/LYMPHATIC NEGATIVE: 1
ENDOCRINE NEGATIVE: 1
PSYCHIATRIC NEGATIVE: 1
ALLERGIC/IMMUNOLOGIC NEGATIVE: 1
WEAKNESS: 1
APPETITE CHANGE: 1

## 2018-07-12 NOTE — ED PROVIDER NOTES
History     Chief Complaint   Patient presents with     Generalized Weakness     abdominal pain, nausea, vomiting     HPI  Shanika Stahl is a 89 year old female who presents to the ED with history of hyperlipemia, renal hypertension, cerebral embolism, chronic atrial fibrillation, pacemaker, and paroxysmal supraventricular tachycardia who presents to the ED with generalized weakness. The patient reports she has been having abdominal pain and sharp spasm for three weeks now. The patient son assisted with history. He reports that the patient has been having lower abdominal pain that radiates to her back. The patient's son reports she saw her primary, Dr. Jaime who thought she was constipated and recommended Miralax. He reports that the patient tried this with no relief, so had a CT ordered by Dr. Jaime and was found to have a moderate to large amount of stool throughout her colon. The patient was given Magnesium Sulfate and the patient started having diarrhea. The patient reports that today she had a lot of diarrhea. The patient reports feeling very weak and has been eating very little over the past three weeks. The patient reports that whenever she eats she has a loose bowel movement and does not feel well. The patient's son said he would like to get the patient admitted in the hospital to have the her monitored and her weakness addressed. The patient reports she was in so much pain this morning that she could not get up. The patient is currently on warfrin due to a stroke in the past, as well as metoprolol and simvastatin.      Social History: The patient presents to the ED with her son Antoine. The patient lives in [place], [place] with Antoine.     Past Medical History:  Past Medical History:   Diagnosis Date     Abnormal CXR (chest x-ray)     Read as emphysema     Atherosclerosis of renal artery (H)      Atrial fibrillation (H) 3/27/2014     Basal cell carcinoma      ESSENTIAL TREMOR 5/9/2005     Family history  of breast cancer      Hyperlipidemia LDL goal <100 10/31/2010    CHOL      175   7/6/2011 HDL       69   7/6/2011 LDL       93   7/6/2011 TRIG       61   7/6/2011 CHOLHDLRATIO      3.0   7/6/2011 On simvastatin 20mg      Hypertension goal BP (blood pressure) < 140/90 10/3/2013     Other and unspecified hyperlipidemia      Other osteoporosis      Pacemaker      Paroxysmal supraventricular tachycardia (H)      Postmenopausal atrophic vaginitis      Stroke (H) 2/10/2014     Syncope 10/9/2013     Unspecified essential hypertension        Medications:  Current Outpatient Prescriptions   Medication Sig Dispense Refill     CALCIUM + D 600-200 MG-UNIT PO TABS 1 tablet by mouth daily       fish oil-omega-3 fatty acids (OMEGA 3) 1000 MG capsule Take 1 capsule by mouth 2 times daily 90 capsule 0     furosemide (LASIX) 20 MG tablet Take 1 tablet (20 mg) by mouth every other day 45 tablet 3     loratadine (CLARITIN) 10 MG tablet Take 10 mg by mouth daily       metoprolol succinate (TOPROL-XL) 25 MG 24 hr tablet Take 1 tablet (25 mg) by mouth 2 times daily 180 tablet 3     simvastatin (ZOCOR) 20 MG tablet Take 1 tablet (20 mg) by mouth At Bedtime 90 tablet 3     VITAMIN D 1000 UNIT OR TABS 1 TABLET DAILY 30 0     warfarin (JANTOVEN) 2.5 MG tablet Take 2.5 mg on Mon/Wed/Fri; 5 mg all other days or as directed by the Anticoagulation Clinic 150 tablet 0     acyclovir (ZOVIRAX) 5 % ointment Apply topically 6 times daily 15 g 3     order for DME Knee high compression stockings 10-15 mm Hg 1 each 1       Allergies:  Allergies   Allergen Reactions     Allopurinol      Ampicillin Diarrhea     Cipro [Ciprofloxacin] Nausea and Vomiting     Levaquin GI Disturbance     Lisinopril Swelling     AngioEdema     Paxil [Paroxetine] Nausea and Vomiting     Penicillins Diarrhea     Sulfa Drugs GI Disturbance     Zithromax [Macrolides] Nausea and Vomiting     Zoloft Nausea and Vomiting     Problem List:    Patient Active Problem List    Diagnosis  Date Noted     Pulmonary hypertension 07/31/2017     Priority: Medium     Sensorineural hearing loss, bilateral 04/06/2016     Priority: Medium     Chronic atrial fibrillation (H) 10/02/2015     Priority: Medium     On warfarin       Pacemaker 10/02/2015     Priority: Medium     Long term current use of anticoagulant therapy 02/27/2015     Priority: Medium     Problem list name updated by automated process. Provider to review       Health Care Home 02/13/2014     Priority: Medium     **See Letters for [facility] Care Plan: Emergency Care Plan         Cerebral embolism with cerebral infarction (H) 02/10/2014     Priority: Medium     Tachy-pernell and a-fib       Syncope 10/09/2013     Priority: Medium     Advanced directives, counseling/discussion 02/15/2012     Priority: Medium       Advance Care Planning:   Receipt of ACP document:  Received: Health Care Directive which was witnessed or notarized on 5/14/2013.  Document not previously scanned.  Validation form completed and sent with document to be scanned.  Code Status reflects choices in most recent ACP document.  Confirmed/documented designated decision maker(s). See permanent comments section of demographics in clinical tab. View document(s) and details by clicking on code status.   Added by Joanna Garcia on 5/21/2013.          Health Directive notarized  March 30 1999  Sent to scanning February 15, 2012  [facility]  Ashley GODFREY MA         Hyperlipidemia LDL goal <100 10/31/2010     Priority: Medium     CHOL      175   7/6/2011  HDL       69   7/6/2011  LDL       93   7/6/2011  TRIG       61   7/6/2011  CHOLHDLRATIO      3.0   7/6/2011  On simvastatin 20mg       Family history of breast cancer      Priority: Medium     Abnormal CXR (chest x-ray)      Priority: Medium     Read as emphysema       Renal hypertension 05/09/2005     Priority: Medium     Known STU--seen on 2001 MRA--did not want angioplasty in past  Controlled on diltiazem, losartan and  spironolactone  Problem list name updated by automated process. Provider to review       Atherosclerosis of renal artery (H) 05/09/2005     Priority: Medium     STU by MRA (?records)--medical therapy elected by patient.       Paroxysmal supraventricular tachycardia (H) 05/09/2005     Priority: Medium     SVT-infrequent episode on holter in 2000  Negative stress echo in 2002       Other osteoporosis 05/09/2005     Priority: Medium     Dx 2004 and put on fosamax   BMD 3/05 (T scores):  Lumbar -4.3, Right prox femur -4.0, Left prox femur -4.0  BMD 7/08 (T scores):  Lumbar -4.2, Right prox femur -3.9, Left prox femur -4.1  Fosamax changed to IV reclast and miacalcin 3/09 by Dr. Vanessa  Had second reclast injection 8/11          Past Medical History:    Past Medical History:   Diagnosis Date     Abnormal CXR (chest x-ray)      Atherosclerosis of renal artery (H)      Atrial fibrillation (H) 3/27/2014     Basal cell carcinoma      ESSENTIAL TREMOR 5/9/2005     Family history of breast cancer      Hyperlipidemia LDL goal <100 10/31/2010     Hypertension goal BP (blood pressure) < 140/90 10/3/2013     Other and unspecified hyperlipidemia      Other osteoporosis      Pacemaker      Paroxysmal supraventricular tachycardia (H)      Postmenopausal atrophic vaginitis      Stroke (H) 2/10/2014     Syncope 10/9/2013     Unspecified essential hypertension        Past Surgical History:    Past Surgical History:   Procedure Laterality Date     EYE SURGERY  2/21/12    left eye cataract     IMPLANT PACEMAKER       SURGICAL HISTORY OF -   1965    FEMORAL HERNIA REPAIR       Family History:    Family History   Problem Relation Age of Onset     Cerebrovascular Disease Mother      Diabetes Mother      Breast Cancer Sister      HEART DISEASE Brother      Cerebrovascular Disease Brother      HEART DISEASE Brother      Prostate Cancer Brother      HEART DISEASE Brother      HEART DISEASE Sister      Gynecology Daughter      Cancer Son       Lung       Social History:  Marital Status:   [5]  Social History   Substance Use Topics     Smoking status: Never Smoker     Smokeless tobacco: Never Used     Alcohol use No        Medications:      CALCIUM + D 600-200 MG-UNIT PO TABS   fish oil-omega-3 fatty acids (OMEGA 3) 1000 MG capsule   furosemide (LASIX) 20 MG tablet   loratadine (CLARITIN) 10 MG tablet   metoprolol succinate (TOPROL-XL) 25 MG 24 hr tablet   simvastatin (ZOCOR) 20 MG tablet   VITAMIN D 1000 UNIT OR TABS   warfarin (JANTOVEN) 2.5 MG tablet   acyclovir (ZOVIRAX) 5 % ointment   order for DME         Review of Systems   Constitutional: Positive for activity change and appetite change.   HENT: Negative.    Eyes: Negative.    Respiratory: Negative.    Cardiovascular: Negative.    Gastrointestinal: Positive for abdominal pain.   Endocrine: Negative.    Genitourinary: Negative.    Musculoskeletal: Negative.    Skin: Negative.    Allergic/Immunologic: Negative.    Neurological: Positive for weakness.   Hematological: Negative.    Psychiatric/Behavioral: Negative.    All other systems reviewed and are negative.      Physical Exam   BP: 158/70  Pulse: 101  Temp: 98.5  F (36.9  C)  Resp: 20  Weight: 36.3 kg (80 lb)  SpO2: 98 %      Physical Exam   Constitutional: She is oriented to person, place, and time. She appears well-developed and well-nourished. No distress.   HENT:   Head: Normocephalic and atraumatic.   Eyes: Conjunctivae and EOM are normal. Pupils are equal, round, and reactive to light. Left eye exhibits no discharge. No scleral icterus.   Neck: Normal range of motion. Neck supple. No JVD present. No tracheal deviation present. No thyromegaly present.   Cardiovascular: Regular rhythm.  Exam reveals no gallop and no friction rub.    Murmur heard.  Pulmonary/Chest: No stridor. No respiratory distress. She has no wheezes. She has no rales. She exhibits no tenderness.   Abdominal: Soft. She exhibits no distension and no mass. There is no  tenderness. There is no rebound and no guarding.   Musculoskeletal: She exhibits no tenderness or deformity.   Lymphadenopathy:     She has no cervical adenopathy.   Neurological: She is alert and oriented to person, place, and time. She displays normal reflexes. No cranial nerve deficit. She exhibits normal muscle tone. Coordination normal.   Skin: She is not diaphoretic. There is pallor.   Psychiatric: She has a normal mood and affect. Her behavior is normal. Judgment and thought content normal.       ED Course     ED Course     Procedures               Critical Care time:  none                   ED Medications:  Medications   sodium chloride 0.9% infusion (not administered)   0.9% sodium chloride BOLUS (0 mLs Intravenous Stopped 7/12/18 2030)       ED Vitals:  Vitals:    07/12/18 1743 07/12/18 1915 07/12/18 2053 07/12/18 2101   BP: 158/70   144/68   Pulse: 101  95    Resp: 20  18    Temp: 98.5  F (36.9  C)      TempSrc: Temporal      SpO2: 98% 98% 96%    Weight: 36.3 kg (80 lb)          ED Labs and Imaging:  Results for orders placed or performed during the hospital encounter of 07/12/18 (from the past 24 hour(s))   CBC with platelets differential   Result Value Ref Range    WBC 5.9 4.0 - 11.0 10e9/L    RBC Count 4.35 3.8 - 5.2 10e12/L    Hemoglobin 13.1 11.7 - 15.7 g/dL    Hematocrit 40.4 35.0 - 47.0 %    MCV 93 78 - 100 fl    MCH 30.1 26.5 - 33.0 pg    MCHC 32.4 31.5 - 36.5 g/dL    RDW 13.0 10.0 - 15.0 %    Platelet Count 308 150 - 450 10e9/L    Diff Method Automated Method     % Neutrophils 75.3 %    % Lymphocytes 17.3 %    % Monocytes 5.8 %    % Eosinophils 0.8 %    % Basophils 0.5 %    % Immature Granulocytes 0.3 %    Nucleated RBCs 0 0 /100    Absolute Neutrophil 4.5 1.6 - 8.3 10e9/L    Absolute Lymphocytes 1.0 0.8 - 5.3 10e9/L    Absolute Monocytes 0.3 0.0 - 1.3 10e9/L    Absolute Eosinophils 0.1 0.0 - 0.7 10e9/L    Absolute Basophils 0.0 0.0 - 0.2 10e9/L    Abs Immature Granulocytes 0.0 0 - 0.4 10e9/L     Absolute Nucleated RBC 0.0    Comprehensive metabolic panel   Result Value Ref Range    Sodium 132 (L) 133 - 144 mmol/L    Potassium 4.2 3.4 - 5.3 mmol/L    Chloride 99 94 - 109 mmol/L    Carbon Dioxide 21 20 - 32 mmol/L    Anion Gap 12 3 - 14 mmol/L    Glucose 91 70 - 99 mg/dL    Urea Nitrogen 17 7 - 30 mg/dL    Creatinine 0.72 0.52 - 1.04 mg/dL    GFR Estimate 75 >60 mL/min/1.7m2    GFR Estimate If Black >90 >60 mL/min/1.7m2    Calcium 9.4 8.5 - 10.1 mg/dL    Bilirubin Total 0.9 0.2 - 1.3 mg/dL    Albumin 3.4 3.4 - 5.0 g/dL    Protein Total 7.7 6.8 - 8.8 g/dL    Alkaline Phosphatase 192 (H) 40 - 150 U/L    ALT 23 0 - 50 U/L    AST 43 0 - 45 U/L   TSH with free T4 reflex   Result Value Ref Range    TSH 4.40 (H) 0.40 - 4.00 mU/L   INR   Result Value Ref Range    INR 4.59 (H) 0.86 - 1.14   T4 free   Result Value Ref Range    T4 Free 1.83 (H) 0.76 - 1.46 ng/dL   Abdomen, flat/upright (2 views)    Narrative    XR ABDOMEN TWO VIEWS  7/12/2018 8:27 PM     COMPARISON: None.    HISTORY: Abdominal spasm and pain. Constipation noted on CT imaging on  July 5, 2018.  Evaluate for interval resolution of constipation.    FINDINGS: Abdominal bowel gas pattern is nonspecific. There is no  evidence for free intraperitoneal air.      Impression    IMPRESSION: No evidence for bowel obstruction or free air.   US Pelvic Complete with Transvaginal    Narrative    PELVIC ULTRASOUND TRANSABDOMINAL IMAGING AND TRANSVAGINAL IMAGING   7/12/2018 8:38 PM    HISTORY: Lower abdominal spasms and discomfort. Abnormal endometrial  stripe on CT imaging from July 5, 2018.  Evaluate for uterine  malignancy versus other acute process;     COMPARISON: A CT which included the pelvis on 7/5/2018.    TECHNIQUE: Transabdominal imaging was performed. Transvaginal imaging  was also performed.     FINDINGS: The uterus measures 5.9 x 3.1 cm. It demonstrates normal  echogenicity with no myometrial abnormality seen. The endometrium is  normal measuring 0.27 cm.  The endometrial canal is distended with  fluid measuring 2.4 x 1.9 x 3.0 cm for an estimated volume of 7.2 mL.  The right ovary is not seen. The left ovary contains a 0.5 cm  follicle. Normal blood flow is seen to the left ovary. No adnexal  pathology is seen. There is a small amount of free fluid in the  cul-de-sac.      Impression    IMPRESSION: The uterus is fluid-filled as described above suggesting  hydrometra.    ANITA REIS MD     6:27 PM Patient Assessed.     Prior or recent diagnostic imaging and work-up:  CT ABDOMEN AND PELVIS WITHOUT CONTRAST   7/5/2018 10:24 AM      HISTORY: Generalized abdominal pain.     TECHNIQUE: Volumetric helical sections were acquired from the lung  bases through the ischial tuberosities without IV contrast. IV  contrast was not administered at the request of the referring  physician. Coronal images were also reconstructed. Radiation dose for  this scan was reduced using automated exposure control, adjustment of  the mA and/or kV according to patient size, or iterative  reconstruction technique.     COMPARISON: CT of the abdomen and pelvis performed 6/9/2010.     FINDINGS: There is a moderate to large amount of stool throughout the  colon. No convincing evidence for colitis or diverticulitis. No bowel  obstruction. No free fluid in the pelvis. There are multiple bilateral  renal cysts, with the largest in the upper pole on the left measuring  3.3 cm. No hydronephrosis. The liver, gallbladder, spleen, adrenal  glands, and pancreas have otherwise unremarkable noncontrast  appearances. The uterus appears retroverted. The endometrium appears  prominent given the patient's age. There is moderate atherosclerotic  aortoiliac calcification. Cardiac enlargement. Coronary artery  calcification. Cardiac leads are noted in the right heart. Mild  scattered scarring and/or atelectasis at both lung bases. Degenerative  changes are noted at the lumbosacral interspace.         IMPRESSION:    1. Moderate to large amount of stool throughout the colon.  2. The endometrium appears prominent given the patient's age, and  endometrial thickening or mass is not excluded. The endometrium had a  similar appearance on a prior CT examination performed 6/9/2010.  Consider pelvic ultrasound for further evaluation.  3. Cardiac enlargement.    Assessments & Plan (with Medical Decision Making)   Clinical impression: 89-year-old female who presented for concern for generalized weakness with diarrhea, intermittent episodes of lower abdominal spasms and discomfort and concern for poor oral intake.  Exact cause of her symptoms is unclear what is likely multifactorial.    History is obtained from the patient and her son who is at the bedside.  Patient has been seen by her primary care provider and had CT imaging obtained on July 5 which showed moderate to large amount of stool in the colon and concerned that she has a more prominent endometrium which is concerning for endometrial thickening and possible mass which could not be excluded.  The endometrium was noted to have a similar size on CT imaging from June 2010.  A pelvic ultrasound was recommended for follow-up.  Patient has a pelvic ultrasound scheduled for tomorrow July 13.  She presented with her son because of concern that she has not been eating well but with progressive abdominal pain and discomfort.  Patient is on warfarin for history of atrial fibrillation and stroke per her son.  She has had no fever no chills and no new change to her medications.  She is currently on metoprolol, simvastatin and furosemide.  On my exam she is a thin frail appearing 89-year-old.  Blood pressure is 158/70 she was not tachycardic at rest on my exam although she was listed to have a heart rate of 101 in triage.  She is afebrile 96% on room air.  Her abdomen is soft, non-distended she does not have any palpable masses on exam and has no rebound or guarding.  Rectal and speculum  exam was deferred.      ED course and Plan:  I reviewed patient's medical records including her last ED visit from June 2018 when she was seen for a fall with scalp laceration.  I reviewed her clinic note with Dr. Jaime.  Also reviewed her CT imaging that was done on July 5, 2018.  Please see the interpreting radiologist report above.  She had mild hyponatremia with a sodium of 132 and lab workup today.  She did have an elevated alk phos during her clinic visits from July 5, 2018.  Her alk phos is 180.  Today it is 192.  She has normal liver enzymes.  Because she was encouraged to have a pelvic ultrasound for follow-up from CT imaging showing concern for an endometrium that appeared more prominent a pelvic ultrasound was completed today to exclude uterine malignancy given patient's intermittent complaint of abdominal spasms and discomfort.  She was given IV fluids.  Son is asking that the patient be admitted for concern for weakness with poor oral intake and persistent loose stools.  Patient has not been on any antibiotics by report and has not had any foreign travel.  Patient's INR today is supratherapeutic at 4.59.  Post pelvic ultrasound I was informed the patient now developed some vaginal bleeding.  Pelvic ultrasound today shows hydro-metria.  There was a distended endometrial canal the right ovary was not well identified.  Please see the interpreting radiologist report above. Her TSH is elevated at 4.40 barely and her free T4 is slightly elevated at 1.83.    With hydrometra noted on pelvic ultrasound I spoke with GYN on -call. I spoke with Dr Howell.-to review patient's presentation, and pelvic ultrasound findings. He reviewed her pelvic ultrasound and did not think this was the cause of her abdominal spasm and there is no indication for intervention from a gynecology standpoint.     I spoke with Dr Goss- on-call hospitalist at 9:30 PM.  He agreed to assume care upon admission after reviewing rationale  for admission including consultation with OB/GYN.  Plan is to admit for generalized weakness of unclear cause in the context of loose stools with recent use of MiraLAX and magnesium citrate to help with concern for constipation based on CT imaging and for further care.  Plan is to hold warfarin without any need for emergent reversal given low concern for acute active hemorrhage with supratherapeutic INR, with possible post vaginal bleeding after pelvic ultrasound completed to evaluate for recent Annette changes noted on CT imaging.        Disclaimer: This note consists of symbols derived from keyboarding, dictation and/or voice recognition software. As a result, there may be errors in the script that have gone undetected. Please consider this when interpreting information found in this chart.  I have reviewed the nursing notes.    I have reviewed the findings, diagnosis, plan and need for follow up with the patient.       New Prescriptions    No medications on file       Final diagnoses:   Muscle weakness (generalized)   Abdominal pain, generalized - Occasional episodes of abdominal spasms more in the mid and lower abdominal area and suprapubic and pelvic area over the last 3 weeks   Supratherapeutic INR - INR was 4.59.  No evidence for active hemorrhage today.  Patient does have some mild post pelvic ultrasound bleeding   Thyroid function test abnormal - mildly elevated TSH, and T4   Abnormal pelvic ultrasound - hydrometra noted with distended endometrial canal on pelvic ultrasound.  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 chronic given CT imaging showing chronic endometrial changes in the past   This document serves as a record of the services and decisions personally performed and made by Julio Bishop, *. The HPI was created on his behalf by   Elma Durán, a trained medical scribe. The creation of this document is based the provider's statements to the medical scribe.  Elma Durán 6:26 PM 7/12/2018    Provider:    The information in this document, created by the medical scribe for me, accurately reflects the services I personally performed and the decisions made by me. I have reviewed and approved this document for accuracy prior to leaving the patient care area.  Julio Bishop, * 6:26 PM 7/12/2018 7/12/2018   [facility] EMERGENCY DEPARTMENT     Julio Bishop MD  07/12/18 3063

## 2018-07-12 NOTE — ED NOTES
abd pain, nausea for the past week. Was seen 1 week ago dx with constipation, pt has been having BM's but still has pain. Decrease appetite. Also c/o bilat flank pain. Son cares for pt.

## 2018-07-12 NOTE — IP AVS SNAPSHOT
"          [facility]: [phone]                                              INTERAGENCY TRANSFER FORM - LAB / IMAGING / EKG / EMG RESULTS   2018                    Hospital Admission Date: 2018  JULITO NEGRON   : 1924  Sex: Female        Attending Provider: Nahum Jensen MD     Allergies:  Allopurinol, Ampicillin, Cipro [Ciprofloxacin], Levaquin, Lisinopril, Paxil [Paroxetine], Penicillins, Sulfa Drugs, Zithromax [Macrolides], Zoloft    Infection:  None   Service:  INTERNAL MED    Ht:  1.562 m (5' 1.5\")   Wt:  36.3 kg (80 lb)   Admission Wt:  36.3 kg (80 lb)    BMI:  14.87 kg/m 2   BSA:  1.26 m 2            Patient PCP Information     Provider PCP Type    Milena Jaime DO General         Lab Results - 3 Days      INR [[id number]] (Abnormal)  Resulted: 18 0608, Result status: Final result    Ordering provider: Elias Goss MD  18 0000 Resulting lab: [facility]    Specimen Information    Type Source Collected On   Blood  18 0551          Components       Value Reference Range Flag Lab   INR 1.75 0.86 - 1.14 H 59            INR [[id number]] (Abnormal)  Resulted: 07/15/18 0837, Result status: Final result    Ordering provider: Elias Goss MD  07/15/18 0000 Resulting lab: [facility]    Specimen Information    Type Source Collected On   Blood  07/15/18 0556          Components       Value Reference Range Flag Lab   INR 2.02 0.86 - 1.14 H 59            INR [[id number]] (Abnormal)  Resulted: 18 0654, Result status: Final result    Ordering provider: Elias Goss MD  18 0000 Resulting lab: [facility]    Specimen Information    Type Source Collected On   Blood  18 0634          Components       Value Reference Range Flag Lab   INR 4.16 0.86 - 1.14 H 59            INR [[id number]] (Abnormal)  Resulted: 18 0722, Result status: Final result    Ordering provider: " Elias Goss MD  07/13/18 0008 Resulting lab: [facility]    Specimen Information    Type Source Collected On   Blood  07/13/18 0559          Components       Value Reference Range Flag Lab   INR 5.62 0.86 - 1.14 HH 59   Comment:         Critical Value called to and read back by  CHLOÉ HERCULES RN [facility] 07/13/18 0718               Comprehensive metabolic panel [[id number]] (Abnormal)  Resulted: 07/13/18 0633, Result status: Final result    Ordering provider: Elias Goss MD  07/13/18 0001 Resulting lab: [facility]    Specimen Information    Type Source Collected On   Blood  07/13/18 0559          Components       Value Reference Range Flag Lab   Sodium 135 133 - 144 mmol/L  59   Potassium 4.2 3.4 - 5.3 mmol/L  59   Chloride 102 94 - 109 mmol/L  59   Carbon Dioxide 23 20 - 32 mmol/L  59   Anion Gap 10 3 - 14 mmol/L  59   Glucose 63 70 - 99 mg/dL L 59   Urea Nitrogen 15 7 - 30 mg/dL  59   Creatinine 0.65 0.52 - 1.04 mg/dL  59   GFR Estimate 85 >60 mL/min/1.7m2  59   Comment:  Non  GFR Calc   GFR Estimate If Black >90 >60 mL/min/1.7m2  59   Comment:  African American GFR Calc   Calcium 8.6 8.5 - 10.1 mg/dL  59   Bilirubin Total 0.9 0.2 - 1.3 mg/dL  59   Albumin 3.0 3.4 - 5.0 g/dL L 59   Protein Total 7.0 6.8 - 8.8 g/dL  59   Alkaline Phosphatase 174 40 - 150 U/L H 59   ALT 24 0 - 50 U/L  59   AST 44 0 - 45 U/L  59            CBC with platelets [[id number]]  Resulted: 07/13/18 0615, Result status: Final result    Ordering provider: Elias Goss MD  07/13/18 0001 Resulting lab: [facility]    Specimen Information    Type Source Collected On   Blood  07/13/18 0559          Components       Value Reference Range Flag Lab   WBC 7.7 4.0 - 11.0 10e9/L  59   RBC Count 4.16 3.8 - 5.2 10e12/L  59   Hemoglobin 12.3 11.7 - 15.7 g/dL  59   Hematocrit 38.6 35.0 - 47.0 %  59   MCV 93 78 - 100 fl  59   MCH 29.6 26.5 - 33.0 pg  59   MCHC 31.9 31.5 -  36.5 g/dL  59   RDW 13.2 10.0 - 15.0 %  59   Platelet Count 270 150 - 450 10e9/L  59            Testing Performed By     Lab - Abbreviation Name Director Address Valid Date Range    59 - Unknown [facility] Unknown [address]  [place] [address] 12/31/14 1006 - Present            Unresulted Labs (24h ago through future)    Start       Ordered    07/13/18 0600  INR  DAILY,   Routine      07/13/18 0008         Imaging Results - 3 Days      CT Lumbar Spine w/o Contrast [[id number]]  Resulted: 07/13/18 1606, Result status: Final result    Ordering provider: Elias Goss MD  07/13/18 0006 Resulted by: Bart Leggett MD    Performed: 07/13/18 0717 - 07/13/18 0745 Resulting lab: RADIOLOGY RESULTS    Narrative:       CT LUMBAR SPINE WITHOUT CONTRAST  7/13/2018 7:45 AM     HISTORY: Abdomen and pelvic pain, but worse with movement.      TECHNIQUE: Axial images of the lumbar spine were obtained without  intravenous contrast. Multiplanar reformations were performed.   Radiation dose for this scan was reduced using automated exposure  control, adjustment of the mA and/or kV according to patient size, or  iterative reconstruction technique.    COMPARISON: 9/30/2016 x-ray.    FINDINGS: Reconstructed images demonstrate normal vertebral body  height. There are advanced degenerative changes at the L5-S1 level.    T12-L1: No disc herniation or stenosis. Facet joints are unremarkable.    L1-L2:  No disc herniation or stenosis. Facet joints are unremarkable.    L2-L3:  Mild disc bulge. Mild central stenosis. Neural foramina are  patent. Facet joints are unremarkable. Probable hemangioma right  posterior L3 vertebra.     L3-L4:  Mild disc bulge and ligamentum flavum hypertrophy. Moderate  central stenosis. Neural foramina are patent.     L4-L5:  Disc bulge and ligamentum flavum hypertrophy result in mild to  moderate central stenosis. Neural foramina are patent.     L5-S1:  Advanced facet degenerative  changes. Grade 1 degenerative  spondylolisthesis. Endplate irregularity appears degenerative. No  paraspinous soft tissue inflammation. No central stenosis. Mild  inferior foraminal narrowing bilaterally.        Impression:       IMPRESSION:    1. Advanced degenerative disc disease and grade 1 degenerative  spondylolisthesis at L5-S1 without significant stenosis.  2. At L3-L4 there is moderate central stenosis.  2. At L4-L5 there is mild to moderate central stenosis. No other  significant stenosis identified.  4. No evidence of compression fracture.     REBECCA VILLEGAS MD      CT Thoracic Spine w Contrast [[id number]]  Resulted: 07/13/18 0931, Result status: Final result    Ordering provider: Elias Goss MD  07/13/18 0711 Resulted by: Filipe Pavon MD    Performed: 07/13/18 0717 - 07/13/18 0824 Resulting lab: RADIOLOGY RESULTS    Narrative:       CT THORACIC SPINE WITHOUT CONTRAST   7/13/2018 8:24 AM     HISTORY: Abdominal pain.      TECHNIQUE: Axial images of the thoracic spine were obtained without  intravenous contrast. Multiplanar reformations were performed.   Radiation dose for this scan was reduced using automated exposure  control, adjustment of the mA and/or kV according to patient size, or  iterative reconstruction technique.    COMPARISON: CT abdomen and pelvis 7/5/2018    FINDINGS:  The alignment of the thoracic spine is normal although  kyphosis is exaggerated.  There is a burst fracture of T11 involving  superior and inferior endplates with acute-appearing fracture lines.  The posterosuperior and posteroinferior endplates protrude slightly  into the spinal canal but cause only mild narrowing of the spinal  canal. There is adjacent soft tissue swelling best appreciated on  coronal images and axial image 109 of series 5. Digital   radiograph from the previous CT scan shows that there is no soft  tissue swelling and that there was only mild compression deformity of  the superior endplate, and  cross-sectional images through the inferior  endplate do not show any widening or retropulsion. Thus the fracture  has worsened since 7/5/2018.    There is also a compression fracture of the superior endplate of T6  with no acute fracture lines and no soft tissue swelling.    No other fractures are seen. There are small bilateral pleural  effusions. There is cardiomegaly with pacemaker in the heart. There is  a cyst in the upper pole of the left kidney.      Impression:       IMPRESSION:  1. Recent burst fracture of T11 involving superior and inferior  endplates with slight retropulsion into the spinal canal. This has  progressed substantially since 7/5/2018 and now has much more adjacent  soft tissue swelling.  2. Old compression fracture of the superior endplate of T6.    DMAION CARUSO MD      CT Abdomen Pelvis w Contrast [[id number]]  Resulted: 07/13/18 0838, Result status: Final result    Ordering provider: Elias Goss MD  07/12/18 2357 Resulted by: Yao Swann MD    Performed: 07/13/18 0717 - 07/13/18 0743 Resulting lab: RADIOLOGY RESULTS    Narrative:       CT ABDOMEN AND PELVIS WITH CONTRAST 7/13/2018 7:43 AM     HISTORY:  Abdominal pain.     COMPARISON: CT abdomen and pelvis 7/5/2018.    TECHNIQUE: Axial images from the lung bases to the symphysis are  performed with additional coronal reformatted images. 39 mL of Isovue  370 are given intravenously.  Radiation dose for this scan was reduced  using automated exposure control, adjustment of the mA and/or kV  according to patient size, or iterative reconstruction technique.    FINDINGS:   Linear atelectasis is present at the lung bases in  addition to a trace amount of pleural fluid bilaterally which is new.  Cardiomegaly.    Abdomen: The liver, spleen, gallbladder, pancreas and adrenal glands  are unremarkable. Bilateral simple-appearing renal cysts are present.  No hydronephrosis. No obvious renal calculi. Aorta is calcified  without evidence  of aneurysm or dissection. The bowel is normal in  caliber without obstruction. Bowel anastomosis right lower quadrant is  patent.    Pelvis: Bladder is decompressed but otherwise unremarkable. Uterus is  visualized but the endometrium appears thickened on series 4, image  54. May be abnormal in a postmenopausal patient. No enlarged pelvic or  inguinal lymph nodes. No free pelvic fluid. Bone window examination  demonstrates no aggressive-appearing bone lesions. Osteopenia is  noted. Correlate with concurrently performed spine CT reports.      Impression:       IMPRESSION:  1. Cardiomegaly with trace pleural effusions.  2. Endometrial thickening of uncertain significance. Endometrial  hyperplasia or neoplasia is possible. No significant change since  prior CT. No enlarged lymph nodes.  3. No bowel obstruction or diverticulitis. Appendix not visualized.  Bowel anastomosis right lower quadrant is patent.  4. Simple renal cysts bilaterally. Abdominal and pelvic organs are  otherwise within normal limits.    MAKEDA BLEDSOE MD      Testing Performed By     Lab - Abbreviation Name Director Address Valid Date Range    104 - Rad [facility] RADIOLOGY RESULTS Unknown Unknown 02/16/05 1553 - Present            Encounter-Level Documents:     There are no encounter-level documents.      Order-Level Documents:     There are no order-level documents.

## 2018-07-12 NOTE — IP AVS SNAPSHOT
` `     [facility] SURGICAL: [phone]            Medication Administration Report for Shainka Stahl as of 07/16/18 1205   Legend:    Given Hold Not Given Due Canceled Entry Other Actions    Time Time (Time) Time  Time-Action       Inactive    Active    Linked        Medications 07/10/18 07/11/18 07/12/18 07/13/18 07/14/18 07/15/18 07/16/18    acetaminophen (TYLENOL) tablet 325 mg  Dose: 325 mg  Freq: EVERY 4 HOURS PRN Route: PO  PRN Reasons: mild pain,fever  Start: 07/15/18 1308   Admin Instructions: Maximum acetaminophen dose from all sources = 75 mg/kg/day not to exceed 4 grams/day.    Admin. Amount: 1 tablet (1 × 325 mg tablet)  Dispense Loc: [facility] 200               acetaminophen (TYLENOL) tablet 650 mg  Dose: 650 mg  Freq: 3 TIMES DAILY Route: PO  Start: 07/14/18 2000   Admin Instructions: Alternate ibuprofen (if ordered) with acetaminophen.  Maximum acetaminophen dose from all sources = 75 mg/kg/day not to exceed 4 grams/day.    Admin. Amount: 2 tablet (2 × 325 mg tablet)  Last Admin: 07/16/18 0805  Dispense Loc: [facility] 200         2124 (650 mg)-Given        0621 (650 mg)-Given              1406 (650 mg)-Given       2012 (650 mg)-Given        0805 (650 mg)-Given       [ ] 1400       [ ] 2000           loratadine (CLARITIN) tablet 10 mg  Dose: 10 mg  Freq: DAILY Route: PO  Start: 07/13/18 0800   Admin. Amount: 1 tablet (1 × 10 mg tablet)  Last Admin: 07/16/18 0805  Dispense Loc: [facility] 200        0847 (10 mg)-Given        (0925)-Not Given        0803 (10 mg)-Given        0805 (10 mg)-Given           melatonin tablet 1 mg  Dose: 1 mg  Freq: AT BEDTIME PRN Route: PO  PRN Reason: sleep  Start: 07/12/18 6047   Admin Instructions: Do not give unless at least 6 hours of uninterrupted sleep is expected.    Admin. Amount: 1 tablet (1 × 1 mg tablet)  Dispense Loc: [facility] 400               metoprolol succinate (TOPROL-XL) 24 hr tablet 25 mg  Dose: 25 mg  Freq: 2 TIMES DAILY Route:  PO  Start: 07/13/18 0000   Admin Instructions: DO NOT CRUSH. Tablet may be split in half along score line.    Admin. Amount: 1 tablet (1 × 25 mg tablet)  Last Admin: 07/16/18 0805  Dispense Loc: [facility] 200        0111 (25 mg)-Given       0847 (25 mg)-Given       2207 (25 mg)-Given        0923 (25 mg)-Given       2124 (25 mg)-Given        0803 (25 mg)-Given       2011 (25 mg)-Given        0805 (25 mg)-Given       [ ] 2000           naloxone (NARCAN) injection 0.1-0.4 mg  Dose: 0.1-0.4 mg  Freq: EVERY 2 MIN PRN Route: IV  PRN Reason: opioid reversal  Start: 07/12/18 2357   Admin Instructions: For respiratory rate LESS than or EQUAL to 8.  Partial reversal dose:  0.1 mg titrated q 2 minutes for Analgesia Side Effects Monitoring Sedation Level of 3 (frequently drowsy, arousable, drifts to sleep during conversation).Full reversal dose:  0.4 mg bolus for Analgesia Side Effects Monitoring Sedation Level of 4 (somnolent, minimal or no response to stimulation).  For ordered doses up to 2mg give IVP. Give each 0.4mg over 15 seconds in emergency situations. For non-emergent situations further dilute in 9mL of NS to facilitate titration of response.    Admin. Amount: 0.1-0.4 mg = 0.25-1 mL Conc: 0.4 mg/mL  Dispense Loc: [employer] 200  Volume: 1 mL               naproxen (NAPROSYN) tablet 250 mg  Dose: 250 mg  Freq: 2 TIMES DAILY WITH MEALS Route: PO  Start: 07/15/18 1730   Admin. Amount: 1 tablet (1 × 250 mg tablet)  Last Admin: 07/16/18 0805  Dispense Loc: [facility] 200          1743 (250 mg)-Given        0805 (250 mg)-Given       [ ] 1730           ondansetron (ZOFRAN-ODT) ODT tab 4 mg  Dose: 4 mg  Freq: EVERY 6 HOURS PRN Route: PO  PRN Reasons: nausea,vomiting  Start: 07/12/18 2357   Admin Instructions: This is Step 1 of nausea and vomiting management.  If nausea not resolved in 15 minutes, go to Step 2 prochlorperazine (COMPAZINE). Do not push through foil backing. Peel back foil and gently remove. Place on tongue  immediately. Administration with liquid unnecessary  With dry hands, peel back foil backing and gently remove tablet; do not push oral disintegrating tablet through foil backing; administer immediately on tongue and oral disintegrating tablet dissolves in seconds; then swallow with saliva; liquid not required.    Admin. Amount: 1 tablet (1 × 4 mg tablet)  Dispense Loc: [facility] 200              Or  ondansetron (ZOFRAN) injection 4 mg  Dose: 4 mg  Freq: EVERY 6 HOURS PRN Route: IV  PRN Reasons: nausea,vomiting  Start: 07/12/18 2357   Admin Instructions: This is Step 1 of nausea and vomiting management.  If nausea not resolved in 15 minutes, go to Step 2 prochlorperazine (COMPAZINE).  Irritant. For ordered doses up to 4 mg, give IV Push undiluted over 2-5 minutes.    Admin. Amount: 4 mg = 2 mL Conc: 4 mg/2 mL  Dispense Loc: [facility] 200  Infused Over: 2-5 Minutes  Volume: 2 mL               polyethylene glycol (MIRALAX/GLYCOLAX) Packet 17 g  Dose: 17 g  Freq: DAILY PRN Route: PO  PRN Reason: constipation  Start: 07/15/18 1354   Admin Instructions: 1 Packet = 17 grams. Mixed prescribed dose in 8 ounces of water. Follow with 8 oz. of water.    Admin. Amount: 17 g  Dispense Loc: [facility] 200               prochlorperazine (COMPAZINE) injection 5 mg  Dose: 5 mg  Freq: EVERY 6 HOURS PRN Route: IV  PRN Reasons: nausea,vomiting  Start: 07/12/18 2357   Admin Instructions: This is Step 2 of nausea and vomiting management. Give if nausea not resolved 15 minutes after giving ondansetron (ZOFRAN). If nausea not resolved in 15 minutes, go to Step 3 metoclopramide (REGLAN), if ordered.  For ordered doses up to 10 mg, give IV Push undiluted. Each 5mg over 1 minute.    Admin. Amount: 5 mg = 1 mL Conc: 5 mg/mL  Dispense Loc: [facility] 200  Infused Over: 1-2 Minutes  Volume: 1 mL              Or  prochlorperazine (COMPAZINE) tablet 5 mg  Dose: 5 mg  Freq: EVERY 6 HOURS PRN Route: PO  PRN Reason: vomiting  Start: 07/12/18  2357   Admin Instructions: This is Step 2 of nausea and vomiting management. Give if nausea not resolved 15 minutes after giving ondansetron (ZOFRAN). If nausea not resolved in 15 minutes, go to Step 3 metoclopramide (REGLAN), if ordered.    Admin. Amount: 1 tablet (1 × 5 mg tablet)  Dispense Loc: [facility] 200              Or  prochlorperazine (COMPAZINE) Suppository 12.5 mg  Dose: 12.5 mg  Freq: EVERY 12 HOURS PRN Route: RE  PRN Reasons: nausea,vomiting  Start: 07/12/18 2357   Admin Instructions: This is Step 2 of nausea and vomiting management. Give if nausea not resolved 15 minutes after giving ondansetron (ZOFRAN). If nausea not resolved in 15 minutes, go to Step 3 metoclopramide (REGLAN), if ordered.    Admin. Amount: 0.5 suppository (0.5 × 25 mg suppository)  Dispense Loc: [facility] 200               senna-docusate (SENOKOT-S;PERICOLACE) 8.6-50 MG per tablet 1 tablet  Dose: 1 tablet  Freq: AT BEDTIME Route: PO  Start: 07/15/18 2200   Admin. Amount: 1 tablet  Last Admin: 07/15/18 2118  Dispense Loc: [facility] 200          2118 (1 tablet)-Given        [ ] 2200           simvastatin (ZOCOR) tablet 20 mg  Dose: 20 mg  Freq: AT BEDTIME Route: PO  Start: 07/13/18 0000   Admin. Amount: 1 tablet (1 × 20 mg tablet)  Last Admin: 07/15/18 2118  Dispense Loc: [facility] 200        0112 (20 mg)-Given       2207 (20 mg)-Given        2124 (20 mg)-Given        2118 (20 mg)-Given        [ ] 2200           sodium chloride (PF) 0.9% PF flush 3 mL  Dose: 3 mL  Freq: EVERY 8 HOURS Route: IK  Start: 07/13/18 1630   Admin. Amount: 3 mL  Last Admin: 07/16/18 0806  Dispense Loc: [facility] [facility]  Volume: 3 mL        1622 (3 mL)-Given       2218 (3 mL)-Given               0927 (3 mL)-Given       1729 (3 mL)-Given        0644 (3 mL)-Given       1745 (3 mL)-Given        0000 (3 mL)-Given [C]       0806 (3 mL)-Given       [ ] 1500       [ ] 2300           Warfarin Therapy Reminder (Check START DATE - warfarin may be starting in the  FUTURE)  Dose: 1 each  Freq: CONTINUOUS PRN Route: XX  Start: 18 0008   Admin Instructions: Patient is on Warfarin Therapy - check for daily order    Admin. Amount: 1 each  Dispense Loc: [facility]              Future Medications  Medications 07/10/18 07/11/18 07/12/18 07/13/18 07/14/18 07/15/18 07/16/18       warfarin (COUMADIN) tablet 3 mg  Dose: 3 mg  Freq: ONCE AT 6PM Route: PO  Start: 18 1800   Admin. Amount: 1 tablet (1 × 3 mg tablet)  Dispense Loc: [facility] [employer] [facility]  Administrations Remainin           [ ] 1800          Completed Medications  Medications 07/10/18 07/11/18 07/12/18 07/13/18 07/14/18 07/15/18 07/16/18         Dose: 3 mg  Freq: ONCE AT 6PM Route: PO  Start: 07/15/18 1800   End: 07/15/18 1742   Admin. Amount: 1 tablet (1 × 3 mg tablet)  Last Admin: 07/15/18 174  Dispense Loc: [facility] [facility] 200  Administrations Remainin          1742 (3 mg)-Given           Discontinued Medications  Medications 07/10/18 07/11/18 07/12/18 07/13/18 07/14/18 07/15/18 07/16/18         Dose: 650 mg  Freq: EVERY 4 HOURS PRN Route: RE  PRN Reason: mild pain  Start: 18   End: 07/15/18 1349   Admin Instructions: Alternate ibuprofen (if ordered) with acetaminophen.  Maximum acetaminophen dose from all sources = 75 mg/kg/day not to exceed 4 grams/day.    Admin. Amount: 1 suppository (1 × 650 mg suppository)  Dispense Loc: [employer] 200          1349-Med Discontinued          Dose: 650 mg  Freq: EVERY 4 HOURS PRN Route: PO  PRN Reason: mild pain  Start: 18   End: 18 1400   Admin Instructions: Alternate ibuprofen (if ordered) with acetaminophen.  Maximum acetaminophen dose from all sources = 75 mg/kg/day not to exceed 4 grams/day.    Admin. Amount: 2 tablet (2 × 325 mg tablet)  Last Admin: 18 1302  Dispense Loc: [facility] 200        1901 (325 mg)-Given        1302 (650 mg)-Given       1400-Med Discontinued           Rate: 75 mL/hr   Freq: CONTINUOUS Route: IV  Start:  18 2106   End: 18 1459   Last Admin: 18 0112  Dispense Loc: [employer] [facility]  Volume: 1,000 mL        0112 ( )-New Bag       1459-Med Discontinued            Dose: 50 mg  Freq: EVERY 8 HOURS PRN Route: PO  PRN Reason: moderate pain  Start: 18 1400   End: 07/15/18 1308   Admin. Amount: 1 tablet (1 × 50 mg tablet)  Dispense Loc: [facility] 200          1308-Med Discontinued          Dose: 2.5 mg  Freq: ONCE AT 6PM Route: PO  Start: 07/15/18 1800   End: 07/15/18 1148   Admin. Amount: 1 tablet (1 × 2.5 mg tablet)  Dispense Loc: [facility] 200  Administrations Remainin          1148-Med Discontinued          Dose: 1 each  Freq: NO DOSE TODAY (WARFARIN) Route: XX  Start: 18 1048   End: 18 2347   Admin Instructions: No dose of Warfarin due today per order    Admin. Amount: 1 each  Dispense Loc: [facility]         2347-Med Discontinued           Dose: 1 each  Freq: NO DOSE TODAY (WARFARIN) Route: XX  Start: 18 1343   End: 18 0042   Admin Instructions: No dose of Warfarin due today per order    Admin. Amount: 1 each  Dispense Loc: [facility]         0042-Med Discontinued      Medications 07/10/18 07/11/18 07/12/18 07/13/18 07/14/18 07/15/18 07/16/18

## 2018-07-12 NOTE — IP AVS SNAPSHOT
"` `           [facility] SURGICAL: [phone]                                              INTERAGENCY TRANSFER FORM - NURSING   2018                    Hospital Admission Date: 2018  JULITO NEGRON   : 1924  Sex: Female        Attending Provider: Nahum Jensen MD     Allergies:  Allopurinol, Ampicillin, Cipro [Ciprofloxacin], Levaquin, Lisinopril, Paxil [Paroxetine], Penicillins, Sulfa Drugs, Zithromax [Macrolides], Zoloft    Infection:  None   Service:  INTERNAL MED    Ht:  1.562 m (5' 1.5\")   Wt:  36.3 kg (80 lb)   Admission Wt:  36.3 kg (80 lb)    BMI:  14.87 kg/m 2   BSA:  1.26 m 2            Patient PCP Information     Provider PCP Type    Milena Jaime DO General      Current Code Status     Date Active Code Status Order ID Comments User Context       2018 11:57 PM Full Code [id number]  Elias Goss MD Inpatient       Code Status History     Date Active Date Inactive Code Status Order ID Comments User Context    2014 10:32 AM 2018 11:57 PM DNR/DNI [id number]  Vitor Flowers MD Outpatient    2/10/2014 12:02 PM 2014 10:32 AM DNR/DNI [id number]  Vitor Flowers MD Inpatient    2/10/2014 11:03 AM 2/10/2014 12:02 PM Full Code [id number]  Mana Martinez RN Inpatient    2014  4:28 PM 2014  4:16 PM Full Code [id number]  Emma Briones, RN Inpatient    10/9/2013 12:46 AM 10/10/2013  5:54 PM Full Code [id number]  Carly Najera, EUGENIA Inpatient      Advance Directives        Scanned docmt in ACP Activity?           Yes, scanned ACP docmt        Hospital Problems as of 2018              Priority Class Noted POA    Renal hypertension Medium  2005 Yes    Chronic atrial fibrillation (H) Medium  10/2/2015 Yes    * (Principal)Closed stable burst fracture of eleventh thoracic vertebra with routine healing Medium  2018 Yes    Diarrhea Medium  2018 Yes    Oropharyngeal dysphagia Medium  2018 Yes    Hyponatremia Medium  2018 Yes    " Asymptomatic bacteriuria Medium  7/13/2018 Yes    Vaginal bleeding Medium  7/13/2018 Yes    Prolonged INR Medium  7/13/2018 Yes    Generalized weakness Medium  7/13/2018 Yes      Non-Hospital Problems as of 7/16/2018              Priority Class Noted    Atherosclerosis of renal artery (H) Medium  5/9/2005    Paroxysmal supraventricular tachycardia (H) Medium  5/9/2005    Other osteoporosis Medium  5/9/2005    Abnormal CXR (chest x-ray) Medium  Unknown    Family history of breast cancer Medium  Unknown    Hyperlipidemia LDL goal <100 Medium  10/31/2010    Advance Care Planning Medium  2/15/2012    Syncope Medium  10/9/2013    Cerebral embolism with cerebral infarction (H) Medium  2/10/2014    Health Care Home Medium  2/13/2014    Long term current use of anticoagulant therapy Medium  2/27/2015    Pacemaker Medium  10/2/2015    Sensorineural hearing loss, bilateral Medium  4/6/2016    Pulmonary hypertension Medium  7/31/2017      Immunizations     Name Date      Influenza (H1N1) 12/17/09     Influenza (High Dose) 3 valent vaccine 10/04/17     Influenza (High Dose) 3 valent vaccine 09/30/16     Influenza (High Dose) 3 valent vaccine 10/02/15     Influenza (High Dose) 3 valent vaccine 10/03/14     Influenza (High Dose) 3 valent vaccine 10/08/13     Influenza (High Dose) 3 valent vaccine 10/02/12     Influenza (IIV3) PF 09/30/11     Influenza (IIV3) PF 10/04/10     Influenza (IIV3) PF 10/29/08     Influenza (IIV3) PF 10/29/07     Influenza (IIV3) PF 10/27/06     Influenza (IIV3) PF 11/03/05     Pneumo Conj 13-V (2010&after) 10/04/17     Pneumococcal 23 valent 10/29/07     TD (ADULT, 7+) 07/25/11     Zoster vaccine, live 07/25/11          END      ASSESSMENT     Discharge Profile Flowsheet     EXPECTED DISCHARGE     FINAL RESOURCES      Expected Discharge Date  07/16/18 07/13/18 1338   Resources List  Transitional Care;Skilled Nursing Facility;Home Care 07/13/18 1338    DISCHARGE NEEDS ASSESSMENT     SKIN      Equipment  "Currently Used at Home  cane, straight;grab bar;walker, rolling 02/09/18 1344   Inspection of bony prominences  Full except (identify areas not inspected) 07/16/18 0914    Primary Care Clinic Name  -- ([facility]) 07/13/18 1338   Skin WDL  ex;characteristics 07/16/18 0914    Primary Care MD Name  -- ([facility]) 07/13/18 1338   Skin Color/Characteristics  redness blanchable 07/16/18 0451    Equipment Used at Home  none 02/12/14 0907   Skin Integrity  scab(s) 07/16/18 0914    GASTROINTESTINAL (ADULT,PEDIATRIC,OB)     Additional Documentation  Wound (LDA) 07/15/18 1748    GI WDL  WDL 07/16/18 0914   Inspection under devices  Full 07/16/18 0451    Abdominal Appearance  rounded 07/16/18 0451   Skin Temperature  warm 07/16/18 0451    All Quadrants Bowel Sounds  audible and normoactive 07/16/18 0914   Skin Moisture  dry 07/16/18 0451    Last Bowel Movement  07/13/18 07/16/18 0914   Skin Elasticity  quick return to original state 07/16/18 0451    GI Signs/Symptoms  abdominal discomfort 07/13/18 1954   Full except areas not inspected   Buttock, left;Buttock, right;Sacrum;Coccyx 07/16/18 0914    Passing flatus  yes 07/16/18 0914   SAFETY      COMMUNICATION ASSESSMENT     Safety WDL  WDL 07/16/18 0914    Patient's communication style  spoken language (English or Bilingual) 07/12/18 1743   All Alarms  alarm(s) activated and audible 07/16/18 0914                 Assessment WDL (Within Defined Limits) Definitions           Safety WDL     Effective: 09/28/15    Row Information: <b>WDL Definition:</b> Bed in low position, wheels locked; call light in reach; upper side rails up x 2; ID band on<br> <font color=\"gray\"><i>Item=AS safety wdl>>List=AS safety wdl>>Version=F14</i></font>      Skin WDL     Effective: 09/28/15    Row Information: <b>WDL Definition:</b> Warm; dry; intact; elastic; without discoloration; pressure points without redness<br> <font color=\"gray\"><i>Item=AS skin wdl>>List=AS skin wdl>>Version=F14</i></font>      Vitals     " Vital Signs Flowsheet     VITAL SIGNS     Response to Interventions  Decrease in pain 07/15/18 1949    Temp  97.6  F (36.4  C) 07/16/18 0747   CLINICALLY ALIGNED PAIN ASSESSMENT (CAPA) ([facility] ADULTS ONLY)      Temp src  Oral 07/16/18 0747   Comfort  negligible pain 07/16/18 0814    Resp  16 07/16/18 0747   Change in Pain  about the same 07/13/18 1558    Pulse  72 07/16/18 0747   HEIGHT AND WEIGHT      Pulse/Heart Rate Source  Monitor 07/16/18 0747   Height Method  Stated 07/12/18 1744    BP  146/65 07/16/18 0747   Weight  36.3 kg (80 lb) 07/12/18 1744    BP Location  Right arm 07/16/18 0747   LISSET COMA SCALE      OXYGEN THERAPY     Best Eye Response  4-->(E4) spontaneous 07/13/18 1554    SpO2  95 % 07/16/18 0747   Best Motor Response  6-->(M6) obeys commands 07/13/18 1554    O2 Device  None (Room air) 07/16/18 0747   Best Verbal Response  5-->(V5) oriented 07/13/18 1554    PACEMAKER     [place] Coma Scale Score  15 07/13/18 1554    Pacemaker  Permanent 07/16/18 0814   DAILY CARE      PAIN/COMFORT     Activity Management  up in chair 07/16/18 0814    Patient Currently in Pain  yes 07/14/18 1405   Activity Assistance Provided  assistance, 1 person 07/16/18 0451    Preferred Pain Scale  CAPA (Clinically Aligned Pain Assessment) ([facility] Adults Only) 07/16/18 0814   Additional Documentation  Activity Device Assistance (Row) 07/14/18 1727    0-10 Pain Scale  5 07/15/18 0621   Assistive Device Utilized  walker 07/16/18 0451    Pain Location  Abdomen 07/13/18 1558   POSITIONING      Pain Orientation  Lower 07/13/18 1558   Body Position  supine 07/16/18 0451    Pain Descriptors  Aching 07/13/18 1558   Head of Bed (HOB)  HOB at 20 degrees 07/16/18 0451    Pain Management Interventions  medication offered but refused;no interventions per patient request 07/13/18 1558   Chair  Upright in chair 07/16/18 0814    Pain Intervention(s)  Medication (See eMAR) 07/15/18 1746    Positioning/Transfer Devices  orthosis 07/16/18 0814            Patient Lines/Drains/Airways Status    Active LINES/DRAINS/AIRWAYS     Name: Placement date: Placement time: Site: Days: Last dressing change:    Wound 07/12/18 Posterior;Right Back Other (comment) red blanchable mole 07/12/18   2345   Back   3             Patient Lines/Drains/Airways Status    Active PICC/CVC     None            Intake/Output Detail Report     Date Intake     Output Net    Shift P.O. I.V. IV Piggyback Total Urine Total       Noc 07/14/18 2300 - 07/15/18 0659 100 -- -- 100 150 150 -50    Day 07/15/18 0700 - 07/15/18 1459 520 -- -- 520 -- -- 520    Aiyana 07/15/18 1500 - 07/15/18 2259 210 -- -- 210 -- -- 210    Noc 07/15/18 2300 - 07/16/18 0659 -- -- -- -- -- -- 0    Day 07/16/18 0700 - 07/16/18 1459 -- -- -- -- -- -- 0      Last Void/BM       Most Recent Value    Urine Occurrence 1 at 07/15/2018 2115    Stool Occurrence 1 at 07/13/2018 1115      Case Management/Discharge Planning     Case Management/Discharge Planning Flowsheet     REFERRAL INFORMATION     Major Change/Loss/Stressor  hospitalization;other (see comments) (injury) 07/14/18 1453    Did the Initial Social Work Assessment result in a Social Work Case?  Yes 07/13/18 1338   EXPECTED DISCHARGE      Admission Type  inpatient 07/13/18 1338   Expected Discharge Date  07/16/18 07/13/18 1338    Arrived From  home or self-care 07/13/18 1338   DISCHARGE PLANNING      Primary Care Clinic Name  -- ([facility]) 07/13/18 1338   Equipment Used at Home  none 02/12/14 0907    Primary Care MD Name  -- ([facility]) 07/13/18 1338   FINAL RESOURCES      LIVING ENVIRONMENT     Equipment Currently Used at Home  cane, straight;grab bar;walker, rolling 02/09/18 1344    Lives With  child(rachele), adult 07/15/18 1100   Resources List  Transitional Care;Skilled Nursing Facility;Home Care 07/13/18 1338    Living Arrangements  house 07/15/18 1100   ABUSE RISK SCREEN      Provides Primary Care For  no one 07/13/18 1835    QUESTION TO PATIENT:  Has a member of your family or a partner(now or in the past) intimidated, hurt, manipulated, or controlled you in any way?  no 07/12/18 1744    ASSESSMENT OF FAMILY/SOCIAL SUPPORT     QUESTION TO PATIENT: Do you feel safe going back to the place where you are living?  yes 07/12/18 1744    Who is your support system?  Children 07/13/18 1338   OBSERVATION: Is there reason to believe there has been maltreatment of a vulnerable adult (ie. Physical/Sexual/Emotional abuse, self neglect, lack of adequate food, shelter, medical care, or financial exploitation)?  no 07/12/18 1744    Description of Support System  Supportive;Involved 07/13/18 1338   OTHER      Support Assessment  Adequate family and caregiver support;Adequate social supports 07/13/18 1338   Are you depressed or being treated for depression?  No 07/12/18 2338    COPING/STRESS

## 2018-07-12 NOTE — IP AVS SNAPSHOT
` `     [facility] SURGICAL: [phone]                 INTERAGENCY TRANSFER FORM - NOTES (H&P, Discharge Summary, Consults, Procedures, Therapies)   2018                    Hospital Admission Date: 2018  JULITO NEGRON   : 1924  Sex: Female        Patient PCP Information     Provider PCP Type    Milena Jaime, DO General         History & Physicals      H&P signed by Elias Goss MD at 2018 10:08 AM      Author:  Elias Goss MD Service:  Hospitalist Author Type:  Physician    Filed:  2018 10:08 AM Date of Service:  2018 11:25 PM Creation Time:  2018 12:47 AM    Status:  Signed :  Elias Goss MD (Physician)         Admitted:     2018      CHIEF COMPLAINT:  Abdominal pain, weakness, anorexia.        HISTORY OF PRESENT ILLNESS: The patient actually gives a history of approximately 3 weeks of pain which she describes as abdominal pain, points to the high abdomen as the area that it hurts.  She was seen in the clinic and it was thought to be primarily due to potential constipation.  She ultimately had a CT scan of the abdomen and pelvis which did show copious stool throughout the colon.  She has had a total of 2 bottles of magnesium citrate, the first one over 2 days about 5 days ago and the last one on Tuesday and Wednesday.  With this, she started to have diarrhea and has had about 9-10 loose stools on Tuesday, 3-4 yesterday, but only one today.      She continues, however, to complain of abdominal pain.  This pain seems to be worse when she lies down and the sons do not really note that it is worse when she gets up and walks, but it is worse when she rolls over during the act of rolling.  At times it is intolerable and at other times she does not really complain of it much.  With this, she has had marked anorexia, has not eaten much of anything over the last couple of days, and now has also had significant weakness.  She is  needing increasing help to get out of bed and move around.      Of note, she did have a fall, primarily striking her head on 06/20/2018, which correlates about with the time this pain started.      PAST MEDICAL HISTORY:   1.  Renal arterial hypertension.   2.  Hyperlipidemia.   3.  Stroke secondary to embolism from AFib.   4.  Tachybrady syndrome, pacemaker-dependent.   5.  Chronic atrial fibrillation.   6.  Osteoporosis.   7.  Pulmonary hypertension.      MEDICATIONS PRIOR TO ADMISSION:   1.  Claritin 10 mg daily.   2.  Toprol-XL 25 b.i.d.    3.  Zocor 20 mg daily.   4.  Acyclovir p.r.n.   5.  Vitamin D and calcium daily.   6.  Omega-3 fatty acids 1000 mg b.i.d.   7.  Lasix 20 mg every other day.   8.  Warfarin 2.5 mg Monday, Wednesday, Friday, 5 mg all other days, followed by [facility] Clinic.      ALLERGIES:  ALLOPURINOL, AMPICILLIN, CIPRO, LEVAQUIN, LISINOPRIL, PAXIL, PENICILLIN, SULFA, ZITHROMAX, ZOLOFT.  PLEASE SEE EPIC FOR SPECIFIC REACTIONS; MOST ARE NAUSEA AND VOMITING.      SOCIAL HISTORY:  She lives with her son.  She is a never smoker, never drinker.      FAMILY HISTORY:  Reviewed in Epic. See Epic. Really noncontributory at this age.      REVIEW OF SYSTEMS:  She has had some increasing memory problems that the sons note.  She is very hard of hearing.  She does have some episodic dysphagia where things feel like they get stuck and even water sometimes has a hard time going down.  That has been longstanding.  Before these laxatives, she would have a bowel movement every 2-3 days.  There has been no clear residual from her stroke several years ago.      The rest of the 10-point review of systems was negative.      PHYSICAL EXAMINATION:   GENERAL:  She is awake, alert, pleasant, hard of hearing, in no distress when I first initially encounter her lying on the exam table.  As I have her roll over during the exam, she appears to be in distress and then with rolling back, she is in distress again.   VITAL  SIGNS:  She is afebrile, pulse 101, respiratory rate 20, blood pressure 144/68, O2 sat 98% on room air.   HEENT:  Eyes show pupils tiny, reactive.  EOMs intact.  TMs normal.  Nasal mucosa is normal.  Throat is normal.   NECK:  Supple, without masses, nodes or thyromegaly.   CHEST:  Clear to A and P with exception of some coarse rhonchi that clear on coughing at the bases.   CARDIOVASCULAR:  Regular rate and rhythm with a 2/6 systolic murmur over the entire precordium, nonradiating specifically into the carotids, no edema.  No JVD.  Pulses are brisk and equal.   ABDOMEN:  Actually soft, somewhat scaphoid, completely nontender, without HSM or masses.     MUSCULOSKELETAL: When I press about the lower rib cage, there is no significant tenderness.  When I press on the back, she will complain of a little tenderness in the lumbar region and possibly a little over the SI joints, but it does not reproduce her pain.   GENITOURINARY/RECTAL:  She has no stool in the ampulla at this time. She has some bleeding vaginally which is minimal at this time, but was apparently heavier right after an ultrasound was done.  She has not had any bleeding before the ultrasound was done.  Placement of the vaginal probe was somewhat painful for her as well.   EXTREMITIES:  Grossly normal with exception of some definite pain when I flex both legs and then twist them off to the side in both directions.  Otherwise, range of motion of the hips is without pain when I do them individually.   NEUROLOGIC:  Shows good strength, sensation, rapid alternating movements, finger-nose testing of upper extremities.  Good strength, sensation in the lower extremities, though generalized weakness.      IMAGING: Imaging done tonight, abdominal x-ray is unremarkable.  Pelvic ultrasound shows uterus is slightly enlarged and fluid-filled, but when reviewing the CT scan from 2010 and before, she had a similar look as on the recent CT, so I think this is not new and  unlikely pertinent to the current pain.      CT pelvis from 07/05/2018 was reviewed and showed moderate to large amount of stool in the colon.  Some degenerative changes at the lumbosacral interspace, the enlarged uterus similar to CT in 2010, and cardiac enlargement.        LABORATORY DATA: Labs are essentially unremarkable, though she does have 49 WBCs per high-power field in her urine, more significant RBCs but this is after her bleeding started.  Sodium was 132, alkaline phosphatase 192.  INR elevated 4.59.  TSH and T3 both slightly elevated.      ASSESSMENT AND PLAN:   1.  Abdominal pain, unknown etiology.  She persistently points to the abdomen as the source of the pain, but what seems to trigger it is moving her entire pelvis, not specifically one hip or another.  Concerned this may be some musculoskeletal problem.  She has been treated for constipation and I believe that is resolved, but has persistent diarrhea secondary to the recent cathartics.  We will recheck a CT abdomen and pelvis in a.m., but also lumbar spines.  Interestingly, she does not have pain when she walks, so pointing away from a musculoskeletal source.  We will try to stick with just Tylenol for pain.   2.  Diarrhea. I believe this is secondary to the magnesium citrate that she has taken, two bottles total over the last 6 days.  We will follow this expectantly.    3.  Dysphagia.  This is a separate problem and will likely need some workup as an outpatient with a barium swallow or EGD.   4.  Hyponatremia.  This is mild.  Will follow for now.   5.  Asymptomatic bacteriuria.  I do not think she has any symptoms referable to UTI, but has positive WBCs.  We will await cultures and see if there are any symptoms that point to it.   6.  Vaginal bleeding secondary to pelvic ultrasound.  I believe this is just some vaginal tears.  I could not see a tear exteriorly.  Bleeding seems to have slowed down.  I am not reversing her Coumadin at this time.    7.  Atrial fibrillation on anticoagulation.  INR is 4.9.  We will hold her Coumadin for now, not reversing at this point.   8.  Generalized weakness.  We will have PT and OT see.      CODE STATUS:  DNR/DNI.      PROPHYLAXIS: She is already fully anticoagulated on warfarin.      DISPOSITION:  At this point, I really cannot see a specific indication for inpatient.  She is therefore observation while we continue this investigation, but she may convert to inpatient if not improving or we find more specific pathology.         ELIAS PADILLA MD             D: 2018   T: 2018   MT:       Name:     SHANIKA STAHL   MRN:      [id number]        Account:      [id number]   :      1924        Admitted:     2018                   Document: [id number][MD1.1]         Revision History        User Key Date/Time User Provider Type Action    > ROXY.1 2018 10:08 AM Elias Padilla MD Physician Sign                     Discharge Summaries      Discharge Summaries by Milena Gonzalez PA-C at 2018  7:45 AM     Author:  Milena Gonzalez PA-C Service:  Internal Medicine Author Type:  Physician Assistant - C    Filed:  2018 10:17 AM Date of Service:  2018  7:45 AM Creation Time:  2018  7:45 AM    Status:  Cosign Needed :  Milena Gonzalez PA-C (Physician Assistant - C)    Cosign Required:  Yes             [facility]    Discharge Summary  Hospital Medicine    Date of Admission:  2018  Date of Discharge:  2018   Discharging Provider:[RL1.1] Milena Gonzalez[RL1.2]  Date of Service: 2018     Primary Care     Milena Jaime  [address] [address]      Identification and Chief Compaint: Shanika Stahl is a 89 year old female who presented on 2018 with complaint of[RL1.1] abdominal pain found to have a burst fracture of T11[RL1.3].[RL1.1]    Discharge Diagnoses[RL1.2]       Closed stable burst  fracture of eleventh thoracic vertebra with routine healing    Generalized weakness    Abdominal pain    Renal hypertension    Chronic atrial fibrillation    Diarrhea    Oropharyngeal dysphagia    Hyponatremia    Asymptomatic bacteriuria    Vaginal bleeding    Prolonged INR[RL1.4]      Discharge Disposition[RL1.2]   Discharged to[RL1.1] TCU for short term care[RL1.3]    Discharge Orders     Follow Up and recommended labs and tests   Follow up with Dr. Lujan or Dr. Kyaw Resendiz in 2 weeks.     Additional Discharge Instructions   TLSO brace should be on while out of bed. Reposition as needed while brace is on.     Weight bearing status   As tolerated     General info for SNF   Length of Stay Estimate: Short Term Care: Estimated # of Days <30  Condition at Discharge: Improving  Level of care:skilled   Rehabilitation Potential: Good  Admission H&P remains valid and up-to-date: Yes  Recent Chemotherapy: N/A  Use Nursing Home Standing Orders: N/A     Mantoux instructions   Give two-step Mantoux (PPD) Per Facility Policy Yes     Reason for your hospital stay   You were seen for abdominal pain. This was thought to be due to a fracture of your 11th thoracic vertebra. You have been given a brace for this to be worn whenever you are out of bed. You should follow up with the [facility] in 2 weeks.     Weight bearing status   Weight bear as tolerated       Discharge Medications   Current Discharge Medication List      START taking these medications    Details   acetaminophen (TYLENOL) 325 MG tablet Take 2 tablets (650 mg) by mouth 3 times daily  Qty: 100 tablet, Refills: 0    Associated Diagnoses: Closed stable burst fracture of eleventh thoracic vertebra with routine healing      naproxen (NAPROSYN) 250 MG tablet Take 1 tablet (250 mg) by mouth every 12 hours as needed for moderate pain , take with meals.    Associated Diagnoses: Closed stable burst fracture of eleventh thoracic vertebra with routine  healing      polyethylene glycol (MIRALAX/GLYCOLAX) Packet Take 17 g by mouth daily as needed for constipation  Qty: 7 packet    Associated Diagnoses: Slow transit constipation      senna-docusate (SENOKOT-S;PERICOLACE) 8.6-50 MG per tablet Take 1 tablet by mouth At Bedtime , stop taking if loose stools develop.  Qty: 30 tablet    Associated Diagnoses: Slow transit constipation         CONTINUE these medications which have NOT CHANGED    Details   CALCIUM + D 600-200 MG-UNIT PO TABS 1 tablet by mouth daily      fish oil-omega-3 fatty acids (OMEGA 3) 1000 MG capsule Take 1 capsule by mouth 2 times daily  Qty: 90 capsule, Refills: 0      furosemide (LASIX) 20 MG tablet Take 1 tablet (20 mg) by mouth every other day  Qty: 45 tablet, Refills: 3    Comments: Patient will call to fill  Associated Diagnoses: Leg swelling      loratadine (CLARITIN) 10 MG tablet Take 10 mg by mouth daily      metoprolol succinate (TOPROL-XL) 25 MG 24 hr tablet Take 1 tablet (25 mg) by mouth 2 times daily  Qty: 180 tablet, Refills: 3    Comments: Patient will call to fill  Associated Diagnoses: Renal hypertension, stage 1-4 or unspecified chronic kidney disease      simvastatin (ZOCOR) 20 MG tablet Take 1 tablet (20 mg) by mouth At Bedtime  Qty: 90 tablet, Refills: 3    Comments: Patient will call to fiill  Associated Diagnoses: Hyperlipidemia LDL goal <100      VITAMIN D 1000 UNIT OR TABS 1 TABLET DAILY  Qty: 30, Refills: 0      warfarin (JANTOVEN) 2.5 MG tablet Take 2.5 mg on Mon/Wed/Fri; 5 mg all other days or as directed by the Anticoagulation Clinic  Qty: 150 tablet, Refills: 0    Comments: Patient will call to fill  Associated Diagnoses: Chronic atrial fibrillation (H)      acyclovir (ZOVIRAX) 5 % ointment Apply topically 6 times daily  Qty: 15 g, Refills: 3    Associated Diagnoses: Cold sore      order for DME Knee high compression stockings 10-15 mm Hg  Qty: 1 each, Refills: 1    Associated Diagnoses: Leg swelling           Allergies    Allergies   Allergen Reactions     Allopurinol      Ampicillin Diarrhea     Cipro [Ciprofloxacin] Nausea and Vomiting     Levaquin GI Disturbance     Lisinopril Swelling     AngioEdema     Paxil [Paroxetine] Nausea and Vomiting     Penicillins Diarrhea     Sulfa Drugs GI Disturbance     Zithromax [Macrolides] Nausea and Vomiting     Zoloft Nausea and Vomiting[RL1.2]       Consultations This Hospital Stay   Consultation during this admission received from orthopedics, orthosis brace and nurtrition[RL1.1]    Significant Results and Procedures[RL1.2]   Procedures    None[RL1.1]    Data[RL1.2]   Results for orders placed or performed during the hospital encounter of 07/12/18   US Pelvic Complete with Transvaginal    Narrative    PELVIC ULTRASOUND TRANSABDOMINAL IMAGING AND TRANSVAGINAL IMAGING   7/12/2018 8:38 PM    HISTORY: Lower abdominal spasms and discomfort. Abnormal endometrial  stripe on CT imaging from July 5, 2018.  Evaluate for uterine  malignancy versus other acute process;     COMPARISON: A CT which included the pelvis on 7/5/2018.    TECHNIQUE: Transabdominal imaging was performed. Transvaginal imaging  was also performed.     FINDINGS: The uterus measures 5.9 x 3.1 cm. It demonstrates normal  echogenicity with no myometrial abnormality seen. The endometrium is  normal measuring 0.27 cm. The endometrial canal is distended with  fluid measuring 2.4 x 1.9 x 3.0 cm for an estimated volume of 7.2 mL.  The right ovary is not seen. The left ovary contains a 0.5 cm  follicle. Normal blood flow is seen to the left ovary. No adnexal  pathology is seen. There is a small amount of free fluid in the  cul-de-sac.      Impression    IMPRESSION: The uterus is fluid-filled as described above suggesting  hydrometra.    ANITA REIS MD   Abdomen, flat/upright (2 views)    Narrative    XR ABDOMEN TWO VIEWS  7/12/2018 8:27 PM     COMPARISON: None.    HISTORY: Abdominal spasm and pain. Constipation noted on CT imaging  on  July 5, 2018.  Evaluate for interval resolution of constipation.    FINDINGS: Abdominal bowel gas pattern is nonspecific. There is no  evidence for free intraperitoneal air.      Impression    IMPRESSION: No evidence for bowel obstruction or free air.    CLAUDIA TELLES MD   CT Abdomen Pelvis w Contrast    Narrative    CT ABDOMEN AND PELVIS WITH CONTRAST 7/13/2018 7:43 AM     HISTORY:  Abdominal pain.     COMPARISON: CT abdomen and pelvis 7/5/2018.    TECHNIQUE: Axial images from the lung bases to the symphysis are  performed with additional coronal reformatted images. 39 mL of Isovue  370 are given intravenously.  Radiation dose for this scan was reduced  using automated exposure control, adjustment of the mA and/or kV  according to patient size, or iterative reconstruction technique.    FINDINGS:   Linear atelectasis is present at the lung bases in  addition to a trace amount of pleural fluid bilaterally which is new.  Cardiomegaly.    Abdomen: The liver, spleen, gallbladder, pancreas and adrenal glands  are unremarkable. Bilateral simple-appearing renal cysts are present.  No hydronephrosis. No obvious renal calculi. Aorta is calcified  without evidence of aneurysm or dissection. The bowel is normal in  caliber without obstruction. Bowel anastomosis right lower quadrant is  patent.    Pelvis: Bladder is decompressed but otherwise unremarkable. Uterus is  visualized but the endometrium appears thickened on series 4, image  54. May be abnormal in a postmenopausal patient. No enlarged pelvic or  inguinal lymph nodes. No free pelvic fluid. Bone window examination  demonstrates no aggressive-appearing bone lesions. Osteopenia is  noted. Correlate with concurrently performed spine CT reports.      Impression    IMPRESSION:  1. Cardiomegaly with trace pleural effusions.  2. Endometrial thickening of uncertain significance. Endometrial  hyperplasia or neoplasia is possible. No significant change since  prior CT. No  enlarged lymph nodes.  3. No bowel obstruction or diverticulitis. Appendix not visualized.  Bowel anastomosis right lower quadrant is patent.  4. Simple renal cysts bilaterally. Abdominal and pelvic organs are  otherwise within normal limits.    MAKEDA BLEDSOE MD   CT Lumbar Spine w/o Contrast    Narrative    CT LUMBAR SPINE WITHOUT CONTRAST  7/13/2018 7:45 AM     HISTORY: Abdomen and pelvic pain, but worse with movement.      TECHNIQUE: Axial images of the lumbar spine were obtained without  intravenous contrast. Multiplanar reformations were performed.   Radiation dose for this scan was reduced using automated exposure  control, adjustment of the mA and/or kV according to patient size, or  iterative reconstruction technique.    COMPARISON: 9/30/2016 x-ray.    FINDINGS: Reconstructed images demonstrate normal vertebral body  height. There are advanced degenerative changes at the L5-S1 level.    T12-L1: No disc herniation or stenosis. Facet joints are unremarkable.    L1-L2:  No disc herniation or stenosis. Facet joints are unremarkable.    L2-L3:  Mild disc bulge. Mild central stenosis. Neural foramina are  patent. Facet joints are unremarkable. Probable hemangioma right  posterior L3 vertebra.     L3-L4:  Mild disc bulge and ligamentum flavum hypertrophy. Moderate  central stenosis. Neural foramina are patent.     L4-L5:  Disc bulge and ligamentum flavum hypertrophy result in mild to  moderate central stenosis. Neural foramina are patent.     L5-S1:  Advanced facet degenerative changes. Grade 1 degenerative  spondylolisthesis. Endplate irregularity appears degenerative. No  paraspinous soft tissue inflammation. No central stenosis. Mild  inferior foraminal narrowing bilaterally.        Impression    IMPRESSION:    1. Advanced degenerative disc disease and grade 1 degenerative  spondylolisthesis at L5-S1 without significant stenosis.  2. At L3-L4 there is moderate central stenosis.  2. At L4-L5 there is mild to  moderate central stenosis. No other  significant stenosis identified.  4. No evidence of compression fracture.     REBECCA VILLEGAS MD   CT Thoracic Spine w Contrast    Narrative    CT THORACIC SPINE WITHOUT CONTRAST   7/13/2018 8:24 AM     HISTORY: Abdominal pain.      TECHNIQUE: Axial images of the thoracic spine were obtained without  intravenous contrast. Multiplanar reformations were performed.   Radiation dose for this scan was reduced using automated exposure  control, adjustment of the mA and/or kV according to patient size, or  iterative reconstruction technique.    COMPARISON: CT abdomen and pelvis 7/5/2018    FINDINGS:  The alignment of the thoracic spine is normal although  kyphosis is exaggerated.  There is a burst fracture of T11 involving  superior and inferior endplates with acute-appearing fracture lines.  The posterosuperior and posteroinferior endplates protrude slightly  into the spinal canal but cause only mild narrowing of the spinal  canal. There is adjacent soft tissue swelling best appreciated on  coronal images and axial image 109 of series 5. Digital   radiograph from the previous CT scan shows that there is no soft  tissue swelling and that there was only mild compression deformity of  the superior endplate, and cross-sectional images through the inferior  endplate do not show any widening or retropulsion. Thus the fracture  has worsened since 7/5/2018.    There is also a compression fracture of the superior endplate of T6  with no acute fracture lines and no soft tissue swelling.    No other fractures are seen. There are small bilateral pleural  effusions. There is cardiomegaly with pacemaker in the heart. There is  a cyst in the upper pole of the left kidney.      Impression    IMPRESSION:  1. Recent burst fracture of T11 involving superior and inferior  endplates with slight retropulsion into the spinal canal. This has  progressed substantially since 7/5/2018 and now has much more  "adjacent  soft tissue swelling.  2. Old compression fracture of the superior endplate of T6.    DAMION CARUSO MD[RL1.1]     History of Present Illness[RL1.2]   (From H&P) \"The patient actually gives a history of approximately 3 weeks of pain which she describes as abdominal pain, points to the high abdomen as the area that it hurts.  She was seen in the clinic and it was thought to be primarily due to potential constipation.  She ultimately had a CT scan of the abdomen and pelvis which did show copious stool throughout the colon.  She has had a total of 2 bottles of magnesium citrate, the first one over 2 days about 5 days ago and the last one on Tuesday and Wednesday.  With this, she started to have diarrhea and has had about 9-10 loose stools on Tuesday, 3-4 yesterday, but only one today.       She continues, however, to complain of abdominal pain.  This pain seems to be worse when she lies down and the sons do not really note that it is worse when she gets up and walks, but it is worse when she rolls over during the act of rolling.  At times it is intolerable and at other times she does not really complain of it much.  With this, she has had marked anorexia, has not eaten much of anything over the last couple of days, and now has also had significant weakness.  She is needing increasing help to get out of bed and move around.       Of note, she did have a fall, primarily striking her head on 06/20/2018, which correlates about with the time this pain started.\"[RL1.1]    Hospital Course[RL1.2]   Shanika Stahl was admitted on 7/12/2018.  The following problems were addressed during her hospitalization:    Closed stable burst fracture of eleventh thoracic vertebra with routine healing   Presented with abdominal pain, admission exam suggested mid-back pain radiating anteriorly to the abdomen. Spine CT 7/12/18 revealed degenerative changes at multiple levels, as well as a T11 burst fracture with slight retropulsion " "into the spinal canal, described as having \"progressed substantially\" since 7/5/18 study, with increased soft tissue swelling. This is probably the source of the patient's pain. CT abdomen did not show any pathology to explain pain. Ortho consult 7/14/2018 recommended TLSO brace, weight bear as tolerated, PT and pain control. Patient has been fitted with TLSO brace. Which improved pain slightly.[RL1.1] S[RL1.5]he continue[RL1.1]d[RL1.5] to have \"some\" pain on scheduled acetaminophen 650 mg TID. Tramadol[RL1.1] initially[RL1.5] added, but then discontinued due to concerns it would exacerbate constipation. Started on naproxen 250 BID, with plans to discontinue or change to PRN when less analgesic needs[RL1.1]. On day of discharge, no complaints of pain on scheduled acetaminophen and naproxen.[RL1.5]  - continue TLSO at all times unless in bed[RL1.1]  -[RL1.6] weight bearing as tolerated[RL1.1], per ortho note[RL1.6]  - continue  acetaminophen scheduled at 650 mg TID  - continue naproxen 250 mg BID[RL1.1] prn[RL1.6]   - follow up with [facility][RL1.5] (Dr. Lujan or Dr. Resendiz)[RL1.6] in 2 weeks[RL1.5] for repeat Xrays[RL1.6], per ortho consult note[RL1.5]  - continue PT/OT[RL1.6]     Renal hypertension[RL1.1]  O[RL1.5]n metoprolol as outpatient. BP[RL1.1] largely stable,[RL1.5] max[RL1.1] BP[RL1.5] 152/74 last 24 hours.  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  - Continue warfarin, pharmacy to dose  - continue rate control with metoprolol at home dose     Diarrhea   Received multiple doses of Mg citrate[RL1.1] prior to admission in addition to other bowel regimen[RL1.5] to correct constipation, which is likely the etiology of diarrhea on presentation. Abdominal exam is " unremarkable, CT abdomen 7/12/18 showed no significant pathology. No recent antibiotics. Diarrhea resolved and is now feeling[RL1.1] as though she is becoming constipated.[RL1.5]   - continued on bowel regimen senna scheduled and Miralax prn     Oropharyngeal dysphagia  Longstanding oropharyngeal dysphagia to both solids and liquids, no prior work up.[RL1.1] E[RL1.5]ating without difficulty[RL1.1] in hospital[RL1.5]. Would recommend further outpatient work up if continues/recurrence.    Hyponatremia  Mild at 132 on admission. Subsequently normal. No interventions.     Abnormal UA, asymptomatic bacteriuria  UA positive for leukocyte esterase, WBC and few bacteria. No urinary symptoms, no fever, and WBC normal.[RL1.1] No antibiotics prescribed as no symptoms developed and remained afebrile.[RL1.5]     Vaginal bleeding  May be due to mild, local trauma from 7/12/18 vaginal ultrasound (obtained for lower abdomen pain and abnormal endometrial stripe on CT) combined with prolonged INR. Bleeding is now absent at a therapeutic INR.[RL1.1]    Abnormal Pelvic US  Pelvic/Transvaginal US performed for abdominal pain and concerns of abnormal endometrial stripe on CT. Normal endometrium on US though did show fluid-filled uterus suggesting hydrometra. ED did speak with an on call GYN who did not think this was related to her abdominal pain, no indication from a GYN standpoint.[RL1.6]     Prolonged INR  INR 4.4 --> 5.62 on admission, on warfarin for atrial fibrillation. Unclear why INR was prolonged at admission: no recent antibiotics, med or diet changes. She did have some diarrhea as above prior to arrival which may have contributed[RL1.1]. M[RL1.5]ild vaginal bleeding as above,[RL1.1] otherwise[RL1.5] no significant blood loss noted and Hgb has remained normal. Warfarin initially held to allow INR to normalize, no reversal.  - continue warfarin, pharmacy consult to manage INR     Generalized weakness  Global, non-focal weakness.  Part of her exertional intolerance has been due to pain, may improve if pain control improves. Patient and family agreeable to TCU for further PT and cares while strength is regained. PT and OT were consulted and initiated when cleared for weightbearing.  - continuation of PT and OT at TCU[RL1.1]    Interval History:  Patient without complaints. Feels improved in comparison to yesterday. Pain absent on current pain medications. Tolerating brace. Slept well. Fair appetite. No vaginal bleeding. Feels a bit constipated. No urinary symptoms.    Patient denies fever, chills, myalgias, lightheadedness, dizziness, cough, congestion, rhinorrhea, sore throat, shortness of breath, palpitations, chest pain, abdominal pain, nausea, vomiting, diarrhea, changes in urination (dysuria, changes in frequency/urgency), rash or wounds.    She is agreeable to TCU and hopes to discharge to the [facility] today if there is availability.[RL1.5]    Pending Results   Unresulted Labs Ordered in the Past 30 Days of this Admission     No orders found from 5/13/2018 to 7/13/2018.        Physical Exam   Temp:  [97.6  F (36.4  C)-98.4  F (36.9  C)] 97.6  F (36.4  C)  Pulse:  [72-82] 72  Resp:  [16-18] 16  BP: (129-152)/(65-75) 146/65  SpO2:  [94 %-97 %] 95 %  Vitals:    07/12/18 1743   Weight: 36.3 kg (80 lb)[RL1.2]     Constitutional: Alert, oriented, cooperative, no apparent distress, appears nontoxic, Appears stated age.  Eyes: Sclera are anicteric, EOMI  HENT: Normocephalic. Atraumatic. Oropharynx is clear and moist.   Lymph/Hematologic: No epitrochlear, axillary, preauricular, postauricular, occipital, sub-mandibular, tonsillar, sub-mental, anterior or posterior cervical, or supraclavicular lymphadenopathy is appreciated.  Cardiovascular:[RL1.1] Normal[RL1.5] rate and[RL1.1] irregular[RL1.5] rhythm[RL1.1].[RL1.5] Radial pulses are 2+ bilaterally. Distal pulses are intact. No lower extremity edema.  Respiratory: No accessory muscle usage.  Speaking in full sentences. Clear to auscultation bilaterally without wheezes, crackles or rhonchi.   GI:[RL1.1] S[RL1.5]oft, non-tender, non-distended. No rebound or guarding.  Genitourinary: Deferred  Musculoskeletal: Normal muscle bulk and tone. Moves all extremities appropriately.[RL1.1] TLSO brace applied.[RL1.5]  Skin: Warm and dry, no rashes.   Neurologic: Neck supple. Cranial nerves 3-12 are grossly intact.  is symmetric.     The discharge plan was discussed with the patient[RL1.1].[RL1.5]    Total time on this discharge was[RL1.1] > 30 minutes[RL1.5].    I have discussed patient and formulated plan with Dr. Nahum Jensen.    Milena Gonzalez PA-C  [facility][RL1.1]      Revision History        User Key Date/Time User Provider Type Action    > RL1.2 2018 10:17 AM Milena Gonzalez PA-C Physician Assistant - [facility] Sign     RL1.6 2018  8:58 AM Milena Gonzalez PA-C Physician Assistant - C      RL1.5 2018  8:35 AM Milena Gonzalez PA-C Physician Assistant - C      RL1.3 2018  8:15 AM Milena Gonzalez PA-C Physician Assistant - C      RL1.4 2018  7:46 AM Milena Gonzalez PA-C Physician Assistant - C      RL1.1 2018  7:45 AM Milena Gonzalez PA-C Physician Assistant - C                      Consult Notes      Consults by Bart Vega MD at 2018  8:08 AM     Author:  Bart Vega MD Service:  Orthopedics Author Type:  Physician    Filed:  2018  8:08 AM Date of Service:  2018  8:08 AM Creation Time:  2018  8:02 AM    Status:  Signed :  Bart Vega MD (Physician)         [facility] Consultation    Consultation - [facility]  Level of consult: One-time consult to assist in determining a diagnosis and to recommend an appropriate treatment plan    Shankia Stahl,  1924, MRN [id number]     Admitting Dx: Abdominal pain, generalized [R10.84]  Muscle  weakness (generalized) [M62.81]  Thyroid function test abnormal [R94.6]  Abnormal pelvic ultrasound [R93.8]  Supratherapeutic INR [R79.1]     PCP: Milena Jaime, [phone]     Code status:  Full Code     Extended Emergency Contact Information  Primary Emergency Contact: Ulises Stahl  Address: [address] [place] [address] [place]  Home Phone: [phone]  Mobile Phone: [phone]  Relation: Son  Secondary Emergency Contact: Naina  Address: [address] [address] [place]  Home Phone: [phone]  Work Phone: [phone]  Mobile Phone: [phone]  Relation: Son     Assessment:  Compression fracture T11 vertebral body.      Plan:  TLSO  WBAT  PT  Pain control    Follow up with [facility] in 2 weeks.    Principal Problem:    Closed stable burst fracture of eleventh thoracic vertebra with routine healing  Active Problems:    Renal hypertension    Chronic atrial fibrillation (H)    Diarrhea    Oropharyngeal dysphagia    Hyponatremia    Asymptomatic bacteriuria    Vaginal bleeding    Prolonged INR    Generalized weakness       Chief Complaint  LBP     HPI  We have been requested by Dr. Xiao to evaluate Shanika Stahl who is a 89 year old year old female for T11 vertebral fx.    The patient actually gives a history of approximately 3 weeks of pain which she describes as abdominal pain, points to the high abdomen as the area that it hurts.  She was seen in the clinic and it was thought to be primarily due to potential constipation.  She ultimately had a CT scan of the abdomen and pelvis which did shows a T11 vertebral body fracture.      She is relatively comfortable today.      Of note, she did have a fall, primarily striking her head on 06/20/2018, which correlates about with the time this pain started.          History is obtained from the patient     Past Medical History  Past Medical History:   Diagnosis Date     Abnormal CXR (chest  x-ray)     Read as emphysema     Atherosclerosis of renal artery (H)      Atrial fibrillation (H) 3/27/2014     Basal cell carcinoma      ESSENTIAL TREMOR 5/9/2005     Family history of breast cancer      Hyperlipidemia LDL goal <100 10/31/2010    CHOL      175   7/6/2011 HDL       69   7/6/2011 LDL       93   7/6/2011 TRIG       61   7/6/2011 CHOLHDLRATIO      3.0   7/6/2011 On simvastatin 20mg      Hypertension goal BP (blood pressure) < 140/90 10/3/2013     Other and unspecified hyperlipidemia      Other osteoporosis      Pacemaker      Paroxysmal supraventricular tachycardia (H)      Postmenopausal atrophic vaginitis      Stroke (H) 2/10/2014     Syncope 10/9/2013     Unspecified essential hypertension        Surgical History  Past Surgical History:   Procedure Laterality Date     EYE SURGERY  2/21/12    left eye cataract     IMPLANT PACEMAKER       SURGICAL HISTORY OF -   1965    FEMORAL HERNIA REPAIR        Social History  Social History     Social History     Marital status:      Spouse name: N/A     Number of children: 4     Years of education: N/A     Occupational History      None       Retired     Social History Main Topics     Smoking status: Never Smoker     Smokeless tobacco: Never Used     Alcohol use No     Drug use: No     Sexual activity: No     Other Topics Concern     Exercise Yes     Seat Belt Yes     Parent/Sibling W/ Cabg, Mi Or Angioplasty Before 65f 55m? No     Social History Narrative       Family History  Family History   Problem Relation Age of Onset     Cerebrovascular Disease Mother      Diabetes Mother      Breast Cancer Sister      HEART DISEASE Brother      Cerebrovascular Disease Brother      HEART DISEASE Brother      Prostate Cancer Brother      HEART DISEASE Brother      HEART DISEASE Sister      Gynecology Daughter      Cancer Son      Lung        Allergies:  Allopurinol; Ampicillin; Cipro [ciprofloxacin]; Levaquin; Lisinopril; Paxil [paroxetine]; Penicillins; Sulfa  drugs; Zithromax [macrolides]; and Zoloft      Current Medications:  Current Facility-Administered Medications   Medication     acetaminophen (TYLENOL) Suppository 650 mg     acetaminophen (TYLENOL) tablet 650 mg     loratadine (CLARITIN) tablet 10 mg     melatonin tablet 1 mg     metoprolol succinate (TOPROL-XL) 24 hr tablet 25 mg     naloxone (NARCAN) injection 0.1-0.4 mg     ondansetron (ZOFRAN-ODT) ODT tab 4 mg    Or     ondansetron (ZOFRAN) injection 4 mg     prochlorperazine (COMPAZINE) injection 5 mg    Or     prochlorperazine (COMPAZINE) tablet 5 mg    Or     prochlorperazine (COMPAZINE) Suppository 12.5 mg     simvastatin (ZOCOR) tablet 20 mg     sodium chloride (PF) 0.9% PF flush 3 mL     Warfarin Therapy Reminder (Check START DATE - warfarin may be starting in the FUTURE)       Review of Systems:  The Review of Systems is negative other than noted in the HPI    Physical Exam:  Temp:  [97.4  F (36.3  C)-97.6  F (36.4  C)] 97.5  F (36.4  C)  Pulse:  [75-83] 82  Resp:  [16] 16  BP: (138-161)/(59-72) 138/59  SpO2:  [94 %-96 %] 94 %    Alert and oriented to person, place, time  Soft abdomen  Regular heart rate  Breathing comfortably    Tender low thoracic spine  Skin intact at spine  Normal sensory and motor exam distal Bilateral LE  No lymphedema  Palpable DP pulses B LE     Pertinent Labs  Lab Results: personally reviewed.  Lab Results   Component Value Date    WBC 7.7 07/13/2018    HGB 12.3 07/13/2018    HCT 38.6 07/13/2018    MCV 93 07/13/2018     07/13/2018       Recent Labs  Lab 07/14/18  0634 07/13/18  0559 07/12/18  1815   INR 4.16* 5.62* 4.59*       Pertinent Radiology  Radiology Results: images and radiology report reviewed  Recent Results (from the past 24 hour(s))   CT Thoracic Spine w Contrast    Narrative    CT THORACIC SPINE WITHOUT CONTRAST   7/13/2018 8:24 AM     HISTORY: Abdominal pain.      TECHNIQUE: Axial images of the thoracic spine were obtained without  intravenous contrast.  Multiplanar reformations were performed.   Radiation dose for this scan was reduced using automated exposure  control, adjustment of the mA and/or kV according to patient size, or  iterative reconstruction technique.    COMPARISON: CT abdomen and pelvis 7/5/2018    FINDINGS:  The alignment of the thoracic spine is normal although  kyphosis is exaggerated.  There is a burst fracture of T11 involving  superior and inferior endplates with acute-appearing fracture lines.  The posterosuperior and posteroinferior endplates protrude slightly  into the spinal canal but cause only mild narrowing of the spinal  canal. There is adjacent soft tissue swelling best appreciated on  coronal images and axial image 109 of series 5. Digital   radiograph from the previous CT scan shows that there is no soft  tissue swelling and that there was only mild compression deformity of  the superior endplate, and cross-sectional images through the inferior  endplate do not show any widening or retropulsion. Thus the fracture  has worsened since 7/5/2018.    There is also a compression fracture of the superior endplate of T6  with no acute fracture lines and no soft tissue swelling.    No other fractures are seen. There are small bilateral pleural  effusions. There is cardiomegaly with pacemaker in the heart. There is  a cyst in the upper pole of the left kidney.      Impression    IMPRESSION:  1. Recent burst fracture of T11 involving superior and inferior  endplates with slight retropulsion into the spinal canal. This has  progressed substantially since 7/5/2018 and now has much more adjacent  soft tissue swelling.  2. Old compression fracture of the superior endplate of T6.    DAMION CARUSO MD       Attestation:  I have reviewed today's vital signs, notes, medications, labs and imaging.     Bart Vega[SM1.1]       Revision History        User Key Date/Time User Provider Type Action    > SM1.1 7/14/2018  8:08 AM  Bart Vega MD Physician Sign                     Progress Notes - Physician (Notes from 07/13/18 through 07/16/18)      Progress Notes by Rik George at 7/16/2018 10:53 AM     Author:  Rik George Service:  Spiritual Health Author Type:      Filed:  7/16/2018 10:56 AM Date of Service:  7/16/2018 10:53 AM Creation Time:  7/16/2018 10:53 AM    Status:  Signed :  Rik George ()         SPIRITUAL HEALTH SERVICES  SPIRITUAL ASSESSMENT Progress Note  [facility] [place]/Surg    Referral Source:     Pt on admission to Inpatient status     Primary Focus:     Assessment of emotional/spiritual/Rastafari distress    Support for coping    Illness Circumstances:   Reviewed documentation. Reflective conversation shared with patient which integrated elements of illness and family narratives.     Context of Serious Illness/Symptom(s) - Fall at home; thoracic fracture    Resources for Support - Lives with sonAntoine    Distress:     Emotional/Existential/Relational Distress - None identified    Spiritual/Zoroastrian Distress - None identified    Social/Cultural/Economic Distress - She stated that her son likes his job as a  with [employer] but he doesn't like the hours    Spiritual / Zoroastrian Coping:     Quaker/Lillian - [facility]    Spiritual Practice(s) - Attends [employer] regularly and hopes the same is true for her son    Goals of Care:    Goals of Care - She is hoping to be able to discharge to [facility] because it will be easier for her son to transport and visit    Meaning/Sense-Making - Family and lillian were significant in conversation    Plan: No follow up visit planned but patient is aware of the availability of spiritual support on request.    Rik George M.A., [facility]  Staff [facility]  Office: [phone]  Cell: [phone]  Pager [phone][MC1.1]       Revision History        User Key Date/Time User  "Provider Type Action    > MC1.1 7/16/2018 10:56 AM DorisRik pandya Sign            Progress Notes by Jeffery Xiao MD at 7/15/2018  2:03 PM     Author:  Jeffery Xiao MD Service:  Hospitalist Author Type:  Physician    Filed:  7/15/2018  2:14 PM Date of Service:  7/15/2018  2:03 PM Creation Time:  7/15/2018  2:03 PM    Status:  Signed :  Jeffery Xiao MD (Physician)         [facility] Medicine Progress Note  Date of Service: 07/15/2018    Assessment & Plan   Shanika Stahl is a 89 year old female who presented on 7/12/2018 with reported abdominal pain.    Principal Problem:    Closed stable burst fracture of eleventh thoracic vertebra with routine healing - was admitted under the diagnosis of abdominal pain, but admission exam suggested mid-back pain radiating anteriorly to the abdomen.  Spine CT 7/12/18 revealed degenerative changes at multiple levels, as well as a T11 burst fracture with slight retropulsion into the spinal canal, described as having \"progressed substantially\" since 7/5/18 study, with increased soft tissue swelling.  This is probably the source of the patient's pain.  CT abdomen did not show any pathology to explain pain.  Since yesterday, Ortho consultation has been performed and reviewed, and following that consult, patient has been fitted with TLSO brace.  This has improved pain slightly.  However, she continues to have \"some\" pain on scheduled acetaminophen 650 mg TID.  We added tramadol PRN yesterday, but patient does not wish to take it out of concern that it will exacerbate constipation.  - TLSO fitted, which she is to use at all times unless in bed.  Per Ortho note patient can now be weight bearing as tolerated.  - Will continue  acetaminophen scheduled at 650 mg TID.  - At patient's request will discontinue tramadol 50 mg TID PRN.  - Now that INR is no longer prolonged, will begin low dose naproxen.  Start at 250 mg " BID scheduled, switch to PRN or D/C when analgesic needs are less.  - Patient and family agree to this plan.    Active Problems:    Renal hypertension - on metoprolol as outpatient.  BP control generally good here so far, BP max 151/79 last 24 hours.  - Continue current metoprolol, increase dose if hypertension worsens.      Chronic atrial fibrillation (H) - on warfarin anticoagulation and metoprolol for rate control.  HR good so far.  - Continue warfarin and metoprolol unchanged.      Diarrhea - received multiple doses of Mg citrate within past few days to correct constipation, which I think is the etiology. Abdominal exam is unremarkable, CT abdomen 7/12/18 showed no significant pathology.  Has not received recent antibiotics.  Diarrhea has resolved per patient, and she is now feeling as though she's in the early stages of constipation.  - Senna/docusate daily at HS will start tonight.  - Polyethylene glycol QD PRN also added; she uses this at home.      Oropharyngeal dysphagia - longstanding oropharyngeal dysphagia to both solids and liquids, not yet worked up.  Has been eating without difficulty here so far.  - Observe for dysphagia in-house.      Hyponatremia - mild at 132 on admission, normal today.  - Follow Na.      Asymptomatic bacteriuria - abnormal UA, but no urinary symptoms, no fever, and WBC normal.  - No antibiotics unless symptomatic and/or febrile.      Vaginal bleeding - may be due to mild, local trauma from 7/12/18 vaginal ultrasound combined with prolonged INR.  Bleeding is now absent at a therapeutic INR.  - Observe for recurrence.      Prolonged INR - on warfarin for a-fib.  Unclear why INR was prolonged at admission: no recent antibiotics, med or diet changes.  Aside from mild vaginal bleeding as above, patient had no significant blood loss noted, and Hgb has remained normal.  INR 2.02 today.  - Warfarin resumed today.  - Pharmacy consult obtained to manage INR.      Generalized weakness -  global, non-focal weakness.  Part of her exertional intolerance has been due to pain, may improve if pain control improves.  - Continue PT and OT now that she is cleared for weight bearing.  Both recommend continuation of PT and OT at TCU.  - Have discussed the benefits of TCU at discharge with patient and family, most notably the physical therapy she can receive there on a routine basis as she adapts to her TLSO and tries to regain lost strength.  Patient is agreeable, family enthusiastically agrees.  Will see if [facility] bed (her preference) might be available Monday.      DVT Prophylaxis: Warfarin  Code Status: Full Code    Lines: Peripheral IV.   Chauhan catheter: Not currently needed.  Restraints: Not needed.    Discussion: T11 burst fracture now braced, weight bearing can resume and PT can begin.  Working on pain control without using opioids at patient request, medical problems are stable, INR is again therapeutic.  Can go to TCU when bed available, likely tomorrow, 7/16/18.    Disposition: Anticipate discharge to TCU tomorrow.  Appropriate for Inpatient care.     Attestation:  I have reviewed today's vital signs, notes, medications, labs and imaging.  Amount of time performed on this follow-up visit: 35 minutes, 20 of which were spent in care coordination and counseling with patient and both sons.    Jeffery Xiao MD       Interval History   See above regarding analgesia for burst fracture.  Pain is better controlled with TLSO applied, and if she remains seated or lying down and still.  Diarrhea has stopped completely, and patient has now had no BM in more than 24 hours which concerns her.  No dysphagia yet encountered here.      Physical Exam   Temp:  [97.7  F (36.5  C)-97.8  F (36.6  C)] 97.8  F (36.6  C)  Pulse:  [75-92] 75  Resp:  [16] 16  BP: (144-151)/(69-79) 151/79  SpO2:  [96 %] 96 %    Weights:   Vitals:    07/12/18 1743   Weight: 36.3 kg (80 lb)    Body mass index is 14.87 kg/(m^2).    GENERAL:  Pleasant woman, seated in bed, looks comfortable.  EYES: Eyes grossly normal to inspection, extraocular movements intact  HENT: Nares patent bilaterally.  Nasal mucosa normal, no discharge.   NECK: Trachea midline, no stridor.    RESP: No accessory muscle use.  Symmetrical breath sounds.  Lungs clear throughout on inspiration and expiration.  Expiration not prolonged, no wheeze.  CV: Irregularly irregular, non-tachycardic.  Normal S1 S2, grade I/VI midsystolic murmur heard best at upper sternal borders, no extra sound.  No lower extremity edema.  ABDOMEN: Soft, non-tender, no guarding.  Liver and spleen not enlarged, no masses palpable.  Bowel sounds positive.  MS: No bony deformities noted.  No red or inflamed joints.  SKIN: Warm and dry, no rashes.  NEURO: Alert, oriented, conversant.  Cranial nerves II - XII grossly intact.  No gross motor or sensory deficits.  Gait not assessed.  BACK: Tender to palpation over mid- and lumbar spine, not elsewhere.  PSYCH: Calm, alert, conversant.  Able to articulate logical thoughts, no tangential thoughts, no hallucinations or delusions.  Affect normal.        Data     Recent Labs  Lab 07/15/18  0556 07/14/18  0634 07/13/18  0559 07/12/18  1815   WBC  --   --  7.7 5.9   HGB  --   --  12.3 13.1   MCV  --   --  93 93   PLT  --   --  270 308   INR 2.02* 4.16* 5.62* 4.59*   NA  --   --  135 132*   POTASSIUM  --   --  4.2 4.2   CHLORIDE  --   --  102 99   CO2  --   --  23 21   BUN  --   --  15 17   CR  --   --  0.65 0.72   ANIONGAP  --   --  10 12   BLAISE  --   --  8.6 9.4   GLC  --   --  63* 91   ALBUMIN  --   --  3.0* 3.4   PROTTOTAL  --   --  7.0 7.7   BILITOTAL  --   --  0.9 0.9   ALKPHOS  --   --  174* 192*   ALT  --   --  24 23   AST  --   --  44 43         Recent Labs  Lab 07/13/18  0559 07/12/18  1815   GLC 63* 91        Unresulted Labs Ordered in the Past 30 Days of this Admission     No orders found from 5/13/2018 to 7/13/2018.           Imaging  No results found for this or  any previous visit (from the past 24 hour(s)).     I reviewed all new labs and imaging results over the last 24 hours. I personally reviewed no images or EKG's today.    Medications     Warfarin Therapy Reminder         acetaminophen  650 mg Oral TID     loratadine  10 mg Oral Daily     metoprolol succinate  25 mg Oral BID     naproxen  250 mg Oral BID w/meals     senna-docusate  1 tablet Oral At Bedtime     simvastatin  20 mg Oral At Bedtime     sodium chloride (PF)  3 mL Intracatheter Q8H     warfarin  3 mg Oral ONCE at 18:00       Jeffery Xiao MD[JM1.1]          Revision History        User Key Date/Time User Provider Type Action    > JM1.1 7/15/2018  2:14 PM Jeffery Xiao MD Physician Sign            Progress Notes by Carly Guillory PT at 7/15/2018 10:59 AM     Author:  Carly Guillory PT Service:  (none) Author Type:  Physical Therapist    Filed:  7/15/2018 11:00 AM Date of Service:  7/15/2018 10:59 AM Creation Time:  7/15/2018 10:59 AM    Status:  Signed :  Carly Guillory PT (Physical Therapist)         Discharge Planner PT   Patient plan for discharge: TCU  Current status: min A with sit to sup, max A of 2 with boosting. Amb with RW and brace with SBA  Barriers to return to prior living situation: steps, pain, need for more assistance  Recommendations for discharge: TCU  Rationale for recommendations: clinical judgement, need for more assistance       Entered by: Carly Guillory 07/15/2018 10:59 AM[JK1.1]        07/15/18 1000   Quick Adds   Type of Visit Initial PT Evaluation   Living Environment   Lives With child(rachele), adult   Living Arrangements house   Living Environment Comment Has mainl level set up   Functional Level Prior   Prior Functional Level Comment Indep ADLs, amb on treadmill with son present. No AD use   General Information   Patient/Family Goals Statement To get better   Pertinent History of Current Problem (include personal factors and/or comorbidities that impact the POC)  "Pt admitted due to T11 burst fx with retropulsion.   Precautions/Limitations (TLSO brace on for all mobility)   Weight-Bearing Status - LLE full weight-bearing   Weight-Bearing Status - RLE full weight-bearing   General Observations On toilet upon arrival with aide in room   General Info Comments TLSO brace   Cognitive Status Examination   Orientation orientation to person, place and time   Level of Consciousness alert   Follows Commands and Answers Questions 100% of the time   Personal Safety and Judgment intact   Memory intact   Range of Motion (ROM)   ROM Quick Adds No deficits were identified   Strength   Strength Comments BLE generalized weakness 4-/5   Bed Mobility   Bed Mobility Comments min A of 1 with log roll sit to sup, A of 2 for boosting in bed, A of 2 for rolling and removing brace   Transfer Skills   Transfer Comments SBA with sit/stands   Gait   Gait Comments Pt amb with RW with SBA 15ft with step through gait, pt voicing not knowing how to use RW   Balance   Balance Comments steady on feet with RW   General Therapy Interventions   Planned Therapy Interventions gait training;bed mobility training;strengthening   Clinical Impression   Criteria for Skilled Therapeutic Intervention yes, treatment indicated   PT Diagnosis impaired mobility   Influenced by the following impairments pain, weakness   Functional limitations due to impairments amb, transfers   Clinical Presentation Stable/Uncomplicated   Clinical Presentation Rationale clinical judgement   Clinical Decision Making (Complexity) Low complexity   Therapy Frequency` daily   Predicted Duration of Therapy Intervention (days/wks) 2 days   Anticipated Discharge Disposition Transitional Care Facility   Risk & Benefits of therapy have been explained Yes   Patient, Family & other staff in agreement with plan of care Yes   Clinical Impression Comments pt would benefit from skilled PT to addres strength and mobility   [facility] AM-PAC TM \"6 " "Clicks\"   2016, Trustees of [facility], under license to [employer].  All rights reserved.   6 Clicks Short Forms Basic Mobility Inpatient Short Form   [facility] AM-PAC  \"6 Clicks\" V.2 Basic Mobility Inpatient Short Form   1. Turning from your back to your side while in a flat bed without using bedrails? 2 - A Lot   2. Moving from lying on your back to sitting on the side of a flat bed without using bedrails? 2 - A Lot   3. Moving to and from a bed to a chair (including a wheelchair)? 3 - A Little   4. Standing up from a chair using your arms (e.g., wheelchair, or bedside chair)? 3 - A Little   5. To walk in hospital room? 3 - A Little   6. Climbing 3-5 steps with a railing? 3 - A Little   Basic Mobility Raw Score (Score out of 24.Lower scores equate to lower levels of function) 16   Total Evaluation Time   Total Evaluation Time (Minutes) 8[JK1.2]       See Care Plan for goals.[JK1.1]       Revision History        User Key Date/Time User Provider Type Action    > JK1.2 7/15/2018 11:00 AM Carly Guillory, PT Physical Therapist Sign     JK1.1 7/15/2018 10:59 AM Carly Guillory, PT Physical Therapist             Progress Notes by Asim Humphreys PA-C at 7/15/2018  8:39 AM     Author:  Asim Humphreys PA-C Service:  (none) Author Type:  Physician Assistant - C    Filed:  7/15/2018  8:45 AM Date of Service:  7/15/2018  8:39 AM Creation Time:  7/15/2018  8:39 AM    Status:  Signed :  Asim Humphreys PA-C (Physician Assistant - C)         [facility] Progress Note      Post-operative Day:[CH1.1]      * No surgery found *[CH1.2]      Subjective:  Patient seen and examined.  Doing very well.  VSS.  AF.  Out of bed this AM sitting in chair.  Managing pain with Tylenol at this point.  Tolerating brace well, notes brace helps manage pain.      Pain: minimal at rest, increased with change of position.  Chest pain, SOB:  No      Objective:[CH1.1]  Blood pressure 151/79, pulse " 75, temperature 97.8  F (36.6  C), temperature source Oral, resp. rate 16, weight 36.3 kg (80 lb), SpO2 96 %, not currently breastfeeding.    Patient Vitals for the past 24 hrs:   BP Temp Temp src Pulse Resp SpO2   07/15/18 0730 151/79 97.8  F (36.6  C) Oral 75 16 96 %   07/14/18 1522 144/69 97.7  F (36.5  C) Oral 92 16 -       Wt Readings from Last 4 Encounters:   07/12/18 36.3 kg (80 lb)   07/05/18 (P) 37.2 kg (82 lb)   06/29/18 38 kg (83 lb 12.8 oz)   06/06/18 38.6 kg (85 lb)[CH1.2]         Motor function, sensation, and circulation intact   Yes  Wound status: incisions are clean dry and intact. N/A  Calf tenderness: Bilateral  No    Pertinent Labs   Lab Results: personally reviewed.     Recent Labs   Lab Test  07/15/18   0556  07/14/18   0634  07/13/18   0559  07/12/18   1815  07/05/18   0842   INR  2.02*  4.16*  5.62*  4.59*  4.25*   HGB   --    --   12.3  13.1  12.1   HCT   --    --   38.6  40.4  37.3   MCV   --    --   93  93  94   PLT   --    --   270  308  249   NA   --    --   135  132*  139       Plan:  Patient doing very well.  Restrictions reviewed.  Questions answered.  No concerns.  2 week f/u appointment with [facility].   Brace when out of bed, doff when supine.  Bracing likely necessary for 8-12 weeks.   Anticoagulation protocol: Mechanical and/or ambulation              Pain medications:  tramadol and tylenol            Weight bearing status:  WBAT            Disposition:  [facility] tomorrow.             Continue cares and rehabilitation     Report completed by:[CH1.1]  Asim Humphreys PA-C, PA-C[CH1.2]  Date: 7/15/2018  Time: 8:40 AM[CH1.1]     Revision History        User Key Date/Time User Provider Type Action    > CH1.2 7/15/2018  8:45 AM Asim Humphreys PA-C Physician Assistant - C Sign     CH1.1 7/15/2018  8:39 AM Asim Humphreys PA-C Physician Assistant - C             Progress Notes by Hollis Downs at 7/14/2018  4:44 PM     Author:  Hollis Downs Service:  (none) Author Type:   Orthotist    Filed:  7/14/2018  4:45 PM Encounter Date:  7/14/2018 Status:  Signed    :  Hollis Downs (Orthotist)           S: Patient with family were seen today at the [facility] in [place] with an order from Dr. Xiao to fit the patient with a TLSO. Patient recently suffered a fall and is experiencing low/mid back pain.    O: Patient was in bed for the majority of the fitting appointment. Patient was alert/oriented during the appointment but in distress due to pain management. Patient is hard of hearing. Patient is 5'1'' and 36 kg. Patient has a bony sternum and a slender frame.    A: Patient was fit with a DeRoyal Ultralign TLSO with noble hinge sternal bar. Waist section was sized to patient's waist measurement and sternal bar was contoured to make contact with patient's sternum. TLSO fits well on the patient. Patient was logged rolled into the TLSO then therapy got her out of bed and was able to ambulate with the TLSO on without issue.   Patient is comfortable in the TLSO, there are no significant areas of pressure visible, there is adequate motion at the shoulder, pain has been reduced, corset provides intra cavity pressure, device has been checked for defects per 's guidelines,  and controls the thoracolumbar spine in the frontal, sagittal, transverse plane. Patient is satisfied with the fit and function of the TLSO .   Patient and family were given verbal/written instructions on donning/doffing, care instructions, warranty issues, fitting issues, and who to contact if there is an issue.    G: DeRoyal TLSO will treat patient's current condition by restricting flexion/extensions/rotation of the lumbar spine and provide abdominal compression to offload the fracutre site to facilitate healing.     P: Patient will follow Dr. Xiao's wearing schedule and will follow up with the [facility] as needed.      This note has been electronically signed by  Hollis VILLALOBOS/ , ABC  "#[id number], License #[id number].[BK1.1]     Revision History        User Key Date/Time User Provider Type Action    > BK1.1 7/14/2018  4:45 PM Hollis Downs Orthotist Sign            Progress Notes by Jeffery Xiao MD at 7/14/2018  3:23 PM     Author:  Jeffery Xiao MD Service:  Hospitalist Author Type:  Physician    Filed:  7/14/2018  3:35 PM Date of Service:  7/14/2018  3:23 PM Creation Time:  7/14/2018  3:23 PM    Status:  Signed :  Jeffery Xiao MD (Physician)         [facility]    Hospital Medicine Progress Note  Date of Service: 07/14/2018    Assessment & Plan   Shanika Stahl is a 89 year old female who presented on 7/12/2018 with reported abdominal pain.    Principal Problem:    Closed stable burst fracture of eleventh thoracic vertebra with routine healing - was admitted under the diagnosis of abdominal pain, but admission exam suggested mid-back pain radiating anteriorly to the abdomen.  Spine CT 7/12/18 revealed degenerative changes at multiple levels, as well as a T11 burst fracture with slight retropulsion into the spinal canal, described as having \"progressed substantially\" since 7/5/18 study, with increased soft tissue swelling.  This is probably the source of the patient's pain.  CT abdomen did not show any pathology to explain pain.  Since yesterday, Ortho consultation has been performed and reviewed, and following that consult, patient has been fitted with TLSO brace.  This has improved pain slightly.  However, she continues to have \"some\" pain on acetaminophen PRN only, sons wonder if analgesia could be stepped up.  - TLSO fitted.  Per Ortho note patient can now be weight bearing as tolerated.  - Will give acetaminophen scheduled at 650 mg TID.  - Add tramadol 50 mg TID PRN for breakthrough pain.  I had hoped to avoid opioids given her chronic problems with constipation, so will stop tramadol if other agents found to be effective.  - Still hesitant to " use NSAIDs at INR > 4, but will consider when INR therapeutic.  - Patient and family agree to this plan.    Active Problems:    Renal hypertension - on metoprolol as outpatient.  BP control generally good here so far.  - Continue current metoprolol, increase dose if hypertension recurs.      Chronic atrial fibrillation (H) - on warfarin anticoagulation and metoprolol for rate control.  HR good so far.  - Continue warfarin and metoprolol unchanged.      Diarrhea - received multiple doses of Mg citrate within past few days to correct constipation, which I think is the etiology. Abdominal exam is unremarkable, CT abdomen 7/12/18 showed no significant pathology.  Has not received recent antibiotics.  Diarrhea is easing per patient.  - Observe without Rx for now.      Oropharyngeal dysphagia - longstanding oropharyngeal dysphagia to both solids and liquids, not yet worked up.  Has been eating without difficulty here so far.  - Observe for dysphagia in-house.  - Consider outpatient workup.      Hyponatremia - mild at 132 on admission, normal today.  - Follow Na.      Asymptomatic bacteriuria - abnormal UA, but no urinary symptoms, no fever, and WBC normal.  - No antibiotics unless symptomatic and/or febrile.      Vaginal bleeding - may be due to mild, local trauma from 7/12/18 vaginal ultrasound combined with prolonged INR.  Bleeding is mild, and Hgb normal.  - Observe.      Prolonged INR - on warfarin for a-fib.  Unclear why INR is prolonged: no recent antibiotics, med or diet changes.  Aside from mild vaginal bleeding as above, no more significant blood loss noted, and Hgb normal.  No indication to reverse warfarin currently.  - Warfarin held today.  - Pharmacy consult obtained to manage INR.      Generalized weakness - global, non-focal weakness.  Part of her exertional intolerance has been due to pain, may improve if pain control improves.  - Begin PT and OT now that she is cleared for weight bearing.  - Discussed the  benefits of TCU at discharge with patient and family, most notably the physical therapy she can receive there on a routine basis as she adapts to her TLSO and tries to regain lost strength.  Patient is reluctantly agreeable, family enthusiastically agrees.  Will see if [facility] bed might be available Monday.      DVT Prophylaxis: Warfarin  Code Status: Full Code    Lines: Peripheral IV.   Chauhan catheter: Not currently needed.  Restraints: Not needed.    Discussion: T11 burst fracture now braced, weight bearing can resume and PT can begin.  Medical problems are stable, INR is normalizing.  Can go to TCU when bed available, perhaps Monday?    Disposition: Anticipate discharge in 2 to 3 days.  Appropriate for Inpatient care.     Attestation:  I have reviewed today's vital signs, notes, medications, labs and imaging.  Amount of time performed on this follow-up visit: 35 minutes, 25 of which were spent in care coordination and counseling with patient, both sons, and a daughter-in-law.    Jeffery Xiao MD       Interval History   Pain control is suboptimal on acetaminophen PRN.  Pain is better controlled with TLSO applied, and if she remains seated or lying down and still.  BM frequency seems to be easing.  No dysphagia yet encountered here.      Physical Exam   Temp:  [97.4  F (36.3  C)-97.8  F (36.6  C)] 97.8  F (36.6  C)  Pulse:  [75-87] 87  Resp:  [16-18] 18  BP: (138-151)/(59-65) 139/65  SpO2:  [92 %-95 %] 92 %    Weights:   Vitals:    07/12/18 1743   Weight: 36.3 kg (80 lb)    Body mass index is 14.87 kg/(m^2).    GENERAL: Pleasant woman, seated in recliner with TLSO on, looks comfortable.  EYES: Eyes grossly normal to inspection, extraocular movements intact  HENT: Nares patent bilaterally.  Nasal mucosa normal, no discharge.   NECK: Trachea midline, no stridor.    RESP: No accessory muscle use.  Symmetrical breath sounds.  Lungs clear throughout on inspiration and expiration.  Expiration not prolonged, no  wheeze.  CV: Irregularly irregular, non-tachycardic.  Normal S1 S2, no murmur or extra sound.  No lower extremity edema.  ABDOMEN: Soft, non-tender, no guarding.  Liver and spleen not enlarged, no masses palpable.  Bowel sounds positive.  MS: No bony deformities noted.  No red or inflamed joints.  SKIN: Warm and dry, no rashes.  NEURO: Alert, oriented, conversant.  Cranial nerves II - XII grossly intact.  No gross motor or sensory deficits.  Able to stand with minimal assistance, and can walk behind walker a short distance with mild assist.   BACK: Tender to palpation over mid- and lumbar spine.  PSYCH: Calm, alert, conversant.  Able to articulate logical thoughts, no tangential thoughts, no hallucinations or delusions.  Affect normal.        Data     Recent Labs  Lab 07/14/18  0634 07/13/18  0559 07/12/18  1815   WBC  --  7.7 5.9   HGB  --  12.3 13.1   MCV  --  93 93   PLT  --  270 308   INR 4.16* 5.62* 4.59*   NA  --  135 132*   POTASSIUM  --  4.2 4.2   CHLORIDE  --  102 99   CO2  --  23 21   BUN  --  15 17   CR  --  0.65 0.72   ANIONGAP  --  10 12   BLAISE  --  8.6 9.4   GLC  --  63* 91   ALBUMIN  --  3.0* 3.4   PROTTOTAL  --  7.0 7.7   BILITOTAL  --  0.9 0.9   ALKPHOS  --  174* 192*   ALT  --  24 23   AST  --  44 43         Recent Labs  Lab 07/13/18  0559 07/12/18  1815   GLC 63* 91        Unresulted Labs Ordered in the Past 30 Days of this Admission     No orders found from 5/13/2018 to 7/13/2018.           Imaging  No results found for this or any previous visit (from the past 24 hour(s)).     I reviewed all new labs and imaging results over the last 24 hours. I personally reviewed no images or EKG's today.    Medications     Warfarin Therapy Reminder         acetaminophen  650 mg Oral TID     loratadine  10 mg Oral Daily     metoprolol succinate  25 mg Oral BID     simvastatin  20 mg Oral At Bedtime     sodium chloride (PF)  3 mL Intracatheter Q8H     warfarin-No DOSE today  1 each Does not apply no dose today  (warfarin)       Jeffery Xiao MD[JM1.1]          Revision History        User Key Date/Time User Provider Type Action    > JM1.1 7/14/2018  3:35 PM Jeffery Xiao MD Physician Sign            Progress Notes by Carly Guillory PT at 7/14/2018 12:22 PM     Author:  Carly Guillory PT Service:  (none) Author Type:  Physical Therapist    Filed:  7/14/2018 12:23 PM Date of Service:  7/14/2018 12:22 PM Creation Time:  7/14/2018 12:22 PM    Status:  Signed :  Carly Guillory PT (Physical Therapist)         PT orders received. Hold until brace is present and off bedrest.[JK1.1]     Revision History        User Key Date/Time User Provider Type Action    > JK1.1 7/14/2018 12:23 PM Carly Guillory PT Physical Therapist Sign            Progress Notes by Carly Medina OT at 7/14/2018  9:24 AM     Author:  Carly Medina OT Service:  (none) Author Type:  Occupational Therapist    Filed:  7/14/2018  9:25 AM Date of Service:  7/14/2018  9:24 AM Creation Time:  7/14/2018  9:24 AM    Status:  Signed :  Carly Meidna OT (Occupational Therapist)         Occupational Therapy [facility]     07/14/18 0800   Quick Adds   Type of Visit Initial Occupational Therapy Evaluation   Living Environment   Lives With child(rachele), adult   Living Arrangements house   Number of Stairs to Enter Home 3   Number of Stairs Within Home 0   Living Environment Comment One level with basement, does not go in basement. walk in shower with multiple grab bars, rasied toilet.     Self-Care   Usual Activity Tolerance (Independent and active within home)   Current Activity Tolerance (bedrest until TLSO)   Functional Level Prior   Ambulation 0-->independent   Transferring 0-->independent   Toileting 0-->independent   Bathing 2-->assistive person   Dressing 0-->independent   Fall history within last six months yes   Prior Functional Level Comment Independent with dressing, toileting, shower.  Son is always present when pt does shower or walks  on treadmill.  Very rare cane use, typically no gait aide.  Son assists with all IADLs.     General Information   Onset of Illness/Injury or Date of Surgery - Date 07/13/18  (fall on 6/20/18)   Referring Physician Dameon   Patient/Family Goals Statement Return home, accepts TCU or homecare if necessary   Additional Occupational Profile Info/Pertinent History of Current Problem vertebral fx.  Pt was bending over to open low dresser drawer on 6/20 when she fell backwards.  has had pain since this time, especially with bed mobility and ambulation.   PAST MEDICAL HISTORY:   tachycardia, HTN, Stroke   Precautions/Limitations (awaiting TLSO)   General Observations Pleseant and cooperative   Cognitive Status Examination   Orientation orientation to person, place and time   Level of Consciousness alert   Able to Follow Commands WNL/WFL   Cognitive Comment forgetful   Pain Assessment   Patient Currently in Pain No   Range of Motion (ROM)   ROM Comment Bilateral UEs WFL   Strength   Strength Comments 4/5 MMT, generalized weakness but functional   Mobility   Bed Mobility Comments Needs min assist to log roll in bed, mild grimmace but reports no pain   Transfer Skills   Transfer Comments HOLD until TLSO   Upper Body Dressing   Level of [place]: Dress Upper Body independent  (anticipate assist once TLSO arrives)   Toileting   Level of [place]: Toilet (NSG assist with bedpan)   Grooming   Level of [place]: Grooming independent  (bedbased with setup)   Eating/Self Feeding   Level of [place]: Eating independent   General Therapy Interventions   Planned Therapy Interventions ADL retraining   Intervention Comments Limited eval/tx due to bedbased status.  Initiated discussion of DC planning; safety of home VS TCU and advancement of ADLs.     Clinical Impression   Criteria for Skilled Therapeutic Interventions Met yes, treatment indicated   OT Diagnosis vertebral fx   Assessment of Occupational Performance 1-3  "Performance Deficits   Identified Performance Deficits transfers, lower body tasks   Clinical Decision Making (Complexity) Low complexity   Therapy Frequency daily   Predicted Duration of Therapy Intervention (days/wks) 3   Anticipated Discharge Disposition Home with Assist;Transitional Care Facility  (continue to assess once TLSO arrives, anticipate TCU)   Risks and Benefits of Treatment have been explained. Yes   Patient, Family & other staff in agreement with plan of care Yes   Clinical Impression Comments Pt demosntrates functional decline from her baseline warranting the need for OT.  Current bedrest status limits full eval.  Upon arrival of TLSO pt and family will need education and therapy to accomodate for changes in ADLs.     [facility] AM-PAC TM \"6 Clicks\"   2016, Trustees of [facility], under license to [employer].  All rights reserved.   6 Clicks Short Forms Daily Activity Inpatient Short Form   [facility] AM-PAC  \"6 Clicks\" Daily Activity Inpatient Short Form   1. Putting on and taking off regular lower body clothing? 2 - A Lot   2. Bathing (including washing, rinsing, drying)? 3 - A Little   3. Toileting, which includes using toilet, bedpan or urinal? 3 - A Little   4. Putting on and taking off regular upper body clothing? 4 - None   5. Taking care of personal grooming such as brushing teeth? 4 - None   6. Eating meals? 4 - None   Daily Activity Raw Score (Score out of 24.Lower scores equate to lower levels of function) 20   Total Evaluation Time   Total Evaluation Time (Minutes) 20     See Care Plan for Goals.    Carly Medina OTR/L[JK1.1]         Revision History        User Key Date/Time User Provider Type Action    > JK1.1 7/14/2018  9:25 AM Carly Medina OT Occupational Therapist Sign            Progress Notes by Dre Phillips, RN at 7/13/2018  4:59 PM     Author:  Dre Phillips, RN Service:  (none) Author Type:  Registered Nurse    Filed:  7/13/2018  5:01 PM " "Encounter Date:  7/13/2018 Status:  Signed    :  Dre Phillips RN (Registered Nurse)           Opened in Error.    Dre Phillips RN, BSN, PHN  [facility]   [phone][BD1.1]           Revision History        User Key Date/Time User Provider Type Action    > BD1.1 7/13/2018  5:01 PM Dre Phillips RN Registered Nurse Sign            Progress Notes by Jeffery Xiao MD at 7/13/2018  2:32 PM     Author:  Jeffery Xiao MD Service:  Hospitalist Author Type:  Physician    Filed:  7/13/2018  2:56 PM Date of Service:  7/13/2018  2:32 PM Creation Time:  7/13/2018  2:32 PM    Status:  Signed :  Jeffery Xiao MD (Physician)         [facility] Medicine Progress Note  Date of Service:[JM1.1] 07/13/2018    Assessment & Plan[JM1.2]   Shanika Stahl is a 89 year old female who presented on 7/12/2018 with reported abdominal pain.[JM1.1]    Principal Problem:    Closed stable burst fracture of eleventh thoracic vertebra with routine healing[JM1.2] - was admitted under the diagnosis of abdominal pain, but admission exam suggested mid-back pain radiating anteriorly to the abdomen.  Spine CT 7/12/18 revealed degenerative changes at multiple levels, as well as a T11 burst fracture with slight retropulsion into the spinal canal, described as having \"progressed substantially\" since 7/5/18 study, with increased soft tissue swelling.  This is probably the source of the patient's pain.  CT abdomen did not show any pathology to explain pain.  - Described the T11 abnormality to patient and her son, Ulises.  - She should probably not weight bear until we get an Orthopedic opinion on management.  Ortho consult ordered, will make her non-weight bearing.  - Acetaminophen so far has been adequate for pain control. Would like to avoid opioids given her chronic problems with constipation, and avoid NSAIDs at INR > 5 today.[JM1.1]    Active Problems:    " Renal hypertension[JM1.2] - on metoprolol as outpatient.  Mildly hypertensive here so far.  - Continue current metoprolol, increase dose if hypertension persists.[JM1.1]      Chronic atrial fibrillation (H)[JM1.2] - on warfarin anticoagulation and metoprolol for rate control.  HR good so far.  - Continue warfarin and metoprolol unchanged.[JM1.1]      Diarrhea[JM1.2] - received multiple doses of Mg citrate within past few days to correct constipation, which I think is the etiology. Abdominal exam is unremarkable, CT abdomen 7/12/18 showed no significant pathology.  Has not received recent antibiotics.  - Observe without Rx for now.[JM1.1]      Oropharyngeal dysphagia[JM1.2] - longstanding oropharyngeal dysphagia to both solids and liquids, not yet worked up.  - Observe for dysphagia in-house.  - Consider outpatient workup.[JM1.1]      Hyponatremia[JM1.2] - mild at 132 on admission, normal today.  - Follow Na.[JM1.1]      Asymptomatic bacteriuria[JM1.2] - abnormal UA, but no urinary symptoms, no fever, and WBC normal.  - No antibiotics unless symptomatic and/or febrile.[JM1.1]      Vaginal bleeding[JM1.2] - may be due to mild, local trauma from 7/12/18 vaginal ultrasound combined with prolonged INR.  Bleeding is mild, and Hgb normal.  - Observe.[JM1.1]      Prolonged INR[JM1.2] - on warfarin for a-fib.  Unclear why INR is prolonged: no recent antibiotics, med or diet changes.  Aside from mild vaginal bleeding as above, no more significant blood loss noted, and Hgb normal.  No indication to reverse warfarin currently.  - Warfarin held today.  - Pharmacy consult obtained to manage INR.[JM1.1]      Generalized weakness[JM1.2] - global, non-focal weakness.  Part of her exertional intolerance has been due to pain, may improve if pain control improves.  - Begin PT and OT when cleared for weight bearing.  - Consider TCU at discharge.      DVT Prophylaxis: Warfarin  Code Status:[JM1.1] Full Code[JM1.2]    Lines: Peripheral  IV.   Chauhan catheter: Not currently needed.  Restraints: Not needed.    Discussion: T11 burst fracture appears to explain patient's pain.  Will request Orthopedic consult; wonder if patient may require bracing?    Disposition: Anticipate discharge in 2 to 3 days.  Appropriate for Inpatient care.     Attestation:  I have reviewed today's vital signs, notes, medications, labs and imaging.  Amount of time performed on this follow-up visit: 45 minutes, 25 of which were spent in care coordination and counseling.    Jeffery Xiao MD[JM1.1]       Interval History[JM1.2]   Pain is well-controlled if she remains seated or lying down and still.  BM frequency seems to be easing.  No dysphagia yet encountered here.  Patient's son, Alex, here for visit.[JM1.1]    Physical Exam   Temp:  [97.3  F (36.3  C)-98.5  F (36.9  C)] 97.6  F (36.4  C)  Pulse:  [] 83  Resp:  [16-20] 16  BP: (144-161)/(68-74) 161/72  SpO2:  [95 %-98 %] 96 %[JM1.2]    Weights:[JM1.1]   Vitals:    07/12/18 1743   Weight: 36.3 kg (80 lb)    Body mass index is 14.87 kg/(m^2).[JM1.2]    GENERAL: Pleasant woman, seated in recliner, looks comfortable.  EYES: Eyes grossly normal to inspection, extraocular movements intact  HENT: Nares patent bilaterally.  Nasal mucosa normal, no discharge.   NECK: Trachea midline, no stridor.    RESP: No accessory muscle use.  Symmetrical breath sounds.  Lungs clear throughout on inspiration and expiration.  Expiration not prolonged, no wheeze.  CV: Irregularly irregular, non-tachycardic.  Normal S1 S2, no murmur or extra sound.  No lower extremity edema.  ABDOMEN: Soft, non-tender, no guarding.  Liver and spleen not enlarged, no masses palpable.  Bowel sounds positive.  MS: No bony deformities noted.  No red or inflamed joints.  SKIN: Warm and dry, no rashes.  NEURO: Alert, oriented, conversant.  Cranial nerves II - XII grossly intact.  No gross motor or sensory deficits.    BACK: Tender to palpation over mid-spine,  back pain exacerbated by leaning forward and twisting trunk movements, nearly absent if still.  PSYCH: Calm, alert, conversant.  Able to articulate logical thoughts, no tangential thoughts, no hallucinations or delusions.  Affect normal.[JM1.1]        Data     Recent Labs  Lab 07/13/18  0559 07/12/18  1815   WBC 7.7 5.9   HGB 12.3 13.1   MCV 93 93    308   INR 5.62* 4.59*    132*   POTASSIUM 4.2 4.2   CHLORIDE 102 99   CO2 23 21   BUN 15 17   CR 0.65 0.72   ANIONGAP 10 12   BLAISE 8.6 9.4   GLC 63* 91   ALBUMIN 3.0* 3.4   PROTTOTAL 7.0 7.7   BILITOTAL 0.9 0.9   ALKPHOS 174* 192*   ALT 24 23   AST 44 43         Recent Labs  Lab 07/13/18  0559 07/12/18  1815   GLC 63* 91        Unresulted Labs Ordered in the Past 30 Days of this Admission     No orders found for last 61 day(s).[JM1.2]           Imaging[JM1.1]  Recent Results (from the past 24 hour(s))   Abdomen, flat/upright (2 views)    Narrative    XR ABDOMEN TWO VIEWS  7/12/2018 8:27 PM     COMPARISON: None.    HISTORY: Abdominal spasm and pain. Constipation noted on CT imaging on  July 5, 2018.  Evaluate for interval resolution of constipation.    FINDINGS: Abdominal bowel gas pattern is nonspecific. There is no  evidence for free intraperitoneal air.      Impression    IMPRESSION: No evidence for bowel obstruction or free air.    CLAUDIA TELLES MD   US Pelvic Complete with Transvaginal    Narrative    PELVIC ULTRASOUND TRANSABDOMINAL IMAGING AND TRANSVAGINAL IMAGING   7/12/2018 8:38 PM    HISTORY: Lower abdominal spasms and discomfort. Abnormal endometrial  stripe on CT imaging from July 5, 2018.  Evaluate for uterine  malignancy versus other acute process;     COMPARISON: A CT which included the pelvis on 7/5/2018.    TECHNIQUE: Transabdominal imaging was performed. Transvaginal imaging  was also performed.     FINDINGS: The uterus measures 5.9 x 3.1 cm. It demonstrates normal  echogenicity with no myometrial abnormality seen. The endometrium  is  normal measuring 0.27 cm. The endometrial canal is distended with  fluid measuring 2.4 x 1.9 x 3.0 cm for an estimated volume of 7.2 mL.  The right ovary is not seen. The left ovary contains a 0.5 cm  follicle. Normal blood flow is seen to the left ovary. No adnexal  pathology is seen. There is a small amount of free fluid in the  cul-de-sac.      Impression    IMPRESSION: The uterus is fluid-filled as described above suggesting  hydrometra.    ANITA REIS MD   CT Abdomen Pelvis w Contrast    Narrative    CT ABDOMEN AND PELVIS WITH CONTRAST 7/13/2018 7:43 AM     HISTORY:  Abdominal pain.     COMPARISON: CT abdomen and pelvis 7/5/2018.    TECHNIQUE: Axial images from the lung bases to the symphysis are  performed with additional coronal reformatted images. 39 mL of Isovue  370 are given intravenously.  Radiation dose for this scan was reduced  using automated exposure control, adjustment of the mA and/or kV  according to patient size, or iterative reconstruction technique.    FINDINGS:   Linear atelectasis is present at the lung bases in  addition to a trace amount of pleural fluid bilaterally which is new.  Cardiomegaly.    Abdomen: The liver, spleen, gallbladder, pancreas and adrenal glands  are unremarkable. Bilateral simple-appearing renal cysts are present.  No hydronephrosis. No obvious renal calculi. Aorta is calcified  without evidence of aneurysm or dissection. The bowel is normal in  caliber without obstruction. Bowel anastomosis right lower quadrant is  patent.    Pelvis: Bladder is decompressed but otherwise unremarkable. Uterus is  visualized but the endometrium appears thickened on series 4, image  54. May be abnormal in a postmenopausal patient. No enlarged pelvic or  inguinal lymph nodes. No free pelvic fluid. Bone window examination  demonstrates no aggressive-appearing bone lesions. Osteopenia is  noted. Correlate with concurrently performed spine CT reports.      Impression     IMPRESSION:  1. Cardiomegaly with trace pleural effusions.  2. Endometrial thickening of uncertain significance. Endometrial  hyperplasia or neoplasia is possible. No significant change since  prior CT. No enlarged lymph nodes.  3. No bowel obstruction or diverticulitis. Appendix not visualized.  Bowel anastomosis right lower quadrant is patent.  4. Simple renal cysts bilaterally. Abdominal and pelvic organs are  otherwise within normal limits.    MAKEDA BLEDSOE MD   CT Lumbar Spine w/o Contrast    Narrative    CT LUMBAR SPINE WITHOUT CONTRAST  7/13/2018 7:45 AM     HISTORY: Abdomen and pelvic pain, but worse with movement.      TECHNIQUE: Axial images of the lumbar spine were obtained without  intravenous contrast. Multiplanar reformations were performed.   Radiation dose for this scan was reduced using automated exposure  control, adjustment of the mA and/or kV according to patient size, or  iterative reconstruction technique.    COMPARISON: 9/30/2016 x-ray.    FINDINGS: Reconstructed images demonstrate normal vertebral body  height. There are advanced degenerative changes at the L5-S1 level.    T12-L1: No disc herniation or stenosis. Facet joints are unremarkable.    L1-L2:  No disc herniation or stenosis. Facet joints are unremarkable.    L2-L3:  Mild disc bulge. Mild central stenosis. Neural foramina are  patent. Facet joints are unremarkable. Probable hemangioma right  posterior L3 vertebra.     L3-L4:  Mild disc bulge and ligamentum flavum hypertrophy. Moderate  central stenosis. Neural foramina are patent.     L4-L5:  Disc bulge and ligamentum flavum hypertrophy result in mild to  moderate central stenosis. Neural foramina are patent.     L5-S1:  Advanced facet degenerative changes. Grade 1 degenerative  spondylolisthesis. Endplate irregularity appears degenerative. No  paraspinous soft tissue inflammation. No central stenosis. Mild  inferior foraminal narrowing bilaterally.        Impression    IMPRESSION:     1. Advanced degenerative disc disease and grade 1 degenerative  spondylolisthesis at L5-S1 without significant stenosis.  2. At L3-L4 there is moderate central stenosis.  2. At L4-L5 there is mild to moderate central stenosis. No other  significant stenosis identified.  4. No evidence of compression fracture.    CT Thoracic Spine w Contrast    Narrative    CT THORACIC SPINE WITHOUT CONTRAST   7/13/2018 8:24 AM     HISTORY: Abdominal pain.      TECHNIQUE: Axial images of the thoracic spine were obtained without  intravenous contrast. Multiplanar reformations were performed.   Radiation dose for this scan was reduced using automated exposure  control, adjustment of the mA and/or kV according to patient size, or  iterative reconstruction technique.    COMPARISON: CT abdomen and pelvis 7/5/2018    FINDINGS:  The alignment of the thoracic spine is normal although  kyphosis is exaggerated.  There is a burst fracture of T11 involving  superior and inferior endplates with acute-appearing fracture lines.  The posterosuperior and posteroinferior endplates protrude slightly  into the spinal canal but cause only mild narrowing of the spinal  canal. There is adjacent soft tissue swelling best appreciated on  coronal images and axial image 109 of series 5. Digital   radiograph from the previous CT scan shows that there is no soft  tissue swelling and that there was only mild compression deformity of  the superior endplate, and cross-sectional images through the inferior  endplate do not show any widening or retropulsion. Thus the fracture  has worsened since 7/5/2018.    There is also a compression fracture of the superior endplate of T6  with no acute fracture lines and no soft tissue swelling.    No other fractures are seen. There are small bilateral pleural  effusions. There is cardiomegaly with pacemaker in the heart. There is  a cyst in the upper pole of the left kidney.      Impression    IMPRESSION:  1. Recent burst  fracture of T11 involving superior and inferior  endplates with slight retropulsion into the spinal canal. This has  progressed substantially since 7/5/2018 and now has much more adjacent  soft tissue swelling.  2. Old compression fracture of the superior endplate of T6.    DAMION CARUSO MD[JM1.2]        I reviewed all new labs and imaging results over the last 24 hours. I personally reviewed no images or EKG's today.[JM1.1]    Medications     sodium chloride 75 mL/hr at 07/13/18 0112     Warfarin Therapy Reminder         loratadine  10 mg Oral Daily     metoprolol succinate  25 mg Oral BID     simvastatin  20 mg Oral At Bedtime     warfarin-No DOSE today  1 each Does not apply no dose today (warfarin)[JM1.2]       Jeffery Xiao MD[JM1.1]          Revision History        User Key Date/Time User Provider Type Action    > JM1.2 7/13/2018  2:56 PM Jeffery Xiao MD Physician Sign     JM1.1 7/13/2018  2:32 PM Jeffery Xiao MD Physician             Progress Notes by Mikayla Santos LICSW at 7/13/2018  1:41 PM     Author:  Mikayla Santos LICSW Service:  (none) Author Type:      Filed:  7/13/2018  1:41 PM Date of Service:  7/13/2018  1:41 PM Creation Time:  7/13/2018  1:39 PM    Status:  Signed :  Mikayla Santos LICSW ()         CARE TRANSITION SOCIAL WORK INITIAL ASSESSMENT:      Met with: Patient and Family.    DATA  Active Problems:    Abdominal pain       Primary Care Clinic Name:  ([facility])  Primary Care MD Name:  ([facility])  Contact information and PCP information verified: Yes      ASSESSMENT  Cognitive Status: awake, alert and oriented.       Resources List: Transitional Care, Skilled Nursing Facility, Home Care     Lives With: child(rachele), adult  Living Arrangements: house     Description of Support System: Supportive, Involved   Who is your support system?: Children   Support Assessment: Adequate family and caregiver support, Adequate social supports   Insurance  Concerns: No Insurance issues identified        This writer met with pt and with pts sonAlex introduced self and role. Discussed discharge planning and medicare guidelines in regards to home care and SNF benefits. Pt lives at home with sonAntoine who helps provide care, meals, medications, shopping etc. Pt reports feeling more weak than usual. Discussed TCU vs Home care. Patient was provided with Medicare certified nursing home list. Pts choices are as follows [place] [facility] (Phone: [phone] Fax: [phone]), [facility] (Main: [phone] Admissions: [phone] Fax: [phone]) and [facility] Phone: ([phone]) Fax: ([phone]). If pt feels better she is agreeable to home care. Pt was provided with Medicare certified home care list. Pt chooses to use [facility] ([phone] Fax: [phone]).  TCU referrals pending, will need home care order placed if going home with home care.  Pt will be changed from OBS to inpatient today, discussed TCU benefits and the potential for private pay if not meeting 3 night stay.             PLAN    TCU vs home care    Discharge Planner   Discharge Plans in progress: TCU vs home care  Barriers to discharge plan: medical stabiltiy  Follow up plan: CTS to follow       Entered by: Mikayla Santos 07/13/2018 1:39 PM             Mikayla Santos MSW, JAIME, [facility] [phone][AK1.1]       Revision History        User Key Date/Time User Provider Type Action    > AK1.1 7/13/2018  1:41 PM Mikayla Santos LICSW  Sign            Progress Notes by Rita Figueroa RN at 7/13/2018  7:23 AM     Author:  Chall, Rita, RN Service:  Nursing Author Type:  Registered Nurse    Filed:  7/13/2018  7:25 AM Date of Service:  7/13/2018  7:23 AM Creation Time:  7/13/2018  7:23 AM    Status:  Addendum :  Rita Figueroa RN (Registered Nurse)         DATE:  7/13/2018   TIME OF RECEIPT FROM LAB:  0719  LAB TEST:  0559  LAB VALUE:  INR  5.62  RESULTS GIVEN WITH READ-BACK TO (PROVIDER):  Jeffery Xiao MD  TIME LAB VALUE REPORTED TO PROVIDER:   0722[JC1.1]    Pharmacy notified as well.[JC1.2]     Revision History        User Key Date/Time User Provider Type Action    > [N/A] 7/13/2018  7:25 AM Rita Figueroa RN Registered Nurse Addend     JC1.2 7/13/2018  7:25 AM Rita Figueroa RN Registered Nurse Sign     JC1.1 7/13/2018  7:23 AM Rita Figueroa RN Registered Nurse             Progress Notes by Ellen Ovalle RN at 7/13/2018  4:27 AM     Author:  Ellen Ovalle RN Service:  Nursing Author Type:  Registered Nurse    Filed:  7/13/2018  4:29 AM Date of Service:  7/13/2018  4:27 AM Creation Time:  7/13/2018  4:27 AM    Status:  Signed :  Ellen Ovalle RN (Registered Nurse)         [facility] ADMISSION NOTE    Patient admitted to room 2310 at approximately 2330 via cart from emergency room. Patient was accompanied by transport tech.     Verbal SBAR report received from EUGENIA Duron prior to patient arrival.     Patient ambulated to bed with two assist. Patient alert and oriented X 3. Pain is controlled without any medications. 0-10 Pain Scale: 0. Admission vital signs: Blood pressure 151/74, pulse 73, temperature 97.3  F (36.3  C), temperature source Oral, resp. rate 16, weight 36.3 kg (80 lb), SpO2 95 %, not currently breastfeeding. Patient was oriented to plan of care, call light, bed controls, tv, telephone, bathroom and visiting hours.     Risk Assessment    The following safety risks were identified during admission: fall. Yellow risk band applied: YES.     Skin Initial Assessment    This writer admitted this patient and completed a full skin assessment and  score in the Adult PCS flowsheet. Appropriate interventions initiated as needed.     Secondary skin check completed by EUGENIA Madsen.    Skin  Inspection of bony prominences: Full  Skin WDL:  WDL except  Additional Documentation: Wound (LDA)     Risk  Assessment  Sensory Perception: 4-->no impairment  Moisture: 4-->rarely moist  Activity: 3-->walks occasionally  Mobility: 3-->slightly limited  Nutrition: 3-->adequate  Friction and Shear: 3-->no apparent problem   Score: 20  Bed Support Surface: Atmos Air mattress  Reassessed using Bed Algorithm: No    Ellen Ovalle[JK1.1]       Revision History        User Key Date/Time User Provider Type Action    > JK1.1 7/13/2018  4:29 AM Ellen Ovalle RN Registered Nurse Sign            Progress Notes by Tena Brady RN at 7/13/2018  3:43 AM     Author:  Tena Brady RN Service:  (none) Author Type:  Registered Nurse    Filed:  7/13/2018  3:44 AM Date of Service:  7/13/2018  3:43 AM Creation Time:  7/13/2018  3:43 AM    Status:  Signed :  Tena Brady RN (Registered Nurse)         This writer was present during the initial skin assessment and agrees with the documented findings.[JB1.1]      Revision History        User Key Date/Time User Provider Type Action    > JB1.1 7/13/2018  3:44 AM Tena Brady RN Registered Nurse Sign                  Procedure Notes     No notes of this type exist for this encounter.         Progress Notes - Therapies (Notes from 07/13/18 through 07/16/18)      Progress Notes by Carly Guillory PT at 7/15/2018 10:59 AM     Author:  Carly Guillory PT Service:  (none) Author Type:  Physical Therapist    Filed:  7/15/2018 11:00 AM Date of Service:  7/15/2018 10:59 AM Creation Time:  7/15/2018 10:59 AM    Status:  Signed :  Carly Guillory PT (Physical Therapist)         Discharge Planner PT   Patient plan for discharge: TCU  Current status: min A with sit to sup, max A of 2 with boosting. Amb with RW and brace with SBA  Barriers to return to prior living situation: steps, pain, need for more assistance  Recommendations for discharge: TCU  Rationale for recommendations: clinical judgement, need for more assistance       Entered by: Carly Guillory 07/15/2018 10:59 AM[JK1.1]         07/15/18 1000   Quick Adds   Type of Visit Initial PT Evaluation   Living Environment   Lives With child(rachele), adult   Living Arrangements house   Living Environment Comment Has mainl level set up   Functional Level Prior   Prior Functional Level Comment Indep ADLs, amb on treadmill with son present. No AD use   General Information   Patient/Family Goals Statement To get better   Pertinent History of Current Problem (include personal factors and/or comorbidities that impact the POC) Pt admitted due to T11 burst fx with retropulsion.   Precautions/Limitations (TLSO brace on for all mobility)   Weight-Bearing Status - LLE full weight-bearing   Weight-Bearing Status - RLE full weight-bearing   General Observations On toilet upon arrival with aide in room   General Info Comments TLSO brace   Cognitive Status Examination   Orientation orientation to person, place and time   Level of Consciousness alert   Follows Commands and Answers Questions 100% of the time   Personal Safety and Judgment intact   Memory intact   Range of Motion (ROM)   ROM Quick Adds No deficits were identified   Strength   Strength Comments BLE generalized weakness 4-/5   Bed Mobility   Bed Mobility Comments min A of 1 with log roll sit to sup, A of 2 for boosting in bed, A of 2 for rolling and removing brace   Transfer Skills   Transfer Comments SBA with sit/stands   Gait   Gait Comments Pt amb with RW with SBA 15ft with step through gait, pt voicing not knowing how to use RW   Balance   Balance Comments steady on feet with RW   General Therapy Interventions   Planned Therapy Interventions gait training;bed mobility training;strengthening   Clinical Impression   Criteria for Skilled Therapeutic Intervention yes, treatment indicated   PT Diagnosis impaired mobility   Influenced by the following impairments pain, weakness   Functional limitations due to impairments amb, transfers   Clinical Presentation Stable/Uncomplicated   Clinical Presentation  "Rationale clinical judgement   Clinical Decision Making (Complexity) Low complexity   Therapy Frequency` daily   Predicted Duration of Therapy Intervention (days/wks) 2 days   Anticipated Discharge Disposition Transitional Care Facility   Risk & Benefits of therapy have been explained Yes   Patient, Family & other staff in agreement with plan of care Yes   Clinical Impression Comments pt would benefit from skilled PT to addres strength and mobility   [facility] AM-PAC TM \"6 Clicks\"   2016, Trustees of [facility], under license to [employer].  All rights reserved.   6 Clicks Short Forms Basic Mobility Inpatient Short Form   [facility] AM-PAC  \"6 Clicks\" V.2 Basic Mobility Inpatient Short Form   1. Turning from your back to your side while in a flat bed without using bedrails? 2 - A Lot   2. Moving from lying on your back to sitting on the side of a flat bed without using bedrails? 2 - A Lot   3. Moving to and from a bed to a chair (including a wheelchair)? 3 - A Little   4. Standing up from a chair using your arms (e.g., wheelchair, or bedside chair)? 3 - A Little   5. To walk in hospital room? 3 - A Little   6. Climbing 3-5 steps with a railing? 3 - A Little   Basic Mobility Raw Score (Score out of 24.Lower scores equate to lower levels of function) 16   Total Evaluation Time   Total Evaluation Time (Minutes) 8[JK1.2]       See Care Plan for goals.[JK1.1]       Revision History        User Key Date/Time User Provider Type Action    > JK1.2 7/15/2018 11:00 AM Carly Guillory PT Physical Therapist Sign     JK1.1 7/15/2018 10:59 AM Carly Guillory PT Physical Therapist             Progress Notes by Carly Guillory PT at 7/14/2018 12:22 PM     Author:  Carly Guillory PT Service:  (none) Author Type:  Physical Therapist    Filed:  7/14/2018 12:23 PM Date of Service:  7/14/2018 12:22 PM Creation Time:  7/14/2018 12:22 PM    Status:  Signed :  Carly Guillory PT (Physical Therapist)         PT " orders received. Hold until brace is present and off bedrest.[JK1.1]     Revision History        User Key Date/Time User Provider Type Action    > JK1.1 7/14/2018 12:23 PM Carly Guillory PT Physical Therapist Sign            Progress Notes by Carly Medina OT at 7/14/2018  9:24 AM     Author:  Carly Medina OT Service:  (none) Author Type:  Occupational Therapist    Filed:  7/14/2018  9:25 AM Date of Service:  7/14/2018  9:24 AM Creation Time:  7/14/2018  9:24 AM    Status:  Signed :  Carly Medina OT (Occupational Therapist)         Occupational Therapy EVAL     07/14/18 0800   Quick Adds   Type of Visit Initial Occupational Therapy Evaluation   Living Environment   Lives With child(rachele), adult   Living Arrangements house   Number of Stairs to Enter Home 3   Number of Stairs Within Home 0   Living Environment Comment One level with basement, does not go in basement. walk in shower with multiple grab bars, rasied toilet.     Self-Care   Usual Activity Tolerance (Independent and active within home)   Current Activity Tolerance (bedrest until TLSO)   Functional Level Prior   Ambulation 0-->independent   Transferring 0-->independent   Toileting 0-->independent   Bathing 2-->assistive person   Dressing 0-->independent   Fall history within last six months yes   Prior Functional Level Comment Independent with dressing, toileting, shower.  Son is always present when pt does shower or walks on treadmill.  Very rare cane use, typically no gait aide.  Son assists with all IADLs.     General Information   Onset of Illness/Injury or Date of Surgery - Date 07/13/18  (fall on 6/20/18)   Referring Physician Dameon   Patient/Family Goals Statement Return home, accepts TCU or homecare if necessary   Additional Occupational Profile Info/Pertinent History of Current Problem vertebral fx.  Pt was bending over to open low dresser drawer on 6/20 when she fell backwards.  has had pain since this time, especially with bed  mobility and ambulation.   PAST MEDICAL HISTORY:   tachycardia, HTN, Stroke   Precautions/Limitations (awaiting TLSO)   General Observations Pleseant and cooperative   Cognitive Status Examination   Orientation orientation to person, place and time   Level of Consciousness alert   Able to Follow Commands WNL/WFL   Cognitive Comment forgetful   Pain Assessment   Patient Currently in Pain No   Range of Motion (ROM)   ROM Comment Bilateral UEs WFL   Strength   Strength Comments 4/5 MMT, generalized weakness but functional   Mobility   Bed Mobility Comments Needs min assist to log roll in bed, mild grimmace but reports no pain   Transfer Skills   Transfer Comments HOLD until TLSO   Upper Body Dressing   Level of [place]: Dress Upper Body independent  (anticipate assist once TLSO arrives)   Toileting   Level of [place]: Toilet (NSG assist with bedpan)   Grooming   Level of [place]: Grooming independent  (bedbased with setup)   Eating/Self Feeding   Level of [place]: Eating independent   General Therapy Interventions   Planned Therapy Interventions ADL retraining   Intervention Comments Limited eval/tx due to bedbased status.  Initiated discussion of DC planning; safety of home VS TCU and advancement of ADLs.     Clinical Impression   Criteria for Skilled Therapeutic Interventions Met yes, treatment indicated   OT Diagnosis vertebral fx   Assessment of Occupational Performance 1-3 Performance Deficits   Identified Performance Deficits transfers, lower body tasks   Clinical Decision Making (Complexity) Low complexity   Therapy Frequency daily   Predicted Duration of Therapy Intervention (days/wks) 3   Anticipated Discharge Disposition Home with Assist;Transitional Care Facility  (continue to assess once TLSO arrives, anticipate TCU)   Risks and Benefits of Treatment have been explained. Yes   Patient, Family & other staff in agreement with plan of care Yes   Clinical Impression Comments Pt demosntrates  "functional decline from her baseline warranting the need for OT.  Current bedrest status limits full eval.  [facility] and family will need education and therapy to accomodate for changes in ADLs.     [facility] TM \"6 Clicks\"   2016, Trustees of [facility], under license to [employer].  All rights reserved.   6 Clicks Short Forms Daily Activity Inpatient Short Form   [facility] AM-PAC  \"6 Clicks\" Daily Activity Inpatient Short Form   1. Putting on and taking off regular lower body clothing? 2 - A Lot   2. Bathing (including washing, rinsing, drying)? 3 - A Little   3. Toileting, which includes using toilet, bedpan or urinal? 3 - A Little   4. Putting on and taking off regular upper body clothing? 4 - None   5. Taking care of personal grooming such as brushing teeth? 4 - None   6. Eating meals? 4 - None   Daily Activity Raw Score (Score out of 24.Lower scores equate to lower levels of function) 20   Total Evaluation Time   Total Evaluation Time (Minutes) 20     See Care Plan for Goals.    Carly Medina OTR/L[JK1.1]         Revision History        User Key Date/Time User Provider Type Action    > JK1.1 7/14/2018  9:25 AM Carly Medina, OT Occupational Therapist Sign            "

## 2018-07-12 NOTE — TELEPHONE ENCOUNTER
"Per Son, Nba, patient has been having diarrhea or constipation for the past week.  When son came home tonight, patient had \"filled her pants\" with stool.  Patient is having abdominal cramps and is uncomfortable.  Son states patient hasn't had more than a \"plateful of food\" over the past week.  He also states she has had a very small fluid intake.   Son states \"she is weak and cannot get out of a chair.\"    He wants to bring her in for evaluation and perhaps a bag of IV fluids.  He declines triage and states he will bring her in to [facility] ER now.    Protocol and care advice reviewed  Caller states understanding of the recommended disposition  Advised to call back if further questions or concerns      Reason for Disposition    Patient sounds very sick or weak to the triager    Protocols used: CONSTIPATION-ADULT-AH      "

## 2018-07-12 NOTE — IP AVS SNAPSHOT
` ` Patient Information     Patient Name Sex     Shanika Stahl ([id number]) Female 1924       Room Bed    2310 2310-01      Patient Demographics     Address Phone E-mail Address    [address] [address] [phone] (Home)  [phone] (Mobile) *Preferred* [email or web address]      Patient Ethnicity & Race     Ethnic Group Patient Race    American White      Emergency Contact(s)     Name Relation Home Work Mobile    Ulises Stahl [phone]    Naina Han [phone]      Documents on File        Status Date Received Description       Documents for the Patient    Face Sheet  () 08     Privacy Notice - [place] Received 02/10/14     Face Sheet  () 10/26/05     Face Sheet  () 08/15/07     Consent Form  08     Face Sheet Received () 10/07/09     External Medication Information Consent       Patient ID Received () 14 No id with    Consent for Services - Hospital/Clinic Received () 11     External Medication Information Consent Accepted () 11     Advance Directives and Living Will Received 12 HEALTH CARE DIRECTIVE 3/30/99-    Consent to Communicate   Nba Stahl (Son)    Consent for Services - Hospital/Clinic Received () 07/10/12     External Medication Information Consent Accepted 12     Consent for EHR Access  13 Copied from existing Consent for services - C/HOD collected on 07/10/2012    [facility] Specified Other       Insurance Card Received 13 [facility]    Advance Directives and Living Will Not Received  VALIDATION OF AD 5.14.13    Advance Directives and Living Will Received 13 HEALTH CARE DIRECTIVE  5.14.13    Consent for Services - Hospital/Clinic Received () 13     Physical Therapy Certification Received 10/09/13 INPT Nahum Jensen    Occupational Therapy Certification Received 10/09/13 Cristal ndiaye INJAZ Camara  Nahum Jensen    Advance Directives and Living Will Not Received  VALIDATION OF AD 3/30/99-    Advance Directives and Living Will Received 14 POLST 2014    Physical Therapy Certification Received 14 francisco Flowers for CVA    Privacy Notice - [place]       Consent for Services - Hospital/Clinic Received () 14     Consent for Services - Hospital/Clinic       Consent for Services - Hospital/Clinic Received () 08/25/15     Consent for Services/Privacy Notice - Hospital/Clinic Received () 16     Business/Insurance/Care Coordination/Health Form - Patient  16 [place] DEPT OF [place]AFETY- APPLICATION FOR DISABILY PARKING CERT 2016    Consent to Communicate  16 MARA NEGRON    Consent for Services/Privacy Notice - Hospital/Clinic Received () 17     Physical Therapy Certification Received 17 Cert (17-9/15/17) Elect signed by [facility] Everywhere Prospective Auth Received 10/27/17     Insurance Card Received 18 medicare    Consent for Services/Privacy Notice - Hospital/Clinic Received 18     Patient ID Received () 18     Consent for Services - Hospital and Clinic Received 18     HIE Auth Received 18     Patient ID Scan Refused 18     Insurance Card  (Deleted) 05     Insurance Card Received (Deleted) 07        Documents for the Encounter    CMS IM for Patient Signature Received 18     Observation Notice Received 18     CMS FONTAINE for Patient Signature Received 18     CMS IM for Patient Signature Received 18       Admission Information     Attending Provider Admitting Provider Admission Type Admission Date/Time    Nahum Jensen MD Miller, Jeffery Amato MD Emergency 18  1746    Discharge Date Hospital Service Auth/Cert Status Service Area     Internal Medicine [facility]    Unit Room/Bed Admission Status        [facility] 2310/2310-01 Admission (Confirmed)       Admission     Complaint    Abdominal pain, Abdominal pain      Hospital Account     Name Acct ID Class Status Primary Coverage    Jhon Stahlclaire DIAZ [id number] Inpatient Open MEDICARE - MEDICARE            Guarantor Account (for Hospital Account #[id number])     Name Relation to Pt Service Area Active? Acct Type    Favio Shanika DIAZ  FCS Yes Personal/Family    Address Phone          [address] [place] [address] [phone](H)              Coverage Information (for Hospital Account #[id number])     1. MEDICARE/MEDICARE     F/O Payor/Plan Precert #    MEDICARE/MEDICARE     Subscriber Subscriber #    Rosyya Shanika DIAZ [id number]    Address Phone    [employer] CLAIMS  [address]  [place] [address] [phone]          2. [employer]     F/O Payor/Plan Precert #    [employer]     Subscriber Subscriber #    Shanika Stahl [id number]    Address Phone    [address] [address]  [place] [address] [phone]

## 2018-07-12 NOTE — IP AVS SNAPSHOT
"    [facility] SURGICAL: [phone]                                              INTERAGENCY TRANSFER FORM - PHYSICIAN ORDERS   2018                    Hospital Admission Date: 2018  JULITO NEGRON   : 1924  Sex: Female        Attending Provider: Nahum Jensen MD     Allergies:  Allopurinol, Ampicillin, Cipro [Ciprofloxacin], Levaquin, Lisinopril, Paxil [Paroxetine], Penicillins, Sulfa Drugs, Zithromax [Macrolides], Zoloft    Infection:  None   Service:  INTERNAL MED    Ht:  1.562 m (5' 1.5\")   Wt:  36.3 kg (80 lb)   Admission Wt:  36.3 kg (80 lb)    BMI:  14.87 kg/m 2   BSA:  1.26 m 2            Patient PCP Information     Provider PCP Type    Milena Jaime DO General      ED Clinical Impression     Diagnosis Description Comment Added By Time Added    Muscle weakness (generalized) [M62.81] Muscle weakness (generalized) [M62.81]  Julio Bishop MD 2018  8:12 PM    Abdominal pain, generalized [R10.84] Abdominal pain, generalized [R10.84] Occasional episodes of abdominal spasms more in the mid and lower abdominal area and suprapubic and pelvic area over the last 3 weeks Julio Bishop MD 2018  8:13 PM    Supratherapeutic INR [R79.1] Supratherapeutic INR [R79.1] INR was 4.59.  No evidence for active hemorrhage today.  Patient does have some mild post pelvic ultrasound bleeding Julio Bishop MD 2018  8:13 PM    Thyroid function test abnormal [R94.6] Thyroid function test abnormal [R94.6] mildly elevated TSH, and T4 Julio Bishop MD 2018  8:15 PM    Abnormal pelvic ultrasound [R93.8] Abnormal pelvic ultrasound [R93.8] hydrometra noted with distended endometrial canal on pelvic ultrasound.  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 chronic given CT imaging showing chronic endometrial changes in the past Julio Bishop MD 2018  9:05 PM      Hospital Problems as of 2018              Priority Class Noted POA    Renal hypertension " Medium  5/9/2005 Yes    Chronic atrial fibrillation (H) Medium  10/2/2015 Yes    * (Principal)Closed stable burst fracture of eleventh thoracic vertebra with routine healing Medium  7/13/2018 Yes    Diarrhea Medium  7/13/2018 Yes    Oropharyngeal dysphagia Medium  7/13/2018 Yes    Hyponatremia Medium  7/13/2018 Yes    Asymptomatic bacteriuria Medium  7/13/2018 Yes    Vaginal bleeding Medium  7/13/2018 Yes    Prolonged INR Medium  7/13/2018 Yes    Generalized weakness Medium  7/13/2018 Yes      Non-Hospital Problems as of 7/16/2018              Priority Class Noted    Atherosclerosis of renal artery (H) Medium  5/9/2005    Paroxysmal supraventricular tachycardia (H) Medium  5/9/2005    Other osteoporosis Medium  5/9/2005    Abnormal CXR (chest x-ray) Medium  Unknown    Family history of breast cancer Medium  Unknown    Hyperlipidemia LDL goal <100 Medium  10/31/2010    Advance Care Planning Medium  2/15/2012    Syncope Medium  10/9/2013    Cerebral embolism with cerebral infarction (H) Medium  2/10/2014    Health Care Home Medium  2/13/2014    Long term current use of anticoagulant therapy Medium  2/27/2015    Pacemaker Medium  10/2/2015    Sensorineural hearing loss, bilateral Medium  4/6/2016    Pulmonary hypertension Medium  7/31/2017      Code Status History     Date Active Date Inactive Code Status Order ID Comments User Context    2/12/2014 10:32 AM 7/12/2018 11:57 PM DNR/DNI [id number]  Vitor Flowers MD Outpatient    2/10/2014 12:02 PM 2/12/2014 10:32 AM DNR/DNI [id number]  Vitor Flowers MD Inpatient    2/10/2014 11:03 AM 2/10/2014 12:02 PM Full Code [id number]  Mana Martinez, RN Inpatient    1/22/2014  4:28 PM 1/23/2014  4:16 PM Full Code [id number]  Emma Briones RN Inpatient    10/9/2013 12:46 AM 10/10/2013  5:54 PM Full Code [id number]  Carly Najera, RN Inpatient         Medication Review      START taking        Dose / Directions Comments    acetaminophen 325 MG tablet   Commonly known as:  TYLENOL         Dose:  650 mg   Take 2 tablets (650 mg) by mouth 3 times daily   Quantity:  100 tablet   Refills:  0        naproxen 250 MG tablet   Commonly known as:  NAPROSYN        Dose:  250 mg   Take 1 tablet (250 mg) by mouth every 12 hours as needed for moderate pain , take with meals.   Refills:  0        polyethylene glycol Packet   Commonly known as:  MIRALAX/GLYCOLAX   Used for:  Slow transit constipation        Dose:  17 g   Take 17 g by mouth daily as needed for constipation   Quantity:  7 packet   Refills:  0        senna-docusate 8.6-50 MG per tablet   Commonly known as:  SENOKOT-S;PERICOLACE   Used for:  Slow transit constipation        Dose:  1 tablet   Take 1 tablet by mouth At Bedtime , stop taking if loose stools develop.   Quantity:  30 tablet   Refills:  0          CONTINUE these medications which have NOT CHANGED        Dose / Directions Comments    acyclovir 5 % ointment   Commonly known as:  ZOVIRAX   Used for:  Cold sore        Apply topically 6 times daily   Quantity:  15 g   Refills:  3        calcium + D 600-200 MG-UNIT Tabs   Generic drug:  calcium carbonate-vitamin D        1 tablet by mouth daily   Refills:  0        cholecalciferol 1000 UNIT tablet   Commonly known as:  vitamin D3        1 TABLET DAILY   Quantity:  30   Refills:  0        fish oil-omega-3 fatty acids 1000 MG capsule        Dose:  1 capsule   Take 1 capsule by mouth 2 times daily   Quantity:  90 capsule   Refills:  0        furosemide 20 MG tablet   Commonly known as:  LASIX   Used for:  Leg swelling        Dose:  20 mg   Take 1 tablet (20 mg) by mouth every other day   Quantity:  45 tablet   Refills:  3    Patient will call to fill       loratadine 10 MG tablet   Commonly known as:  CLARITIN        Dose:  10 mg   Take 10 mg by mouth daily   Refills:  0        metoprolol succinate 25 MG 24 hr tablet   Commonly known as:  TOPROL-XL   Used for:  Renal hypertension, stage 1-4 or unspecified chronic kidney disease        Dose:   25 mg   Take 1 tablet (25 mg) by mouth 2 times daily   Quantity:  180 tablet   Refills:  3    Patient will call to fill       order for DME   Used for:  Leg swelling        Knee high compression stockings 10-15 mm Hg   Quantity:  1 each   Refills:  1        simvastatin 20 MG tablet   Commonly known as:  ZOCOR   Used for:  Hyperlipidemia LDL goal <100        Dose:  20 mg   Take 1 tablet (20 mg) by mouth At Bedtime   Quantity:  90 tablet   Refills:  3    Patient will call to fiill       warfarin 2.5 MG tablet   Commonly known as:  JANTOVEN   Used for:  Chronic atrial fibrillation (H)        Take 2.5 mg on Mon/Wed/Fri; 5 mg all other days or as directed by the Anticoagulation Clinic   Quantity:  150 tablet   Refills:  0    Patient will call to fill                 Further instructions from your care team       Orthopedic Discharge Instructions  Follow up:  Follow up with Dr. Lujan or Dr. Resendiz in 2 weeks for x rays as scheduled.  Call [phone] if appointment needed or questions. If you have questions or concerns while at home, please call [facility].     Hospital Medicine Discharge Instructions:  1. Pain Management: acetaminophen (Tylenol) 650 mg three times daily. Naproxen (Aleve) 250 mg every 12 hours as needed for pain. Do not take on empty stomach, take with food/meal.  2. Constipation: Senna 1 tablet at bedtime, stop taking if loose stools develop. May add Miralax as needed for constipation.    Coumadin Discharge Instructions:  1. Take Coumadin 3 mg today (7/16), then resume home dosing schedule of 2.5 mg Mon/Wed/Fri, 5 mg rest of week.  2. Recheck INR at [facility] on Thurs, 7/19 with results phoned to [facility] at [phone]. Further dosing instructions per Anticoagulation Clinic.     Summary of Visit     Reason for your hospital stay       You were seen for abdominal pain. This was thought to be due to a fracture of your 11th thoracic vertebra. You have been  given a brace for this to be worn whenever you are out of bed. You should follow up with the [facility] in 2 weeks.             After Care     Additional Discharge Instructions       TLSO brace should be on while out of bed. Reposition as needed while brace is on.       General info for SNF       Length of Stay Estimate: Short Term Care: Estimated # of Days <30  Condition at Discharge: Improving  Level of care:skilled   Rehabilitation Potential: Good  Admission H&P remains valid and up-to-date: Yes  Recent Chemotherapy: N/A  Use Nursing Home Standing Orders: N/A       Mantoux instructions       Give two-step Mantoux (PPD) Per Facility Policy Yes       Weight bearing status       As tolerated       Weight bearing status       Weight bear as tolerated             Your next 10 appointments already scheduled     Aug 28, 2018  9:00 AM CDT   Remote PPM Check with [facility]   [facility] ([facility])    [address] [address] [address]   [phone]           This appointment is for a remote check of your pacemaker.  This is not an appointment at the office.              Follow-Up Appointment Instructions     Future Labs/Procedures    Follow Up and recommended labs and tests     Comments:    Follow up with Dr. Lujan or Dr. Kyaw Resendiz in 2 weeks.      Follow-Up Appointment Instructions     Follow Up and recommended labs and tests       Follow up with Dr. Lujan or Dr. Kyaw Resendiz in 2 weeks.             Statement of Approval     Ordered          07/16/18 1017  I have reviewed and agree with all the recommendations and orders detailed in this document.  EFFECTIVE NOW     Approved and electronically signed by:  Nahum Jensen MD

## 2018-07-12 NOTE — LETTER
Transition Communication Hand-off for Care Transitions to Next Level of Care Provider    Name: Shanika Stahl  : 1924  MRN #: [id number]  Primary Care Provider: Milena Jaime  Primary Care MD Name:  ([place])  Primary Clinic: [address] [place] [address]  Primary Care Clinic Name:  ([facility])  Reason for Hospitalization:  Abdominal pain, generalized [R10.84]  Muscle weakness (generalized) [M62.81]  Thyroid function test abnormal [R94.6]  Abnormal pelvic ultrasound [R93.8]  Supratherapeutic INR [R79.1]  Admit Date/Time: 2018  5:46 PM  Discharge Date: 2018  Payor Source: Payor: MEDICARE / Plan: MEDICARE / Product Type: Medicare /     Readmission Assessment Measure (KLAUDIA) Risk Score/category: average  Reason for Communication Hand-off Referral: Fragility    Discharge Plan: [facility]       Concern for non-adherence with plan of care:   Y/N no  Discharge Needs Assessment: TCU    Follow-up specialty is recommended: No    Follow-up plan:  Future Appointments  Date Time Provider Department Center   2018 9:00 AM [facility]     Agrawal Recommendations:  TSLO brace, TCU to increase independence with ADLs and functional mobility.    Autumn Almanzar    AVS/Discharge Summary is the source of truth; this is a helpful guide for improved communication of patient story

## 2018-07-13 ENCOUNTER — APPOINTMENT (OUTPATIENT)
Dept: CT IMAGING | Facility: CLINIC | Age: 83
DRG: 543 | End: 2018-07-13
Attending: FAMILY MEDICINE
Payer: MEDICARE

## 2018-07-13 ENCOUNTER — ANTICOAGULATION THERAPY VISIT (OUTPATIENT)
Dept: ANTICOAGULATION | Facility: CLINIC | Age: 83
End: 2018-07-13

## 2018-07-13 DIAGNOSIS — Z79.01 LONG TERM CURRENT USE OF ANTICOAGULANT THERAPY: ICD-10-CM

## 2018-07-13 DIAGNOSIS — I63.40 CEREBRAL EMBOLISM WITH CEREBRAL INFARCTION (H): ICD-10-CM

## 2018-07-13 PROBLEM — R82.71 ASYMPTOMATIC BACTERIURIA: Status: ACTIVE | Noted: 2018-07-13

## 2018-07-13 PROBLEM — R79.1 PROLONGED INR: Status: ACTIVE | Noted: 2018-07-13

## 2018-07-13 PROBLEM — R10.9 ABDOMINAL PAIN: Status: RESOLVED | Noted: 2018-07-12 | Resolved: 2018-07-13

## 2018-07-13 PROBLEM — E87.1 HYPONATREMIA: Status: ACTIVE | Noted: 2018-07-13

## 2018-07-13 PROBLEM — N93.9 VAGINAL BLEEDING: Status: ACTIVE | Noted: 2018-07-13

## 2018-07-13 LAB
ALBUMIN SERPL-MCNC: 3 G/DL (ref 3.4–5)
ALP SERPL-CCNC: 174 U/L (ref 40–150)
ALT SERPL W P-5'-P-CCNC: 24 U/L (ref 0–50)
ANION GAP SERPL CALCULATED.3IONS-SCNC: 10 MMOL/L (ref 3–14)
AST SERPL W P-5'-P-CCNC: 44 U/L (ref 0–45)
BILIRUB SERPL-MCNC: 0.9 MG/DL (ref 0.2–1.3)
BUN SERPL-MCNC: 15 MG/DL (ref 7–30)
CALCIUM SERPL-MCNC: 8.6 MG/DL (ref 8.5–10.1)
CHLORIDE SERPL-SCNC: 102 MMOL/L (ref 94–109)
CO2 SERPL-SCNC: 23 MMOL/L (ref 20–32)
CREAT SERPL-MCNC: 0.65 MG/DL (ref 0.52–1.04)
ERYTHROCYTE [DISTWIDTH] IN BLOOD BY AUTOMATED COUNT: 13.2 % (ref 10–15)
GFR SERPL CREATININE-BSD FRML MDRD: 85 ML/MIN/1.7M2
GLUCOSE SERPL-MCNC: 63 MG/DL (ref 70–99)
HCT VFR BLD AUTO: 38.6 % (ref 35–47)
HGB BLD-MCNC: 12.3 G/DL (ref 11.7–15.7)
INR PPP: 5.62 (ref 0.86–1.14)
MCH RBC QN AUTO: 29.6 PG (ref 26.5–33)
MCHC RBC AUTO-ENTMCNC: 31.9 G/DL (ref 31.5–36.5)
MCV RBC AUTO: 93 FL (ref 78–100)
PLATELET # BLD AUTO: 270 10E9/L (ref 150–450)
POTASSIUM SERPL-SCNC: 4.2 MMOL/L (ref 3.4–5.3)
PROT SERPL-MCNC: 7 G/DL (ref 6.8–8.8)
RBC # BLD AUTO: 4.16 10E12/L (ref 3.8–5.2)
SODIUM SERPL-SCNC: 135 MMOL/L (ref 133–144)
WBC # BLD AUTO: 7.7 10E9/L (ref 4–11)

## 2018-07-13 PROCEDURE — 72131 CT LUMBAR SPINE W/O DYE: CPT

## 2018-07-13 PROCEDURE — 85610 PROTHROMBIN TIME: CPT | Performed by: FAMILY MEDICINE

## 2018-07-13 PROCEDURE — A9270 NON-COVERED ITEM OR SERVICE: HCPCS | Mod: GY | Performed by: FAMILY MEDICINE

## 2018-07-13 PROCEDURE — 36415 COLL VENOUS BLD VENIPUNCTURE: CPT | Performed by: FAMILY MEDICINE

## 2018-07-13 PROCEDURE — 25000132 ZZH RX MED GY IP 250 OP 250 PS 637: Mod: GY | Performed by: FAMILY MEDICINE

## 2018-07-13 PROCEDURE — 25000128 H RX IP 250 OP 636: Performed by: EMERGENCY MEDICINE

## 2018-07-13 PROCEDURE — 25000128 H RX IP 250 OP 636: Performed by: FAMILY MEDICINE

## 2018-07-13 PROCEDURE — 72129 CT CHEST SPINE W/DYE: CPT

## 2018-07-13 PROCEDURE — 99233 SBSQ HOSP IP/OBS HIGH 50: CPT

## 2018-07-13 PROCEDURE — 25000125 ZZHC RX 250: Performed by: FAMILY MEDICINE

## 2018-07-13 PROCEDURE — 85027 COMPLETE CBC AUTOMATED: CPT | Performed by: FAMILY MEDICINE

## 2018-07-13 PROCEDURE — G0378 HOSPITAL OBSERVATION PER HR: HCPCS

## 2018-07-13 PROCEDURE — 12000000 ZZH R&B MED SURG/OB

## 2018-07-13 PROCEDURE — 80053 COMPREHEN METABOLIC PANEL: CPT | Performed by: FAMILY MEDICINE

## 2018-07-13 PROCEDURE — 74177 CT ABD & PELVIS W/CONTRAST: CPT

## 2018-07-13 RX ORDER — IOPAMIDOL 755 MG/ML
39 INJECTION, SOLUTION INTRAVASCULAR ONCE
Status: COMPLETED | OUTPATIENT
Start: 2018-07-13 | End: 2018-07-13

## 2018-07-13 RX ADMIN — SIMVASTATIN 20 MG: 20 TABLET, FILM COATED ORAL at 22:07

## 2018-07-13 RX ADMIN — ACETAMINOPHEN 325 MG: 325 TABLET, FILM COATED ORAL at 19:01

## 2018-07-13 RX ADMIN — SIMVASTATIN 20 MG: 20 TABLET, FILM COATED ORAL at 01:12

## 2018-07-13 RX ADMIN — METOPROLOL SUCCINATE 25 MG: 25 TABLET, EXTENDED RELEASE ORAL at 22:07

## 2018-07-13 RX ADMIN — IOPAMIDOL 39 ML: 755 INJECTION, SOLUTION INTRAVENOUS at 07:23

## 2018-07-13 RX ADMIN — SODIUM CHLORIDE: 9 INJECTION, SOLUTION INTRAVENOUS at 07:23

## 2018-07-13 RX ADMIN — SODIUM CHLORIDE, PRESERVATIVE FREE: 5 INJECTION INTRAVENOUS at 01:12

## 2018-07-13 RX ADMIN — METOPROLOL SUCCINATE 25 MG: 25 TABLET, EXTENDED RELEASE ORAL at 08:47

## 2018-07-13 RX ADMIN — LORATADINE 10 MG: 10 TABLET ORAL at 08:47

## 2018-07-13 RX ADMIN — METOPROLOL SUCCINATE 25 MG: 25 TABLET, EXTENDED RELEASE ORAL at 01:11

## 2018-07-13 NOTE — PLAN OF CARE
PT-  Cancel- Orders received, pt not seen;; will hold on PT due to high INR/ fall risk; will attempt again 7/14/2018

## 2018-07-13 NOTE — PLAN OF CARE
Problem: Patient Care Overview  Goal: Plan of Care/Patient Progress Review  Outcome: No Change  Patient has some cramping pain in her abdomen but refuses pain medications. Up to void once, assist x 1. Urine appears bloody but she does have uterine bleeding as well. Currently drinking a cup of water to prepare for CT scans.

## 2018-07-13 NOTE — PHARMACY-ANTICOAGULATION SERVICE
Clinical Pharmacy - Warfarin Dosing Consult     Pharmacy has been consulted to manage this patient s warfarin therapy.  Indication: Atrial Fibrillation  Therapy Goal: INR 2-3  OP Anticoag Clinic: [facility]  Warfarin Prior to Admission: Yes  Warfarin PTA Regimen: 2.5mg Mon,Wed,Fri; 5mg rest of week  Recent documented change in oral intake/nutrition: Patient reporting less intake due to significant diarrhea    INR   Date Value Ref Range Status   07/12/2018 4.59 (H) 0.86 - 1.14 Final   07/05/2018 4.25 (H) 0.86 - 1.14 Final       Recommend warfarin 0 mg today.  Pharmacy will monitor Shanika Stahl daily and order warfarin doses to achieve specified goal.      Please contact pharmacy as soon as possible if the warfarin needs to be held for a procedure or if the warfarin goals change.      Taylor Naqvi, PharmD

## 2018-07-13 NOTE — H&P
Admitted:     07/12/2018      CHIEF COMPLAINT:  Abdominal pain, weakness, anorexia.        HISTORY OF PRESENT ILLNESS: The patient actually gives a history of approximately 3 weeks of pain which she describes as abdominal pain, points to the high abdomen as the area that it hurts.  She was seen in the clinic and it was thought to be primarily due to potential constipation.  She ultimately had a CT scan of the abdomen and pelvis which did show copious stool throughout the colon.  She has had a total of 2 bottles of magnesium citrate, the first one over 2 days about 5 days ago and the last one on Tuesday and Wednesday.  With this, she started to have diarrhea and has had about 9-10 loose stools on Tuesday, 3-4 yesterday, but only one today.      She continues, however, to complain of abdominal pain.  This pain seems to be worse when she lies down and the sons do not really note that it is worse when she gets up and walks, but it is worse when she rolls over during the act of rolling.  At times it is intolerable and at other times she does not really complain of it much.  With this, she has had marked anorexia, has not eaten much of anything over the last couple of days, and now has also had significant weakness.  She is needing increasing help to get out of bed and move around.      Of note, she did have a fall, primarily striking her head on 06/20/2018, which correlates about with the time this pain started.      PAST MEDICAL HISTORY:   1.  Renal arterial hypertension.   2.  Hyperlipidemia.   3.  Stroke secondary to embolism from AFib.   4.  Tachybrady syndrome, pacemaker-dependent.   5.  Chronic atrial fibrillation.   6.  Osteoporosis.   7.  Pulmonary hypertension.      MEDICATIONS PRIOR TO ADMISSION:   1.  Claritin 10 mg daily.   2.  Toprol-XL 25 b.i.d.    3.  Zocor 20 mg daily.   4.  Acyclovir p.r.n.   5.  Vitamin D and calcium daily.   6.  Omega-3 fatty acids 1000 mg b.i.d.   7.  Lasix 20 mg every other day.   8.   Warfarin 2.5 mg Monday, Wednesday, Friday, 5 mg all other days, followed by [facility].      ALLERGIES:  ALLOPURINOL, AMPICILLIN, CIPRO, LEVAQUIN, LISINOPRIL, PAXIL, PENICILLIN, SULFA, ZITHROMAX, ZOLOFT.  PLEASE SEE EPIC FOR SPECIFIC REACTIONS; MOST ARE NAUSEA AND VOMITING.      SOCIAL HISTORY:  She lives with her son.  She is a never smoker, never drinker.      FAMILY HISTORY:  Reviewed in Epic. See Epic. Really noncontributory at this age.      REVIEW OF SYSTEMS:  She has had some increasing memory problems that the sons note.  She is very hard of hearing.  She does have some episodic dysphagia where things feel like they get stuck and even water sometimes has a hard time going down.  That has been longstanding.  Before these laxatives, she would have a bowel movement every 2-3 days.  There has been no clear residual from her stroke several years ago.      The rest of the 10-point review of systems was negative.      PHYSICAL EXAMINATION:   GENERAL:  She is awake, alert, pleasant, hard of hearing, in no distress when I first initially encounter her lying on the exam table.  As I have her roll over during the exam, she appears to be in distress and then with rolling back, she is in distress again.   VITAL SIGNS:  She is afebrile, pulse 101, respiratory rate 20, blood pressure 144/68, O2 sat 98% on room air.   HEENT:  Eyes show pupils tiny, reactive.  EOMs intact.  TMs normal.  Nasal mucosa is normal.  Throat is normal.   NECK:  Supple, without masses, nodes or thyromegaly.   CHEST:  Clear to A and P with exception of some coarse rhonchi that clear on coughing at the bases.   CARDIOVASCULAR:  Regular rate and rhythm with a 2/6 systolic murmur over the entire precordium, nonradiating specifically into the carotids, no edema.  No JVD.  Pulses are brisk and equal.   ABDOMEN:  Actually soft, somewhat scaphoid, completely nontender, without HSM or masses.     MUSCULOSKELETAL: When I press about the lower rib cage,  there is no significant tenderness.  When I press on the back, she will complain of a little tenderness in the lumbar region and possibly a little over the SI joints, but it does not reproduce her pain.   GENITOURINARY/RECTAL:  She has no stool in the ampulla at this time. She has some bleeding vaginally which is minimal at this time, but was apparently heavier right after an ultrasound was done.  She has not had any bleeding before the ultrasound was done.  Placement of the vaginal probe was somewhat painful for her as well.   EXTREMITIES:  Grossly normal with exception of some definite pain when I flex both legs and then twist them off to the side in both directions.  Otherwise, range of motion of the hips is without pain when I do them individually.   NEUROLOGIC:  Shows good strength, sensation, rapid alternating movements, finger-nose testing of upper extremities.  Good strength, sensation in the lower extremities, though generalized weakness.      IMAGING: Imaging done tonight, abdominal x-ray is unremarkable.  Pelvic ultrasound shows uterus is slightly enlarged and fluid-filled, but when reviewing the CT scan from 2010 and before, she had a similar look as on the recent CT, so I think this is not new and unlikely pertinent to the current pain.      CT pelvis from 07/05/2018 was reviewed and showed moderate to large amount of stool in the colon.  Some degenerative changes at the lumbosacral interspace, the enlarged uterus similar to CT in 2010, and cardiac enlargement.        LABORATORY DATA: Labs are essentially unremarkable, though she does have 49 WBCs per high-power field in her urine, more significant RBCs but this is after her bleeding started.  Sodium was 132, alkaline phosphatase 192.  INR elevated 4.59.  TSH and T3 both slightly elevated.      ASSESSMENT AND PLAN:   1.  Abdominal pain, unknown etiology.  She persistently points to the abdomen as the source of the pain, but what seems to trigger it is  moving her entire pelvis, not specifically one hip or another.  Concerned this may be some musculoskeletal problem.  She has been treated for constipation and I believe that is resolved, but has persistent diarrhea secondary to the recent cathartics.  We will recheck a CT abdomen and pelvis in a.m., but also lumbar spines.  Interestingly, she does not have pain when she walks, so pointing away from a musculoskeletal source.  We will try to stick with just Tylenol for pain.   2.  Diarrhea. I believe this is secondary to the magnesium citrate that she has taken, two bottles total over the last 6 days.  We will follow this expectantly.    3.  Dysphagia.  This is a separate problem and will likely need some workup as an outpatient with a barium swallow or EGD.   4.  Hyponatremia.  This is mild.  Will follow for now.   5.  Asymptomatic bacteriuria.  I do not think she has any symptoms referable to UTI, but has positive WBCs.  We will await cultures and see if there are any symptoms that point to it.   6.  Vaginal bleeding secondary to pelvic ultrasound.  I believe this is just some vaginal tears.  I could not see a tear exteriorly.  Bleeding seems to have slowed down.  I am not reversing her Coumadin at this time.   7.  Atrial fibrillation on anticoagulation.  INR is 4.9.  We will hold her Coumadin for now, not reversing at this point.   8.  Generalized weakness.  We will have PT and OT see.      CODE STATUS:  DNR/DNI.      PROPHYLAXIS: She is already fully anticoagulated on warfarin.      DISPOSITION:  At this point, I really cannot see a specific indication for inpatient.  She is therefore observation while we continue this investigation, but she may convert to inpatient if not improving or we find more specific pathology.         MARIBEL PADILLA MD             D: 2018   T: 2018   MT:       Name:     JULITO NEGRON   MRN:      [id number]        Account:      [id number]   :      1924         Admitted:     07/12/2018                   Document: [id number]

## 2018-07-13 NOTE — PROGRESS NOTES
DATE:  7/13/2018   TIME OF RECEIPT FROM LAB:  0719  LAB TEST:  0559  LAB VALUE:  INR 5.62  RESULTS GIVEN WITH READ-BACK TO (PROVIDER):  Jeffery Xiao MD  TIME LAB VALUE REPORTED TO PROVIDER:   0722    Pharmacy notified as well.

## 2018-07-13 NOTE — ED NOTES
"Patient has  [place] to Observation  order. Patient has been given the Observation brochure -  What does Observation mean to me.\"  Patient has been given the opportunity to ask questions about observation status and their plan of care.      Janna Hill  "

## 2018-07-13 NOTE — MR AVS SNAPSHOT
Shanika Stahl   7/13/2018   Anticoagulation Therapy Visit    Description:  89 year old female   Provider:  Dre Phillips, RN   Department:  [facility]           INR as of 7/13/2018     Today's INR       Anticoagulation Summary as of 7/13/2018     INR goal 2.0-3.0   Today's INR    Full warfarin instructions 2.5 mg on Mon, Wed, Fri; 5 mg all other days   Next INR check     Indications   Atrial fibrillation (H) (Resolved) [I48.91]  Cerebral embolism with cerebral infarction (H) [I63.40]  Long term current use of anticoagulant therapy [Z79.01]         July 2018 Details    Sun Mon Tue Wed Thu Fri Sat     1               2               3               4               5               6               7                 8               9               10               11               12               13      2.5 mg   See details      14      5 mg           15      5 mg         16      2.5 mg         17      5 mg         18      2.5 mg         19      5 mg         20      2.5 mg         21      5 mg           22      5 mg         23      2.5 mg         24      5 mg         25      2.5 mg         26      5 mg         27      2.5 mg         28      5 mg           29      5 mg         30      2.5 mg         31      5 mg              Date Details   07/13 This INR check      Date of next INR: No date specified         How to take your warfarin dose     To take:  2.5 mg Take 1 of the 2.5 mg tablets.    To take:  5 mg Take 2 of the 2.5 mg tablets.

## 2018-07-13 NOTE — PLAN OF CARE
Problem: Patient Care Overview  Goal: Plan of Care/Patient Progress Review  OT: Hold therapy d/t INR of 5.62

## 2018-07-13 NOTE — PROGRESS NOTES
[facility] ADMISSION NOTE    Patient admitted to room 2310 at approximately 2330 via cart from emergency room. Patient was accompanied by transport tech.     Verbal SBAR report received from EUGENIA Duron prior to patient arrival.     Patient ambulated to bed with two assist. Patient alert and oriented X 3. Pain is controlled without any medications. 0-10 Pain Scale: 0. Admission vital signs: Blood pressure 151/74, pulse 73, temperature 97.3  F (36.3  C), temperature source Oral, resp. rate 16, weight 36.3 kg (80 lb), SpO2 95 %, not currently breastfeeding. Patient was oriented to plan of care, call light, bed controls, tv, telephone, bathroom and visiting hours.     Risk Assessment    The following safety risks were identified during admission: fall. Yellow risk band applied: YES.     Skin Initial Assessment    This writer admitted this patient and completed a full skin assessment and  score in the Adult PCS flowsheet. Appropriate interventions initiated as needed.     Secondary skin check completed by EUGENIA Madsen.    Skin  Inspection of bony prominences: Full  Skin WDL:  WDL except  Additional Documentation: Wound (LDA)     Risk Assessment  Sensory Perception: 4-->no impairment  Moisture: 4-->rarely moist  Activity: 3-->walks occasionally  Mobility: 3-->slightly limited  Nutrition: 3-->adequate  Friction and Shear: 3-->no apparent problem   Score: 20  Bed Support Surface: Atmos Air mattress  Reassessed using Bed Algorithm: No    Ellen Ovalle

## 2018-07-13 NOTE — PROGRESS NOTES
"[facility] Medicine Progress Note  Date of Service: 07/13/2018    Assessment & Plan   Shanika Stahl is a 89 year old female who presented on 7/12/2018 with reported abdominal pain.    Principal Problem:    Closed stable burst fracture of eleventh thoracic vertebra with routine healing - was admitted under the diagnosis of abdominal pain, but admission exam suggested mid-back pain radiating anteriorly to the abdomen.  Spine CT 7/12/18 revealed degenerative changes at multiple levels, as well as a T11 burst fracture with slight retropulsion into the spinal canal, described as having \"progressed substantially\" since 7/5/18 study, with increased soft tissue swelling.  This is probably the source of the patient's pain.  CT abdomen did not show any pathology to explain pain.  - Described the T11 abnormality to patient and her son, Ulises.  - She should probably not weight bear until we get an Orthopedic opinion on management.  Ortho consult ordered, will make her non-weight bearing.  - Acetaminophen so far has been adequate for pain control. Would like to avoid opioids given her chronic problems with constipation, and avoid NSAIDs at INR > 5 today.    Active Problems:    Renal hypertension - on metoprolol as outpatient.  Mildly hypertensive here so far.  - Continue current metoprolol, increase dose if hypertension persists.      Chronic atrial fibrillation (H) - on warfarin anticoagulation and metoprolol for rate control.  HR good so far.  - Continue warfarin and metoprolol unchanged.      Diarrhea - received multiple doses of Mg citrate within past few days to correct constipation, which I think is the etiology. Abdominal exam is unremarkable, CT abdomen 7/12/18 showed no significant pathology.  Has not received recent antibiotics.  - Observe without Rx for now.      Oropharyngeal dysphagia - longstanding oropharyngeal dysphagia to both solids and liquids, not yet worked up.  - " Observe for dysphagia in-house.  - Consider outpatient workup.      Hyponatremia - mild at 132 on admission, normal today.  - Follow Na.      Asymptomatic bacteriuria - abnormal UA, but no urinary symptoms, no fever, and WBC normal.  - No antibiotics unless symptomatic and/or febrile.      Vaginal bleeding - may be due to mild, local trauma from 7/12/18 vaginal ultrasound combined with prolonged INR.  Bleeding is mild, and Hgb normal.  - Observe.      Prolonged INR - on warfarin for a-fib.  Unclear why INR is prolonged: no recent antibiotics, med or diet changes.  Aside from mild vaginal bleeding as above, no more significant blood loss noted, and Hgb normal.  No indication to reverse warfarin currently.  - Warfarin held today.  - Pharmacy consult obtained to manage INR.      Generalized weakness - global, non-focal weakness.  Part of her exertional intolerance has been due to pain, may improve if pain control improves.  - Begin PT and OT when cleared for weight bearing.  - Consider TCU at discharge.      DVT Prophylaxis: Warfarin  Code Status: Full Code    Lines: Peripheral IV.   Chauhan catheter: Not currently needed.  Restraints: Not needed.    Discussion: T11 burst fracture appears to explain patient's pain.  Will request Orthopedic consult; wonder if patient may require bracing?    Disposition: Anticipate discharge in 2 to 3 days.  Appropriate for Inpatient care.     Attestation:  I have reviewed today's vital signs, notes, medications, labs and imaging.  Amount of time performed on this follow-up visit: 45 minutes, 25 of which were spent in care coordination and counseling.    Jeffery Xiao MD       Interval History   Pain is well-controlled if she remains seated or lying down and still.  BM frequency seems to be easing.  No dysphagia yet encountered here.  Patient's son, Alex, here for visit.    Physical Exam   Temp:  [97.3  F (36.3  C)-98.5  F (36.9  C)] 97.6  F (36.4  C)  Pulse:  [] 83  Resp:   [16-20] 16  BP: (144-161)/(68-74) 161/72  SpO2:  [95 %-98 %] 96 %    Weights:   Vitals:    07/12/18 1743   Weight: 36.3 kg (80 lb)    Body mass index is 14.87 kg/(m^2).    GENERAL: Pleasant woman, seated in recliner, looks comfortable.  EYES: Eyes grossly normal to inspection, extraocular movements intact  HENT: Nares patent bilaterally.  Nasal mucosa normal, no discharge.   NECK: Trachea midline, no stridor.    RESP: No accessory muscle use.  Symmetrical breath sounds.  Lungs clear throughout on inspiration and expiration.  Expiration not prolonged, no wheeze.  CV: Irregularly irregular, non-tachycardic.  Normal S1 S2, no murmur or extra sound.  No lower extremity edema.  ABDOMEN: Soft, non-tender, no guarding.  Liver and spleen not enlarged, no masses palpable.  Bowel sounds positive.  MS: No bony deformities noted.  No red or inflamed joints.  SKIN: Warm and dry, no rashes.  NEURO: Alert, oriented, conversant.  Cranial nerves II - XII grossly intact.  No gross motor or sensory deficits.    BACK: Tender to palpation over mid-spine, back pain exacerbated by leaning forward and twisting trunk movements, nearly absent if still.  PSYCH: Calm, alert, conversant.  Able to articulate logical thoughts, no tangential thoughts, no hallucinations or delusions.  Affect normal.        Data     Recent Labs  Lab 07/13/18  0559 07/12/18  1815   WBC 7.7 5.9   HGB 12.3 13.1   MCV 93 93    308   INR 5.62* 4.59*    132*   POTASSIUM 4.2 4.2   CHLORIDE 102 99   CO2 23 21   BUN 15 17   CR 0.65 0.72   ANIONGAP 10 12   BLAISE 8.6 9.4   GLC 63* 91   ALBUMIN 3.0* 3.4   PROTTOTAL 7.0 7.7   BILITOTAL 0.9 0.9   ALKPHOS 174* 192*   ALT 24 23   AST 44 43         Recent Labs  Lab 07/13/18  0559 07/12/18  1815   GLC 63* 91        Unresulted Labs Ordered in the Past 30 Days of this Admission     No orders found for last 61 day(s).           Imaging  Recent Results (from the past 24 hour(s))   Abdomen, flat/upright (2 views)    Narrative     XR ABDOMEN TWO VIEWS  7/12/2018 8:27 PM     COMPARISON: None.    HISTORY: Abdominal spasm and pain. Constipation noted on CT imaging on  July 5, 2018.  Evaluate for interval resolution of constipation.    FINDINGS: Abdominal bowel gas pattern is nonspecific. There is no  evidence for free intraperitoneal air.      Impression    IMPRESSION: No evidence for bowel obstruction or free air.    CLAUDIA TELLES MD   US Pelvic Complete with Transvaginal    Narrative    PELVIC ULTRASOUND TRANSABDOMINAL IMAGING AND TRANSVAGINAL IMAGING   7/12/2018 8:38 PM    HISTORY: Lower abdominal spasms and discomfort. Abnormal endometrial  stripe on CT imaging from July 5, 2018.  Evaluate for uterine  malignancy versus other acute process;     COMPARISON: A CT which included the pelvis on 7/5/2018.    TECHNIQUE: Transabdominal imaging was performed. Transvaginal imaging  was also performed.     FINDINGS: The uterus measures 5.9 x 3.1 cm. It demonstrates normal  echogenicity with no myometrial abnormality seen. The endometrium is  normal measuring 0.27 cm. The endometrial canal is distended with  fluid measuring 2.4 x 1.9 x 3.0 cm for an estimated volume of 7.2 mL.  The right ovary is not seen. The left ovary contains a 0.5 cm  follicle. Normal blood flow is seen to the left ovary. No adnexal  pathology is seen. There is a small amount of free fluid in the  cul-de-sac.      Impression    IMPRESSION: The uterus is fluid-filled as described above suggesting  hydrometra.    ANITA REIS MD   CT Abdomen Pelvis w Contrast    Narrative    CT ABDOMEN AND PELVIS WITH CONTRAST 7/13/2018 7:43 AM     HISTORY:  Abdominal pain.     COMPARISON: CT abdomen and pelvis 7/5/2018.    TECHNIQUE: Axial images from the lung bases to the symphysis are  performed with additional coronal reformatted images. 39 mL of Isovue  370 are given intravenously.  Radiation dose for this scan was reduced  using automated exposure control, adjustment of the mA and/or  kV  according to patient size, or iterative reconstruction technique.    FINDINGS:   Linear atelectasis is present at the lung bases in  addition to a trace amount of pleural fluid bilaterally which is new.  Cardiomegaly.    Abdomen: The liver, spleen, gallbladder, pancreas and adrenal glands  are unremarkable. Bilateral simple-appearing renal cysts are present.  No hydronephrosis. No obvious renal calculi. Aorta is calcified  without evidence of aneurysm or dissection. The bowel is normal in  caliber without obstruction. Bowel anastomosis right lower quadrant is  patent.    Pelvis: Bladder is decompressed but otherwise unremarkable. Uterus is  visualized but the endometrium appears thickened on series 4, image  54. May be abnormal in a postmenopausal patient. No enlarged pelvic or  inguinal lymph nodes. No free pelvic fluid. Bone window examination  demonstrates no aggressive-appearing bone lesions. Osteopenia is  noted. Correlate with concurrently performed spine CT reports.      Impression    IMPRESSION:  1. Cardiomegaly with trace pleural effusions.  2. Endometrial thickening of uncertain significance. Endometrial  hyperplasia or neoplasia is possible. No significant change since  prior CT. No enlarged lymph nodes.  3. No bowel obstruction or diverticulitis. Appendix not visualized.  Bowel anastomosis right lower quadrant is patent.  4. Simple renal cysts bilaterally. Abdominal and pelvic organs are  otherwise within normal limits.    MAKEDA BLEDSOE MD   CT Lumbar Spine w/o Contrast    Narrative    CT LUMBAR SPINE WITHOUT CONTRAST  7/13/2018 7:45 AM     HISTORY: Abdomen and pelvic pain, but worse with movement.      TECHNIQUE: Axial images of the lumbar spine were obtained without  intravenous contrast. Multiplanar reformations were performed.   Radiation dose for this scan was reduced using automated exposure  control, adjustment of the mA and/or kV according to patient size, or  iterative reconstruction  technique.    COMPARISON: 9/30/2016 x-ray.    FINDINGS: Reconstructed images demonstrate normal vertebral body  height. There are advanced degenerative changes at the L5-S1 level.    T12-L1: No disc herniation or stenosis. Facet joints are unremarkable.    L1-L2:  No disc herniation or stenosis. Facet joints are unremarkable.    L2-L3:  Mild disc bulge. Mild central stenosis. Neural foramina are  patent. Facet joints are unremarkable. Probable hemangioma right  posterior L3 vertebra.     L3-L4:  Mild disc bulge and ligamentum flavum hypertrophy. Moderate  central stenosis. Neural foramina are patent.     L4-L5:  Disc bulge and ligamentum flavum hypertrophy result in mild to  moderate central stenosis. Neural foramina are patent.     L5-S1:  Advanced facet degenerative changes. Grade 1 degenerative  spondylolisthesis. Endplate irregularity appears degenerative. No  paraspinous soft tissue inflammation. No central stenosis. Mild  inferior foraminal narrowing bilaterally.        Impression    IMPRESSION:    1. Advanced degenerative disc disease and grade 1 degenerative  spondylolisthesis at L5-S1 without significant stenosis.  2. At L3-L4 there is moderate central stenosis.  2. At L4-L5 there is mild to moderate central stenosis. No other  significant stenosis identified.  4. No evidence of compression fracture.    CT Thoracic Spine w Contrast    Narrative    CT THORACIC SPINE WITHOUT CONTRAST   7/13/2018 8:24 AM     HISTORY: Abdominal pain.      TECHNIQUE: Axial images of the thoracic spine were obtained without  intravenous contrast. Multiplanar reformations were performed.   Radiation dose for this scan was reduced using automated exposure  control, adjustment of the mA and/or kV according to patient size, or  iterative reconstruction technique.    COMPARISON: CT abdomen and pelvis 7/5/2018    FINDINGS:  The alignment of the thoracic spine is normal although  kyphosis is exaggerated.  There is a burst fracture of  T11 involving  superior and inferior endplates with acute-appearing fracture lines.  The posterosuperior and posteroinferior endplates protrude slightly  into the spinal canal but cause only mild narrowing of the spinal  canal. There is adjacent soft tissue swelling best appreciated on  coronal images and axial image 109 of series 5. Digital   radiograph from the previous CT scan shows that there is no soft  tissue swelling and that there was only mild compression deformity of  the superior endplate, and cross-sectional images through the inferior  endplate do not show any widening or retropulsion. Thus the fracture  has worsened since 7/5/2018.    There is also a compression fracture of the superior endplate of T6  with no acute fracture lines and no soft tissue swelling.    No other fractures are seen. There are small bilateral pleural  effusions. There is cardiomegaly with pacemaker in the heart. There is  a cyst in the upper pole of the left kidney.      Impression    IMPRESSION:  1. Recent burst fracture of T11 involving superior and inferior  endplates with slight retropulsion into the spinal canal. This has  progressed substantially since 7/5/2018 and now has much more adjacent  soft tissue swelling.  2. Old compression fracture of the superior endplate of T6.    DAMION CARUSO MD        I reviewed all new labs and imaging results over the last 24 hours. I personally reviewed no images or EKG's today.    Medications     sodium chloride 75 mL/hr at 07/13/18 0112     Warfarin Therapy Reminder         loratadine  10 mg Oral Daily     metoprolol succinate  25 mg Oral BID     simvastatin  20 mg Oral At Bedtime     warfarin-No DOSE today  1 each Does not apply no dose today (warfarin)       Jeffery Xiao MD

## 2018-07-13 NOTE — PROGRESS NOTES
CARE TRANSITION SOCIAL WORK INITIAL ASSESSMENT:      Met with: Patient and Family.    DATA  Active Problems:    Abdominal pain       Primary Care Clinic Name:  ([facility])  Primary Care MD Name:  ([facility])  Contact information and PCP information verified: Yes      ASSESSMENT  Cognitive Status: awake, alert and oriented.       Resources List: Transitional Care, Skilled Nursing Facility, Home Care     Lives With: child(rachele), adult  Living Arrangements: house     Description of Support System: Supportive, Involved   Who is your support system?: Children   Support Assessment: Adequate family and caregiver support, Adequate social supports   Insurance Concerns: No Insurance issues identified        This writer met with pt and with pts sonAlex introduced self and role. Discussed discharge planning and medicare guidelines in regards to home care and SNF benefits. Pt lives at home with sonAntoine who helps provide care, meals, medications, shopping etc. Pt reports feeling more weak than usual. Discussed TCU vs Home care. Patient was provided with Medicare certified nursing home list. Pts choices are as follows [place] [facility] (Phone: [phone] Fax: [phone]), [place] (Main: [phone] Admissions: [phone] Fax: [phone]) and [facility] Phone: ([phone]) Fax: ([phone]). If pt feels better she is agreeable to home care. Pt was provided with Medicare certified home care list. Pt chooses to use [facility] ([phone] Fax: [phone]).  TCU referrals pending, will need home care order placed if going home with home care.  Pt will be changed from OBS to inpatient today, discussed TCU benefits and the potential for private pay if not meeting 3 night stay.             PLAN    TCU vs home care    Discharge Planner   Discharge Plans in progress: TCU vs home care  Barriers to discharge plan: medical stabiltiy  Follow up plan: CTS to follow       Entered  by: Mikayla Santos 07/13/2018 1:39 PM             Mikayla Santos MSW, LICSW, [facility] [phone]

## 2018-07-14 ENCOUNTER — DOCUMENTATION ONLY (OUTPATIENT)
Dept: ORTHOPEDICS | Facility: CLINIC | Age: 83
End: 2018-07-14

## 2018-07-14 ENCOUNTER — APPOINTMENT (OUTPATIENT)
Dept: OCCUPATIONAL THERAPY | Facility: CLINIC | Age: 83
DRG: 543 | End: 2018-07-14
Payer: MEDICARE

## 2018-07-14 LAB — INR PPP: 4.16 (ref 0.86–1.14)

## 2018-07-14 PROCEDURE — 85610 PROTHROMBIN TIME: CPT | Performed by: FAMILY MEDICINE

## 2018-07-14 PROCEDURE — 25000132 ZZH RX MED GY IP 250 OP 250 PS 637: Mod: GY | Performed by: FAMILY MEDICINE

## 2018-07-14 PROCEDURE — A9270 NON-COVERED ITEM OR SERVICE: HCPCS | Mod: GY | Performed by: FAMILY MEDICINE

## 2018-07-14 PROCEDURE — 12000000 ZZH R&B MED SURG/OB

## 2018-07-14 PROCEDURE — 97165 OT EVAL LOW COMPLEX 30 MIN: CPT | Mod: GO | Performed by: OCCUPATIONAL THERAPIST

## 2018-07-14 PROCEDURE — 97535 SELF CARE MNGMENT TRAINING: CPT | Mod: GO | Performed by: OCCUPATIONAL THERAPIST

## 2018-07-14 PROCEDURE — 25000132 ZZH RX MED GY IP 250 OP 250 PS 637: Mod: GY

## 2018-07-14 PROCEDURE — A9270 NON-COVERED ITEM OR SERVICE: HCPCS | Mod: GY

## 2018-07-14 PROCEDURE — 40000133 ZZH STATISTIC OT WARD VISIT: Performed by: OCCUPATIONAL THERAPIST

## 2018-07-14 PROCEDURE — L0460 TLSO 2 SHL SYMPHYS-STERN CST: HCPCS

## 2018-07-14 PROCEDURE — 36415 COLL VENOUS BLD VENIPUNCTURE: CPT | Performed by: FAMILY MEDICINE

## 2018-07-14 PROCEDURE — 99233 SBSQ HOSP IP/OBS HIGH 50: CPT

## 2018-07-14 RX ORDER — ACETAMINOPHEN 325 MG/1
650 TABLET ORAL 3 TIMES DAILY
Status: DISCONTINUED | OUTPATIENT
Start: 2018-07-14 | End: 2018-07-16 | Stop reason: HOSPADM

## 2018-07-14 RX ORDER — TRAMADOL HYDROCHLORIDE 50 MG/1
50 TABLET ORAL EVERY 8 HOURS PRN
Status: DISCONTINUED | OUTPATIENT
Start: 2018-07-14 | End: 2018-07-15

## 2018-07-14 RX ADMIN — ACETAMINOPHEN 650 MG: 325 TABLET, FILM COATED ORAL at 13:02

## 2018-07-14 RX ADMIN — ACETAMINOPHEN 650 MG: 325 TABLET, FILM COATED ORAL at 21:24

## 2018-07-14 RX ADMIN — METOPROLOL SUCCINATE 25 MG: 25 TABLET, EXTENDED RELEASE ORAL at 09:23

## 2018-07-14 RX ADMIN — SIMVASTATIN 20 MG: 20 TABLET, FILM COATED ORAL at 21:24

## 2018-07-14 RX ADMIN — METOPROLOL SUCCINATE 25 MG: 25 TABLET, EXTENDED RELEASE ORAL at 21:24

## 2018-07-14 ASSESSMENT — ACTIVITIES OF DAILY LIVING (ADL)
WHICH_OF_THE_ABOVE_FUNCTIONAL_RISKS_HAD_A_RECENT_ONSET_OR_CHANGE?: FALL HISTORY
RETIRED_EATING: 0-->INDEPENDENT
SWALLOWING: 2-->DIFFICULTY SWALLOWING LIQUIDS/FOODS
COGNITION: 1 - ATTENTION OR MEMORY DEFICITS
RETIRED_COMMUNICATION: 0-->UNDERSTANDS/COMMUNICATES WITHOUT DIFFICULTY

## 2018-07-14 NOTE — PLAN OF CARE
Problem: Patient Care Overview  Goal: Plan of Care/Patient Progress Review  Discharge Planner OT   Patient plan for discharge: TCU per family report  Current status: TLSO has arrived.  Max assist supine to sit.  CGA sit to stand.  AMbulates 30' with walker and CGA.  Reports pain with all mobility.    Barriers to return to prior living situation: Pain and increased level of assist  Recommendations for discharge: TCU  Rationale for recommendations: Functional decline.        Entered by: Carly Medina 07/14/2018 1:49 PM

## 2018-07-14 NOTE — PROGRESS NOTES
Occupational Therapy EVAL     07/14/18 0800   Quick Adds   Type of Visit Initial Occupational Therapy Evaluation   Living Environment   Lives With child(rachele), adult   Living Arrangements house   Number of Stairs to Enter Home 3   Number of Stairs Within Home 0   Living Environment Comment One level with basement, does not go in basement. walk in shower with multiple grab bars, rasied toilet.     Self-Care   Usual Activity Tolerance (Independent and active within home)   Current Activity Tolerance (bedrest until TLSO)   Functional Level Prior   Ambulation 0-->independent   Transferring 0-->independent   Toileting 0-->independent   Bathing 2-->assistive person   Dressing 0-->independent   Fall history within last six months yes   Prior Functional Level Comment Independent with dressing, toileting, shower.  Son is always present when pt does shower or walks on treadmill.  Very rare cane use, typically no gait aide.  Son assists with all IADLs.     General Information   Onset of Illness/Injury or Date of Surgery - Date 07/13/18  (fall on 6/20/18)   Referring Physician Dameon   Patient/Family Goals Statement Return home, accepts TCU or homecare if necessary   Additional Occupational Profile Info/Pertinent History of Current Problem vertebral fx.  Pt was bending over to open low dresser drawer on 6/20 when she fell backwards.  has had pain since this time, especially with bed mobility and ambulation.   PAST MEDICAL HISTORY:   tachycardia, HTN, Stroke   Precautions/Limitations (awaiting TLSO)   General Observations Pleseant and cooperative   Cognitive Status Examination   Orientation orientation to person, place and time   Level of Consciousness alert   Able to Follow Commands WNL/WFL   Cognitive Comment forgetful   Pain Assessment   Patient Currently in Pain No   Range of Motion (ROM)   ROM Comment Bilateral UEs WFL   Strength   Strength Comments 4/5 MMT, generalized weakness but functional   Mobility   Bed Mobility  "Comments Needs min assist to log roll in bed, mild grimmace but reports no pain   Transfer Skills   Transfer Comments HOLD until TLSO   Upper Body Dressing   Level of [place]: Dress Upper Body independent  (anticipate assist once TLSO arrives)   Toileting   Level of [place]: Toilet (NSG assist with bedpan)   Grooming   Level of [place]: Grooming independent  (bedbased with setup)   Eating/Self Feeding   Level of [place]: Eating independent   General Therapy Interventions   Planned Therapy Interventions ADL retraining   Intervention Comments Limited eval/tx due to bedbased status.  Initiated discussion of DC planning; safety of home VS TCU and advancement of ADLs.     Clinical Impression   Criteria for Skilled Therapeutic Interventions Met yes, treatment indicated   OT Diagnosis vertebral fx   Assessment of Occupational Performance 1-3 Performance Deficits   Identified Performance Deficits transfers, lower body tasks   Clinical Decision Making (Complexity) Low complexity   Therapy Frequency daily   Predicted Duration of Therapy Intervention (days/wks) 3   Anticipated Discharge Disposition Home with Assist;Transitional Care Facility  (continue to assess once TLSO arrives, anticipate TCU)   Risks and Benefits of Treatment have been explained. Yes   Patient, Family & other staff in agreement with plan of care Yes   Clinical Impression Comments Pt demosntrates functional decline from her baseline warranting the need for OT.  Current bedrest status limits full eval.  Upon arrival of TLSO pt and family will need education and therapy to accomodate for changes in ADLs.     [facility] [employer] [facility] TM \"6 Clicks\"   2016, Trustees of [facility], under license to [employer].  All rights reserved.   6 Clicks Short Forms Daily Activity Inpatient Short Form   [facility] AM-PAC  \"6 Clicks\" Daily Activity Inpatient Short Form   1. Putting on and taking off regular lower body clothing? 2 - A Lot "   2. Bathing (including washing, rinsing, drying)? 3 - A Little   3. Toileting, which includes using toilet, bedpan or urinal? 3 - A Little   4. Putting on and taking off regular upper body clothing? 4 - None   5. Taking care of personal grooming such as brushing teeth? 4 - None   6. Eating meals? 4 - None   Daily Activity Raw Score (Score out of 24.Lower scores equate to lower levels of function) 20   Total Evaluation Time   Total Evaluation Time (Minutes) 20     See Care Plan for Goals.    Carly Medina OTR/L

## 2018-07-14 NOTE — PLAN OF CARE
Problem: Patient Care Overview  Goal: Plan of Care/Patient Progress Review  Outcome: No Change  Patient is alert, forgetful at times and hard of hearing, wears bilateral hearing aides. Remained in bed today until TLSO fitted and applied. Patient grimacing and tearful with repositioning this afternoon prior to brace application. Patient agreed to tylenol 650 mg today which has helped. Ambulated in east and sat in chair with brace on tolerating well. Brace can be off when in bed. Patient is able to reposition onto her right side independently. Ate better today, sipping on boost in between meals. Voids without difficulty, no vaginal bleeding noted.

## 2018-07-14 NOTE — CONSULTS
[facility] Consultation    Consultation - [facility]  Level of consult: One-time consult to assist in determining a diagnosis and to recommend an appropriate treatment plan    Shanika Stahl,  1924, MRN [id number]     Admitting Dx: Abdominal pain, generalized [R10.84]  Muscle weakness (generalized) [M62.81]  Thyroid function test abnormal [R94.6]  Abnormal pelvic ultrasound [R93.8]  Supratherapeutic INR [R79.1]     PCP: Milena Jaime, [phone]     Code status:  Full Code     Extended Emergency Contact Information  Primary Emergency Contact: Ulises Stahl  Address: [address] [place] [address] [facility]  Home Phone: [phone]  Mobile Phone: [phone]  Relation: Son  Secondary Emergency Contact: Naina  Address: [address] [address] [facility]  Home Phone: [phone]  Work Phone: [phone]  Mobile Phone: [phone]  Relation: Son     Assessment:  Compression fracture T11 vertebral body.      Plan:  TLSO  WBAT  PT  Pain control    Follow up with [facility] in 2 weeks.    Principal Problem:    Closed stable burst fracture of eleventh thoracic vertebra with routine healing  Active Problems:    Renal hypertension    Chronic atrial fibrillation (H)    Diarrhea    Oropharyngeal dysphagia    Hyponatremia    Asymptomatic bacteriuria    Vaginal bleeding    Prolonged INR    Generalized weakness       Chief Complaint  LBP     HPI  We have been requested by Dr. Xiao to evaluate Shanika Stahl who is a 89 year old year old female for T11 vertebral fx.    The patient actually gives a history of approximately 3 weeks of pain which she describes as abdominal pain, points to the high abdomen as the area that it hurts.  She was seen in the clinic and it was thought to be primarily due to potential constipation.  She ultimately had a CT scan of the abdomen and pelvis which did shows a T11 vertebral body  fracture.      She is relatively comfortable today.      Of note, she did have a fall, primarily striking her head on 06/20/2018, which correlates about with the time this pain started.          History is obtained from the patient     Past Medical History  Past Medical History:   Diagnosis Date     Abnormal CXR (chest x-ray)     Read as emphysema     Atherosclerosis of renal artery (H)      Atrial fibrillation (H) 3/27/2014     Basal cell carcinoma      ESSENTIAL TREMOR 5/9/2005     Family history of breast cancer      Hyperlipidemia LDL goal <100 10/31/2010    CHOL      175   7/6/2011 HDL       69   7/6/2011 LDL       93   7/6/2011 TRIG       61   7/6/2011 CHOLHDLRATIO      3.0   7/6/2011 On simvastatin 20mg      Hypertension goal BP (blood pressure) < 140/90 10/3/2013     Other and unspecified hyperlipidemia      Other osteoporosis      Pacemaker      Paroxysmal supraventricular tachycardia (H)      Postmenopausal atrophic vaginitis      Stroke (H) 2/10/2014     Syncope 10/9/2013     Unspecified essential hypertension        Surgical History  Past Surgical History:   Procedure Laterality Date     EYE SURGERY  2/21/12    left eye cataract     IMPLANT PACEMAKER       SURGICAL HISTORY OF -   1965    FEMORAL HERNIA REPAIR        Social History  Social History     Social History     Marital status:      Spouse name: N/A     Number of children: 4     Years of education: N/A     Occupational History      None       Retired     Social History Main Topics     Smoking status: Never Smoker     Smokeless tobacco: Never Used     Alcohol use No     Drug use: No     Sexual activity: No     Other Topics Concern     Exercise Yes     Seat Belt Yes     Parent/Sibling W/ Cabg, Mi Or Angioplasty Before 65f 55m? No     Social History Narrative       Family History  Family History   Problem Relation Age of Onset     Cerebrovascular Disease Mother      Diabetes Mother      Breast Cancer Sister      HEART DISEASE Brother       Cerebrovascular Disease Brother      HEART DISEASE Brother      Prostate Cancer Brother      HEART DISEASE Brother      HEART DISEASE Sister      Gynecology Daughter      Cancer Son      Lung        Allergies:  Allopurinol; Ampicillin; Cipro [ciprofloxacin]; Levaquin; Lisinopril; Paxil [paroxetine]; Penicillins; Sulfa drugs; Zithromax [macrolides]; and Zoloft      Current Medications:  Current Facility-Administered Medications   Medication     acetaminophen (TYLENOL) Suppository 650 mg     acetaminophen (TYLENOL) tablet 650 mg     loratadine (CLARITIN) tablet 10 mg     melatonin tablet 1 mg     metoprolol succinate (TOPROL-XL) 24 hr tablet 25 mg     naloxone (NARCAN) injection 0.1-0.4 mg     ondansetron (ZOFRAN-ODT) ODT tab 4 mg    Or     ondansetron (ZOFRAN) injection 4 mg     prochlorperazine (COMPAZINE) injection 5 mg    Or     prochlorperazine (COMPAZINE) tablet 5 mg    Or     prochlorperazine (COMPAZINE) Suppository 12.5 mg     simvastatin (ZOCOR) tablet 20 mg     sodium chloride (PF) 0.9% PF flush 3 mL     Warfarin Therapy Reminder (Check START DATE - warfarin may be starting in the FUTURE)       Review of Systems:  The Review of Systems is negative other than noted in the HPI    Physical Exam:  Temp:  [97.4  F (36.3  C)-97.6  F (36.4  C)] 97.5  F (36.4  C)  Pulse:  [75-83] 82  Resp:  [16] 16  BP: (138-161)/(59-72) 138/59  SpO2:  [94 %-96 %] 94 %    Alert and oriented to person, place, time  Soft abdomen  Regular heart rate  Breathing comfortably    Tender low thoracic spine  Skin intact at spine  Normal sensory and motor exam distal Bilateral LE  No lymphedema  Palpable DP pulses B LE     Pertinent Labs  Lab Results: personally reviewed.  Lab Results   Component Value Date    WBC 7.7 07/13/2018    HGB 12.3 07/13/2018    HCT 38.6 07/13/2018    MCV 93 07/13/2018     07/13/2018       Recent Labs  Lab 07/14/18  0634 07/13/18  0559 07/12/18  1815   INR 4.16* 5.62* 4.59*       Pertinent Radiology  Radiology  Results: images and radiology report reviewed  Recent Results (from the past 24 hour(s))   CT Thoracic Spine w Contrast    Narrative    CT THORACIC SPINE WITHOUT CONTRAST   7/13/2018 8:24 AM     HISTORY: Abdominal pain.      TECHNIQUE: Axial images of the thoracic spine were obtained without  intravenous contrast. Multiplanar reformations were performed.   Radiation dose for this scan was reduced using automated exposure  control, adjustment of the mA and/or kV according to patient size, or  iterative reconstruction technique.    COMPARISON: CT abdomen and pelvis 7/5/2018    FINDINGS:  The alignment of the thoracic spine is normal although  kyphosis is exaggerated.  There is a burst fracture of T11 involving  superior and inferior endplates with acute-appearing fracture lines.  The posterosuperior and posteroinferior endplates protrude slightly  into the spinal canal but cause only mild narrowing of the spinal  canal. There is adjacent soft tissue swelling best appreciated on  coronal images and axial image 109 of series 5. Digital   radiograph from the previous CT scan shows that there is no soft  tissue swelling and that there was only mild compression deformity of  the superior endplate, and cross-sectional images through the inferior  endplate do not show any widening or retropulsion. Thus the fracture  has worsened since 7/5/2018.    There is also a compression fracture of the superior endplate of T6  with no acute fracture lines and no soft tissue swelling.    No other fractures are seen. There are small bilateral pleural  effusions. There is cardiomegaly with pacemaker in the heart. There is  a cyst in the upper pole of the left kidney.      Impression    IMPRESSION:  1. Recent burst fracture of T11 involving superior and inferior  endplates with slight retropulsion into the spinal canal. This has  progressed substantially since 7/5/2018 and now has much more adjacent  soft tissue swelling.  2. Old  compression fracture of the superior endplate of T6.    DAMION CARUSO MD       Attestation:  I have reviewed today's vital signs, notes, medications, labs and imaging.     Bart Vega

## 2018-07-14 NOTE — PLAN OF CARE
Problem: Patient Care Overview  Goal: Plan of Care/Patient Progress Review  Outcome: No Change  Patient is alert and oriented with minor forgetfulness and hard of hearing. Uses call light appropriately. Patient agreed to take tylenol 325 mg with encouragement for back pain this evening. Poor appetite which patient and son admits has been present for 2-3 weeks. Patient and family informed of plan of care: bedrest until seen by ortho and fitted for TLSO brace tomorrow. Patient log rolled onto her right side to use bedpan with 1 assist tolerating well. Voided good amount of yellow urine, no blood noted in pad.

## 2018-07-14 NOTE — PROGRESS NOTES
S: Patient with family were seen today at the [facility] in [place] with an order from Dr. Xiao to fit the patient with a TLSO. Patient recently suffered a fall and is experiencing low/mid back pain.    O: Patient was in bed for the majority of the fitting appointment. Patient was alert/oriented during the appointment but in distress due to pain management. Patient is hard of hearing. Patient is 5'1'' and 36 kg. Patient has a bony sternum and a slender frame.    A: Patient was fit with a DeRoyal Ultralign TLSO with noble hinge sternal bar. Waist section was sized to patient's waist measurement and sternal bar was contoured to make contact with patient's sternum. TLSO fits well on the patient. Patient was logged rolled into the TLSO then therapy got her out of bed and was able to ambulate with the TLSO on without issue.   Patient is comfortable in the TLSO, there are no significant areas of pressure visible, there is adequate motion at the shoulder, pain has been reduced, corset provides intra cavity pressure, device has been checked for defects per 's guidelines,  and controls the thoracolumbar spine in the frontal, sagittal, transverse plane. Patient is satisfied with the fit and function of the TLSO .   Patient and family were given verbal/written instructions on donning/doffing, care instructions, warranty issues, fitting issues, and who to contact if there is an issue.    G: DeRoyal TLSO will treat patient's current condition by restricting flexion/extensions/rotation of the lumbar spine and provide abdominal compression to offload the fracutre site to facilitate healing.     P: Patient will follow Dr. Xiao's wearing schedule and will follow up with the [facility] as needed.      This note has been electronically signed by  Hollis CHRIS , ABC #[id number], License #[id number].

## 2018-07-14 NOTE — PROGRESS NOTES
"[facility] Progress Note  Date of Service: 07/14/2018    Assessment & Plan   Shanika Stahl is a 89 year old female who presented on 7/12/2018 with reported abdominal pain.    Principal Problem:    Closed stable burst fracture of eleventh thoracic vertebra with routine healing - was admitted under the diagnosis of abdominal pain, but admission exam suggested mid-back pain radiating anteriorly to the abdomen.  Spine CT 7/12/18 revealed degenerative changes at multiple levels, as well as a T11 burst fracture with slight retropulsion into the spinal canal, described as having \"progressed substantially\" since 7/5/18 study, with increased soft tissue swelling.  This is probably the source of the patient's pain.  CT abdomen did not show any pathology to explain pain.  Since yesterday, Ortho consultation has been performed and reviewed, and following that consult, patient has been fitted with TLSO brace.  This has improved pain slightly.  However, she continues to have \"some\" pain on acetaminophen PRN only, sons wonder if analgesia could be stepped up.  - TLSO fitted.  Per Ortho note patient can now be weight bearing as tolerated.  - Will give acetaminophen scheduled at 650 mg TID.  - Add tramadol 50 mg TID PRN for breakthrough pain.  I had hoped to avoid opioids given her chronic problems with constipation, so will stop tramadol if other agents found to be effective.  - Still hesitant to use NSAIDs at INR > 4, but will consider when INR therapeutic.  - Patient and family agree to this plan.    Active Problems:    Renal hypertension - on metoprolol as outpatient.  BP control generally good here so far.  - Continue current metoprolol, increase dose if hypertension recurs.      Chronic atrial fibrillation (H) - on warfarin anticoagulation and metoprolol for rate control.  HR good so far.  - Continue warfarin and metoprolol unchanged.      Diarrhea - received multiple doses of " Mg citrate within past few days to correct constipation, which I think is the etiology. Abdominal exam is unremarkable, CT abdomen 7/12/18 showed no significant pathology.  Has not received recent antibiotics.  Diarrhea is easing per patient.  - Observe without Rx for now.      Oropharyngeal dysphagia - longstanding oropharyngeal dysphagia to both solids and liquids, not yet worked up.  Has been eating without difficulty here so far.  - Observe for dysphagia in-house.  - Consider outpatient workup.      Hyponatremia - mild at 132 on admission, normal today.  - Follow Na.      Asymptomatic bacteriuria - abnormal UA, but no urinary symptoms, no fever, and WBC normal.  - No antibiotics unless symptomatic and/or febrile.      Vaginal bleeding - may be due to mild, local trauma from 7/12/18 vaginal ultrasound combined with prolonged INR.  Bleeding is mild, and Hgb normal.  - Observe.      Prolonged INR - on warfarin for a-fib.  Unclear why INR is prolonged: no recent antibiotics, med or diet changes.  Aside from mild vaginal bleeding as above, no more significant blood loss noted, and Hgb normal.  No indication to reverse warfarin currently.  - Warfarin held today.  - Pharmacy consult obtained to manage INR.      Generalized weakness - global, non-focal weakness.  Part of her exertional intolerance has been due to pain, may improve if pain control improves.  - Begin PT and OT now that she is cleared for weight bearing.  - Discussed the benefits of TCU at discharge with patient and family, most notably the physical therapy she can receive there on a routine basis as she adapts to her TLSO and tries to regain lost strength.  Patient is reluctantly agreeable, family enthusiastically agrees.  Will see if [facility] bed might be available Monday.      DVT Prophylaxis: Warfarin  Code Status: Full Code    Lines: Peripheral IV.   Chauhan catheter: Not currently needed.  Restraints: Not needed.    Discussion: T11 burst fracture now  braced, weight bearing can resume and PT can begin.  Medical problems are stable, INR is normalizing.  Can go to TCU when bed available, perhaps Monday?    Disposition: Anticipate discharge in 2 to 3 days.  Appropriate for Inpatient care.     Attestation:  I have reviewed today's vital signs, notes, medications, labs and imaging.  Amount of time performed on this follow-up visit: 35 minutes, 25 of which were spent in care coordination and counseling with patient, both sons, and a daughter-in-law.    Jeffery Xiao MD       Interval History   Pain control is suboptimal on acetaminophen PRN.  Pain is better controlled with TLSO applied, and if she remains seated or lying down and still.  BM frequency seems to be easing.  No dysphagia yet encountered here.      Physical Exam   Temp:  [97.4  F (36.3  C)-97.8  F (36.6  C)] 97.8  F (36.6  C)  Pulse:  [75-87] 87  Resp:  [16-18] 18  BP: (138-151)/(59-65) 139/65  SpO2:  [92 %-95 %] 92 %    Weights:   Vitals:    07/12/18 1743   Weight: 36.3 kg (80 lb)    Body mass index is 14.87 kg/(m^2).    GENERAL: Pleasant woman, seated in recliner with TLSO on, looks comfortable.  EYES: Eyes grossly normal to inspection, extraocular movements intact  HENT: Nares patent bilaterally.  Nasal mucosa normal, no discharge.   NECK: Trachea midline, no stridor.    RESP: No accessory muscle use.  Symmetrical breath sounds.  Lungs clear throughout on inspiration and expiration.  Expiration not prolonged, no wheeze.  CV: Irregularly irregular, non-tachycardic.  Normal S1 S2, no murmur or extra sound.  No lower extremity edema.  ABDOMEN: Soft, non-tender, no guarding.  Liver and spleen not enlarged, no masses palpable.  Bowel sounds positive.  MS: No bony deformities noted.  No red or inflamed joints.  SKIN: Warm and dry, no rashes.  NEURO: Alert, oriented, conversant.  Cranial nerves II - XII grossly intact.  No gross motor or sensory deficits.  Able to stand with minimal assistance, and can  walk behind walker a short distance with mild assist.   BACK: Tender to palpation over mid- and lumbar spine.  PSYCH: Calm, alert, conversant.  Able to articulate logical thoughts, no tangential thoughts, no hallucinations or delusions.  Affect normal.        Data     Recent Labs  Lab 07/14/18  0634 07/13/18  0559 07/12/18  1815   WBC  --  7.7 5.9   HGB  --  12.3 13.1   MCV  --  93 93   PLT  --  270 308   INR 4.16* 5.62* 4.59*   NA  --  135 132*   POTASSIUM  --  4.2 4.2   CHLORIDE  --  102 99   CO2  --  23 21   BUN  --  15 17   CR  --  0.65 0.72   ANIONGAP  --  10 12   BLAISE  --  8.6 9.4   GLC  --  63* 91   ALBUMIN  --  3.0* 3.4   PROTTOTAL  --  7.0 7.7   BILITOTAL  --  0.9 0.9   ALKPHOS  --  174* 192*   ALT  --  24 23   AST  --  44 43         Recent Labs  Lab 07/13/18  0559 07/12/18  1815   GLC 63* 91        Unresulted Labs Ordered in the Past 30 Days of this Admission     No orders found from 5/13/2018 to 7/13/2018.           Imaging  No results found for this or any previous visit (from the past 24 hour(s)).     I reviewed all new labs and imaging results over the last 24 hours. I personally reviewed no images or EKG's today.    Medications     Warfarin Therapy Reminder         acetaminophen  650 mg Oral TID     loratadine  10 mg Oral Daily     metoprolol succinate  25 mg Oral BID     simvastatin  20 mg Oral At Bedtime     sodium chloride (PF)  3 mL Intracatheter Q8H     warfarin-No DOSE today  1 each Does not apply no dose today (warfarin)       Jeffery Xiao MD

## 2018-07-14 NOTE — PLAN OF CARE
Problem: Pain, Acute (Adult)  Goal: Acceptable Pain Control/Comfort Level  Patient will demonstrate the desired outcomes by discharge/transition of care.   Outcome: Improving  Pt has denied pain all night. Denies nausea but states lack of appetite. Encouraged pt to finish a carton of chocolate Boost that she has worked on little by little t/o the night. Pt is very forgetful and set bed alarm off x2 tonight attempting to go to the bathroom. Reminded about bedrest orders until seen by ortho / fitted with the TLSO brace. Has been log rolled & using bedpan.

## 2018-07-15 ENCOUNTER — APPOINTMENT (OUTPATIENT)
Dept: OCCUPATIONAL THERAPY | Facility: CLINIC | Age: 83
DRG: 543 | End: 2018-07-15
Payer: MEDICARE

## 2018-07-15 ENCOUNTER — APPOINTMENT (OUTPATIENT)
Dept: PHYSICAL THERAPY | Facility: CLINIC | Age: 83
DRG: 543 | End: 2018-07-15
Payer: MEDICARE

## 2018-07-15 LAB — INR PPP: 2.02 (ref 0.86–1.14)

## 2018-07-15 PROCEDURE — 97535 SELF CARE MNGMENT TRAINING: CPT | Mod: GO | Performed by: OCCUPATIONAL THERAPIST

## 2018-07-15 PROCEDURE — A9270 NON-COVERED ITEM OR SERVICE: HCPCS | Mod: GY | Performed by: FAMILY MEDICINE

## 2018-07-15 PROCEDURE — 85610 PROTHROMBIN TIME: CPT | Performed by: FAMILY MEDICINE

## 2018-07-15 PROCEDURE — 40000193 ZZH STATISTIC PT WARD VISIT: Performed by: PHYSICAL THERAPIST

## 2018-07-15 PROCEDURE — 40000133 ZZH STATISTIC OT WARD VISIT: Performed by: OCCUPATIONAL THERAPIST

## 2018-07-15 PROCEDURE — A9270 NON-COVERED ITEM OR SERVICE: HCPCS | Mod: GY

## 2018-07-15 PROCEDURE — 36415 COLL VENOUS BLD VENIPUNCTURE: CPT | Performed by: FAMILY MEDICINE

## 2018-07-15 PROCEDURE — 97161 PT EVAL LOW COMPLEX 20 MIN: CPT | Mod: GP | Performed by: PHYSICAL THERAPIST

## 2018-07-15 PROCEDURE — 99233 SBSQ HOSP IP/OBS HIGH 50: CPT

## 2018-07-15 PROCEDURE — 97110 THERAPEUTIC EXERCISES: CPT | Mod: GP | Performed by: PHYSICAL THERAPIST

## 2018-07-15 PROCEDURE — 12000000 ZZH R&B MED SURG/OB

## 2018-07-15 PROCEDURE — 25000132 ZZH RX MED GY IP 250 OP 250 PS 637: Mod: GY | Performed by: FAMILY MEDICINE

## 2018-07-15 PROCEDURE — 25000132 ZZH RX MED GY IP 250 OP 250 PS 637: Mod: GY

## 2018-07-15 RX ORDER — WARFARIN SODIUM 3 MG/1
3 TABLET ORAL
Status: COMPLETED | OUTPATIENT
Start: 2018-07-15 | End: 2018-07-15

## 2018-07-15 RX ORDER — WARFARIN SODIUM 2.5 MG/1
2.5 TABLET ORAL
Status: DISCONTINUED | OUTPATIENT
Start: 2018-07-15 | End: 2018-07-15

## 2018-07-15 RX ORDER — POLYETHYLENE GLYCOL 3350 17 G/17G
17 POWDER, FOR SOLUTION ORAL DAILY PRN
Status: DISCONTINUED | OUTPATIENT
Start: 2018-07-15 | End: 2018-07-16 | Stop reason: HOSPADM

## 2018-07-15 RX ORDER — AMOXICILLIN 250 MG
1 CAPSULE ORAL AT BEDTIME
Status: DISCONTINUED | OUTPATIENT
Start: 2018-07-15 | End: 2018-07-16 | Stop reason: HOSPADM

## 2018-07-15 RX ORDER — ACETAMINOPHEN 325 MG/1
325 TABLET ORAL EVERY 4 HOURS PRN
Status: DISCONTINUED | OUTPATIENT
Start: 2018-07-15 | End: 2018-07-16 | Stop reason: HOSPADM

## 2018-07-15 RX ORDER — NAPROXEN 250 MG/1
250 TABLET ORAL 2 TIMES DAILY WITH MEALS
Status: DISCONTINUED | OUTPATIENT
Start: 2018-07-15 | End: 2018-07-16 | Stop reason: HOSPADM

## 2018-07-15 RX ADMIN — WARFARIN SODIUM 3 MG: 3 TABLET ORAL at 17:42

## 2018-07-15 RX ADMIN — LORATADINE 10 MG: 10 TABLET ORAL at 08:03

## 2018-07-15 RX ADMIN — SENNOSIDES AND DOCUSATE SODIUM 1 TABLET: 8.6; 5 TABLET ORAL at 21:18

## 2018-07-15 RX ADMIN — ACETAMINOPHEN 650 MG: 325 TABLET, FILM COATED ORAL at 14:06

## 2018-07-15 RX ADMIN — METOPROLOL SUCCINATE 25 MG: 25 TABLET, EXTENDED RELEASE ORAL at 20:11

## 2018-07-15 RX ADMIN — ACETAMINOPHEN 650 MG: 325 TABLET, FILM COATED ORAL at 06:21

## 2018-07-15 RX ADMIN — SIMVASTATIN 20 MG: 20 TABLET, FILM COATED ORAL at 21:18

## 2018-07-15 RX ADMIN — ACETAMINOPHEN 650 MG: 325 TABLET, FILM COATED ORAL at 20:12

## 2018-07-15 RX ADMIN — NAPROXEN 250 MG: 250 TABLET ORAL at 17:43

## 2018-07-15 RX ADMIN — METOPROLOL SUCCINATE 25 MG: 25 TABLET, EXTENDED RELEASE ORAL at 08:03

## 2018-07-15 NOTE — PROGRESS NOTES
"[facility] Medicine Progress Note  Date of Service: 07/15/2018    Assessment & Plan   Shanika Stahl is a 89 year old female who presented on 7/12/2018 with reported abdominal pain.    Principal Problem:    Closed stable burst fracture of eleventh thoracic vertebra with routine healing - was admitted under the diagnosis of abdominal pain, but admission exam suggested mid-back pain radiating anteriorly to the abdomen.  Spine CT 7/12/18 revealed degenerative changes at multiple levels, as well as a T11 burst fracture with slight retropulsion into the spinal canal, described as having \"progressed substantially\" since 7/5/18 study, with increased soft tissue swelling.  This is probably the source of the patient's pain.  CT abdomen did not show any pathology to explain pain.  Since yesterday, Ortho consultation has been performed and reviewed, and following that consult, patient has been fitted with TLSO brace.  This has improved pain slightly.  However, she continues to have \"some\" pain on scheduled acetaminophen 650 mg TID.  We added tramadol PRN yesterday, but patient does not wish to take it out of concern that it will exacerbate constipation.  - TLSO fitted, which she is to use at all times unless in bed.  Per Ortho note patient can now be weight bearing as tolerated.  - Will continue  acetaminophen scheduled at 650 mg TID.  - At patient's request will discontinue tramadol 50 mg TID PRN.  - Now that INR is no longer prolonged, will begin low dose naproxen.  Start at 250 mg BID scheduled, switch to PRN or D/C when analgesic needs are less.  - Patient and family agree to this plan.    Active Problems:    Renal hypertension - on metoprolol as outpatient.  BP control generally good here so far, BP max 151/79 last 24 hours.  - Continue current metoprolol, increase dose if hypertension worsens.      Chronic atrial fibrillation (H) - on warfarin anticoagulation and metoprolol for " rate control.  HR good so far.  - Continue warfarin and metoprolol unchanged.      Diarrhea - received multiple doses of Mg citrate within past few days to correct constipation, which I think is the etiology. Abdominal exam is unremarkable, CT abdomen 7/12/18 showed no significant pathology.  Has not received recent antibiotics.  Diarrhea has resolved per patient, and she is now feeling as though she's in the early stages of constipation.  - Senna/docusate daily at HS will start tonight.  - Polyethylene glycol QD PRN also added; she uses this at home.      Oropharyngeal dysphagia - longstanding oropharyngeal dysphagia to both solids and liquids, not yet worked up.  Has been eating without difficulty here so far.  - Observe for dysphagia in-house.      Hyponatremia - mild at 132 on admission, normal today.  - Follow Na.      Asymptomatic bacteriuria - abnormal UA, but no urinary symptoms, no fever, and WBC normal.  - No antibiotics unless symptomatic and/or febrile.      Vaginal bleeding - may be due to mild, local trauma from 7/12/18 vaginal ultrasound combined with prolonged INR.  Bleeding is now absent at a therapeutic INR.  - Observe for recurrence.      Prolonged INR - on warfarin for a-fib.  Unclear why INR was prolonged at admission: no recent antibiotics, med or diet changes.  Aside from mild vaginal bleeding as above, patient had no significant blood loss noted, and Hgb has remained normal.  INR 2.02 today.  - Warfarin resumed today.  - Pharmacy consult obtained to manage INR.      Generalized weakness - global, non-focal weakness.  Part of her exertional intolerance has been due to pain, may improve if pain control improves.  - Continue PT and OT now that she is cleared for weight bearing.  Both recommend continuation of PT and OT at TCU.  - Have discussed the benefits of TCU at discharge with patient and family, most notably the physical therapy she can receive there on a routine basis as she adapts to her  TLSO and tries to regain lost strength.  Patient is agreeable, family enthusiastically agrees.  Will see if [facility] bed (her preference) might be available Monday.      DVT Prophylaxis: Warfarin  Code Status: Full Code    Lines: Peripheral IV.   Chauhan catheter: Not currently needed.  Restraints: Not needed.    Discussion: T11 burst fracture now braced, weight bearing can resume and PT can begin.  Working on pain control without using opioids at patient request, medical problems are stable, INR is again therapeutic.  Can go to TCU when bed available, likely tomorrow, 7/16/18.    Disposition: Anticipate discharge to TCU tomorrow.  Appropriate for Inpatient care.     Attestation:  I have reviewed today's vital signs, notes, medications, labs and imaging.  Amount of time performed on this follow-up visit: 35 minutes, 20 of which were spent in care coordination and counseling with patient and both sons.    Jeffery Xiao MD       Interval History   See above regarding analgesia for burst fracture.  Pain is better controlled with TLSO applied, and if she remains seated or lying down and still.  Diarrhea has stopped completely, and patient has now had no BM in more than 24 hours which concerns her.  No dysphagia yet encountered here.      Physical Exam   Temp:  [97.7  F (36.5  C)-97.8  F (36.6  C)] 97.8  F (36.6  C)  Pulse:  [75-92] 75  Resp:  [16] 16  BP: (144-151)/(69-79) 151/79  SpO2:  [96 %] 96 %    Weights:   Vitals:    07/12/18 1743   Weight: 36.3 kg (80 lb)    Body mass index is 14.87 kg/(m^2).    GENERAL: Pleasant woman, seated in bed, looks comfortable.  EYES: Eyes grossly normal to inspection, extraocular movements intact  HENT: Nares patent bilaterally.  Nasal mucosa normal, no discharge.   NECK: Trachea midline, no stridor.    RESP: No accessory muscle use.  Symmetrical breath sounds.  Lungs clear throughout on inspiration and expiration.  Expiration not prolonged, no wheeze.  CV: Irregularly irregular,  non-tachycardic.  Normal S1 S2, grade I/VI midsystolic murmur heard best at upper sternal borders, no extra sound.  No lower extremity edema.  ABDOMEN: Soft, non-tender, no guarding.  Liver and spleen not enlarged, no masses palpable.  Bowel sounds positive.  MS: No bony deformities noted.  No red or inflamed joints.  SKIN: Warm and dry, no rashes.  NEURO: Alert, oriented, conversant.  Cranial nerves II - XII grossly intact.  No gross motor or sensory deficits.  Gait not assessed.  BACK: Tender to palpation over mid- and lumbar spine, not elsewhere.  PSYCH: Calm, alert, conversant.  Able to articulate logical thoughts, no tangential thoughts, no hallucinations or delusions.  Affect normal.        Data     Recent Labs  Lab 07/15/18  0556 07/14/18  0634 07/13/18  0559 07/12/18  1815   WBC  --   --  7.7 5.9   HGB  --   --  12.3 13.1   MCV  --   --  93 93   PLT  --   --  270 308   INR 2.02* 4.16* 5.62* 4.59*   NA  --   --  135 132*   POTASSIUM  --   --  4.2 4.2   CHLORIDE  --   --  102 99   CO2  --   --  23 21   BUN  --   --  15 17   CR  --   --  0.65 0.72   ANIONGAP  --   --  10 12   BLAISE  --   --  8.6 9.4   GLC  --   --  63* 91   ALBUMIN  --   --  3.0* 3.4   PROTTOTAL  --   --  7.0 7.7   BILITOTAL  --   --  0.9 0.9   ALKPHOS  --   --  174* 192*   ALT  --   --  24 23   AST  --   --  44 43         Recent Labs  Lab 07/13/18  0559 07/12/18  1815   GLC 63* 91        Unresulted Labs Ordered in the Past 30 Days of this Admission     No orders found from 5/13/2018 to 7/13/2018.           Imaging  No results found for this or any previous visit (from the past 24 hour(s)).     I reviewed all new labs and imaging results over the last 24 hours. I personally reviewed no images or EKG's today.    Medications     Warfarin Therapy Reminder         acetaminophen  650 mg Oral TID     loratadine  10 mg Oral Daily     metoprolol succinate  25 mg Oral BID     naproxen  250 mg Oral BID w/meals     senna-docusate  1 tablet Oral At Bedtime      simvastatin  20 mg Oral At Bedtime     sodium chloride (PF)  3 mL Intracatheter Q8H     warfarin  3 mg Oral ONCE at 18:00       Jeffery Xiao MD

## 2018-07-15 NOTE — PLAN OF CARE
Problem: Pain, Acute (Adult)  Goal: Acceptable Pain Control/Comfort Level  Patient will demonstrate the desired outcomes by discharge/transition of care.   Outcome: Improving  Pt receiving scheduled tylenol tid and requested this am dose early. Spoke w/ Dr. Xiao this morning about reordering the prn oral tylenol since pt is very reluctant to take a suppository. Pt slept well during the night. Orthotic brace was applied this morning before getting pt up to commode then transferring to chair. Skin looks in good condition. Pt states the brace feels comfortable.

## 2018-07-15 NOTE — PROGRESS NOTES
[facility] Orthopaedics Progress Note      Post-operative Day:      * No surgery found *      Subjective:  Patient seen and examined.  Doing very well.  VSS.  AF.  Out of bed this AM sitting in chair.  Managing pain with Tylenol at this point.  Tolerating brace well, notes brace helps manage pain.      Pain: minimal at rest, increased with change of position.  Chest pain, SOB:  No      Objective:  Blood pressure 151/79, pulse 75, temperature 97.8  F (36.6  C), temperature source Oral, resp. rate 16, weight 36.3 kg (80 lb), SpO2 96 %, not currently breastfeeding.    Patient Vitals for the past 24 hrs:   BP Temp Temp src Pulse Resp SpO2   07/15/18 0730 151/79 97.8  F (36.6  C) Oral 75 16 96 %   07/14/18 1522 144/69 97.7  F (36.5  C) Oral 92 16 -       Wt Readings from Last 4 Encounters:   07/12/18 36.3 kg (80 lb)   07/05/18 (P) 37.2 kg (82 lb)   06/29/18 38 kg (83 lb 12.8 oz)   06/06/18 38.6 kg (85 lb)         Motor function, sensation, and circulation intact   Yes  Wound status: incisions are clean dry and intact. N/A  Calf tenderness: Bilateral  No    Pertinent Labs   Lab Results: personally reviewed.     Recent Labs   Lab Test  07/15/18   0556  07/14/18   0634  07/13/18   0559  07/12/18   1815  07/05/18   0842   INR  2.02*  4.16*  5.62*  4.59*  4.25*   HGB   --    --   12.3  13.1  12.1   HCT   --    --   38.6  40.4  37.3   MCV   --    --   93  93  94   PLT   --    --   270  308  249   NA   --    --   135  132*  139       Plan:  Patient doing very well.  Restrictions reviewed.  Questions answered.  No concerns.  2 week f/u appointment with [facility] spine.   Brace when out of bed, doff when supine.  Bracing likely necessary for 8-12 weeks.   Anticoagulation protocol: Mechanical and/or ambulation              Pain medications:  tramadol and tylenol            Weight bearing status:  WBAT            Disposition:  [facility] tomorrow.             Continue cares and rehabilitation     Report completed by:  Asim Humphreys,  POONAM, POONAM  Date: 7/15/2018  Time: 8:40 AM

## 2018-07-15 NOTE — PLAN OF CARE
Problem: Patient Care Overview  Goal: Plan of Care/Patient Progress Review  Discharge Planner OT   Patient plan for discharge: TCU  Current status: min/mod assist for log roll, improving but continues to need assist.  Education to pt and family on adaptive equipment and DME to improve safety and function with ADLs.   Barriers to return to prior living situation: weakness, pain, assist with ADLs  Recommendations for discharge: TCU  Rationale for recommendations: increased level of care       Entered by: Carly Medina 07/15/2018 1:06 PM

## 2018-07-15 NOTE — PROGRESS NOTES
Discharge Planner PT   Patient plan for discharge: TCU  Current status: min A with sit to sup, max A of 2 with boosting. Amb with RW and brace with SBA  Barriers to return to prior living situation: steps, pain, need for more assistance  Recommendations for discharge: TCU  Rationale for recommendations: clinical judgement, need for more assistance       Entered by: Carly Guillory 07/15/2018 10:59 AM        07/15/18 1000   Quick Adds   Type of Visit Initial PT Evaluation   Living Environment   Lives With child(rachele), adult   Living Arrangements house   Living Environment Comment Has mainl level set up   Functional Level Prior   Prior Functional Level Comment Indep ADLs, amb on treadmill with son present. No AD use   General Information   Patient/Family Goals Statement To get better   Pertinent History of Current Problem (include personal factors and/or comorbidities that impact the POC) Pt admitted due to T11 burst fx with retropulsion.   Precautions/Limitations (TLSO brace on for all mobility)   Weight-Bearing Status - LLE full weight-bearing   Weight-Bearing Status - RLE full weight-bearing   General Observations On toilet upon arrival with aide in room   General Info Comments TLSO brace   Cognitive Status Examination   Orientation orientation to person, place and time   Level of Consciousness alert   Follows Commands and Answers Questions 100% of the time   Personal Safety and Judgment intact   Memory intact   Range of Motion (ROM)   ROM Quick Adds No deficits were identified   Strength   Strength Comments BLE generalized weakness 4-/5   Bed Mobility   Bed Mobility Comments min A of 1 with log roll sit to sup, A of 2 for boosting in bed, A of 2 for rolling and removing brace   Transfer Skills   Transfer Comments SBA with sit/stands   Gait   Gait Comments Pt amb with RW with SBA 15ft with step through gait, pt voicing not knowing how to use RW   Balance   Balance Comments steady on feet with RW   General Therapy  "Interventions   Planned Therapy Interventions gait training;bed mobility training;strengthening   Clinical Impression   Criteria for Skilled Therapeutic Intervention yes, treatment indicated   PT Diagnosis impaired mobility   Influenced by the following impairments pain, weakness   Functional limitations due to impairments amb, transfers   Clinical Presentation Stable/Uncomplicated   Clinical Presentation Rationale clinical judgement   Clinical Decision Making (Complexity) Low complexity   Therapy Frequency` daily   Predicted Duration of Therapy Intervention (days/wks) 2 days   Anticipated Discharge Disposition Transitional Care Facility   Risk & Benefits of therapy have been explained Yes   Patient, Family & other staff in agreement with plan of care Yes   Clinical Impression Comments pt would benefit from skilled PT to addres strength and mobility   [facility] TM \"6 Clicks\"   2016, Trustees of [facility], under license to [employer].  All rights reserved.   6 Clicks Short Forms Basic Mobility Inpatient Short Form   [facility]  \"6 Clicks\" V.2 Basic Mobility Inpatient Short Form   1. Turning from your back to your side while in a flat bed without using bedrails? 2 - A Lot   2. Moving from lying on your back to sitting on the side of a flat bed without using bedrails? 2 - A Lot   3. Moving to and from a bed to a chair (including a wheelchair)? 3 - A Little   4. Standing up from a chair using your arms (e.g., wheelchair, or bedside chair)? 3 - A Little   5. To walk in hospital room? 3 - A Little   6. Climbing 3-5 steps with a railing? 3 - A Little   Basic Mobility Raw Score (Score out of 24.Lower scores equate to lower levels of function) 16   Total Evaluation Time   Total Evaluation Time (Minutes) 8       See Care Plan for goals.    "

## 2018-07-16 ENCOUNTER — PATIENT OUTREACH (OUTPATIENT)
Dept: CARE COORDINATION | Facility: CLINIC | Age: 83
End: 2018-07-16

## 2018-07-16 VITALS
OXYGEN SATURATION: 95 % | SYSTOLIC BLOOD PRESSURE: 146 MMHG | TEMPERATURE: 97.6 F | BODY MASS INDEX: 14.87 KG/M2 | HEART RATE: 72 BPM | WEIGHT: 80 LBS | DIASTOLIC BLOOD PRESSURE: 65 MMHG | RESPIRATION RATE: 16 BRPM

## 2018-07-16 LAB — INR PPP: 1.75 (ref 0.86–1.14)

## 2018-07-16 PROCEDURE — A9270 NON-COVERED ITEM OR SERVICE: HCPCS | Mod: GY | Performed by: FAMILY MEDICINE

## 2018-07-16 PROCEDURE — 85610 PROTHROMBIN TIME: CPT | Performed by: FAMILY MEDICINE

## 2018-07-16 PROCEDURE — A9270 NON-COVERED ITEM OR SERVICE: HCPCS | Mod: GY

## 2018-07-16 PROCEDURE — 36415 COLL VENOUS BLD VENIPUNCTURE: CPT | Performed by: FAMILY MEDICINE

## 2018-07-16 PROCEDURE — 25000132 ZZH RX MED GY IP 250 OP 250 PS 637: Mod: GY

## 2018-07-16 PROCEDURE — 25000132 ZZH RX MED GY IP 250 OP 250 PS 637: Mod: GY | Performed by: FAMILY MEDICINE

## 2018-07-16 PROCEDURE — 99239 HOSP IP/OBS DSCHRG MGMT >30: CPT | Performed by: PHYSICIAN ASSISTANT

## 2018-07-16 RX ORDER — ACETAMINOPHEN 325 MG/1
650 TABLET ORAL 3 TIMES DAILY
Qty: 100 TABLET | Refills: 0 | DISCHARGE
Start: 2018-07-16 | End: 2019-01-01

## 2018-07-16 RX ORDER — POLYETHYLENE GLYCOL 3350 17 G/17G
17 POWDER, FOR SOLUTION ORAL DAILY PRN
Qty: 7 PACKET | DISCHARGE
Start: 2018-07-16 | End: 2018-07-17

## 2018-07-16 RX ORDER — WARFARIN SODIUM 3 MG/1
3 TABLET ORAL
Status: DISCONTINUED | OUTPATIENT
Start: 2018-07-16 | End: 2018-07-16 | Stop reason: HOSPADM

## 2018-07-16 RX ORDER — NAPROXEN 250 MG/1
250 TABLET ORAL EVERY 12 HOURS PRN
DISCHARGE
Start: 2018-07-16 | End: 2019-01-01

## 2018-07-16 RX ORDER — AMOXICILLIN 250 MG
1 CAPSULE ORAL AT BEDTIME
Qty: 30 TABLET | Status: ON HOLD | DISCHARGE
Start: 2018-07-16 | End: 2019-01-01

## 2018-07-16 RX ADMIN — ACETAMINOPHEN 650 MG: 325 TABLET, FILM COATED ORAL at 08:05

## 2018-07-16 RX ADMIN — NAPROXEN 250 MG: 250 TABLET ORAL at 08:05

## 2018-07-16 RX ADMIN — METOPROLOL SUCCINATE 25 MG: 25 TABLET, EXTENDED RELEASE ORAL at 08:05

## 2018-07-16 RX ADMIN — LORATADINE 10 MG: 10 TABLET ORAL at 08:05

## 2018-07-16 NOTE — DISCHARGE SUMMARY
[facility]    Discharge Summary  Hospital Medicine    Date of Admission:  7/12/2018  Date of Discharge:  7/16/2018   Discharging Provider: Milena Gonzalez  Date of Service: 7/16/2018     Primary Care     Milena Jaime  [address] [facility] [address]      Identification and Chief Compaint: Shanika Stahl is a 89 year old female who presented on 7/12/2018 with complaint of abdominal pain found to have a burst fracture of T11.    Discharge Diagnoses       Closed stable burst fracture of eleventh thoracic vertebra with routine healing    Generalized weakness    Abdominal pain    Renal hypertension    Chronic atrial fibrillation    Diarrhea    Oropharyngeal dysphagia    Hyponatremia    Asymptomatic bacteriuria    Vaginal bleeding    Prolonged INR      Discharge Disposition   Discharged to TCU for short term care    Discharge Orders     Follow Up and recommended labs and tests   Follow up with Dr. Lujan or Dr. Kyaw Resendiz in 2 weeks.     Additional Discharge Instructions   TLSO brace should be on while out of bed. Reposition as needed while brace is on.     Weight bearing status   As tolerated     General info for SNF   Length of Stay Estimate: Short Term Care: Estimated # of Days <30  Condition at Discharge: Improving  Level of care:skilled   Rehabilitation Potential: Good  Admission H&P remains valid and up-to-date: Yes  Recent Chemotherapy: N/A  Use Nursing Home Standing Orders: N/A     Mantoux instructions   Give two-step Mantoux (PPD) Per Facility Policy Yes     Reason for your hospital stay   You were seen for abdominal pain. This was thought to be due to a fracture of your 11th thoracic vertebra. You have been given a brace for this to be worn whenever you are out of bed. You should follow up with the [facility] in 2 weeks.     Weight bearing status   Weight bear as tolerated       Discharge Medications   Current Discharge Medication List       START taking these medications    Details   acetaminophen (TYLENOL) 325 MG tablet Take 2 tablets (650 mg) by mouth 3 times daily  Qty: 100 tablet, Refills: 0    Associated Diagnoses: Closed stable burst fracture of eleventh thoracic vertebra with routine healing      naproxen (NAPROSYN) 250 MG tablet Take 1 tablet (250 mg) by mouth every 12 hours as needed for moderate pain , take with meals.    Associated Diagnoses: Closed stable burst fracture of eleventh thoracic vertebra with routine healing      polyethylene glycol (MIRALAX/GLYCOLAX) Packet Take 17 g by mouth daily as needed for constipation  Qty: 7 packet    Associated Diagnoses: Slow transit constipation      senna-docusate (SENOKOT-S;PERICOLACE) 8.6-50 MG per tablet Take 1 tablet by mouth At Bedtime , stop taking if loose stools develop.  Qty: 30 tablet    Associated Diagnoses: Slow transit constipation         CONTINUE these medications which have NOT CHANGED    Details   CALCIUM + D 600-200 MG-UNIT PO TABS 1 tablet by mouth daily      fish oil-omega-3 fatty acids (OMEGA 3) 1000 MG capsule Take 1 capsule by mouth 2 times daily  Qty: 90 capsule, Refills: 0      furosemide (LASIX) 20 MG tablet Take 1 tablet (20 mg) by mouth every other day  Qty: 45 tablet, Refills: 3    Comments: Patient will call to fill  Associated Diagnoses: Leg swelling      loratadine (CLARITIN) 10 MG tablet Take 10 mg by mouth daily      metoprolol succinate (TOPROL-XL) 25 MG 24 hr tablet Take 1 tablet (25 mg) by mouth 2 times daily  Qty: 180 tablet, Refills: 3    Comments: Patient will call to fill  Associated Diagnoses: Renal hypertension, stage 1-4 or unspecified chronic kidney disease      simvastatin (ZOCOR) 20 MG tablet Take 1 tablet (20 mg) by mouth At Bedtime  Qty: 90 tablet, Refills: 3    Comments: Patient will call to fiill  Associated Diagnoses: Hyperlipidemia LDL goal <100      VITAMIN D 1000 UNIT OR TABS 1 TABLET DAILY  Qty: 30, Refills: 0      warfarin (JANTOVEN) 2.5 MG  tablet Take 2.5 mg on Mon/Wed/Fri; 5 mg all other days or as directed by the Anticoagulation Clinic  Qty: 150 tablet, Refills: 0    Comments: Patient will call to fill  Associated Diagnoses: Chronic atrial fibrillation (H)      acyclovir (ZOVIRAX) 5 % ointment Apply topically 6 times daily  Qty: 15 g, Refills: 3    Associated Diagnoses: Cold sore      order for DME Knee high compression stockings 10-15 mm Hg  Qty: 1 each, Refills: 1    Associated Diagnoses: Leg swelling           Allergies   Allergies   Allergen Reactions     Allopurinol      Ampicillin Diarrhea     Cipro [Ciprofloxacin] Nausea and Vomiting     Levaquin GI Disturbance     Lisinopril Swelling     AngioEdema     Paxil [Paroxetine] Nausea and Vomiting     Penicillins Diarrhea     Sulfa Drugs GI Disturbance     Zithromax [Macrolides] Nausea and Vomiting     Zoloft Nausea and Vomiting       Consultations This Hospital Stay   Consultation during this admission received from orthopedics, orthosis brace and nurtrition    Significant Results and Procedures   Procedures    None    Data   Results for orders placed or performed during the hospital encounter of 07/12/18   US Pelvic Complete with Transvaginal    Narrative    PELVIC ULTRASOUND TRANSABDOMINAL IMAGING AND TRANSVAGINAL IMAGING   7/12/2018 8:38 PM    HISTORY: Lower abdominal spasms and discomfort. Abnormal endometrial  stripe on CT imaging from July 5, 2018.  Evaluate for uterine  malignancy versus other acute process;     COMPARISON: A CT which included the pelvis on 7/5/2018.    TECHNIQUE: Transabdominal imaging was performed. Transvaginal imaging  was also performed.     FINDINGS: The uterus measures 5.9 x 3.1 cm. It demonstrates normal  echogenicity with no myometrial abnormality seen. The endometrium is  normal measuring 0.27 cm. The endometrial canal is distended with  fluid measuring 2.4 x 1.9 x 3.0 cm for an estimated volume of 7.2 mL.  The right ovary is not seen. The left ovary contains a  0.5 cm  follicle. Normal blood flow is seen to the left ovary. No adnexal  pathology is seen. There is a small amount of free fluid in the  cul-de-sac.      Impression    IMPRESSION: The uterus is fluid-filled as described above suggesting  hydrometra.    ANITA REIS MD   Abdomen, flat/upright (2 views)    Narrative    XR ABDOMEN TWO VIEWS  7/12/2018 8:27 PM     COMPARISON: None.    HISTORY: Abdominal spasm and pain. Constipation noted on CT imaging on  July 5, 2018.  Evaluate for interval resolution of constipation.    FINDINGS: Abdominal bowel gas pattern is nonspecific. There is no  evidence for free intraperitoneal air.      Impression    IMPRESSION: No evidence for bowel obstruction or free air.    CLAUDIA TELLES MD   CT Abdomen Pelvis w Contrast    Narrative    CT ABDOMEN AND PELVIS WITH CONTRAST 7/13/2018 7:43 AM     HISTORY:  Abdominal pain.     COMPARISON: CT abdomen and pelvis 7/5/2018.    TECHNIQUE: Axial images from the lung bases to the symphysis are  performed with additional coronal reformatted images. 39 mL of Isovue  370 are given intravenously.  Radiation dose for this scan was reduced  using automated exposure control, adjustment of the mA and/or kV  according to patient size, or iterative reconstruction technique.    FINDINGS:   Linear atelectasis is present at the lung bases in  addition to a trace amount of pleural fluid bilaterally which is new.  Cardiomegaly.    Abdomen: The liver, spleen, gallbladder, pancreas and adrenal glands  are unremarkable. Bilateral simple-appearing renal cysts are present.  No hydronephrosis. No obvious renal calculi. Aorta is calcified  without evidence of aneurysm or dissection. The bowel is normal in  caliber without obstruction. Bowel anastomosis right lower quadrant is  patent.    Pelvis: Bladder is decompressed but otherwise unremarkable. Uterus is  visualized but the endometrium appears thickened on series 4, image  54. May be abnormal in a  postmenopausal patient. No enlarged pelvic or  inguinal lymph nodes. No free pelvic fluid. Bone window examination  demonstrates no aggressive-appearing bone lesions. Osteopenia is  noted. Correlate with concurrently performed spine CT reports.      Impression    IMPRESSION:  1. Cardiomegaly with trace pleural effusions.  2. Endometrial thickening of uncertain significance. Endometrial  hyperplasia or neoplasia is possible. No significant change since  prior CT. No enlarged lymph nodes.  3. No bowel obstruction or diverticulitis. Appendix not visualized.  Bowel anastomosis right lower quadrant is patent.  4. Simple renal cysts bilaterally. Abdominal and pelvic organs are  otherwise within normal limits.    MAKEDA BLEDSOE MD   CT Lumbar Spine w/o Contrast    Narrative    CT LUMBAR SPINE WITHOUT CONTRAST  7/13/2018 7:45 AM     HISTORY: Abdomen and pelvic pain, but worse with movement.      TECHNIQUE: Axial images of the lumbar spine were obtained without  intravenous contrast. Multiplanar reformations were performed.   Radiation dose for this scan was reduced using automated exposure  control, adjustment of the mA and/or kV according to patient size, or  iterative reconstruction technique.    COMPARISON: 9/30/2016 x-ray.    FINDINGS: Reconstructed images demonstrate normal vertebral body  height. There are advanced degenerative changes at the L5-S1 level.    T12-L1: No disc herniation or stenosis. Facet joints are unremarkable.    L1-L2:  No disc herniation or stenosis. Facet joints are unremarkable.    L2-L3:  Mild disc bulge. Mild central stenosis. Neural foramina are  patent. Facet joints are unremarkable. Probable hemangioma right  posterior L3 vertebra.     L3-L4:  Mild disc bulge and ligamentum flavum hypertrophy. Moderate  central stenosis. Neural foramina are patent.     L4-L5:  Disc bulge and ligamentum flavum hypertrophy result in mild to  moderate central stenosis. Neural foramina are patent.     L5-S1:   Advanced facet degenerative changes. Grade 1 degenerative  spondylolisthesis. Endplate irregularity appears degenerative. No  paraspinous soft tissue inflammation. No central stenosis. Mild  inferior foraminal narrowing bilaterally.        Impression    IMPRESSION:    1. Advanced degenerative disc disease and grade 1 degenerative  spondylolisthesis at L5-S1 without significant stenosis.  2. At L3-L4 there is moderate central stenosis.  2. At L4-L5 there is mild to moderate central stenosis. No other  significant stenosis identified.  4. No evidence of compression fracture.     REBECCA VILLEGAS MD   CT Thoracic Spine w Contrast    Narrative    CT THORACIC SPINE WITHOUT CONTRAST   7/13/2018 8:24 AM     HISTORY: Abdominal pain.      TECHNIQUE: Axial images of the thoracic spine were obtained without  intravenous contrast. Multiplanar reformations were performed.   Radiation dose for this scan was reduced using automated exposure  control, adjustment of the mA and/or kV according to patient size, or  iterative reconstruction technique.    COMPARISON: CT abdomen and pelvis 7/5/2018    FINDINGS:  The alignment of the thoracic spine is normal although  kyphosis is exaggerated.  There is a burst fracture of T11 involving  superior and inferior endplates with acute-appearing fracture lines.  The posterosuperior and posteroinferior endplates protrude slightly  into the spinal canal but cause only mild narrowing of the spinal  canal. There is adjacent soft tissue swelling best appreciated on  coronal images and axial image 109 of series 5. Digital   radiograph from the previous CT scan shows that there is no soft  tissue swelling and that there was only mild compression deformity of  the superior endplate, and cross-sectional images through the inferior  endplate do not show any widening or retropulsion. Thus the fracture  has worsened since 7/5/2018.    There is also a compression fracture of the superior endplate of  "T6  with no acute fracture lines and no soft tissue swelling.    No other fractures are seen. There are small bilateral pleural  effusions. There is cardiomegaly with pacemaker in the heart. There is  a cyst in the upper pole of the left kidney.      Impression    IMPRESSION:  1. Recent burst fracture of T11 involving superior and inferior  endplates with slight retropulsion into the spinal canal. This has  progressed substantially since 7/5/2018 and now has much more adjacent  soft tissue swelling.  2. Old compression fracture of the superior endplate of T6.    DAMION CARUSO MD     History of Present Illness   (From H&P) \"The patient actually gives a history of approximately 3 weeks of pain which she describes as abdominal pain, points to the high abdomen as the area that it hurts.  She was seen in the clinic and it was thought to be primarily due to potential constipation.  She ultimately had a CT scan of the abdomen and pelvis which did show copious stool throughout the colon.  She has had a total of 2 bottles of magnesium citrate, the first one over 2 days about 5 days ago and the last one on Tuesday and Wednesday.  With this, she started to have diarrhea and has had about 9-10 loose stools on Tuesday, 3-4 yesterday, but only one today.       She continues, however, to complain of abdominal pain.  This pain seems to be worse when she lies down and the sons do not really note that it is worse when she gets up and walks, but it is worse when she rolls over during the act of rolling.  At times it is intolerable and at other times she does not really complain of it much.  With this, she has had marked anorexia, has not eaten much of anything over the last couple of days, and now has also had significant weakness.  She is needing increasing help to get out of bed and move around.       Of note, she did have a fall, primarily striking her head on 06/20/2018, which correlates about with the time this pain " "started.\"    Hospital Course   Shanika Stahl was admitted on 7/12/2018.  The following problems were addressed during her hospitalization:    Closed stable burst fracture of eleventh thoracic vertebra with routine healing   Presented with abdominal pain, admission exam suggested mid-back pain radiating anteriorly to the abdomen. Spine CT 7/12/18 revealed degenerative changes at multiple levels, as well as a T11 burst fracture with slight retropulsion into the spinal canal, described as having \"progressed substantially\" since 7/5/18 study, with increased soft tissue swelling. This is probably the source of the patient's pain. CT abdomen did not show any pathology to explain pain. Ortho consult 7/14/2018 recommended TLSO brace, weight bear as tolerated, PT and pain control. Patient has been fitted with TLSO brace. Which improved pain slightly. She continued to have \"some\" pain on scheduled acetaminophen 650 mg TID. Tramadol initially added, but then discontinued due to concerns it would exacerbate constipation. Started on naproxen 250 BID, with plans to discontinue or change to PRN when less analgesic needs. On day of discharge, no complaints of pain on scheduled acetaminophen and naproxen.  - continue TLSO at all times unless in bed  - weight bearing as tolerated, per ortho note  - continue  acetaminophen scheduled at 650 mg TID  - continue naproxen 250 mg BID prn   - follow up with [facility] (Dr. Lujan or Dr. Resendiz) in 2 weeks for repeat Xrays, per ortho consult note  - continue PT/OT     Renal hypertension  On metoprolol as outpatient. BP largely stable, max /74 last 24 hours.  - Continue current metoprolol at home dose  - continued on home furosemide every other day at discharge     Chronic atrial fibrillation   On warfarin anticoagulation and metoprolol for rate control.  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 HR stable.  - Continue warfarin, pharmacy to dose  - continue rate control with " metoprolol at home dose     Diarrhea   Received multiple doses of Mg citrate prior to admission in addition to other bowel regimen to correct constipation, which is likely the etiology of diarrhea on presentation. Abdominal exam is unremarkable, CT abdomen 7/12/18 showed no significant pathology. No recent antibiotics. Diarrhea resolved and is now feeling as though she is becoming constipated.   - continued on bowel regimen senna scheduled and Miralax prn     Oropharyngeal dysphagia  Longstanding oropharyngeal dysphagia to both solids and liquids, no prior work up. Eating without difficulty in hospital. Would recommend further outpatient work up if continues/recurrence.    Hyponatremia  Mild at 132 on admission. Subsequently normal. No interventions.     Abnormal UA, asymptomatic bacteriuria  UA positive for leukocyte esterase, WBC and few bacteria. No urinary symptoms, no fever, and WBC normal. No antibiotics prescribed as no symptoms developed and remained afebrile.     Vaginal bleeding  May be due to mild, local trauma from 7/12/18 vaginal ultrasound (obtained for lower abdomen pain and abnormal endometrial stripe on CT) combined with prolonged INR. Bleeding is now absent at a therapeutic INR.    Abnormal Pelvic US  Pelvic/Transvaginal US performed for abdominal pain and concerns of abnormal endometrial stripe on CT. Normal endometrium on US though did show fluid-filled uterus suggesting hydrometra. ED did speak with an on call GYN who did not think this was related to her abdominal pain, no indication from a GYN standpoint.     Prolonged INR  INR 4.4 --> 5.62 on admission, on warfarin for atrial fibrillation. Unclear why INR was prolonged at admission: no recent antibiotics, med or diet changes. She did have some diarrhea as above prior to arrival which may have contributed. Mild vaginal bleeding as above, otherwise no significant blood loss noted and Hgb has remained normal. Warfarin initially held to allow INR  to normalize, no reversal.  - continue warfarin, pharmacy consult to manage INR     Generalized weakness  Global, non-focal weakness. Part of her exertional intolerance has been due to pain, may improve if pain control improves. Patient and family agreeable to TCU for further PT and cares while strength is regained. PT and OT were consulted and initiated when cleared for weightbearing.  - continuation of PT and OT at TCU    Interval History:  Patient without complaints. Feels improved in comparison to yesterday. Pain absent on current pain medications. Tolerating brace. Slept well. Fair appetite. No vaginal bleeding. Feels a bit constipated. No urinary symptoms.    Patient denies fever, chills, myalgias, lightheadedness, dizziness, cough, congestion, rhinorrhea, sore throat, shortness of breath, palpitations, chest pain, abdominal pain, nausea, vomiting, diarrhea, changes in urination (dysuria, changes in frequency/urgency), rash or wounds.    She is agreeable to TCU and hopes to discharge to the [facility] today if there is availability.    Pending Results   Unresulted Labs Ordered in the Past 30 Days of this Admission     No orders found from 5/13/2018 to 7/13/2018.        Physical Exam   Temp:  [97.6  F (36.4  C)-98.4  F (36.9  C)] 97.6  F (36.4  C)  Pulse:  [72-82] 72  Resp:  [16-18] 16  BP: (129-152)/(65-75) 146/65  SpO2:  [94 %-97 %] 95 %  Vitals:    07/12/18 1743   Weight: 36.3 kg (80 lb)     Constitutional: Alert, oriented, cooperative, no apparent distress, appears nontoxic, Appears stated age.  Eyes: Sclera are anicteric, EOMI  HENT: Normocephalic. Atraumatic. Oropharynx is clear and moist.   Lymph/Hematologic: No epitrochlear, axillary, preauricular, postauricular, occipital, sub-mandibular, tonsillar, sub-mental, anterior or posterior cervical, or supraclavicular lymphadenopathy is appreciated.  Cardiovascular: Normal rate and irregular rhythm. Radial pulses are 2+ bilaterally. Distal pulses are intact. No  lower extremity edema.  Respiratory: No accessory muscle usage. Speaking in full sentences. Clear to auscultation bilaterally without wheezes, crackles or rhonchi.   GI: Soft, non-tender, non-distended. No rebound or guarding.  Genitourinary: Deferred  Musculoskeletal: Normal muscle bulk and tone. Moves all extremities appropriately. TLSO brace applied.  Skin: Warm and dry, no rashes.   Neurologic: Neck supple. Cranial nerves 3-12 are grossly intact.  is symmetric.     The discharge plan was discussed with the patient.    Total time on this discharge was > 30 minutes.    I have discussed patient and formulated plan with Dr. Nahum Jensen.    Milena Gonzalez, [facility]

## 2018-07-16 NOTE — PROGRESS NOTES
Care Transitions [facility]charge[facility] [place] [facility] (Phone: [phone] Fax: [phone])    Name: Shanika Stahl    MRN: [id number]    Reason for Hospitalization: Abdominal pain, generalized [R10.84]  Muscle weakness (generalized) [M62.81]  Thyroid function test abnormal [R94.6]  Abnormal pelvic ultrasound [R93.8]  Supratherapeutic INR [R79.1]    Cognitive/Behavioral Status: awake, alert and oriented    Follow-up Appointments: Future Appointments  Date Time Provider Department Center   8/28/2018 9:00 AM [facility] [facility]       Discharge Date:  7/16/2018    Patient/Care Partner in agreement and understands the discharge plan:  Yes    Discharge Disposition:  transitional care unit    Discharge Planner   [facility]Phone: [phone] Fax: [phone]) @ 1300  Barriers to discharge plan: Medical stability  Follow up plan: PCP       Entered by: Autumn Almanzar 07/16/2018 1:23 PM         Autumn Almanzar, RN Care Coordinator  [phone]

## 2018-07-16 NOTE — PHARMACY - DISCHARGE MEDICATION RECONCILIATION
Discharge medication review for this patient is complete. Pharmacist assisted with medication reconciliation of discharge medications with prior to admission medications.     The following changes were made to the discharge medication list based on pharmacist review:  Added:  none  Discontinued: none  Changed: none      Patient's Discharge Medication List  - medications as listed on After Visit Summary (AVS)     Review of your medicines      START taking       Dose / Directions    acetaminophen 325 MG tablet   Commonly known as:  TYLENOL        Dose:  650 mg   Take 2 tablets (650 mg) by mouth 3 times daily   Quantity:  100 tablet   Refills:  0       naproxen 250 MG tablet   Commonly known as:  NAPROSYN        Dose:  250 mg   Take 1 tablet (250 mg) by mouth every 12 hours as needed for moderate pain , take with meals.   Refills:  0       polyethylene glycol Packet   Commonly known as:  MIRALAX/GLYCOLAX   Used for:  Slow transit constipation        Dose:  17 g   Take 17 g by mouth daily as needed for constipation   Quantity:  7 packet   Refills:  0       senna-docusate 8.6-50 MG per tablet   Commonly known as:  SENOKOT-S;PERICOLACE   Used for:  Slow transit constipation        Dose:  1 tablet   Take 1 tablet by mouth At Bedtime , stop taking if loose stools develop.   Quantity:  30 tablet   Refills:  0         CONTINUE these medicines which have NOT CHANGED       Dose / Directions    acyclovir 5 % ointment   Commonly known as:  ZOVIRAX   Used for:  Cold sore        Apply topically 6 times daily   Quantity:  15 g   Refills:  3       calcium + D 600-200 MG-UNIT Tabs   Generic drug:  calcium carbonate-vitamin D        1 tablet by mouth daily   Refills:  0       cholecalciferol 1000 UNIT tablet   Commonly known as:  vitamin D3        1 TABLET DAILY   Quantity:  30   Refills:  0       fish oil-omega-3 fatty acids 1000 MG capsule        Dose:  1 capsule   Take 1 capsule by mouth 2 times daily   Quantity:  90 capsule    Refills:  0       furosemide 20 MG tablet   Commonly known as:  LASIX   Used for:  Leg swelling        Dose:  20 mg   Take 1 tablet (20 mg) by mouth every other day   Quantity:  45 tablet   Refills:  3       loratadine 10 MG tablet   Commonly known as:  CLARITIN        Dose:  10 mg   Take 10 mg by mouth daily   Refills:  0       metoprolol succinate 25 MG 24 hr tablet   Commonly known as:  TOPROL-XL   Used for:  Renal hypertension, stage 1-4 or unspecified chronic kidney disease        Dose:  25 mg   Take 1 tablet (25 mg) by mouth 2 times daily   Quantity:  180 tablet   Refills:  3       order for DME   Used for:  Leg swelling        Knee high compression stockings 10-15 mm Hg   Quantity:  1 each   Refills:  1       simvastatin 20 MG tablet   Commonly known as:  ZOCOR   Used for:  Hyperlipidemia LDL goal <100        Dose:  20 mg   Take 1 tablet (20 mg) by mouth At Bedtime   Quantity:  90 tablet   Refills:  3       warfarin 2.5 MG tablet   Commonly known as:  JANTOVEN   Used for:  Chronic atrial fibrillation (H)        Take 2.5 mg on Mon/Wed/Fri; 5 mg all other days or as directed by the Anticoagulation Clinic   Quantity:  150 tablet   Refills:  0            Where to get your medicines      Some of these will need a paper prescription and others can be bought over the counter. Ask your nurse if you have questions.     You don't need a prescription for these medications      acetaminophen 325 MG tablet     naproxen 250 MG tablet     polyethylene glycol Packet     senna-docusate 8.6-50 MG per tablet           Demetria Winchester, ZenaidaD

## 2018-07-16 NOTE — DISCHARGE INSTRUCTIONS
Orthopedic Discharge Instructions  Follow up:  Follow up with Dr. Lujan or Dr. Resendiz in 2 weeks for x rays as scheduled.  Call [phone] if appointment needed or questions. If you have questions or concerns while at home, please call [facility].     [facility] Medicine Discharge Instructions:  1. Pain Management: acetaminophen (Tylenol) 650 mg three times daily. Naproxen (Aleve) 250 mg every 12 hours as needed for pain. Do not take on empty stomach, take with food/meal.  2. Constipation: Senna 1 tablet at bedtime, stop taking if loose stools develop. May add Miralax as needed for constipation.    Coumadin Discharge Instructions:  1. Take Coumadin 3 mg today (7/16), then resume home dosing schedule of 2.5 mg Mon/Wed/Fri, 5 mg rest of week.  2. Recheck INR at [facility] on Thurs, 7/19 with results phoned to [facility] at [phone]. Further dosing instructions per Anticoagulation Clinic.

## 2018-07-16 NOTE — PHARMACY-ANTICOAGULATION SERVICE
Clinical Pharmacy- Warfarin Discharge Note  This patient is currently on warfarin for the treatment of Atrial fibrillation.  INR Goal= 2-3  Expected length of therapy lifetime.    Warfarin PTA Regimen: 2.5 mg Mon, Wed, Fri, 5mg rest of week      Anticoagulation Dose History     Recent Dosing and Labs Latest Ref Rng & Units 6/20/2018 7/5/2018 7/12/2018 7/13/2018 7/14/2018 7/15/2018 7/16/2018    Warfarin 3 mg - - - - - - 3 mg -    INR 0.86 - 1.14 2.46(H) 4.25(H) 4.59(H) 5.62(HH) 4.16(H) 2.02(H) 1.75(H)    INR 0.86 - 1.14 - - - - - - -          Vitamin K doses administered during the last 7 days: none  FFP administered during the last 7 days: none  Recommend discharging the patient on a warfarin regimen of 3 mg today (7/16), then resume normal dosing schedule of 2.5 mg MWF, 5 mg ROW.    The patient should have an INR checked Thurs, 7/19 at [facility] with results phoned to  [facility].    Demetria Winchester, ZenaidaD

## 2018-07-16 NOTE — PLAN OF CARE
Problem: Pain, Acute (Adult)  Goal: Identify Related Risk Factors and Signs and Symptoms  Related risk factors and signs and symptoms are identified upon initiation of Human Response Clinical Practice Guideline (CPG).   Outcome: Improving  Patient wearing TSLO brace when up out of bed, reminded her not to twist or bend, may log roll in bed.  Skin intact with no redness in areas wear brace may be rubbing.Voiding with no difficulty. Taking scheduled Tylenol for pain.  Forgetful, bed alarm on for safety.

## 2018-07-16 NOTE — LETTER
Hand-off  for Care Coordination  What is Care Coordination?  [facility] Care Coordination Services are available to people in complex situations,   for example medical, social or financial. The Care Coordinator, a SW or an RN, works with the   patient and their doctor to determine health goals, obtain resources, achieve outcomes,   and develop plans to coordinate care across settings.      o Patient Name:   Shanika Stahl  o Patient :     1924  o Patient PCP:     Milena Jaime DO    o Patient Primary Clinic:   [address] [place] [address]  o D/C Facility:  [facility] [phone] Fax: [phone]   o TCU Contact Info for questions: ___________________________  o D/C Date:  ______________________________________________  o Follow-up Apt with PCP after TCU D/C:   ____________________  o Other Follow-Up Apt s: ____________________________________  Additional information (concerns, and Home Care, ect ):   __________________________________________________________________  __________________________________________________________________        __________________________________________________________________    Care Coordinator to Contact  **Please fax this sheet back to me when pt is ready to discharge**  Aleksandr Peguero RN  Clinic Care Coordinator  Fax: [phone]  Phone: [phone]

## 2018-07-16 NOTE — PLAN OF CARE
Problem: Patient Care Overview  Goal: Plan of Care/Patient Progress Review  Occupational Therapy Discharge Summary    Reason for therapy discharge:    Discharged to transitional care facility.    Progress towards therapy goal(s). See goals on Care Plan in [facility] electronic health record for goal details.  Goals not met.  Barriers to achieving goals:   discharge from facility.    Therapy recommendation(s):    Continued therapy is recommended.  Rationale/Recommendations:  TCU to increase independence with ADLs and functional mobility.

## 2018-07-16 NOTE — PLAN OF CARE
Problem: Patient Care Overview  Goal: Plan of Care/Patient Progress Review  Physical Therapy Discharge Summary    Reason for therapy discharge:    Discharged to transitional care facility.    Progress towards therapy goal(s). See goals on Care Plan in [facility] electronic health record for goal details.  Goals not met.  Barriers to achieving goals:   short treatment stay.    Therapy recommendation(s):    Continued therapy is recommended.  Rationale/Recommendations:  [facility].     Leonor Winchester PT

## 2018-07-16 NOTE — PLAN OF CARE
Problem: Patient Care Overview  Goal: Plan of Care/Patient Progress Review  Outcome: No Change  Patient is moving well with 1 assist, TLSO when out of bed and walker. Patient needs reminders to not bend or twist. Is able to turn onto her right side independently when in bed. Tolerated sitting in chair longer today. Appetite continues to be poor. Has been drinking 2 chocolate boost drinks per day. Ate 25 % of supper. Pain controlled with scheduled tylenol, naprosyn added this evening. Alarms on for safety. Is hard of hearing, has bilateral hearing aides. Is forgetful.

## 2018-07-16 NOTE — PROGRESS NOTES
SPIRITUAL HEALTH SERVICES  SPIRITUAL ASSESSMENT Progress Note  [facility] - Med/[facility]    Referral Source:     Pt on admission to Inpatient status     Primary Focus:     Assessment of emotional/spiritual/Presybeterian distress    Support for coping    Illness Circumstances:   Reviewed documentation. Reflective conversation shared with patient which integrated elements of illness and family narratives.     Context of Serious Illness/Symptom(s) - Fall at home; thoracic fracture    Resources for Support - Lives with sonAntoine    Distress:     Emotional/Existential/Relational Distress - None identified    Spiritual/Temple Distress - None identified    Social/Cultural/Economic Distress - She stated that her son likes his job as a  with [employer] but he doesn't like the hours    Spiritual / Temple Coping:     Religious/Lillian - [facility]    Spiritual Practice(s) - Attends Sabianism regularly and hopes the same is true for her son    Goals of Care:    Goals of Care - She is hoping to be able to discharge to [facility] because it will be easier for her son to transport and visit    Meaning/Sense-Making - Family and lillian were significant in conversation    Plan: No follow up visit planned but patient is aware of the availability of spiritual support on request.    Rik George M.A., [facility]  Staff   [facility]  Office: [phone]  Cell: [phone]  Pager [phone]

## 2018-07-16 NOTE — PLAN OF CARE
Problem: Patient Care Overview  Goal: Plan of Care/Patient Progress Review  [place] [facility] DISCHARGE NOTE    Patient discharged to transitional care unit at 1:30 PM via wheel chair. Accompanied by son and staff. Discharge instructions reviewed with patient, opportunity offered to ask questions. Prescriptions sent to patients preferred pharmacy. All belongings sent with patient.    Report called to Rula at [facility].    Nikkie Ferrell

## 2018-07-16 NOTE — PROGRESS NOTES
Clinic Care Coordination Contact    Situation: Patient chart reviewed by care coordinator.    Background: Patient currently hospitalized at [facility] with plan for DC to TCU later today. RN CRUZITO received CTS handoff as follows:  Transition Communication Hand-off for Care Transitions to Next Level of Care Provider  Name: Shanika Stahl  : 1924  MRN #: [id number]  Primary Care Provider: Milena Jaime  Primary Care MD Name:  ([place])  Primary Clinic: [address] [facility] [address]  Primary Care Clinic Name:  ([facility])  Reason for Hospitalization:  Abdominal pain, generalized [R10.84]  Muscle weakness (generalized) [M62.81]  Thyroid function test abnormal [R94.6]  Abnormal pelvic ultrasound [R93.8]  Supratherapeutic INR [R79.1]  Admit Date/Time: 2018  5:46 PM  Discharge Date: 2018  Payor Source: Payor: MEDICARE / Plan: MEDICARE / Product Type: Medicare /   Readmission Assessment Measure (KLAUDIA) Risk Score/category: average  Reason for Communication Hand-off Referral: Fragility  Discharge Plan: [facility]  Concern for non-adherence with plan of care: No  Discharge Needs Assessment: TCU  Follow-up specialty is recommended: No    Follow-up plan:  Future Appointments  Date Time Provider Department Center   2018 9:00 AM [facility] [facility]   [facility]al Recommendations:  TSLO brace, TCU to increase independence with ADLs and functional mobility.    Assessment: Patient remains hospitalized at this time.    Plan/Recommendations: RN CC will monitor chart and will f/u as needed after DC.    Aleksandr DEVLIN,RN- BC  Clinic Care Coordinator  [facility]  Phone: [phone]

## 2018-07-17 ENCOUNTER — NURSING HOME VISIT (OUTPATIENT)
Dept: GERIATRICS | Facility: CLINIC | Age: 83
End: 2018-07-17
Payer: MEDICARE

## 2018-07-17 ENCOUNTER — ANTICOAGULATION THERAPY VISIT (OUTPATIENT)
Dept: ANTICOAGULATION | Facility: CLINIC | Age: 83
End: 2018-07-17

## 2018-07-17 VITALS
TEMPERATURE: 97.6 F | BODY MASS INDEX: 15.24 KG/M2 | DIASTOLIC BLOOD PRESSURE: 78 MMHG | RESPIRATION RATE: 16 BRPM | HEART RATE: 84 BPM | WEIGHT: 82 LBS | SYSTOLIC BLOOD PRESSURE: 130 MMHG

## 2018-07-17 DIAGNOSIS — N93.9 VAGINAL BLEEDING: ICD-10-CM

## 2018-07-17 DIAGNOSIS — R10.84 ABDOMINAL PAIN, GENERALIZED: ICD-10-CM
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
## 2018-07-17 DIAGNOSIS — R13.12 OROPHARYNGEAL DYSPHAGIA: ICD-10-CM

## 2018-07-17 DIAGNOSIS — S22.081D CLOSED STABLE BURST FRACTURE OF ELEVENTH THORACIC VERTEBRA WITH ROUTINE HEALING: Primary | ICD-10-CM

## 2018-07-17 DIAGNOSIS — Z79.01 LONG TERM CURRENT USE OF ANTICOAGULANT THERAPY: ICD-10-CM

## 2018-07-17 DIAGNOSIS — I63.40 CEREBRAL EMBOLISM WITH CEREBRAL INFARCTION (H): ICD-10-CM

## 2018-07-17 DIAGNOSIS — M62.81 GENERALIZED MUSCLE WEAKNESS: ICD-10-CM

## 2018-07-17 DIAGNOSIS — R82.71 ASYMPTOMATIC BACTERIURIA: ICD-10-CM

## 2018-07-17 DIAGNOSIS — I48.20 CHRONIC ATRIAL FIBRILLATION (H): ICD-10-CM

## 2018-07-17 DIAGNOSIS — I12.9 RENAL HYPERTENSION: ICD-10-CM

## 2018-07-17 DIAGNOSIS — E87.1 HYPONATREMIA: ICD-10-CM

## 2018-07-17 DIAGNOSIS — Z95.0 PACEMAKER: ICD-10-CM

## 2018-07-17 DIAGNOSIS — K59.01 SLOW TRANSIT CONSTIPATION: ICD-10-CM

## 2018-07-17 PROCEDURE — 99207 ZZC NO CHARGE NURSE ONLY: CPT

## 2018-07-17 PROCEDURE — 99310 SBSQ NF CARE HIGH MDM 45: CPT | Performed by: NURSE PRACTITIONER

## 2018-07-17 ASSESSMENT — ACTIVITIES OF DAILY LIVING (ADL)
DEPENDENT_IADLS:: CLEANING;COOKING;LAUNDRY;SHOPPING;MEAL PREPARATION;MEDICATION MANAGEMENT;MONEY MANAGEMENT;TRANSPORTATION

## 2018-07-17 NOTE — PROGRESS NOTES
[facility]  PRIMARY CARE PROVIDER AND CLINIC:  JaimeMilena tapia [address] [facility] [address]  Chief Complaint   Patient presents with     Hospital F/U     [facility] Medical Record Number:  [id number]    HPI:    Shanika Stahl is a 89 year old  (5/26/1924),admitted to the [facility] from [facility].  Hospital stay 7/12/18 through 7/16/18.  Admitted to this facility for  rehab, medical management and nursing care.  HPI information obtained from: facility chart records, facility staff, patient report and [facility] chart review.      Shanika Stahl is a 89 year old female with PMH of renal HTN, chronic afib on coumadin, pulmonary HTN, and cerebral embolism with infarct who presented to the ED with abdominal pain and back pain. Had fall 3 weeks prior at home, pain progressively worsening since then.  Spine CT showed degenerative changes as well as T11 burst fracture. Ortho consulted and recommended TLSO brace with OOB, f/u as OP. Abdominal CT did not show any pathology for pain in abdomen. Was noted to have diarrhea, but had previously had constipation and had taken multiple doses of mag citrate. Diarrhea resolved prior to discharge. UA positive for leukocyte esterase, WBC and few bacteria, felt to be asymptomatic bacteremia, not treated with abx. Pelvic ultrasound showed normal endometrium but fluid filled uterus - discussed with GYN who did not feel this was cause of abdominal pain. Did have some vaginal bleeding after the ultrasound, felt to be r/t trauma from US in the setting of an elevated INR (5.62 on admission). Bleeding resolved, coumadin held and INR normalized prior to discharge. Abdominal pain improved, but she remained weak. Patient and family agreed to discharge for further rehab and medical management.       Current issues are:      Closed stable burst fracture of eleventh thoracic vertebra with routine healing  Fall, subsequent  encounter  Generalized muscle weakness  Currently on  650mg TID and naproxen BID PRN, was on tramadol briefly while IP but discontinued to avoid constipation. She reports only mild back pain with this. She has TLSO brace in place, WBAT. Denies any numbness, tingling, difficulty with bowel or bladder.     Abdominal pain, generalized  Denies any abdominal pain in exam today, reports she is eating well but never eat very much.     Renal hypertension  Hx of renal atherosclerosis. She is on metoprolol 25mg BID and lasix 20mg daily. Creatinine stable during recent hospital stay, BP mildly elevated at time while IP.      Chronic atrial fibrillation (H)  Pacemaker  On Metoprolol for rate control, anticoagulated on coumadin. INR 5.6 on hospital admission, improved to 1.7 by discharge.Denies any chest pain or palpitations.     Slow transit constipation  Reports she did have a BM this AM, hopes she has another later today. States it was formed, not overly hard. She is on senna-s q tab qhs.     Oropharyngeal dysphagia  Reports that it feels like food get stuck in her throat at times, but no issues while in the hospital or with breakfast this AM.     Vaginal bleeding  Reports she has some bleeding when she went to the bathroom earlier, but she thinks it was a hemorrhoid and not vaginal. Denies any vaginal burning or discomfort.       CODE STATUS/ADVANCE DIRECTIVES DISCUSSION:   DNR / DNI  Patient's living condition: lives with family, adult children.      ALLERGIES:Allopurinol; Ampicillin; Cipro [ciprofloxacin]; Levaquin; Lisinopril; Paxil [paroxetine]; Penicillins; Sulfa drugs; Zithromax [macrolides]; and Zoloft  PAST MEDICAL HISTORY:  has a past medical history of Abnormal CXR (chest x-ray); Atherosclerosis of renal artery (H); Atrial fibrillation (H) (3/27/2014); Basal cell carcinoma; ESSENTIAL TREMOR (5/9/2005); Family history of breast cancer; Hyperlipidemia LDL goal <100 (10/31/2010); Hypertension goal BP (blood pressure)  < 140/90 (10/3/2013); Other and unspecified hyperlipidemia; Other osteoporosis; Pacemaker; Paroxysmal supraventricular tachycardia (H); Postmenopausal atrophic vaginitis; Stroke (H) (2/10/2014); Syncope (10/9/2013); and Unspecified essential hypertension.  PAST SURGICAL HISTORY:  has a past surgical history that includes surgical history of -  (1965); Eye surgery (2/21/12); and Implant pacemaker.  FAMILY HISTORY: family history includes Breast Cancer in her sister; Cancer in her son; Cerebrovascular Disease in her brother and mother; Diabetes in her mother; Gynecology in her daughter; HEART DISEASE in her brother, brother, brother, and sister; Prostate Cancer in her brother.  SOCIAL HISTORY:  reports that she has never smoked. She has never used smokeless tobacco. She reports that she does not drink alcohol or use illicit drugs.    Post Discharge Medication Reconciliation Status: discharge medications reconciled and changed, per note/orders (see AVS).  Current Outpatient Prescriptions   Medication Sig Dispense Refill     acetaminophen (TYLENOL) 325 MG tablet Take 2 tablets (650 mg) by mouth 3 times daily 100 tablet 0     acyclovir (ZOVIRAX) 5 % ointment Apply topically 6 times daily 15 g 3     CALCIUM + D 600-200 MG-UNIT PO TABS 1 tablet by mouth daily       furosemide (LASIX) 20 MG tablet Take 1 tablet (20 mg) by mouth every other day 45 tablet 3     loratadine (CLARITIN) 10 MG tablet Take 10 mg by mouth daily       metoprolol succinate (TOPROL-XL) 25 MG 24 hr tablet Take 1 tablet (25 mg) by mouth 2 times daily 180 tablet 3     naproxen (NAPROSYN) 250 MG tablet Take 1 tablet (250 mg) by mouth every 12 hours as needed for moderate pain , take with meals.       order for DME Knee high compression stockings 10-15 mm Hg 1 each 1     senna-docusate (SENOKOT-S;PERICOLACE) 8.6-50 MG per tablet Take 1 tablet by mouth At Bedtime , stop taking if loose stools develop. 30 tablet      simvastatin (ZOCOR) 20 MG tablet Take 1  tablet (20 mg) by mouth At Bedtime 90 tablet 3     VITAMIN D 1000 UNIT OR TABS 1 TABLET DAILY 30 0     warfarin (JANTOVEN) 2.5 MG tablet Take 2.5 mg on Mon/Wed/Fri; 5 mg all other days or as directed by the Anticoagulation Clinic 150 tablet 0       ROS:  10 point ROS of systems including Constitutional, Eyes, Respiratory, Cardiovascular, Gastroenterology, Genitourinary, Integumentary, Muscularskeletal, Psychiatric were all negative except for pertinent positives noted in my HPI.    Exam:  /78  Pulse 84  Temp 97.6  F (36.4  C)  Resp 16  Wt 82 lb (37.2 kg)  BMI 15.24 kg/m2  GENERAL APPEARANCE:  Alert, in no distress, oriented, thin, cooperative  RESP:  respiratory effort and palpation of chest normal, lungs clear to auscultation , no respiratory distress  CV:  Palpation and auscultation of heart done , +2 pedal pulses, peripheral edema trace+ in bilat ankles, irregular rhythm irregularly, pacemaker palpable left upper chest, rate-normal  ABDOMEN:  normal bowel sounds, soft, nontender, no hepatosplenomegaly or other masses, no guarding or rebound  M/S:   Gait and station abnormal TLSO brace in place, generalized weakness, no gross deformities  SKIN:  Inspection of skin and subcutaneous tissue baseline  NEURO:   Cranial nerves 2-12 are normal tested and grossly at patient's baseline  PSYCH:  oriented X 3, normal insight, judgement and memory, affect and mood normal    Lab/Diagnostic data:     CBC RESULTS:   Recent Labs   Lab Test  07/13/18 0559  07/12/18   1815   WBC  7.7  5.9   RBC  4.16  4.35   HGB  12.3  13.1   HCT  38.6  40.4   MCV  93  93   MCH  29.6  30.1   MCHC  31.9  32.4   RDW  13.2  13.0   PLT  270  308       Last Basic Metabolic Panel:  Recent Labs   Lab Test  07/13/18 0559  07/12/18   1815   NA  135  132*   POTASSIUM  4.2  4.2   CHLORIDE  102  99   BLAISE  8.6  9.4   CO2  23  21   BUN  15  17   CR  0.65  0.72   GLC  63*  91       Liver Function Studies -   Recent Labs   Lab Test  07/13/18 0559   07/12/18   1815   PROTTOTAL  7.0  7.7   ALBUMIN  3.0*  3.4   BILITOTAL  0.9  0.9   ALKPHOS  174*  192*   AST  44  43   ALT  24  23       TSH   Date Value Ref Range Status   07/12/2018 4.40 (H) 0.40 - 4.00 mU/L Final   01/22/2014 1.69 0.4 - 5.0 mU/L Final   ]    Lab Results   Component Value Date    A1C 5.5 02/10/2014    A1C 5.8 04/10/2007       ASSESSMENT/PLAN:  (S22.081D) Closed stable burst fracture of eleventh thoracic vertebra with routine healing  (primary encounter diagnosis)  (W19.XXXD) Fall, subsequent encounter  (M62.81) Generalized muscle weakness  Comment: Pain controlled, tolerating TLSO brace  Plan: continue APAP, use PRN naproxen sparringly given renal HTN and coumadin, PT/OT, f/u with ortho in 2 weeks.     (R10.84) Abdominal pain, generalized  Comment: no pain currently - ? R/t diarrhea and constipation  Plan: monitor, diet as tolerated    (I12.9) Renal hypertension  Comment: BP appears stable, limited data in TCU  Plan: continue metoprolol and lasix as above, monitor, daily weights,     (I48.2) Chronic atrial fibrillation (H)  (Z95.0) Pacemaker  Comment: rate controlled, asymptomatic, INR slightly subtherapeutic,   Plan: continue metoprolol, coumadin recheck on 7/17, dosing per [place] [facility]    (K59.01) Slow transit constipation  Comment: chronic issues, did have BM this AM  Plan: continue senna qhs, PRN miralax, monitor and adjust closely,     (R13.12) Oropharyngeal dysphagia  Comment: not problematic currently  Plan: monitor, order SLP consult if concerning in TCU    (E87.1) Hyponatremia  Comment: resolved  Plan: BMP to check for stability    (R82.71) Asymptomatic bacteriuria  Comment: no treated with abx  Plan: monitor for urinary symptoms    (N93.9) Vaginal bleeding  Comment: appear resolved, suspect blood noted earlier from hemorroid as correlated with a BM  Plan: monitor for bleeding, CBC to monitor for stability.     Orders:  1. Daily Weight, Notify provider >2lbs weight gain in 1 Day   2. If no BM  in 2 days give PRN Miralax  3. BMP, CBC on 7/20/18 Dx: Hyponatremia, Fall      Total time spent with patient visit at the skilled nursing facility was 35 min including patient visit and review of past records. Greater than 50% of total time spent with counseling and coordinating care due to medication review, discussion of plan of care, discussion of patient history    Electronically signed by:  CARMELA Davis CNP

## 2018-07-17 NOTE — MR AVS SNAPSHOT
Shanika Stahl   7/17/2018   Anticoagulation Therapy Visit    Description:  89 year old female   Provider:  Dre Phillips, RN   Department:  [facility]           INR as of 7/17/2018     Today's INR No new INR was available at the time of this encounter.      Anticoagulation Summary as of 7/17/2018     INR goal 2.0-3.0   Today's INR No new INR was available at the time of this encounter.   Full warfarin instructions 2.5 mg on Mon, Wed, Fri; 5 mg all other days   Next INR check 7/19/2018    Indications   Atrial fibrillation (H) (Resolved) [I48.91]  Cerebral embolism with cerebral infarction (H) [I63.40]  Long term current use of anticoagulant therapy [Z79.01]         July 2018 Details    Sun Mon Tue Wed Thu Fri Sat     1               2               3               4               5               6               7                 8               9               10               11               12               13               14                 15               16               17      5 mg   See details      18      2.5 mg         19            20               21                 22               23               24               25               26               27               28                 29               30               31                    Date Details   07/17 This INR check       Date of next INR:  7/19/2018         How to take your warfarin dose     To take:  2.5 mg Take 1 of the 2.5 mg tablets.    To take:  5 mg Take 2 of the 2.5 mg tablets.            Patient

## 2018-07-17 NOTE — PROGRESS NOTES
Clinic Care Coordination Contact  Care Coordination Transition Communication    Referral Source: IP Handoff    Clinical Data: Patient was hospitalized at [facility] from 7/12/18 to 7/16/18 with diagnosis of abdominal pain/generalized Weakness.     Transition to Facility:[place] [facility] (Phone: [phone] Fax: [phone])              Plan: RN/SW Care Coordinator will send TCU handoff and await notification from facility staff informing RN/SW Care Coordinator of patient's discharge plans/needs. RN/SW Care Coordinator will review chart and outreach to facility staff every 4 weeks and as needed.       Aleksandr DEVLIN,RN- BC  Clinic Care Coordinator  [facility]  Phone: [phone]

## 2018-07-17 NOTE — PROGRESS NOTES
ANTICOAGULATION FOLLOW-UP CLINIC VISIT    Patient Name:  Shanika Stahl  Date:  7/17/2018  Contact Type:  Chart Review    SUBJECTIVE:     Patient Findings     Positives OTC meds (Naproxen)    Comments Date of Admission:  7/12/2018  Date of Discharge:  7/16/2018   Shanika Stahl is a 89 year old female who presented on 7/12/2018 with complaint of abdominal pain found to have a burst fracture of T11.  naproxen (NAPROSYN) 250 MG tablet.Take 1 tablet (250 mg) by mouth every 12 hours as needed for moderate pain , take with meals.  Associated Diagnoses: Closed stable burst fracture of eleventh thoracic vertebra with routine healing               OBJECTIVE    INR   Date Value Ref Range Status   07/16/2018 1.75 (H) 0.86 - 1.14 Final       ASSESSMENT / PLAN  No question data found.  Anticoagulation Summary as of 7/17/2018     INR goal 2.0-3.0   Today's INR No new INR was available at the time of this encounter.   Warfarin maintenance plan 2.5 mg (2.5 mg x 1) on Mon, Wed, Fri; 5 mg (2.5 mg x 2) all other days   Full warfarin instructions 2.5 mg on Mon, Wed, Fri; 5 mg all other days   Weekly warfarin total 27.5 mg   Plan last modified Verenice Morillo RN (5/25/2018)   Next INR check 7/19/2018   Priority INR   Target end date Indefinite    Indications   Atrial fibrillation (H) (Resolved) [I48.91]  Cerebral embolism with cerebral infarction (H) [I63.40]  Long term current use of anticoagulant therapy [Z79.01]         Anticoagulation Episode Summary     INR check location     Preferred lab     Send INR reminders to [facility]    Comments * Keep on low end of therapeutic range. Uses 2.5mg tablets.       Anticoagulation Care Providers     Provider Role Specialty Phone number    Milena Jaime DO [facility] Internal Medicine [phone]            See the Encounter Report to view Anticoagulation Flowsheet and Dosing Calendar (Go to Encounters tab in chart review, and find the Anticoagulation Therapy  Visit)        Dre Phillips RN

## 2018-07-17 NOTE — LETTER
7/17/2018        RE: Shanika Stahl  [address]  [facility] [address]        [facility]  PRIMARY CARE PROVIDER AND CLINIC:  Milena Jaime [address] [facility] [place] [address]  Chief Complaint   Patient presents with     Hospital F/U     [facility] Medical Record Number:  [id number]    HPI:    Shanika Stahl is a 89 year old  (5/26/1924),admitted to the [facility] from [facility].  Hospital stay 7/12/18 through 7/16/18.  Admitted to this facility for  rehab, medical management and nursing care.  HPI information obtained from: facility chart records, facility staff, patient report and [facility] chart review.      Shanika Stahl is a 89 year old female with PMH of renal HTN, chronic afib on coumadin, pulmonary HTN, and cerebral embolism with infarct who presented to the ED with abdominal pain and back pain. Had fall 3 weeks prior at home, pain progressively worsening since then.  Spine CT showed degenerative changes as well as T11 burst fracture. Ortho consulted and recommended TLSO brace with OOB, f/u as OP. Abdominal CT did not show any pathology for pain in abdomen. Was noted to have diarrhea, but had previously had constipation and had taken multiple doses of mag citrate. Diarrhea resolved prior to discharge. UA positive for leukocyte esterase, WBC and few bacteria, felt to be asymptomatic bacteremia, not treated with abx. Pelvic ultrasound showed normal endometrium but fluid filled uterus - discussed with GYN who did not feel this was cause of abdominal pain. Did have some vaginal bleeding after the ultrasound, felt to be r/t trauma from US in the setting of an elevated INR (5.62 on admission). Bleeding resolved, coumadin held and INR normalized prior to discharge. Abdominal pain improved, but she remained weak. Patient and family agreed to discharge for further rehab and medical management.       Current issues are:      Closed  stable burst fracture of eleventh thoracic vertebra with routine healing  Fall, subsequent encounter  Generalized muscle weakness  Currently on  650mg TID and naproxen BID PRN, was on tramadol briefly while IP but discontinued to avoid constipation. She reports only mild back pain with this. She has TLSO brace in place, WBAT. Denies any numbness, tingling, difficulty with bowel or bladder.     Abdominal pain, generalized  Denies any abdominal pain in exam today, reports she is eating well but never eat very much.     Renal hypertension  Hx of renal atherosclerosis. She is on metoprolol 25mg BID and lasix 20mg daily. Creatinine stable during recent hospital stay, BP mildly elevated at time while IP.      Chronic atrial fibrillation (H)  Pacemaker  On Metoprolol for rate control, anticoagulated on coumadin. INR 5.6 on hospital admission, improved to 1.7 by discharge.Denies any chest pain or palpitations.     Slow transit constipation  Reports she did have a BM this AM, hopes she has another later today. States it was formed, not overly hard. She is on senna-s q tab qhs.     Oropharyngeal dysphagia  Reports that it feels like food get stuck in her throat at times, but no issues while in the hospital or with breakfast this AM.     Vaginal bleeding  Reports she has some bleeding when she went to the bathroom earlier, but she thinks it was a hemorrhoid and not vaginal. Denies any vaginal burning or discomfort.       CODE STATUS/ADVANCE DIRECTIVES DISCUSSION:   DNR / DNI  Patient's living condition: lives with family, adult children.      ALLERGIES:Allopurinol; Ampicillin; Cipro [ciprofloxacin]; Levaquin; Lisinopril; Paxil [paroxetine]; Penicillins; Sulfa drugs; Zithromax [macrolides]; and Zoloft  PAST MEDICAL HISTORY:  has a past medical history of Abnormal CXR (chest x-ray); Atherosclerosis of renal artery (H); Atrial fibrillation (H) (3/27/2014); Basal cell carcinoma; ESSENTIAL TREMOR (5/9/2005); Family history of  breast cancer; Hyperlipidemia LDL goal <100 (10/31/2010); Hypertension goal BP (blood pressure) < 140/90 (10/3/2013); Other and unspecified hyperlipidemia; Other osteoporosis; Pacemaker; Paroxysmal supraventricular tachycardia (H); Postmenopausal atrophic vaginitis; Stroke (H) (2/10/2014); Syncope (10/9/2013); and Unspecified essential hypertension.  PAST SURGICAL HISTORY:  has a past surgical history that includes surgical history of -  (1965); Eye surgery (2/21/12); and Implant pacemaker.  FAMILY HISTORY: family history includes Breast Cancer in her sister; Cancer in her son; Cerebrovascular Disease in her brother and mother; Diabetes in her mother; Gynecology in her daughter; HEART DISEASE in her brother, brother, brother, and sister; Prostate Cancer in her brother.  SOCIAL HISTORY:  reports that she has never smoked. She has never used smokeless tobacco. She reports that she does not drink alcohol or use illicit drugs.    Post Discharge Medication Reconciliation Status: discharge medications reconciled and changed, per note/orders (see AVS).  Current Outpatient Prescriptions   Medication Sig Dispense Refill     acetaminophen (TYLENOL) 325 MG tablet Take 2 tablets (650 mg) by mouth 3 times daily 100 tablet 0     acyclovir (ZOVIRAX) 5 % ointment Apply topically 6 times daily 15 g 3     CALCIUM + D 600-200 MG-UNIT PO TABS 1 tablet by mouth daily       furosemide (LASIX) 20 MG tablet Take 1 tablet (20 mg) by mouth every other day 45 tablet 3     loratadine (CLARITIN) 10 MG tablet Take 10 mg by mouth daily       metoprolol succinate (TOPROL-XL) 25 MG 24 hr tablet Take 1 tablet (25 mg) by mouth 2 times daily 180 tablet 3     naproxen (NAPROSYN) 250 MG tablet Take 1 tablet (250 mg) by mouth every 12 hours as needed for moderate pain , take with meals.       order for DME Knee high compression stockings 10-15 mm Hg 1 each 1     senna-docusate (SENOKOT-S;PERICOLACE) 8.6-50 MG per tablet Take 1 tablet by mouth At Bedtime  , stop taking if loose stools develop. 30 tablet      simvastatin (ZOCOR) 20 MG tablet Take 1 tablet (20 mg) by mouth At Bedtime 90 tablet 3     VITAMIN D 1000 UNIT OR TABS 1 TABLET DAILY 30 0     warfarin (JANTOVEN) 2.5 MG tablet Take 2.5 mg on Mon/Wed/Fri; 5 mg all other days or as directed by the Anticoagulation Clinic 150 tablet 0       ROS:  10 point ROS of systems including Constitutional, Eyes, Respiratory, Cardiovascular, Gastroenterology, Genitourinary, Integumentary, Muscularskeletal, Psychiatric were all negative except for pertinent positives noted in my HPI.    Exam:  /78  Pulse 84  Temp 97.6  F (36.4  C)  Resp 16  Wt 82 lb (37.2 kg)  BMI 15.24 kg/m2  GENERAL APPEARANCE:  Alert, in no distress, oriented, thin, cooperative  RESP:  respiratory effort and palpation of chest normal, lungs clear to auscultation , no respiratory distress  CV:  Palpation and auscultation of heart done , +2 pedal pulses, peripheral edema trace+ in bilat ankles, irregular rhythm irregularly, pacemaker palpable left upper chest, rate-normal  ABDOMEN:  normal bowel sounds, soft, nontender, no hepatosplenomegaly or other masses, no guarding or rebound  M/S:   Gait and station abnormal TLSO brace in place, generalized weakness, no gross deformities  SKIN:  Inspection of skin and subcutaneous tissue baseline  NEURO:   Cranial nerves 2-12 are normal tested and grossly at patient's baseline  PSYCH:  oriented X 3, normal insight, judgement and memory, affect and mood normal    Lab/Diagnostic data:     CBC RESULTS:   Recent Labs   Lab Test  07/13/18   0559  07/12/18   1815   WBC  7.7  5.9   RBC  4.16  4.35   HGB  12.3  13.1   HCT  38.6  40.4   MCV  93  93   MCH  29.6  30.1   MCHC  31.9  32.4   RDW  13.2  13.0   PLT  270  308       Last Basic Metabolic Panel:  Recent Labs   Lab Test  07/13/18   0559  07/12/18   1815   NA  135  132*   POTASSIUM  4.2  4.2   CHLORIDE  102  99   BLAISE  8.6  9.4   CO2  23  21   BUN  15  17   CR  0.65   0.72   GLC  63*  91       Liver Function Studies -   Recent Labs   Lab Test  07/13/18   0559  07/12/18   1815   PROTTOTAL  7.0  7.7   ALBUMIN  3.0*  3.4   BILITOTAL  0.9  0.9   ALKPHOS  174*  192*   AST  44  43   ALT  24  23       TSH   Date Value Ref Range Status   07/12/2018 4.40 (H) 0.40 - 4.00 mU/L Final   01/22/2014 1.69 0.4 - 5.0 mU/L Final   ]    Lab Results   Component Value Date    A1C 5.5 02/10/2014    A1C 5.8 04/10/2007       ASSESSMENT/PLAN:  (S22.081D) Closed stable burst fracture of eleventh thoracic vertebra with routine healing  (primary encounter diagnosis)  (W19.XXXD) Fall, subsequent encounter  (M62.81) Generalized muscle weakness  Comment: Pain controlled, tolerating TLSO brace  Plan: continue APAP, use PRN naproxen sparringly given renal HTN and coumadin, PT/OT, f/u with ortho in 2 weeks.     (R10.84) Abdominal pain, generalized  Comment: no pain currently - ? R/t diarrhea and constipation  Plan: monitor, diet as tolerated    (I12.9) Renal hypertension  Comment: BP appears stable, limited data in TCU  Plan: continue metoprolol and lasix as above, monitor, daily weights,     (I48.2) Chronic atrial fibrillation (H)  (Z95.0) Pacemaker  Comment: rate controlled, asymptomatic, INR slightly subtherapeutic,   Plan: continue metoprolol, coumadin recheck on 7/17, dosing per [place] [facility]    (K59.01) Slow transit constipation  Comment: chronic issues, did have BM this AM  Plan: continue senna qhs, PRN miralax, monitor and adjust closely,     (R13.12) Oropharyngeal dysphagia  Comment: not problematic currently  Plan: monitor, order SLP consult if concerning in TCU    (E87.1) Hyponatremia  Comment: resolved  Plan: BMP to check for stability    (R82.71) Asymptomatic bacteriuria  Comment: no treated with abx  Plan: monitor for urinary symptoms    (N93.9) Vaginal bleeding  Comment: appear resolved, suspect blood noted earlier from hemorroid as correlated with a BM  Plan: monitor for bleeding, CBC to monitor for  stability.     Orders:  1. Daily Weight, Notify provider >2lbs weight gain in 1 Day   2. If no BM in 2 days give PRN Miralax  3. BMP, CBC on 7/20/18 Dx: Hyponatremia, Fall      Total time spent with patient visit at the skilled nursing facility was 35 min including patient visit and review of past records. Greater than 50% of total time spent with counseling and coordinating care due to medication review, discussion of plan of care, discussion of patient history    Electronically signed by:  CARMELA Davis CNP      Sincerely,        CARMELA Davis CNP

## 2018-07-19 ENCOUNTER — ANTICOAGULATION THERAPY VISIT (OUTPATIENT)
Dept: ANTICOAGULATION | Facility: CLINIC | Age: 83
End: 2018-07-19
Payer: MEDICARE

## 2018-07-19 DIAGNOSIS — I48.20 CHRONIC ATRIAL FIBRILLATION (H): Chronic | ICD-10-CM

## 2018-07-19 DIAGNOSIS — Z79.01 LONG TERM CURRENT USE OF ANTICOAGULANT THERAPY: ICD-10-CM

## 2018-07-19 DIAGNOSIS — I63.40 CEREBRAL EMBOLISM WITH CEREBRAL INFARCTION (H): ICD-10-CM

## 2018-07-19 LAB — INR PPP: 3.3

## 2018-07-19 PROCEDURE — 99207 ZZC NO CHARGE NURSE ONLY: CPT

## 2018-07-19 RX ORDER — WARFARIN SODIUM 2.5 MG/1
TABLET ORAL
Qty: 150 TABLET | Refills: 0 | COMMUNITY
Start: 2018-07-19 | End: 2018-09-12

## 2018-07-19 NOTE — PROGRESS NOTES
ANTICOAGULATION FOLLOW-UP CLINIC VISIT    Patient Name:  Shanika Stahl  Date:  7/19/2018  Contact Type:  Telephone/ [facility]    SUBJECTIVE:     Patient Findings     Positives Inflammation (Recent fracture)    Comments Closed stable burst fracture of eleventh thoracic vertebra with routine healing -- discomfort with brace, only using tylenol for pain control.    Patient had 18mg in the previous 7 days, will decrease dose to 17.5mg by the next INR check on Monday (~3% decrease). Patient's usual maintenance dose was 27.5mg/week.    No issues with bleeding or unusual bruising noted. No other changes noted per TCU nurse. She did state she does not usually take care of patient so she doesn't know her well.            OBJECTIVE    INR   Date Value Ref Range Status   07/19/2018 3.3  Final       ASSESSMENT / PLAN  No question data found.  Anticoagulation Summary as of 7/19/2018     INR goal 2.0-3.0   Today's INR 3.3!   Warfarin maintenance plan 2.5 mg (2.5 mg x 1) on Mon, Wed, Fri; 5 mg (2.5 mg x 2) all other days   Full warfarin instructions 7/19: 1.25 mg; 7/21: 1.25 mg; 7/22: 2.5 mg; Otherwise 2.5 mg on Mon, Wed, Fri; 5 mg all other days   Weekly warfarin total 27.5 mg   Plan last modified Verenice Morillo, RN (5/25/2018)   Next INR check 7/23/2018   Priority INR   Target end date Indefinite    Indications   Atrial fibrillation (H) (Resolved) [I48.91]  Cerebral embolism with cerebral infarction (H) [I63.40]  Long term current use of anticoagulant therapy [Z79.01]         Anticoagulation Episode Summary     INR check location     Preferred lab     Send INR reminders to [facility]    Comments * Keep on low end of therapeutic range. Uses 2.5mg tablets. // [facility]      Anticoagulation Care Providers     Provider Role Specialty Phone number    Milena Jaime DO [facility] Internal Medicine [phone]            See the Encounter Report to view Anticoagulation Flowsheet and Dosing Calendar  (Go to Encounters tab in chart review, and find the Anticoagulation Therapy Visit)        Tori Toledo RN, CACP

## 2018-07-19 NOTE — MR AVS SNAPSHOT
Shanika Stahl   7/19/2018   Anticoagulation Therapy Visit    Description:  89 year old female   Provider:  Tori Toledo, RN   Department:  [facility]           INR as of 7/19/2018     Today's INR 3.3!      Anticoagulation Summary as of 7/19/2018     INR goal 2.0-3.0   Today's INR 3.3!   Full warfarin instructions 7/19: 1.25 mg; 7/21: 1.25 mg; 7/22: 2.5 mg; Otherwise 2.5 mg on Mon, Wed, Fri; 5 mg all other days   Next INR check 7/23/2018    Indications   Atrial fibrillation (H) (Resolved) [I48.91]  Cerebral embolism with cerebral infarction (H) [I63.40]  Long term current use of anticoagulant therapy [Z79.01]         Description             July 2018 Details    Sun Mon Tue Wed Thu Fri Sat     1               2               3               4               5               6               7                 8               9               10               11               12               13               14                 15               16               17               18               19      1.25 mg   See details      20      2.5 mg         21      1.25 mg           22      2.5 mg         23            24               25               26               27               28                 29               30               31                    Date Details   07/19 This INR check       Date of next INR:  7/23/2018         How to take your warfarin dose     To take:  1.25 mg Take 0.5 of a 2.5 mg tablet.    To take:  2.5 mg Take 1 of the 2.5 mg tablets.

## 2018-07-20 ENCOUNTER — TRANSFERRED RECORDS (OUTPATIENT)
Dept: HEALTH INFORMATION MANAGEMENT | Facility: CLINIC | Age: 83
End: 2018-07-20

## 2018-07-20 ENCOUNTER — HOSPITAL LABORATORY (OUTPATIENT)
Facility: OTHER | Age: 83
End: 2018-07-20

## 2018-07-20 LAB
ANION GAP SERPL CALCULATED.3IONS-SCNC: 7 MMOL/L (ref 3–14)
BUN SERPL-MCNC: 18 MG/DL (ref 7–30)
CALCIUM SERPL-MCNC: 9.8 MG/DL (ref 8.5–10.1)
CHLORIDE SERPL-SCNC: 104 MMOL/L (ref 94–109)
CO2 SERPL-SCNC: 27 MMOL/L (ref 20–32)
CREAT SERPL-MCNC: 0.7 MG/DL (ref 0.52–1.04)
ERYTHROCYTE [DISTWIDTH] IN BLOOD BY AUTOMATED COUNT: 13.5 % (ref 10–15)
GFR SERPL CREATININE-BSD FRML MDRD: 78 ML/MIN/1.7M2
GLUCOSE SERPL-MCNC: 86 MG/DL (ref 70–99)
HCT VFR BLD AUTO: 43.9 % (ref 35–47)
HGB BLD-MCNC: 13.7 G/DL (ref 11.7–15.7)
MCH RBC QN AUTO: 29.5 PG (ref 26.5–33)
MCHC RBC AUTO-ENTMCNC: 31.2 G/DL (ref 31.5–36.5)
MCV RBC AUTO: 94 FL (ref 78–100)
PLATELET # BLD AUTO: 298 10E9/L (ref 150–450)
POTASSIUM SERPL-SCNC: 3.6 MMOL/L (ref 3.4–5.3)
RBC # BLD AUTO: 4.65 10E12/L (ref 3.8–5.2)
SODIUM SERPL-SCNC: 138 MMOL/L (ref 133–144)
WBC # BLD AUTO: 4.8 10E9/L (ref 4–11)

## 2018-07-23 ENCOUNTER — ANTICOAGULATION THERAPY VISIT (OUTPATIENT)
Dept: ANTICOAGULATION | Facility: CLINIC | Age: 83
End: 2018-07-23
Payer: MEDICARE

## 2018-07-23 DIAGNOSIS — I63.40 CEREBRAL EMBOLISM WITH CEREBRAL INFARCTION (H): ICD-10-CM

## 2018-07-23 DIAGNOSIS — Z79.01 LONG TERM CURRENT USE OF ANTICOAGULANT THERAPY: ICD-10-CM

## 2018-07-23 LAB — INR PPP: 2.5

## 2018-07-23 PROCEDURE — 99207 ZZC NO CHARGE NURSE ONLY: CPT

## 2018-07-23 NOTE — MR AVS SNAPSHOT
Shanika Stahl   7/23/2018   Anticoagulation Therapy Visit    Description:  89 year old female   Provider:  Tori Toledo, RN   Department:  [facility]           INR as of 7/23/2018     Today's INR 2.5      Anticoagulation Summary as of 7/23/2018     INR goal 2.0-3.0   Today's INR 2.5   Full warfarin instructions 7/23: 3.75 mg; 7/24: 2.5 mg; 7/25: 3.75 mg; Otherwise 2.5 mg on Mon, Wed, Fri; 5 mg all other days   Next INR check 7/26/2018    Indications   Atrial fibrillation (H) (Resolved) [I48.91]  Cerebral embolism with cerebral infarction (H) [I63.40]  Long term current use of anticoagulant therapy [Z79.01]         Description             July 2018 Details    Sun Mon Tue Wed Thu Fri Sat     1               2               3               4               5               6               7                 8               9               10               11               12               13               14                 15               16               17               18               19               20               21                 22               23      3.75 mg   See details      24      2.5 mg         25      3.75 mg         26            27               28                 29               30               31                    Date Details   07/23 This INR check       Date of next INR:  7/26/2018         How to take your warfarin dose     To take:  2.5 mg Take 1 of the 2.5 mg tablets.    To take:  3.75 mg Take 1.5 of the 2.5 mg tablets.    To take:  5 mg Take 2 of the 2.5 mg tablets.

## 2018-07-23 NOTE — PROGRESS NOTES
ANTICOAGULATION FOLLOW-UP CLINIC VISIT    Patient Name:  Shanika Stahl  Date:  7/23/2018  Contact Type:  Telephone/ [facility]    SUBJECTIVE:     Patient Findings     Positives No Problem Findings    Comments Patient had 17.5mg in the previous 7 days, will adjust dose to keep weekly mg total the same by the next INR check on Thursday. In attempts to keep the INR from spiking up, adjusted the doses to a closer amount each day (using 3.75mg instead of having her take 5mg like usual.)    Patient is still doing well at the TCU after her fall. No changes were made in her medications since last Thursday. No anticipated date of discharge at this time.            OBJECTIVE    INR   Date Value Ref Range Status   07/23/2018 2.5  Final       ASSESSMENT / PLAN  No question data found.  Anticoagulation Summary as of 7/23/2018     INR goal 2.0-3.0   Today's INR 2.5   Warfarin maintenance plan 2.5 mg (2.5 mg x 1) on Mon, Wed, Fri; 5 mg (2.5 mg x 2) all other days   Full warfarin instructions 7/23: 3.75 mg; 7/24: 2.5 mg; 7/25: 3.75 mg; Otherwise 2.5 mg on Mon, Wed, Fri; 5 mg all other days   Weekly warfarin total 27.5 mg   Plan last modified Verenice Morillo RN (5/25/2018)   Next INR check 7/26/2018   Priority INR   Target end date Indefinite    Indications   Atrial fibrillation (H) (Resolved) [I48.91]  Cerebral embolism with cerebral infarction (H) [I63.40]  Long term current use of anticoagulant therapy [Z79.01]         Anticoagulation Episode Summary     INR check location     Preferred lab     Send INR reminders to [facility]    Comments * Keep on low end of therapeutic range. Uses 2.5mg tablets. // [facility]      Anticoagulation Care Providers     Provider Role Specialty Phone number    Milena aJime DO [facility] Internal Medicine [phone]            See the Encounter Report to view Anticoagulation Flowsheet and Dosing Calendar (Go to Encounters tab in chart review, and find the  Anticoagulation Therapy Visit)        Tori Toledo RN, CACP

## 2018-07-24 ENCOUNTER — NURSING HOME VISIT (OUTPATIENT)
Dept: GERIATRICS | Facility: CLINIC | Age: 83
End: 2018-07-24
Payer: MEDICARE

## 2018-07-24 VITALS
WEIGHT: 76.2 LBS | DIASTOLIC BLOOD PRESSURE: 75 MMHG | BODY MASS INDEX: 14.16 KG/M2 | TEMPERATURE: 98.4 F | RESPIRATION RATE: 16 BRPM | HEART RATE: 80 BPM | SYSTOLIC BLOOD PRESSURE: 172 MMHG | OXYGEN SATURATION: 96 %
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
## 2018-07-24 DIAGNOSIS — E78.5 HYPERLIPIDEMIA, UNSPECIFIED HYPERLIPIDEMIA TYPE: ICD-10-CM

## 2018-07-24 DIAGNOSIS — I48.20 CHRONIC ATRIAL FIBRILLATION (H): ICD-10-CM

## 2018-07-24 DIAGNOSIS — R53.81 PHYSICAL DECONDITIONING: ICD-10-CM

## 2018-07-24 DIAGNOSIS — R10.84 ABDOMINAL PAIN, GENERALIZED: ICD-10-CM

## 2018-07-24 DIAGNOSIS — R13.10 DYSPHAGIA, UNSPECIFIED TYPE: ICD-10-CM

## 2018-07-24 DIAGNOSIS — J31.0 CHRONIC RHINITIS, UNSPECIFIED TYPE: ICD-10-CM

## 2018-07-24 DIAGNOSIS — S22.000D CLOSED COMPRESSION FRACTURE OF THORACIC VERTEBRA WITH ROUTINE HEALING, SUBSEQUENT ENCOUNTER: Primary | ICD-10-CM

## 2018-07-24 DIAGNOSIS — I10 BENIGN ESSENTIAL HYPERTENSION: ICD-10-CM

## 2018-07-24 PROCEDURE — 99306 1ST NF CARE HIGH MDM 50: CPT | Performed by: INTERNAL MEDICINE

## 2018-07-24 RX ORDER — POLYETHYLENE GLYCOL 3350 17 G/17G
17 POWDER, FOR SOLUTION ORAL DAILY PRN
COMMUNITY

## 2018-07-24 NOTE — PROGRESS NOTES
"[facility]  INITIAL VISIT NOTE  July 24, 2018    PRIMARY CARE PROVIDER AND CLINIC:  JaimeDebbi tapiaecjeffry Vazquez [address] [facility] [place] [address]    Chief Complaint   Patient presents with     Hospital F/U       HPI:    Shanika Stahl is a 89 year old  (5/26/1924) female who [facility] for an initial visit. Medical history is notable for atrial fibrillation and HTN as well as a fall on 6/20/18. She developed abdominal pain after that fall that seemed related to constipation, as it improved some after bowel movements. CT abdomen pelvis was ordered by PCP on 7/5/18 and was notable for moderate to large amount of stool in the colon and thickened endometrial stripe. She continued to have abdominal pain that was worse with laying supine and with rolling over. She was hospitalized at [facility] from 7/12/18 to 7/16/18 where she was found to have a T11 burst fracture with some retropulsion into spinal canal that had \"progressed substantially\" since 7/5/18 where it apparently could be seen on the  image. There was also an old T6 superior endplate fracture. Ortho was consulted and recommended a TLSO, WBAT, PT and pain control. With regard to the thickened endometrial stripe on the CT from 7/5/18, she had a transvaginal U/S on 7/12/18 with normal endometrium and hydrometra. She was admitted to this facility for medical management and rehab.     Today, Ms. Stahl is seen in her room. History was a bit difficult - I'm not sure if from some hearing loss or more from some cognitive impairment. Reports intermittent trouble swallowing, but now also hiccups vs \"burps\" after she drinks. The TLSO is pushing up against the front of her neck and is uncomfortable. She said yesterday there was a washcloth placed over the metal part and that seemed to help. No chest pain or dyspnea. Abdominal pain is intermittent. She had not had a BM yet today and was a bit concerned about " this. Discussed prune juice, which she says she has never tried. Working with therapies. No concerns per nursing.     CODE STATUS:   DNR / DNI    ALLERGIES:     Allergies   Allergen Reactions     Allopurinol      Ampicillin Diarrhea     Cipro [Ciprofloxacin] Nausea and Vomiting     Levaquin GI Disturbance     Lisinopril Swelling     AngioEdema     Paxil [Paroxetine] Nausea and Vomiting     Penicillins Diarrhea     Sulfa Drugs GI Disturbance     Zithromax [Macrolides] Nausea and Vomiting     Zoloft Nausea and Vomiting       PAST MEDICAL HISTORY:   Past Medical History:   Diagnosis Date     Abnormal CXR (chest x-ray)     Read as emphysema     Atherosclerosis of renal artery (H)      Atrial fibrillation (H) 3/27/2014     Basal cell carcinoma      ESSENTIAL TREMOR 5/9/2005     Family history of breast cancer      Hyperlipidemia LDL goal <100 10/31/2010    CHOL      175   7/6/2011 HDL       69   7/6/2011 LDL       93   7/6/2011 TRIG       61   7/6/2011 CHOLHDLRATIO      3.0   7/6/2011 On simvastatin 20mg      Hypertension goal BP (blood pressure) < 140/90 10/3/2013     Other and unspecified hyperlipidemia      Other osteoporosis      Pacemaker      Paroxysmal supraventricular tachycardia (H)      Postmenopausal atrophic vaginitis      Stroke (H) 2/10/2014     Syncope 10/9/2013     Unspecified essential hypertension        PAST SURGICAL HISTORY:   Past Surgical History:   Procedure Laterality Date     EYE SURGERY  2/21/12    left eye cataract     IMPLANT PACEMAKER       SURGICAL HISTORY OF -   1965    FEMORAL HERNIA REPAIR       FAMILY HISTORY:   Family History   Problem Relation Age of Onset     Cerebrovascular Disease Mother      Diabetes Mother      Breast Cancer Sister      HEART DISEASE Brother      Cerebrovascular Disease Brother      HEART DISEASE Brother      Prostate Cancer Brother      HEART DISEASE Brother      HEART DISEASE Sister      Gynecology Daughter      Cancer Son      Lung       SOCIAL HISTORY:   Lives  with family     MEDICATIONS:  Current Outpatient Prescriptions   Medication Sig Dispense Refill     acetaminophen (TYLENOL) 325 MG tablet Take 2 tablets (650 mg) by mouth 3 times daily 100 tablet 0     acyclovir (ZOVIRAX) 5 % ointment Apply topically 6 times daily 15 g 3     CALCIUM + D 600-200 MG-UNIT PO TABS 1 tablet by mouth daily       furosemide (LASIX) 20 MG tablet Take 1 tablet (20 mg) by mouth every other day 45 tablet 3     loratadine (CLARITIN) 10 MG tablet Take 10 mg by mouth daily       metoprolol succinate (TOPROL-XL) 25 MG 24 hr tablet Take 1 tablet (25 mg) by mouth 2 times daily 180 tablet 3     naproxen (NAPROSYN) 250 MG tablet Take 1 tablet (250 mg) by mouth every 12 hours as needed for moderate pain , take with meals.       order for DME Knee high compression stockings 10-15 mm Hg 1 each 1     senna-docusate (SENOKOT-S;PERICOLACE) 8.6-50 MG per tablet Take 1 tablet by mouth At Bedtime , stop taking if loose stools develop. 30 tablet      simvastatin (ZOCOR) 20 MG tablet Take 1 tablet (20 mg) by mouth At Bedtime 90 tablet 3     VITAMIN D 1000 UNIT OR TABS 1 TABLET DAILY 30 0     warfarin (JANTOVEN) 2.5 MG tablet As directed by Anticoagulation Clinic (maintenance dose being re-established while at U) 150 tablet 0       Post Discharge Medication Reconciliation Status: medication reconcilation previously completed during another office visit.    ROS:  10 point ROS neg other than the symptoms noted above in the HPI.    PHYSICAL EXAM:  /75  Pulse 80  Temp 98.4  F (36.9  C)  Resp 16  Wt 76 lb 3.2 oz (34.6 kg)  SpO2 96%  BMI 14.16 kg/m2   Gen: sitting in recliner, alert, cooperative and in no acute distress  HEENT: normocephalic; oropharynx clear  Card: unable to adequately auscultate due to TLSO  Resp: unable to adequately auscultate due to TLSO  GI: unable to adequately palpate due to TLSO  MSK: normal muscle tone, no LE edema  Neuro: CX II-XII grossly in tact; ROM in all four extremities  "grossly in tact  Psych: alert and oriented to self and general situation; normal affect    LABORATORY/IMAGING DATA:  Reviewed as per Epic    ASSESSMENT/PLAN:    T11 Burst Fracture  CT with some retropulsion. Secondary to a fall. Conservative management with TSLO per ortho.   -- continues in TLSO when out of bed  -- analgesia with APAP 650 mg TID and naproxen 250 mg q12h PRN  -- PT/OT  -- follow up with ortho as scheduled  -- will see if [facility] orthotics can come back out and pad at least the top of the metal chest plate    Abdominal Pain  Now is intermittent. Unclear etiology, today she again thought it was related to constipation. Having daily BMs, but none yet today.   -- bowel regimen as below  -- follow clinically     Dysphagia  Reports intermittent difficultly swallowing and today hiccups vs \"burps\". TLSO is not helping matters.   -- SLP eval /treat has been ordered - appreciate their expertise    HTN  SBPs 130s-150s, most 130s. Some higher readings likely due to pain.   -- continues on furosemide 20 mg every other day and metoprolol XL 25 mg BID  -- follow BPs and adjust medications as needed    Chronic Atrial Fibrillation  HR 70s-80s  -- rate controlled with metoprolol XL 25 mg daily  -- anticoagulated with warfarin, goal INR 2-3 -- managed by [facility]    HLD  -- continues on simvastatin 20 mg at bedtime    Chronic Rhinitis  -- continues on loratadine 10 mg daily    Fall  Physical Deconditioning  In setting of fall, hospitalization and advanced age  -- ongoing PT/OT        FGS TIME SPENT: Total time spent with patient visit at the skilled nursing facility was >45 min including patient visit, review of past records and discussion with nursing, PT and NP. Greater than 50% of total time spent with counseling and coordinating care due to dysphagia, abdominal pain, bowel regimen, TLSO, back pain    Electronically signed by:  Taylor Paez MD                    "

## 2018-07-26 ENCOUNTER — ANTICOAGULATION THERAPY VISIT (OUTPATIENT)
Dept: ANTICOAGULATION | Facility: CLINIC | Age: 83
End: 2018-07-26
Payer: MEDICARE

## 2018-07-26 DIAGNOSIS — I63.40 CEREBRAL EMBOLISM WITH CEREBRAL INFARCTION (H): ICD-10-CM

## 2018-07-26 DIAGNOSIS — Z79.01 LONG TERM CURRENT USE OF ANTICOAGULANT THERAPY: ICD-10-CM

## 2018-07-26 LAB — INR PPP: 4.1

## 2018-07-26 PROCEDURE — 99207 ZZC NO CHARGE NURSE ONLY: CPT | Performed by: REGISTERED NURSE

## 2018-07-26 NOTE — PROGRESS NOTES
ANTICOAGULATION FOLLOW-UP CLINIC VISIT    Patient Name:  Shanika Stahl  Date:  7/26/2018  Contact Type:  Telephone/ Dorie BELLO @ [facility]     SUBJECTIVE:     Patient Findings     Positives Unexplained INR or factor level change    Comments EUGENIA Vail @ [facility] denies any changes and signs and symptoms of bleeding for the patient. Writer instructed her to hold her warfarin today and take 1.25mg daily until recheck 7/30, which will be 16.75mg by the next INR.           OBJECTIVE    INR   Date Value Ref Range Status   07/26/2018 4.1  Final       ASSESSMENT / PLAN  No question data found.  Anticoagulation Summary as of 7/26/2018     INR goal 2.0-3.0   Today's INR 4.1!   Warfarin maintenance plan 2.5 mg (2.5 mg x 1) on Mon, Wed, Fri; 5 mg (2.5 mg x 2) all other days   Full warfarin instructions 7/26: Hold; 7/27: 1.25 mg; 7/28: 1.25 mg; 7/29: 1.25 mg; Otherwise 2.5 mg on Mon, Wed, Fri; 5 mg all other days   Weekly warfarin total 27.5 mg   Plan last modified Verenice Morillo RN (5/25/2018)   Next INR check 7/30/2018   Priority INR   Target end date Indefinite    Indications   Atrial fibrillation (H) (Resolved) [I48.91]  Cerebral embolism with cerebral infarction (H) [I63.40]  Long term current use of anticoagulant therapy [Z79.01]         Anticoagulation Episode Summary     INR check location     Preferred lab     Send INR reminders to [facility]    Comments * Keep on low end of therapeutic range. Uses 2.5mg tablets. // [facility]      Anticoagulation Care Providers     Provider Role Specialty Phone number    Milena Jaime DO [facility] Internal Medicine [phone]            See the Encounter Report to view Anticoagulation Flowsheet and Dosing Calendar (Go to Encounters tab in chart review, and find the Anticoagulation Therapy Visit)        Mabel Serna RN

## 2018-07-26 NOTE — MR AVS SNAPSHOT
Shanika EMILY Stahl   7/26/2018   Anticoagulation Therapy Visit    Description:  89 year old female   Provider:  Mabel Serna, RN   [facility]           INR as of 7/26/2018     Today's INR 4.1!      Anticoagulation Summary as of 7/26/2018     INR goal 2.0-3.0   Today's INR 4.1!   Full warfarin instructions 7/26: Hold; 7/27: 1.25 mg; 7/28: 1.25 mg; 7/29: 1.25 mg; Otherwise 2.5 mg on Mon, Wed, Fri; 5 mg all other days   Next INR check 7/30/2018    Indications   Atrial fibrillation (H) (Resolved) [I48.91]  Cerebral embolism with cerebral infarction (H) [I63.40]  Long term current use of anticoagulant therapy [Z79.01]         July 2018 Details    Sun Mon Tue Wed Thu Fri Sat     1               2               3               4               5               6               7                 8               9               10               11               12               13               14                 15               16               17               18               19               20               21                 22               23               24               25               26      Hold   See details      27      1.25 mg         28      1.25 mg           29      1.25 mg         30            31                    Date Details   07/26 This INR check       Date of next INR:  7/30/2018         How to take your warfarin dose     To take:  1.25 mg Take 0.5 of a 2.5 mg tablet.    To take:  2.5 mg Take 1 of the 2.5 mg tablets.    Hold Do not take your warfarin dose. See the Details table to the right for additional instructions.

## 2018-07-30 ENCOUNTER — MEDICAL CORRESPONDENCE (OUTPATIENT)
Dept: HEALTH INFORMATION MANAGEMENT | Facility: CLINIC | Age: 83
End: 2018-07-30

## 2018-07-30 ENCOUNTER — ANTICOAGULATION THERAPY VISIT (OUTPATIENT)
Dept: ANTICOAGULATION | Facility: CLINIC | Age: 83
End: 2018-07-30
Payer: MEDICARE

## 2018-07-30 ENCOUNTER — DISCHARGE SUMMARY NURSING HOME (OUTPATIENT)
Dept: GERIATRICS | Facility: CLINIC | Age: 83
End: 2018-07-30
Payer: MEDICARE

## 2018-07-30 VITALS
SYSTOLIC BLOOD PRESSURE: 144 MMHG | RESPIRATION RATE: 18 BRPM | HEART RATE: 82 BPM | WEIGHT: 77 LBS | OXYGEN SATURATION: 96 % | DIASTOLIC BLOOD PRESSURE: 69 MMHG | HEIGHT: 61 IN | TEMPERATURE: 98 F | BODY MASS INDEX: 14.54 KG/M2

## 2018-07-30 DIAGNOSIS — S22.081D CLOSED STABLE BURST FRACTURE OF ELEVENTH THORACIC VERTEBRA WITH ROUTINE HEALING: Primary | ICD-10-CM

## 2018-07-30 DIAGNOSIS — E78.5 HYPERLIPIDEMIA, UNSPECIFIED HYPERLIPIDEMIA TYPE: ICD-10-CM

## 2018-07-30 DIAGNOSIS — I48.20 CHRONIC ATRIAL FIBRILLATION (H): ICD-10-CM

## 2018-07-30 DIAGNOSIS — J31.0 CHRONIC RHINITIS, UNSPECIFIED TYPE: ICD-10-CM

## 2018-07-30 DIAGNOSIS — K59.01 SLOW TRANSIT CONSTIPATION: ICD-10-CM

## 2018-07-30 DIAGNOSIS — I63.40 CEREBRAL EMBOLISM WITH CEREBRAL INFARCTION (H): ICD-10-CM

## 2018-07-30 DIAGNOSIS — I10 BENIGN ESSENTIAL HYPERTENSION: ICD-10-CM

## 2018-07-30 DIAGNOSIS — R13.12 OROPHARYNGEAL DYSPHAGIA: ICD-10-CM

## 2018-07-30 DIAGNOSIS — N93.9 VAGINAL BLEEDING: ICD-10-CM

## 2018-07-30 DIAGNOSIS — Z79.01 LONG TERM CURRENT USE OF ANTICOAGULANT THERAPY: ICD-10-CM

## 2018-07-30 DIAGNOSIS — R10.84 ABDOMINAL PAIN, GENERALIZED: ICD-10-CM

## 2018-07-30 DIAGNOSIS — M62.81 GENERALIZED MUSCLE WEAKNESS: ICD-10-CM
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
## 2018-07-30 LAB — INR PPP: 1.4

## 2018-07-30 PROCEDURE — 99207 ZZC NO CHARGE NURSE ONLY: CPT

## 2018-07-30 PROCEDURE — 99316 NF DSCHRG MGMT 30 MIN+: CPT | Performed by: NURSE PRACTITIONER

## 2018-07-30 NOTE — LETTER
7/30/2018        RE: Shanika Stahl  [address] [facility] [place] [address]          [facility] DISCHARGE SUMMARY    PATIENT'S NAME: Shanika Stahl  YOB: 1924  MEDICAL RECORD NUMBER:  [id number]    PRIMARY CARE PROVIDER AND CLINIC RESPONSIBLE AFTER TRANSFER: Milena Jaime [address] [facility] / [place] [address]     CODE STATUS/ADVANCE DIRECTIVES DISCUSSION:   DNR / DNI       Allergies   Allergen Reactions     Allopurinol      Ampicillin Diarrhea     Cipro [Ciprofloxacin] Nausea and Vomiting     Levaquin GI Disturbance     Lisinopril Swelling     AngioEdema     Paxil [Paroxetine] Nausea and Vomiting     Penicillins Diarrhea     Sulfa Drugs GI Disturbance     Zithromax [Macrolides] Nausea and Vomiting     Zoloft Nausea and Vomiting       TRANSFERRING PROVIDERS: CARMELA Davis CNP, Taylor Paez MD  DATE OF SNF ADMISSION:  July / 16 / 2018  DATE OF SNF (anticipated) DISCHARGE: July / 31 / 2018  DISCHARGE DISPOSITION: Assisted Living: [facility])   Nursing Facility: [facility] stay 7/12/18 to 7/16/18.     Condition on Discharge:  Improving.  Function:  Supervision with dressing and ambulation on unit with 2WW. Assist of 1 with TLSO brace.  Cognitive Scores: SLUMS 23/30, CPT 4.6/5.6 and Safety 14.5/22    Equipment: walker and TLSO brace    DISCHARGE DIAGNOSIS:   1. Closed stable burst fracture of eleventh thoracic vertebra with routine healing    2. Fall, subsequent encounter    3. Generalized muscle weakness    4. Abdominal pain, generalized    5. Benign essential hypertension    6. Chronic atrial fibrillation (H)    7. Hyperlipidemia, unspecified hyperlipidemia type    8. Chronic rhinitis, unspecified type    9. Slow transit constipation    10. Oropharyngeal dysphagia    11. Vaginal bleeding        HPI Nursing Facility Course:  HPI information obtained from: facility chart records,  facility staff, patient report and [facility] chart review.    Shanika Stahl is a 89 year old female with PMH of renal HTN, chronic afib on coumadin, pulmonary HTN, and cerebral embolism with infarct who presented to the ED with abdominal pain and back pain. Had fall 3 weeks prior at home, pain progressively worsening since then.  Spine CT showed degenerative changes as well as T11 burst fracture. Ortho consulted and recommended TLSO brace with OOB, f/u as OP. Abdominal CT did not show any pathology for pain in abdomen. Was noted to have diarrhea, but had previously had constipation and had taken multiple doses of mag citrate. Diarrhea resolved prior to discharge. UA positive for leukocyte esterase, WBC and few bacteria, felt to be asymptomatic bacteremia, not treated with abx. Pelvic ultrasound showed normal endometrium but fluid filled uterus - discussed with GYN who did not feel this was cause of abdominal pain. Did have some vaginal bleeding after the ultrasound, felt to be r/t trauma from US in the setting of an elevated INR (5.62 on admission). Bleeding resolved, coumadin held and INR normalized prior to discharge. Abdominal pain improved, but she remained weak. Patient and family agreed to discharge for further rehab and medical management.     Closed stable burst fracture of eleventh thoracic vertebra with routine healing  Fall, subsequent encounter  Generalized muscle weakness  Pain controlled APAP 650mg TID and PRN naproxen, reports only minimal back pain. Does have some discomfort with brace, but improves with repositioning - does not need to wear brace when lying down, only when OOB. She had improvement in mobility and balance with PT and OT, she will continue with home PT and OT at discharge, needs to f/u with Dr Resendiz the first or second week of September. She is discharging to [facility].     Abdominal pain, generalized  Etiology unknown; no c/o abdominal pain in TCU.     Benign  essential hypertension  Hx of renal atherosclerosis. She is on metoprolol 25mg BID and lasix 20mg daily. Creatinine stable during recent hospital stay, BP mildly elevated at time while IP, likely d/t pain or anxiety.      Chronic atrial fibrillation (H)  HR controlled 70's-80's. She is on metoprolol 25mg BID. Is on coumadin for AC. INR variable in TCU; INR on 7/30 was 1.4, had been 4.1 on 7/26. No s/s of bleeding or embolism Dosing managed by [place] [facility], however will be managed by [facility] assisted after discharge.     Hyperlipidemia, unspecified hyperlipidemia type  On Simvastatin 20mg daily.     Chronic rhinitis, unspecified type  On Claritin 10mg daily, no concerns in TCU.     Slow transit constipation  Chronic issues, avoiding narcotics. Noted to have BM's ~ every 2 days in TCU. On senna 1 tab at bedtime and miralax PRN if no BM in 2 days.     Oropharyngeal dysphagia  Chronic issue, worked with SLP in TCU with some improvement. No concerns for aspiration. She is on a regular texture diet. Will continue with home SLP at discharge.     Vaginal bleeding  Not noted in TCU - likely d/t pelvic ultrasound, hemorrhoid. No vaginal pain or discomfort with urination. Hgb stable at 13.7.       Last 3 BPs: 144/69, 155/73, 169/76  Admission Weight: 82lbs  Current Weight: 77lbs  Blood Sugar Range:   N/A      PAST MEDICAL HISTORY:  has a past medical history of Abnormal CXR (chest x-ray); Atherosclerosis of renal artery (H); Atrial fibrillation (H) (3/27/2014); Basal cell carcinoma; ESSENTIAL TREMOR (5/9/2005); Family history of breast cancer; Hyperlipidemia LDL goal <100 (10/31/2010); Hypertension goal BP (blood pressure) < 140/90 (10/3/2013); Other and unspecified hyperlipidemia; Other osteoporosis; Pacemaker; Paroxysmal supraventricular tachycardia (H); Postmenopausal atrophic vaginitis; Stroke (H) (2/10/2014); Syncope (10/9/2013); and Unspecified essential hypertension.    DISCHARGE MEDICATIONS:  Current Outpatient Prescriptions  "  Medication Sig Dispense Refill     acetaminophen (TYLENOL) 325 MG tablet Take 2 tablets (650 mg) by mouth 3 times daily 100 tablet 0     acyclovir (ZOVIRAX) 5 % ointment Apply topically 6 times daily 15 g 3     CALCIUM + D 600-200 MG-UNIT PO TABS 1 tablet by mouth daily       furosemide (LASIX) 20 MG tablet Take 1 tablet (20 mg) by mouth every other day 45 tablet 3     loratadine (CLARITIN) 10 MG tablet Take 10 mg by mouth daily       metoprolol succinate (TOPROL-XL) 25 MG 24 hr tablet Take 1 tablet (25 mg) by mouth 2 times daily 180 tablet 3     naproxen (NAPROSYN) 250 MG tablet Take 1 tablet (250 mg) by mouth every 12 hours as needed for moderate pain , take with meals.       order for DME Knee high compression stockings 10-15 mm Hg 1 each 1     polyethylene glycol (MIRALAX/GLYCOLAX) Packet Take 17 g by mouth daily as needed for constipation       senna-docusate (SENOKOT-S;PERICOLACE) 8.6-50 MG per tablet Take 1 tablet by mouth At Bedtime , stop taking if loose stools develop. 30 tablet      simvastatin (ZOCOR) 20 MG tablet Take 1 tablet (20 mg) by mouth At Bedtime 90 tablet 3     VITAMIN D 1000 UNIT OR TABS 1 TABLET DAILY 30 0     warfarin (JANTOVEN) 2.5 MG tablet As directed by Anticoagulation Clinic (maintenance dose being re-established while at U) 150 tablet 0       MEDICATION CHANGES/RATIONALE:   None    Controlled medications sent with patient:   not applicable/none     ROS:    10 point ROS of systems including Constitutional, Eyes, Respiratory, Cardiovascular, Gastroenterology, Genitourinary, Integumentary, Muscularskeletal, Psychiatric were all negative except for pertinent positives noted in my HPI.    Physical Exam:   Vitals: /69  Pulse 82  Temp 98  F (36.7  C)  Resp 18  Ht 5' 1\" (1.549 m)  Wt 77 lb (34.9 kg)  SpO2 96%  BMI 14.55 kg/m2  BMI= Body mass index is 14.55 kg/(m^2).    GENERAL APPEARANCE:  Alert, in no distress, oriented, thin, cooperative, frail  RESP:  respiratory effort and " palpation of chest normal, lungs clear to auscultation , no respiratory distress  CV:  Palpation and auscultation of heart done , regular rate and rhythm, no murmur, rub, or gallop, no edema, +2 pedal pulses  ABDOMEN:  normal bowel sounds, soft, nontender, no hepatosplenomegaly or other masses, no guarding or rebound  M/S:   Gait and station abnormal TLSO brace with OOB, slightly unsteady gait, ambulates with walker no joint tenderness, mild kyphosis  SKIN:  Inspection of skin and subcutaneous tissue baseline, Palpation of skin and subcutaneous tissue baseline, dry, fragile skin  NEURO:   Cranial nerves 2-12 are normal tested and grossly at patient's baseline  PSYCH:  oriented X 3, memory impaired , affect and mood normal    DISCHARGE PLAN:  Occupational Therapy, Physical Therapy, Speech Therapy  and From:  [facility]  Patient instructed to follow-up with:  PCP in 7 days      [facility] scheduled appointments:  Future Appointments  Date Time Provider Department Center   8/28/2018 9:00 AM [facility]       MTJAYLEEN referral needed and placed by this provider: No    Pending labs: None    SNF labs   CBC RESULTS:   Recent Labs   Lab Test  07/20/18   0748  07/13/18   0559   WBC  4.8  7.7   RBC  4.65  4.16   HGB  13.7  12.3   HCT  43.9  38.6   MCV  94  93   MCH  29.5  29.6   MCHC  31.2*  31.9   RDW  13.5  13.2   PLT  298  270       Last Basic Metabolic Panel:  Recent Labs   Lab Test  07/20/18   0748  07/13/18   0559   NA  138  135   POTASSIUM  3.6  4.2   CHLORIDE  104  102   BLAISE  9.8  8.6   CO2  27  23   BUN  18  15   CR  0.70  0.65   GLC  86  63*       Liver Function Studies -   Recent Labs   Lab Test  07/13/18   0559  07/12/18   1815   PROTTOTAL  7.0  7.7   ALBUMIN  3.0*  3.4   BILITOTAL  0.9  0.9   ALKPHOS  174*  192*   AST  44  43   ALT  24  23       TSH   Date Value Ref Range Status   07/12/2018 4.40 (H) 0.40 - 4.00 mU/L Final   01/22/2014 1.69 0.4 - 5.0 mU/L Final       Lab Results    Component Value Date    A1C 5.5 02/10/2014    A1C 5.8 04/10/2007         Discharge Treatments:  F/u with PCP in 7-10 days  Medications as above  TLSO until cleared by neurosurgery  PT/OT/SLP  Care per JACLYN    1. Ok to AK to [facility] with current medications and treatments  TOTAL DISCHARGE TIME:   Greater than 30 minutes  Electronically signed by:  CARMELA Davis CNP        Documentation of Face-to-Face and Certification for Home Health Services     Patient: Shanika Stahl   YOB: 1924  MR Number: [id number]  Today's Date: 7/30/2018    I certify that patient: Shanika Stahl is under my care and that I, or a nurse practitioner or physician's assistant working with me, had a face-to-face encounter that meets the physician face-to-face encounter requirements with this patient on: 7/30/2018.    This encounter with the patient was in whole, or in part, for the following medical condition, which is the primary reason for home health care: discharge evaluation.    I certify that, based on my findings, the following services are medically necessary home health services: Occupational Therapy, Physical Therapy and Speech Language Therapy.    My clinical findings support the need for the above services because: Occupational Therapy Services are needed to assess and treat cognitive ability and address ADL safety due to impairment in mild cognitive impairment, needs further rehab/ADL training., Physical Therapy Services are needed to assess and treat the following functional impairments: needs continued rehab, continues to require TLSO brace. and Speech Therapy Services are needed to assess and treat impairments in language and/or swallow functions due to dysphagia, altered deit, needs further education on swallowing techniques.    Further, I certify that my clinical findings support that this patient is homebound (i.e. absences from home require considerable and taxing effort and are for medical  reasons or [employer] services or infrequently or of short duration when for other reasons) because: Requires assistance of another person or specialized equipment to access medical services because patient: Requires supervision of another for safe transfer...    Based on the above findings. I certify that this patient is confined to the home and needs intermittent skilled nursing care, physical therapy and/or speech therapy.  The patient is under my care, and I have initiated the establishment of the plan of care.  This patient will be followed by a physician who will periodically review the plan of care.  Physician/Provider to provide follow up care: Milena Jaime    Responsible Medicare certified PECOS Physician: Dr. Taylor Paez  Physician Signature: See electronic signature associated with these discharge orders.  Date: 7/30/2018    Electronically signed by Dr. Taylor Paez MD, and only signing for initial order. Please send all follow up questions and concerns or needed follow up signatures to the PCP Milena Jaime.      Sincerely,        CARMELA Davis CNP

## 2018-07-30 NOTE — PROGRESS NOTES
[facility] DISCHARGE SUMMARY    PATIENT'S NAME: Shanika Stahl  YOB: 1924  MEDICAL RECORD NUMBER:  [id number]    PRIMARY CARE PROVIDER AND CLINIC RESPONSIBLE AFTER TRANSFER: Milena Jaime [address] [facility] / [facility] [address]     CODE STATUS/ADVANCE DIRECTIVES DISCUSSION:   DNR / DNI       Allergies   Allergen Reactions     Allopurinol      Ampicillin Diarrhea     Cipro [Ciprofloxacin] Nausea and Vomiting     Levaquin GI Disturbance     Lisinopril Swelling     AngioEdema     Paxil [Paroxetine] Nausea and Vomiting     Penicillins Diarrhea     Sulfa Drugs GI Disturbance     Zithromax [Macrolides] Nausea and Vomiting     Zoloft Nausea and Vomiting       TRANSFERRING PROVIDERS: CARMELA Davis CNP, Taylor Paez MD  DATE OF SNF ADMISSION:  July / 16 / 2018  DATE OF SNF (anticipated) DISCHARGE: July / 31 / 2018  DISCHARGE DISPOSITION: Assisted Living: [facility] ([facility])   Nursing Facility: [facility] stay 7/12/18 to 7/16/18.     Condition on Discharge:  Improving.  Function:  Supervision with dressing and ambulation on unit with 2WW. Assist of 1 with TLSO brace.  Cognitive Scores: SLUMS 23/30, CPT 4.6/5.6 and Safety 14.5/22    Equipment: walker and TLSO brace    DISCHARGE DIAGNOSIS:   1. Closed stable burst fracture of eleventh thoracic vertebra with routine healing    2. Fall, subsequent encounter    3. Generalized muscle weakness    4. Abdominal pain, generalized    5. Benign essential hypertension    6. Chronic atrial fibrillation (H)    7. Hyperlipidemia, unspecified hyperlipidemia type    8. Chronic rhinitis, unspecified type    9. Slow transit constipation    10. Oropharyngeal dysphagia    11. Vaginal bleeding        HPI Nursing Facility Course:  HPI information obtained from: facility chart records, facility staff, patient report and [facility] chart review.    Shanika Stahl is a 89 year  old female with PMH of renal HTN, chronic afib on coumadin, pulmonary HTN, and cerebral embolism with infarct who presented to the ED with abdominal pain and back pain. Had fall 3 weeks prior at home, pain progressively worsening since then.  Spine CT showed degenerative changes as well as T11 burst fracture. Ortho consulted and recommended TLSO brace with OOB, f/u as OP. Abdominal CT did not show any pathology for pain in abdomen. Was noted to have diarrhea, but had previously had constipation and had taken multiple doses of mag citrate. Diarrhea resolved prior to discharge. UA positive for leukocyte esterase, WBC and few bacteria, felt to be asymptomatic bacteremia, not treated with abx. Pelvic ultrasound showed normal endometrium but fluid filled uterus - discussed with GYN who did not feel this was cause of abdominal pain. Did have some vaginal bleeding after the ultrasound, felt to be r/t trauma from US in the setting of an elevated INR (5.62 on admission). Bleeding resolved, coumadin held and INR normalized prior to discharge. Abdominal pain improved, but she remained weak. Patient and family agreed to discharge for further rehab and medical management.     Closed stable burst fracture of eleventh thoracic vertebra with routine healing  Fall, subsequent encounter  Generalized muscle weakness  Pain controlled APAP 650mg TID and PRN naproxen, reports only minimal back pain. Does have some discomfort with brace, but improves with repositioning - does not need to wear brace when lying down, only when OOB. She had improvement in mobility and balance with PT and OT, she will continue with home PT and OT at discharge, needs to f/u with Dr Resendiz the first or second week of September. She is discharging to [facility].     Abdominal pain, generalized  Etiology unknown; no c/o abdominal pain in TCU.     Benign essential hypertension  Hx of renal atherosclerosis. She is on metoprolol 25mg BID and lasix 20mg  daily. Creatinine stable during recent hospital stay, BP mildly elevated at time while IP, likely d/t pain or anxiety.      Chronic atrial fibrillation (H)  HR controlled 70's-80's. She is on metoprolol 25mg BID. Is on coumadin for AC. INR variable in TCU; INR on 7/30 was 1.4, had been 4.1 on 7/26. No s/s of bleeding or embolism Dosing managed by [place] [facility], however will be managed by [facility] JACLYN after discharge.     Hyperlipidemia, unspecified hyperlipidemia type  On Simvastatin 20mg daily.     Chronic rhinitis, unspecified type  On Claritin 10mg daily, no concerns in TCU.     Slow transit constipation  Chronic issues, avoiding narcotics. Noted to have BM's ~ every 2 days in TCU. On senna 1 tab at bedtime and miralax PRN if no BM in 2 days.     Oropharyngeal dysphagia  Chronic issue, worked with SLP in TCU with some improvement. No concerns for aspiration. She is on a regular texture diet. Will continue with home SLP at discharge.     Vaginal bleeding  Not noted in TCU - likely d/t pelvic ultrasound, hemorrhoid. No vaginal pain or discomfort with urination. Hgb stable at 13.7.       Last 3 BPs: 144/69, 155/73, 169/76  Admission Weight: 82lbs  Current Weight: 77lbs  Blood Sugar Range:   N/A      PAST MEDICAL HISTORY:  has a past medical history of Abnormal CXR (chest x-ray); Atherosclerosis of renal artery (H); Atrial fibrillation (H) (3/27/2014); Basal cell carcinoma; ESSENTIAL TREMOR (5/9/2005); Family history of breast cancer; Hyperlipidemia LDL goal <100 (10/31/2010); Hypertension goal BP (blood pressure) < 140/90 (10/3/2013); Other and unspecified hyperlipidemia; Other osteoporosis; Pacemaker; Paroxysmal supraventricular tachycardia (H); Postmenopausal atrophic vaginitis; Stroke (H) (2/10/2014); Syncope (10/9/2013); and Unspecified essential hypertension.    DISCHARGE MEDICATIONS:  Current Outpatient Prescriptions   Medication Sig Dispense Refill     acetaminophen (TYLENOL) 325 MG tablet Take 2 tablets (650 mg) by  "mouth 3 times daily 100 tablet 0     acyclovir (ZOVIRAX) 5 % ointment Apply topically 6 times daily 15 g 3     CALCIUM + D 600-200 MG-UNIT PO TABS 1 tablet by mouth daily       furosemide (LASIX) 20 MG tablet Take 1 tablet (20 mg) by mouth every other day 45 tablet 3     loratadine (CLARITIN) 10 MG tablet Take 10 mg by mouth daily       metoprolol succinate (TOPROL-XL) 25 MG 24 hr tablet Take 1 tablet (25 mg) by mouth 2 times daily 180 tablet 3     naproxen (NAPROSYN) 250 MG tablet Take 1 tablet (250 mg) by mouth every 12 hours as needed for moderate pain , take with meals.       order for DME Knee high compression stockings 10-15 mm Hg 1 each 1     polyethylene glycol (MIRALAX/GLYCOLAX) Packet Take 17 g by mouth daily as needed for constipation       senna-docusate (SENOKOT-S;PERICOLACE) 8.6-50 MG per tablet Take 1 tablet by mouth At Bedtime , stop taking if loose stools develop. 30 tablet      simvastatin (ZOCOR) 20 MG tablet Take 1 tablet (20 mg) by mouth At Bedtime 90 tablet 3     VITAMIN D 1000 UNIT OR TABS 1 TABLET DAILY 30 0     warfarin (JANTOVEN) 2.5 MG tablet As directed by Anticoagulation Clinic (maintenance dose being re-established while at U) 150 tablet 0       MEDICATION CHANGES/RATIONALE:   None    Controlled medications sent with patient:   not applicable/none     ROS:    10 point ROS of systems including Constitutional, Eyes, Respiratory, Cardiovascular, Gastroenterology, Genitourinary, Integumentary, Muscularskeletal, Psychiatric were all negative except for pertinent positives noted in my HPI.    Physical Exam:   Vitals: /69  Pulse 82  Temp 98  F (36.7  C)  Resp 18  Ht 5' 1\" (1.549 m)  Wt 77 lb (34.9 kg)  SpO2 96%  BMI 14.55 kg/m2  BMI= Body mass index is 14.55 kg/(m^2).    GENERAL APPEARANCE:  Alert, in no distress, oriented, thin, cooperative, frail  RESP:  respiratory effort and palpation of chest normal, lungs clear to auscultation , no respiratory distress  CV:  Palpation and " auscultation of heart done , regular rate and rhythm, no murmur, rub, or gallop, no edema, +2 pedal pulses  ABDOMEN:  normal bowel sounds, soft, nontender, no hepatosplenomegaly or other masses, no guarding or rebound  M/S:   Gait and station abnormal TLSO brace with OOB, slightly unsteady gait, ambulates with walker no joint tenderness, mild kyphosis  SKIN:  Inspection of skin and subcutaneous tissue baseline, Palpation of skin and subcutaneous tissue baseline, dry, fragile skin  NEURO:   Cranial nerves 2-12 are normal tested and grossly at patient's baseline  PSYCH:  oriented X 3, memory impaired , affect and mood normal    DISCHARGE PLAN:  Occupational Therapy, Physical Therapy, Speech Therapy  and From:  [facility]  Patient instructed to follow-up with:  PCP in 7 days      Current [place] scheduled appointments:  Future Appointments  Date Time Provider Department Center   8/28/2018 9:00 AM [facility]       MTJAYLEEN referral needed and placed by this provider: No    Pending labs: None    SNF labs   CBC RESULTS:   Recent Labs   Lab Test  07/20/18   0748  07/13/18   0559   WBC  4.8  7.7   RBC  4.65  4.16   HGB  13.7  12.3   HCT  43.9  38.6   MCV  94  93   MCH  29.5  29.6   MCHC  31.2*  31.9   RDW  13.5  13.2   PLT  298  270       Last Basic Metabolic Panel:  Recent Labs   Lab Test  07/20/18   0748  07/13/18   0559   NA  138  135   POTASSIUM  3.6  4.2   CHLORIDE  104  102   BLAISE  9.8  8.6   CO2  27  23   BUN  18  15   CR  0.70  0.65   GLC  86  63*       Liver Function Studies -   Recent Labs   Lab Test  07/13/18   0559  07/12/18   1815   PROTTOTAL  7.0  7.7   ALBUMIN  3.0*  3.4   BILITOTAL  0.9  0.9   ALKPHOS  174*  192*   AST  44  43   ALT  24  23       TSH   Date Value Ref Range Status   07/12/2018 4.40 (H) 0.40 - 4.00 mU/L Final   01/22/2014 1.69 0.4 - 5.0 mU/L Final       Lab Results   Component Value Date    A1C 5.5 02/10/2014    A1C 5.8 04/10/2007         Discharge Treatments:  F/u  with PCP in 7-10 days  Medications as above  TLSO until cleared by neurosurgery  PT/OT/SLP  Care per long-term    1. Ok to DC to [facility] with current medications and treatments  TOTAL DISCHARGE TIME:   Greater than 30 minutes  Electronically signed by:  CARMELA Davis CNP

## 2018-07-30 NOTE — PROGRESS NOTES
ADDENDUM: According to Rula at [facility], Ragini will be managing patient's warfarin dosing after her discharge. She will be discontinued from [facility] services. If she should need to be managed in the future, a new INR clinic referral will need to be written. Will route to PCP as MARIELOS JETT RN, CACP 7/30/2018 at 10:38 AM       ANTICOAGULATION FOLLOW-UP CLINIC VISIT    Patient Name:  Shanika Stahl  Date:  7/30/2018  Contact Type:  Telephone/ Rula Hassan [facility]    SUBJECTIVE:     Patient Findings     Positives Intentional hold of therapy (Held dose due to supratherapeutic INR)    Comments Patient had 13.75mg in the previous 7 days, will increase dose to 15mg by the next INR check on Friday (~9% increase). Writer is adjusting the dose up a smaller amount because of the drastic changes in her INR with dosing adjustments. Patient will be discharging from the TCU tomorrow.    No changes noted per TCU nurse, patient is doing well. No signs/symptoms of venous thromboembolism or stroke.            OBJECTIVE    INR   Date Value Ref Range Status   07/30/2018 1.4  Final       ASSESSMENT / PLAN  INR assessment SUB    Recheck INR In: 4 DAYS    INR Location TCU [facility][1]     Anticoagulation Summary as of 7/30/2018     INR goal 2.0-3.0   Today's INR 1.4!   Warfarin maintenance plan 2.5 mg (2.5 mg x 1) on Mon, Wed, Fri; 5 mg (2.5 mg x 2) all other days   Full warfarin instructions 7/30: 3.75 mg; 7/31: 2.5 mg; 8/2: 2.5 mg; Otherwise 2.5 mg on Mon, Wed, Fri; 5 mg all other days   Weekly warfarin total 27.5 mg   Plan last modified Verenice Morillo RN (5/25/2018)   Next INR check 8/3/2018   Priority INR   Target end date Indefinite    Indications   Atrial fibrillation (H) (Resolved) [I48.91]  Cerebral embolism with cerebral infarction (H) [I63.40]  Long term current use of anticoagulant therapy [Z79.01]         Anticoagulation Episode Summary     INR check location     Preferred lab     Send INR reminders to  [place] PHONE [facility]    Comments * Keep on low end of therapeutic range. Uses 2.5mg tablets. // [facility]      Anticoagulation Care Providers     Provider Role Specialty Phone number    Milena Jaime Taylor,  [facility] Internal Medicine [phone]            See the Encounter Report to view Anticoagulation Flowsheet and Dosing Calendar (Go to Encounters tab in chart review, and find the Anticoagulation Therapy Visit)        Tori Toledo RN, CACP

## 2018-07-30 NOTE — PROGRESS NOTES
Documentation of Face-to-Face and Certification for Home Health Services     Patient: Shanika Stahl   YOB: 1924  MR Number: [id number]  Today's Date: 7/30/2018    I certify that patient: Shanika Stahl is under my care and that I, or a nurse practitioner or physician's assistant working with me, had a face-to-face encounter that meets the physician face-to-face encounter requirements with this patient on: 7/30/2018.    This encounter with the patient was in whole, or in part, for the following medical condition, which is the primary reason for home health care: discharge evaluation.    I certify that, based on my findings, the following services are medically necessary home health services: Occupational Therapy, Physical Therapy and Speech Language Therapy.    My clinical findings support the need for the above services because: Occupational Therapy Services are needed to assess and treat cognitive ability and address ADL safety due to impairment in mild cognitive impairment, needs further rehab/ADL training., Physical Therapy Services are needed to assess and treat the following functional impairments: needs continued rehab, continues to require TLSO brace. and Speech Therapy Services are needed to assess and treat impairments in language and/or swallow functions due to dysphagia, altered deit, needs further education on swallowing techniques.    Further, I certify that my clinical findings support that this patient is homebound (i.e. absences from home require considerable and taxing effort and are for medical reasons or [facility]ze services or infrequently or of short duration when for other reasons) because: Requires assistance of another person or specialized equipment to access medical services because patient: Requires supervision of another for safe transfer...    Based on the above findings. I certify that this patient is confined to the home and needs intermittent skilled nursing care,  physical therapy and/or speech therapy.  The patient is under my care, and I have initiated the establishment of the plan of care.  This patient will be followed by a physician who will periodically review the plan of care.  Physician/Provider to provide follow up care: Milena Jaime    Responsible Medicare certified PECOS Physician: Dr. Taylor Paez  Physician Signature: See electronic signature associated with these discharge orders.  Date: 7/30/2018    Electronically signed by Dr. Taylor Paez MD, and only signing for initial order. Please send all follow up questions and concerns or needed follow up signatures to the PCP Milena Jaime.

## 2018-07-30 NOTE — MR AVS SNAPSHOT
Shanika Stahl   7/30/2018   Anticoagulation Therapy Visit    Description:  89 year old female   Provider:  Tori Toledo, RN   Department:  [facility]           INR as of 7/30/2018     Today's INR 1.4!      Anticoagulation Summary as of 7/30/2018     INR goal 2.0-3.0   Today's INR 1.4!   Full warfarin instructions 7/30: 3.75 mg; 7/31: 2.5 mg; 8/2: 2.5 mg; Otherwise 2.5 mg on Mon, Wed, Fri; 5 mg all other days   Next INR check 8/3/2018    Indications   Atrial fibrillation (H) (Resolved) [I48.91]  Cerebral embolism with cerebral infarction (H) [I63.40]  Long term current use of anticoagulant therapy [Z79.01]         Description             Your next Anticoagulation Clinic appointment(s)     Aug 03, 2018  1:00 PM CDT   Anticoagulation Visit with [place] [facility]   [facility]    [address] [address]   [phone]              July 2018 Details    Sun Mon Tue Wed Thu Fri Sat     1               2               3               4               5               6               7                 8               9               10               11               12               13               14                 15               16               17               18               19               20               21                 22               23               24               25               26               27               28                 29               30      3.75 mg   See details      31      2.5 mg              Date Details   07/30 This INR check               How to take your warfarin dose     To take:  2.5 mg Take 1 of the 2.5 mg tablets.    To take:  3.75 mg Take 1.5 of the 2.5 mg tablets.           August 2018 Details    Sun Mon Tue Wed Thu Fri Sat        1      2.5 mg         2      2.5 mg         3            4                 5               6               7               8               9               10               11                  12               13               14               15               16               17               18                 19               20               21               22               23               24               25                 26               27               28               29               30               31                 Date Details   No additional details    Date of next INR:  8/3/2018         How to take your warfarin dose     To take:  2.5 mg Take 1 of the 2.5 mg tablets.

## 2018-08-02 ENCOUNTER — PATIENT OUTREACH (OUTPATIENT)
Dept: CARE COORDINATION | Facility: CLINIC | Age: 83
End: 2018-08-02

## 2018-08-02 ENCOUNTER — RECORDS - HEALTHEAST (OUTPATIENT)
Dept: LAB | Facility: CLINIC | Age: 83
End: 2018-08-02

## 2018-08-02 ENCOUNTER — TELEPHONE (OUTPATIENT)
Dept: FAMILY MEDICINE | Facility: CLINIC | Age: 83
End: 2018-08-02

## 2018-08-02 DIAGNOSIS — M62.81 GENERALIZED MUSCLE WEAKNESS: Primary | ICD-10-CM

## 2018-08-02 NOTE — PROGRESS NOTES
Clinic Care Coordination Contact    Situation: Patient chart reviewed by care coordinator.    Background: This RN CCC originally received CTS when patient DC'd from hospital . She had fallen at home and sustained Vertebral fracture. At that time she went to the [facility] TCU. TCU handoff sent. Patient received the following notification from TCU Marisel MARCH:  Update on  Shanika Stahl  24- she plans to dc tomorrow to [facility] in [place] (new placement for her). With PT/OT/SLP w/ [facility] services. Family did arrange am/pm cares, bathing A and med admin/set up with [facility].    Per TCU DC summary, she did discharge from facility on     Assessment:Patient is now at [facility] with full services. Her health conditions are controlled. Is wearing TLSO brace when up, for spinal Fx.    Plan/Recommendations: No outreach will be done as patient's health is stable and she is in a facility where her needs are being met (she also has some dementia so not a good candidate for CCC).    Aleksandr GARCIAN,RN- BC  Clinic Care Coordinator  [facility]  Phone: [phone]

## 2018-08-02 NOTE — TELEPHONE ENCOUNTER
Reason for Call:  Other orders    Detailed comments: physical Therapy 2 times a week for 6 weeks, 1 time a week for 3 weeks for strength and balance.  Speech Therapy assessment.    Phone Number Beverly can be reached at: Other phone number:  [phone]    Best Time: any    Can we leave a detailed message on this number? YES    Call taken on 8/2/2018 at 12:38 PM by Crissy Santoro

## 2018-08-02 NOTE — TELEPHONE ENCOUNTER
Pt's son Antoine calling back and given the information. He states she is at the assisted living facility [facility].    [facility] [place]

## 2018-08-03 ENCOUNTER — TELEPHONE (OUTPATIENT)
Dept: FAMILY MEDICINE | Facility: CLINIC | Age: 83
End: 2018-08-03

## 2018-08-03 ENCOUNTER — TRANSFERRED RECORDS (OUTPATIENT)
Dept: HEALTH INFORMATION MANAGEMENT | Facility: CLINIC | Age: 83
End: 2018-08-03

## 2018-08-03 DIAGNOSIS — I48.20 CHRONIC ATRIAL FIBRILLATION (H): Primary | Chronic | ICD-10-CM

## 2018-08-03 LAB
INR PPP: 1.51 (ref 0.9–1.1)
INR PPP: 1.51 (ref 0.9–1.1)

## 2018-08-03 NOTE — TELEPHONE ENCOUNTER
Form from Ragini Dugan with pt's INR result.     Ainsley calling to see if you can look at that and write orders for her coumadin as she is down to one dose. She will need to send them to their pharmacy.    [facility]

## 2018-08-03 NOTE — TELEPHONE ENCOUNTER
Staff called Ainsley from [facility] (phone [phone]) and pend the pharmacy.  Okay for verbal orders over the phone.  Waiting for provider approval.  Aniya Raines CMA (DANISHA)   (aka: Elizabeth Raines)

## 2018-08-03 NOTE — TELEPHONE ENCOUNTER
Received call at 4:30 on Friday from Ainsley mohan [place] that patient has 1 dose of warfarin left and INR is 1.5.  Needs orders.  [place] reports that patient's INR should be managed by [facility].  [facility] INR signed off, under orders from [facility] that [place] would be managing INR.  Patient currently taking 2.5 mg.  Recommend to take 5 mg today, Sat Sun and check INR on Monday 8/6.  Will place new order for INR clinic

## 2018-08-06 ENCOUNTER — RECORDS - HEALTHEAST (OUTPATIENT)
Dept: LAB | Facility: CLINIC | Age: 83
End: 2018-08-06

## 2018-08-06 ENCOUNTER — TELEPHONE (OUTPATIENT)
Dept: FAMILY MEDICINE | Facility: CLINIC | Age: 83
End: 2018-08-06

## 2018-08-06 ENCOUNTER — ANTICOAGULATION THERAPY VISIT (OUTPATIENT)
Dept: ANTICOAGULATION | Facility: CLINIC | Age: 83
End: 2018-08-06
Payer: MEDICARE

## 2018-08-06 DIAGNOSIS — I63.40 CEREBRAL EMBOLISM WITH CEREBRAL INFARCTION (H): ICD-10-CM

## 2018-08-06 DIAGNOSIS — Z79.01 LONG TERM CURRENT USE OF ANTICOAGULANT THERAPY: ICD-10-CM

## 2018-08-06 LAB
INR PPP: 2.7
INR PPP: 2.73 (ref 0.9–1.1)

## 2018-08-06 PROCEDURE — 99207 ZZC NO CHARGE NURSE ONLY: CPT

## 2018-08-06 NOTE — TELEPHONE ENCOUNTER
Reason for Call: Request for an order or referral:    Order or referral being requested: speech therapy x2 a week for x1 week, then x1 week for x2 weeks for treatment of dysphagia and cogniative testing    Date needed: at your convenience    Has the patient been seen by the PCP for this problem? Not Applicable    [facility]    Phone number Patient can be reached at:  [phone]    Best Time:  any    Can we leave a detailed message on this number?  YES    Call taken on 8/6/2018 at 2:42 PM by Beverly Vigil

## 2018-08-06 NOTE — PROGRESS NOTES
ANTICOAGULATION FOLLOW-UP CLINIC VISIT    Patient Name:  Shanika Stahl  Date:  8/6/2018  Contact Type:  Telephone/ Aleksandra BELLO, [facility]    SUBJECTIVE:     Patient Findings     Positives Extra doses    Comments Patient was low last week with an INR of 1.4 . She was given an extra bump on Friday. Today she is 2.7. She had 26.25 mg in the past 7 days, so I will resume her back on her maintenance dose of 27.5 mg. Recheck INR in 1 week.            OBJECTIVE    INR   Date Value Ref Range Status   08/06/2018 2.7  Final       ASSESSMENT / PLAN  INR assessment THER    Recheck INR In: 1 WEEK    INR Location TCU      Anticoagulation Summary as of 8/6/2018     INR goal 2.0-3.0   Today's INR 2.7   Warfarin maintenance plan 2.5 mg (2.5 mg x 1) on Mon, Wed, Fri; 5 mg (2.5 mg x 2) all other days   Full warfarin instructions 2.5 mg on Mon, Wed, Fri; 5 mg all other days   Weekly warfarin total 27.5 mg   Plan last modified Verenice Morillo RN (5/25/2018)   Next INR check 8/13/2018   Priority INR   Target end date Indefinite    Indications   Atrial fibrillation (H) (Resolved) [I48.91]  Cerebral embolism with cerebral infarction (H) [I63.40]  Long term current use of anticoagulant therapy [Z79.01]         Anticoagulation Episode Summary     INR check location     Preferred lab     Send INR reminders to [facility]    Comments Keep on low end of therapeutic range. Uses 2.5mg tablets.      Anticoagulation Care Providers     Provider Role Specialty Phone number    Debbi Jaimeecjeffry Vazquez,  [facility] Internal Medicine [phone]            See the Encounter Report to view Anticoagulation Flowsheet and Dosing Calendar (Go to Encounters tab in chart review, and find the Anticoagulation Therapy Visit)        Dre Phillips RN

## 2018-08-06 NOTE — MR AVS SNAPSHOT
Shanika Stahl   8/6/2018   Anticoagulation Therapy Visit    Description:  89 year old female   Provider:  Dre Phillips, RN   Department:  [place]           INR as of 8/6/2018     Today's INR 2.7      Anticoagulation Summary as of 8/6/2018     INR goal 2.0-3.0   Today's INR 2.7   Full warfarin instructions 2.5 mg on Mon, Wed, Fri; 5 mg all other days   Next INR check 8/13/2018    Indications   Atrial fibrillation (H) (Resolved) [I48.91]  Cerebral embolism with cerebral infarction (H) [I63.40]  Long term current use of anticoagulant therapy [Z79.01]         August 2018 Details    Sun Mon Tue Wed Thu Fri Sat        1               2               3               4                 5               6      2.5 mg   See details      7      5 mg         8      2.5 mg         9      5 mg         10      2.5 mg         11      5 mg           12      5 mg         13            14               15               16               17               18                 19               20               21               22               23               24               25                 26               27               28               29               30               31                 Date Details   08/06 This INR check       Date of next INR:  8/13/2018         How to take your warfarin dose     To take:  2.5 mg Take 1 of the 2.5 mg tablets.    To take:  5 mg Take 2 of the 2.5 mg tablets.

## 2018-08-10 ENCOUNTER — OFFICE VISIT (OUTPATIENT)
Dept: FAMILY MEDICINE | Facility: CLINIC | Age: 83
End: 2018-08-10
Payer: MEDICARE

## 2018-08-10 VITALS
DIASTOLIC BLOOD PRESSURE: 58 MMHG | SYSTOLIC BLOOD PRESSURE: 122 MMHG | HEART RATE: 71 BPM | OXYGEN SATURATION: 95 % | WEIGHT: 81 LBS | TEMPERATURE: 99.1 F | BODY MASS INDEX: 15.3 KG/M2

## 2018-08-10 DIAGNOSIS — S22.081D CLOSED STABLE BURST FRACTURE OF ELEVENTH THORACIC VERTEBRA WITH ROUTINE HEALING: ICD-10-CM

## 2018-08-10 DIAGNOSIS — R13.12 OROPHARYNGEAL DYSPHAGIA: ICD-10-CM

## 2018-08-10 DIAGNOSIS — M80.08XA AGE-RELATED OSTEOPOROSIS WITH CURRENT PATHOLOGICAL FRACTURE OF VERTEBRA, INITIAL ENCOUNTER (H): Primary | ICD-10-CM

## 2018-08-10 DIAGNOSIS — I12.9 RENAL HYPERTENSION: Chronic | ICD-10-CM

## 2018-08-10 PROCEDURE — 99215 OFFICE O/P EST HI 40 MIN: CPT | Performed by: INTERNAL MEDICINE

## 2018-08-10 NOTE — PROGRESS NOTES
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 - Staff verbalize that this dx is not in EPIC, staff gave to the pt a full problem list.  We discussed that she has renal artery stenosis, and mildly reduced GFR in the past (unknown if due to meds or dehydration or what).    Acyclovir - cancel rx - too expensive.    [facility] : wants re-evaluation for osteoporosis.  On calcitonin [id number].  On fosamax [phone].  Then on reclast [phone]    Dexa 2013 - T-SCORES:  Lumbar Spine L1-L4 T-score: -4.4      Left Hip T-score: -4.1  Right Hip T-score: -3.9      Hip lowest BMD: 0.480 gm/cm2.      PERCENT CHANGE since 8/2/2011:  Lumbar Spine: -0.8%  Femurs: +3.5%      IMPRESSION: Lumbar spine and bilateral hip osteoporosis. Hip and  lumbar bone density is not statistically significantly changed  compared to 8/2/2011        Speech Therapist: Wants to do a cognitive test, pt verbalize to them that she is having memory problem. Son would like to know if this is the correct plan.  She is seeing speech due to dysphagia, crushing pills.  Son thinks this is improving.  Dysphagia is not a new issue.    She is now at assisted living in [place]. She may or may not return to live with son Antoine.  She needs orders for [facility] physical therapy.        Hospital Follow-up Visit:    Hospital/Nursing Home/IP Rehab Facility: [facility]  Date of Admission: 7/12/18  Date of Discharge: 7/16/18  Reason(s) for Admission: Closed stable burst fracture of eleventh throracic vertebra with routine healing            Problems taking medications  regularly:  None       Medication changes since discharge: None       Problems adhering to non-medication therapy:  None    Summary of hospitalization:  [facility] discharge summary reviewed  Diagnostic Tests/Treatments reviewed.  Follow up needed: Ortho, therapies  Other Healthcare Providers Involved in Patient s Care:         Homecare and Physical Therapy  Update since discharge: stable.     Post Discharge Medication Reconciliation: discharge medications reconciled and changed, per note/orders (see AVS).  Plan of care communicated with patient     Coding guidelines for this visit:  Type of Medical   Decision Making Face-to-Face Visit       within 7 Days of discharge Face-to-Face Visit        within 14 days of discharge   Moderate Complexity [id number]   High Complexity [phone] [phone]              Current Outpatient Prescriptions   Medication Sig Dispense Refill     acetaminophen (TYLENOL) 325 MG tablet Take 2 tablets (650 mg) by mouth 3 times daily 100 tablet 0     CALCIUM + D 600-200 MG-UNIT PO TABS 1 tablet by mouth daily       furosemide (LASIX) 20 MG tablet Take 1 tablet (20 mg) by mouth every other day 45 tablet 3     loratadine (CLARITIN) 10 MG tablet Take 10 mg by mouth daily       metoprolol succinate (TOPROL-XL) 25 MG 24 hr tablet Take 1 tablet (25 mg) by mouth 2 times daily 180 tablet 3     naproxen (NAPROSYN) 250 MG tablet Take 1 tablet (250 mg) by mouth every 12 hours as needed for moderate pain , take with meals.       order for DME Knee high compression stockings 10-15 mm Hg 1 each 1     polyethylene glycol (MIRALAX/GLYCOLAX) Packet Take 17 g by mouth daily as needed for constipation       senna-docusate (SENOKOT-S;PERICOLACE) 8.6-50 MG per tablet Take 1 tablet by mouth At Bedtime , stop taking if loose stools develop. 30 tablet      simvastatin (ZOCOR) 20 MG tablet Take 1 tablet (20 mg) by mouth At Bedtime 90 tablet 3     VITAMIN D 1000 UNIT OR TABS 1 TABLET DAILY 30 0     warfarin (JANTOVEN)  2.5 MG tablet As directed by Anticoagulation Clinic (maintenance dose being re-established while at TCU) 150 tablet 0       Reviewed and updated as needed this visit by clinical staff  Tobacco  Allergies  Meds  Problems  Med Hx  Surg Hx  Fam Hx  Soc Hx        Reviewed and updated as needed this visit by Provider  Allergies  Meds  Problems         ROS:  Constitutional, HEENT, cardiovascular, pulmonary, GI, , musculoskeletal, neuro, skin, endocrine and psych systems are negative, except as otherwise noted.    OBJECTIVE:     /58 (BP Location: Right arm, Patient Position: Sitting, Cuff Size: Adult Large)  Pulse 71  Temp 99.1  F (37.3  C) (Tympanic)  Wt 81 lb (36.7 kg)  SpO2 95%  Breastfeeding? No  BMI 15.3 kg/m2  Body mass index is 15.3 kg/(m^2).  GENERAL APPEARANCE: alert, no distress and frail, thin, elderly  RESP: lungs clear to auscultation - no rales, rhonchi or wheezes  CV: irreg irreg, no murmurs appreciated  MS: mild-mod pain with palpation over right buttock area      ASSESSMENT/PLAN:     1. Age-related osteoporosis with current pathological fracture of vertebra, initial encounter (H)   - severe osteoporosis on DEXA in 2013.  Has been on oral and IV bisphosphonate.  Unsure the benefit of stronger agent in a 89 year old, even w/a burst fracture of spine.  Family will consider, but we should update the DEXA.  - DX Hip/Pelvis/Spine; Future    2. Oropharyngeal dysphagia - getting speech therapy.  No aspiration per ST    3. Renal hypertension - but renal function intact    4. Closed stable burst fracture of eleventh thoracic vertebra with routine healing - Tylenol helping, wearing brace, following with Ortho, getting physical therapy/OT    Greater than 40 minutes was spent face-to-face with the patient by the provider.  Greater than 50% was spent in counseling or coordinating care for this patient.      Patient Instructions   1.  Dr. Jaime has verbally authorized therapies on 8/2.  If the  order needs adjusted let her know.  2. You are getting appropriate treatment of speech/swallow problem  3. Acyclovir discontinued  4. You do not have kidney disease, but a history of renal artery stenosis (narrowing).  5. May consider starting Reclast IV infusion or another stronger medication for osteoporosis .  Regardless, update bone density  6. Could try heat or ice on back and buttock area for 10-15 min, no longer, 2-3 x day as needed.  Continue with Tylenol   7. Consider crushing Tylenol or liquid Tylenol due to swallowing problems      Milena Jaime, DO  [facility]

## 2018-08-10 NOTE — MR AVS SNAPSHOT
After Visit Summary   8/10/2018    Shanika Stahl    MRN: [id number]           Patient Information     Date Of Birth          5/26/1924        Visit Information        Provider Department      8/10/2018 2:00 PM Milena Jaime,  [facility]        Today's Diagnoses     Age-related osteoporosis with current pathological fracture of vertebra, initial encounter (H)     -  1      Care Instructions    1.  Dr. Jaime has verbally authorized therapies on 8/2.  If the order needs adjusted let her know.  2. You are getting appropriate treatment of speech/swallow problem  3. Acyclovir discontinued  4. You do not have kidney disease, but a history of renal artery stenosis (narrowing).  5. May consider starting Reclast IV infusion or another stronger medication for osteoporosis .  Regardless, update bone density  6. Could try heat or ice on back and buttock area for 10-15 min, no longer, 2-3 x day as needed.  Continue with Tylenol   7. Consider crushing Tylenol or liquid Tylenol due to swallowing problems          Follow-ups after your visit        Your next 10 appointments already scheduled     Aug 28, 2018  9:00 AM CDT   Remote PPM Check with [facility]   [facility] ([facility])    [address] [address] [phone]           This appointment is for a remote check of your pacemaker.  This is not an appointment at the office.              Future tests that were ordered for you today     Open Future Orders        Priority Expected Expires Ordered    DX Hip/Pelvis/Spine Routine  8/10/2019 8/10/2018            Who to contact     If you have questions or need follow up information about today's clinic visit or your schedule please contact [facility] directly at [phone].  Normal or non-critical lab and imaging results will be communicated to you by MyChart, letter or phone within 4 business days after the clinic  has received the results. If you do not hear from us within 7 days, please contact the clinic through Mobile Card or phone. If you have a critical or abnormal lab result, we will notify you by phone as soon as possible.  Submit refill requests through Mobile Card or call your pharmacy and they will forward the refill request to us. Please allow 3 business days for your refill to be completed.          Additional Information About Your Visit        [employer] Information     Mobile Card gives you secure access to your electronic health record. If you see a primary care provider, you can also send messages to your care team and make appointments. If you have questions, please call your primary care clinic.  If you do not have a primary care provider, please call [phone] and they will assist you.        Care EveryWhere ID     This is your Care EveryWhere ID. This could be used by other organizations to access your [place] medical records  [id number]        Your Vitals Were     Pulse Temperature Pulse Oximetry Breastfeeding? BMI (Body Mass Index)       71 99.1  F (37.3  C) (Tympanic) 95% No 15.3 kg/m2        Blood Pressure from Last 3 Encounters:   08/10/18 122/58   07/30/18 144/69   07/24/18 172/75    Weight from Last 3 Encounters:   08/10/18 81 lb (36.7 kg)   07/30/18 77 lb (34.9 kg)   07/24/18 76 lb 3.2 oz (34.6 kg)                 Today's Medication Changes          These changes are accurate as of 8/10/18  2:57 PM.  If you have any questions, ask your nurse or doctor.               Stop taking these medicines if you haven't already. Please contact your care team if you have questions.     acyclovir 5 % ointment   Commonly known as:  ZOVIRAX   Stopped by:  Milena Jaime DO                    Primary Care Provider Office Phone # Fax #    Milena Jaime [phone] [address] [address]        Equal Access to Services     JAYCEE MOLINA : harsha Ceja  nicholas hashaquille romo ah. So [facility] [phone].    ATENCIÓN: Si elio pearl, tiene a holly disposición servicios gratuitos de asistencia lingüística. Aquilino al [phone].    We comply with applicable federal civil rights laws and [place] laws. We do not discriminate on the basis of race, color, national origin, age, disability, sex, sexual orientation, or gender identity.            Thank you!     Thank you for choosing [facility]  for your care. Our goal is always to provide you with excellent care. Hearing back from our patients is one way we can continue to improve our services. Please take a few minutes to complete the written survey that you may receive in the mail after your visit with us. Thank you!             Your Updated Medication List - Protect others around you: Learn how to safely use, store and throw away your medicines at www.disposemymeds.org.          This list is accurate as of 8/10/18  2:57 PM.  Always use your most recent med list.                   Brand Name Dispense Instructions for use Diagnosis    acetaminophen 325 MG tablet    TYLENOL    100 tablet    Take 2 tablets (650 mg) by mouth 3 times daily    Closed stable burst fracture of eleventh thoracic vertebra with routine healing       calcium + D 600-200 MG-UNIT Tabs   Generic drug:  calcium carbonate-vitamin D      1 tablet by mouth daily        cholecalciferol 1000 UNIT tablet    vitamin D3    30    1 TABLET DAILY        furosemide 20 MG tablet    LASIX    45 tablet    Take 1 tablet (20 mg) by mouth every other day    Leg swelling       JANTOVEN 2.5 MG tablet   Generic drug:  warfarin     150 tablet    As directed by Anticoagulation Clinic (maintenance dose being re-established while at [facility])    Chronic atrial fibrillation (H)       loratadine 10 MG tablet    CLARITIN     Take 10 mg by mouth daily        metoprolol succinate 25 MG 24 hr tablet    TOPROL-XL    180  tablet    Take 1 tablet (25 mg) by mouth 2 times daily    Renal hypertension, stage 1-4 or unspecified chronic kidney disease       naproxen 250 MG tablet    NAPROSYN     Take 1 tablet (250 mg) by mouth every 12 hours as needed for moderate pain , take with meals.    Closed stable burst fracture of eleventh thoracic vertebra with routine healing       order for DME     1 each    Knee high compression stockings 10-15 mm Hg    Leg swelling       polyethylene glycol Packet    MIRALAX/GLYCOLAX     Take 17 g by mouth daily as needed for constipation        senna-docusate 8.6-50 MG per tablet    SENOKOT-S;PERICOLACE    30 tablet    Take 1 tablet by mouth At Bedtime , stop taking if loose stools develop.    Slow transit constipation       simvastatin 20 MG tablet    ZOCOR    90 tablet    Take 1 tablet (20 mg) by mouth At Bedtime    Hyperlipidemia LDL goal <100

## 2018-08-10 NOTE — PATIENT INSTRUCTIONS
1.  Dr. Jaime has verbally authorized therapies on 8/2.  If the order needs adjusted let her know.  2. You are getting appropriate treatment of speech/swallow problem  3. Acyclovir discontinued  4. You do not have kidney disease, but a history of renal artery stenosis (narrowing).  5. May consider starting Reclast IV infusion or another stronger medication for osteoporosis .  Regardless, update bone density  6. Could try heat or ice on back and buttock area for 10-15 min, no longer, 2-3 x day as needed.  Continue with Tylenol   7. Consider crushing Tylenol or liquid Tylenol due to swallowing problems

## 2018-08-13 ENCOUNTER — RECORDS - HEALTHEAST (OUTPATIENT)
Dept: LAB | Facility: CLINIC | Age: 83
End: 2018-08-13

## 2018-08-13 LAB
INR PPP: 3.7
INR PPP: 3.78 (ref 0.9–1.1)

## 2018-08-14 ENCOUNTER — ANTICOAGULATION THERAPY VISIT (OUTPATIENT)
Dept: ANTICOAGULATION | Facility: CLINIC | Age: 83
End: 2018-08-14
Payer: MEDICARE

## 2018-08-14 DIAGNOSIS — I63.40 CEREBRAL EMBOLISM WITH CEREBRAL INFARCTION (H): ICD-10-CM

## 2018-08-14 DIAGNOSIS — Z79.01 LONG TERM CURRENT USE OF ANTICOAGULANT THERAPY: ICD-10-CM

## 2018-08-14 PROCEDURE — 99207 ZZC NO CHARGE NURSE ONLY: CPT

## 2018-08-14 NOTE — PROGRESS NOTES
ANTICOAGULATION FOLLOW-UP CLINIC VISIT    Patient Name:  Shanika Stahl  Date:  8/14/2018  Contact Type:  Telephone/ Ainsley BELLO, [facility]    SUBJECTIVE:     Patient Findings     Positives Unexplained INR or factor level change    Comments RN denies any changes in diet, health, medication, or activity. Patient was placed back on maintenance dose of 27.5 mg last week, so it is possible maintenance the dose will need to be decreased. RN held Coumadin dose yesterday. I instructed RN to have patient continue the maintenance dose the rest of the week for a total of 25 mg. Recheck in 1 week. Patient denies signs or symptoms of bleeding. Writer educated patient regarding increased bleed risk and when to seek immediate medical attention. Patient verbalized understanding.             OBJECTIVE    INR   Date Value Ref Range Status   08/13/2018 3.7  Final       ASSESSMENT / PLAN  INR assessment SUPRA    Recheck INR In: 1 WEEK    INR Location [facility]R      Anticoagulation Summary as of 8/14/2018     INR goal 2.0-3.0   Today's INR 3.7! 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 Uses 2.5mg tablets.      Anticoagulation Care Providers     Provider Role Specialty Phone number    Milena Jaime,  [facility] Internal [facility] [phone]            See the Encounter Report  to view Anticoagulation Flowsheet and Dosing Calendar (Go to Encounters tab in chart review, and find the Anticoagulation Therapy Visit)        Dre Phillips RN

## 2018-08-14 NOTE — MR AVS SNAPSHOT
Shanika Stahl   8/14/2018   Anticoagulation Therapy Visit    Description:  89 year old female   Provider:  Dre Phillips, RN   Department:  [facility]           INR as of 8/14/2018     Today's INR 3.7! (8/13/2018)      Anticoagulation Summary as of 8/14/2018     INR goal 2.0-3.0   Today's INR 3.7! 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]  Long term current use of anticoagulant therapy [Z79.01]         August 2018 Details    Sun Mon Tue Wed Thu Fri Sat        1               2               3               4                 5               6               7               8               9               10               11                 12               13               14      5 mg   See details      15      2.5 mg         16      5 mg         17      2.5 mg         18      5 mg           19      5 mg         20            21               22               23               24               25                 26               27               28               29               30               31                 Date Details   08/14 This INR check       Date of next INR:  8/20/2018         How to take your warfarin dose     To take:  2.5 mg Take 1 of the 2.5 mg tablets.    To take:  5 mg Take 2 of the 2.5 mg tablets.

## 2018-08-17 ENCOUNTER — RECORDS - HEALTHEAST (OUTPATIENT)
Dept: LAB | Facility: CLINIC | Age: 83
End: 2018-08-17

## 2018-08-20 ENCOUNTER — TRANSFERRED RECORDS (OUTPATIENT)
Dept: HEALTH INFORMATION MANAGEMENT | Facility: CLINIC | Age: 83
End: 2018-08-20

## 2018-08-20 LAB
INR PPP: 2 (ref 0.9–1.1)
INR PPP: 2 (ref 0.9–1.1)

## 2018-08-21 ENCOUNTER — ANTICOAGULATION THERAPY VISIT (OUTPATIENT)
Dept: ANTICOAGULATION | Facility: CLINIC | Age: 83
End: 2018-08-21
Payer: MEDICARE

## 2018-08-21 ENCOUNTER — TELEPHONE (OUTPATIENT)
Dept: FAMILY MEDICINE | Facility: CLINIC | Age: 83
End: 2018-08-21

## 2018-08-21 ENCOUNTER — MEDICAL CORRESPONDENCE (OUTPATIENT)
Dept: HEALTH INFORMATION MANAGEMENT | Facility: CLINIC | Age: 83
End: 2018-08-21

## 2018-08-21 DIAGNOSIS — I63.40 CEREBRAL EMBOLISM WITH CEREBRAL INFARCTION (H): ICD-10-CM

## 2018-08-21 DIAGNOSIS — Z79.01 LONG TERM CURRENT USE OF ANTICOAGULANT THERAPY: ICD-10-CM

## 2018-08-21 PROCEDURE — 99207 ZZC NO CHARGE NURSE ONLY: CPT

## 2018-08-21 NOTE — PROGRESS NOTES
ADDENDUM 8/27/2018 -   VM left for pt's nurse to return call to [facility] - [phone]. Pt due for INR.  Veronica Mason RN on 8/27/2018 at 4:31 PM    ANTICOAGULATION FOLLOW-UP CLINIC VISIT    Patient Name:  Shanika Sthal  Date:  8/21/2018  Contact Type:  Telephone/ Left VM [facility]    SUBJECTIVE:     Patient Findings     Positives Intentional hold of therapy    Comments I left a VM at [facility] requesting a call back to [facility]. Patient had a previous hold for a supra-therapeutic INR. Last weeks dose was adjusted from 27.5 mg to 25 mg. Writer will adjust dose for 25 mg weekly and have the INR rechecked in 1 week.           OBJECTIVE    INR   Date Value Ref Range Status   08/20/2018 2.0  Final       ASSESSMENT / PLAN  INR assessment THER    Recheck INR In: 1 WEEK    INR Location Outside lab      Anticoagulation Summary as of 8/21/2018     INR goal 2.0-3.0   Today's INR 2.0 (8/20/2018)   Warfarin maintenance plan 2.5 mg (2.5 mg x 1) on Mon, Wed, Fri; 5 mg (2.5 mg x 2) all other days   Full warfarin instructions 8/26: 2.5 mg; Otherwise 2.5 mg on Mon, Wed, Fri; 5 mg all other days   Weekly warfarin total 27.5 mg   Plan last modified Verenice Morillo RN (5/25/2018)   Next INR check 8/27/2018   Priority INR   Target end date Indefinite    Indications   Atrial fibrillation (H) (Resolved) [I48.91]  Cerebral embolism with cerebral infarction (H) [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 Uses 2.5mg tablets.      Anticoagulation Care Providers     Provider Role Specialty Phone number    Milena Jaime,  [facility] [phone]            See the Encounter Report to view Anticoagulation Flowsheet and Dosing Calendar (Go to Encounters tab in chart review, and find the Anticoagulation Therapy  Visit)        Dre Phillips RN

## 2018-08-21 NOTE — TELEPHONE ENCOUNTER
Form from [place] [facility]-Plan of Care. Orders were signed, faxed and sent to scanning.    [facility]

## 2018-08-21 NOTE — TELEPHONE ENCOUNTER
I have a paper report in my inbox - her INR drawn at [facility] lab on 8/20 11 am is 2.

## 2018-08-21 NOTE — MR AVS SNAPSHOT
Shanika Stahl   8/21/2018   Anticoagulation Therapy Visit    Description:  89 year old female   Provider:  Dre Phillips, RN   Department:  [facility]           INR as of 8/21/2018     Today's INR 2.0 (8/20/2018)      Anticoagulation Summary as of 8/21/2018     INR goal 2.0-3.0   Today's INR 2.0 (8/20/2018)   Full warfarin instructions 8/26: 2.5 mg; Otherwise 2.5 mg on Mon, Wed, Fri; 5 mg all other days   Next INR check 8/27/2018    Indications   Atrial fibrillation (H) (Resolved) [I48.91]  Cerebral embolism with cerebral infarction (H) [I63.40]  Long term current use of anticoagulant therapy [Z79.01]         August 2018 Details    Sun Mon Tue Wed Thu Fri Sat        1               2               3               4                 5               6               7               8               9               10               11                 12               13               14               15               16               17               18                 19               20               21      5 mg   See details      22      2.5 mg         23      5 mg         24      2.5 mg         25      5 mg           26      2.5 mg         27            28               29               30               31                 Date Details   08/21 This INR check       Date of next INR:  8/27/2018         How to take your warfarin dose     To take:  2.5 mg Take 1 of the 2.5 mg tablets.    To take:  5 mg Take 2 of the 2.5 mg tablets.

## 2018-08-21 NOTE — TELEPHONE ENCOUNTER
Thank you. [facility] will address.    Dre Phillips RN, BSN, PHN  Anticoagulation Clinic   [phone]

## 2018-08-27 ENCOUNTER — RECORDS - HEALTHEAST (OUTPATIENT)
Dept: LAB | Facility: CLINIC | Age: 83
End: 2018-08-27

## 2018-08-27 LAB — INR PPP: 2.27 (ref 0.9–1.1)

## 2018-08-28 ENCOUNTER — TELEPHONE (OUTPATIENT)
Dept: FAMILY MEDICINE | Facility: CLINIC | Age: 83
End: 2018-08-28

## 2018-08-28 ENCOUNTER — HOSPITAL ENCOUNTER (OUTPATIENT)
Dept: CARDIOLOGY | Facility: CLINIC | Age: 83
Discharge: HOME | End: 2018-08-28
Attending: INTERNAL MEDICINE | Admitting: INTERNAL MEDICINE
Payer: MEDICARE

## 2018-08-28 ENCOUNTER — ANTICOAGULATION THERAPY VISIT (OUTPATIENT)
Dept: ANTICOAGULATION | Facility: CLINIC | Age: 83
End: 2018-08-28
Payer: MEDICARE

## 2018-08-28 DIAGNOSIS — Z79.01 LONG TERM CURRENT USE OF ANTICOAGULANT THERAPY: ICD-10-CM

## 2018-08-28 DIAGNOSIS — I63.40 CEREBRAL EMBOLISM WITH CEREBRAL INFARCTION (H): ICD-10-CM

## 2018-08-28 LAB — INR PPP: 2.27

## 2018-08-28 PROCEDURE — 93296 REM INTERROG EVL PM/IDS: CPT

## 2018-08-28 PROCEDURE — 99207 ZZC NO CHARGE NURSE ONLY: CPT | Performed by: INTERNAL MEDICINE

## 2018-08-28 PROCEDURE — 93294 REM INTERROG EVL PM/LDLS PM: CPT | Performed by: INTERNAL MEDICINE

## 2018-08-28 NOTE — PROGRESS NOTES
ANTICOAGULATION FOLLOW-UP CLINIC VISIT    Patient Name:  Shanika Stahl  Date:  8/28/2018  Contact Type:  Telephone/ Sydney BELLO-Kent    SUBJECTIVE:     Patient Findings     Comments No changes in medications, diet, or activity or illness.  No missed doses.  Patient is to continue maintenance warfarin plan, and check INR in 1 weeks.  RN verbalizes understanding and agrees to plan. No further questions or concerns             OBJECTIVE    INR   Date Value Ref Range Status   08/28/2018 2.27  Final       ASSESSMENT / PLAN  INR assessment THER    Recheck INR In: 1 WEEK    INR Location Clinic      Anticoagulation Summary as of 8/28/2018     INR goal 2.0-3.0   Today's INR 2.27   Warfarin maintenance plan 2.5 mg (2.5 mg x 1) on Mon, Wed, Fri; 5 mg (2.5 mg x 2) all other days   Full warfarin instructions 2.5 mg on Mon, Wed, Fri; 5 mg all other days   Weekly warfarin total 27.5 mg   No change documented Sydney Wilcox [facility]   Plan last modified Verenice Morillo RN (5/25/2018)   Next INR check 9/4/2018   Priority INR   Target end date Indefinite    Indications   Atrial fibrillation (H) (Resolved) [I48.91]  Cerebral embolism with cerebral infarction (H) [I63.40]  Long term current use of anticoagulant therapy [Z79.01]         Anticoagulation Episode Summary     INR check location     Preferred lab     Send INR reminders to [facility]    Comments Keep on low end of therapeutic range. Uses 2.5mg tablets. Resides at [facility] [phone]      Anticoagulation Care Providers     Provider Role Specialty Phone number    Milena Jaime,  [facility] Medicine [phone]            See the Encounter Report to view Anticoagulation Flowsheet and Dosing Calendar (Go to Encounters tab in chart review, and find the Anticoagulation Therapy Visit)        Sydney Wilcox [facility]

## 2018-08-28 NOTE — TELEPHONE ENCOUNTER
Reason for Call:  Other FYI    Detailed comments: Tila Xiao OT with [facility] calling to give you an update on pt. She was at her residence today and she had a BP reading of 170/80 using a manual pediatric cuff. She states that all other vitals were normal. She did speak with the facility RN who took her BP and had a reading that was 182/105 using a larger cuff. Pt has no signs or symptoms but they were going to continue to monitor her at her facility.    Phone Number Patient can be reached at: Other phone number:  Tila Xiao [phone]    Best Time: any    Can we leave a detailed message on this number? YES    Call taken on 8/28/2018 at 3:26 PM by Alesha Montalvo

## 2018-08-28 NOTE — MR AVS SNAPSHOT
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    If you need to cancel and/or reschedule your appointment please call one of the following numbers:  [facility] - [phone]  [place] - [phone]  [place] - [phone]  [place] - [phone]  [place] - [phone]            August 2018 Details    Sun Mon Tue Wed Thu Fri Sat        1               2               3               4                 5               6               7               8               9               10               11                 12               13               14               15               16               17               18                 19               20               21               22               23               24               25                 26               27               28      5 mg   See details      29      2.5 mg         30      5 mg         31      2.5 mg           Date Details   08/28 This INR check               How to take your warfarin dose     To take:  2.5 mg Take 1 of the 2.5 mg tablets.    To take:  5 mg Take 2 of the 2.5 mg tablets.           September 2018 Details    Sun Mon Tue Wed Thu Fri Sat           1      5 mg           2      5 mg         3      2.5 mg         4            5               6               7               8                 9                10               11               12               13               14               15                 16               17               18               19               20               21               22                 23               24               25               26               27               28               29                 30                      Date Details   No additional details    Date of next INR:  9/4/2018         How to take your warfarin dose     To take:  2.5 mg Take 1 of the 2.5 mg tablets.    To take:  5 mg Take 2 of the 2.5 mg tablets.

## 2018-08-28 NOTE — TELEPHONE ENCOUNTER
Noted.  If continues to have blood pressure greater than 160, she should be seen for further discussion on treating elevated blood pressure

## 2018-08-29 ENCOUNTER — DOCUMENTATION ONLY (OUTPATIENT)
Dept: CARDIOLOGY | Facility: CLINIC | Age: 83
End: 2018-08-29

## 2018-08-29 NOTE — PROGRESS NOTES
[facility] Remote PPM Device Check  : 60 %  Mode: VVIR        Presenting Rhythm: VS/  Heart Rate: Adequate rates per the histograms.  Sensing: Stable    Pacing Threshold: Stable   Impedance: Stable  Battery Status: 2.5-5 yrs remain.  Atrial Arrhythmia: Permanent AF patient continues on Jantoven.  Ventricular Arrhythmia: None.    Care Plan: Annual thresholds due in October 2018. Patient's son Ulises called w/ results and appt date/time.

## 2018-08-29 NOTE — TELEPHONE ENCOUNTER
Discussed plan with Aleksandra. She states she will let team know and also RN at [facility] know.      Roxanne RODRIGUEZ RN

## 2018-09-04 ENCOUNTER — RECORDS - HEALTHEAST (OUTPATIENT)
Dept: LAB | Facility: CLINIC | Age: 83
End: 2018-09-04

## 2018-09-04 ENCOUNTER — ANTICOAGULATION THERAPY VISIT (OUTPATIENT)
Dept: ANTICOAGULATION | Facility: CLINIC | Age: 83
End: 2018-09-04
Payer: MEDICARE

## 2018-09-04 DIAGNOSIS — E78.5 HYPERLIPIDEMIA LDL GOAL <100: Chronic | ICD-10-CM

## 2018-09-04 DIAGNOSIS — I63.40 CEREBRAL EMBOLISM WITH CEREBRAL INFARCTION (H): ICD-10-CM

## 2018-09-04 DIAGNOSIS — I12.9 RENAL HYPERTENSION, STAGE 1-4 OR UNSPECIFIED CHRONIC KIDNEY DISEASE: ICD-10-CM

## 2018-09-04 DIAGNOSIS — Z79.01 LONG TERM CURRENT USE OF ANTICOAGULANT THERAPY: ICD-10-CM

## 2018-09-04 DIAGNOSIS — J31.0 CHRONIC RHINITIS, UNSPECIFIED TYPE: Primary | ICD-10-CM

## 2018-09-04 LAB
INR PPP: 2.33
INR PPP: 2.33 (ref 0.9–1.1)

## 2018-09-04 PROCEDURE — 99207 ZZC NO CHARGE NURSE ONLY: CPT | Performed by: REGISTERED NURSE

## 2018-09-04 NOTE — MR AVS SNAPSHOT
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]  Long term current use of anticoagulant therapy [Z79.01]         September 2018 Details    Sun Mon Tue Wed Thu Fri Sat           1                 2               3               4      5 mg   See details      5      2.5 mg         6      5 mg         7      2.5 mg         8      5 mg           9      5 mg         10      2.5 mg         11            12               13               14               15                 16               17               18               19               20               21               22                 23               24               25               26               27               28               29                 30                      Date Details   09/04 This INR check       Date of next INR:  9/11/2018         How to take your warfarin dose     To take:  2.5 mg Take 1 of the 2.5 mg tablets.    To take:  5 mg Take 2 of the 2.5 mg tablets.

## 2018-09-04 NOTE — LETTER
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     If you need to cancel and/or reschedule your appointment please call one of the following numbers:  [facility] [phone]  [place] - [phone]  [place] - [phone]  [place] - [phone]  [place] - [phone]             September 2018 Details    Sun Mon Tue Wed Thu Fri Sat           1                     2                   3                   4       5 mg   See details      5       2.5 mg           6       5 mg           7       2.5 mg           8       5 mg             9       5 mg           10       2.5 mg           11               12                   13                   14                   15                     16                   17                   18                   19                   20                   21                   22                     23                   24                   25                   26                   27                   28                   29                     30                          Date Details   09/04 This INR check       Date of next INR: 9/11/2018          How to take your warfarin dose      To take: 2.5 mg Take 1 of the 2.5 mg tablets.     To take: 5 mg Take 2 of the 2.5 mg tablets.

## 2018-09-05 ENCOUNTER — MEDICAL CORRESPONDENCE (OUTPATIENT)
Dept: HEALTH INFORMATION MANAGEMENT | Facility: CLINIC | Age: 83
End: 2018-09-05

## 2018-09-05 ENCOUNTER — TELEPHONE (OUTPATIENT)
Dept: FAMILY MEDICINE | Facility: CLINIC | Age: 83
End: 2018-09-05

## 2018-09-05 ENCOUNTER — APPOINTMENT (OUTPATIENT)
Dept: CT IMAGING | Facility: CLINIC | Age: 83
End: 2018-09-05
Attending: EMERGENCY MEDICINE
Payer: MEDICARE

## 2018-09-05 ENCOUNTER — HOSPITAL ENCOUNTER (EMERGENCY)
Facility: CLINIC | Age: 83
Discharge: HOME | End: 2018-09-05
Attending: EMERGENCY MEDICINE | Admitting: EMERGENCY MEDICINE
Payer: MEDICARE

## 2018-09-05 VITALS
TEMPERATURE: 97.3 F | OXYGEN SATURATION: 92 % | SYSTOLIC BLOOD PRESSURE: 185 MMHG | HEART RATE: 88 BPM | DIASTOLIC BLOOD PRESSURE: 84 MMHG | RESPIRATION RATE: 18 BRPM

## 2018-09-05 DIAGNOSIS — S01.01XA LACERATION OF SCALP, INITIAL ENCOUNTER: ICD-10-CM

## 2018-09-05 DIAGNOSIS — S09.90XA CLOSED HEAD INJURY, INITIAL ENCOUNTER: ICD-10-CM
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
## 2018-09-05 LAB
ANION GAP SERPL CALCULATED.3IONS-SCNC: 6 MMOL/L (ref 3–14)
BASOPHILS # BLD AUTO: 0 10E9/L (ref 0–0.2)
BASOPHILS NFR BLD AUTO: 0.7 %
BUN SERPL-MCNC: 21 MG/DL (ref 7–30)
CALCIUM SERPL-MCNC: 9 MG/DL (ref 8.5–10.1)
CHLORIDE SERPL-SCNC: 110 MMOL/L (ref 94–109)
CO2 SERPL-SCNC: 26 MMOL/L (ref 20–32)
CREAT SERPL-MCNC: 0.8 MG/DL (ref 0.52–1.04)
DIFFERENTIAL METHOD BLD: ABNORMAL
EOSINOPHIL # BLD AUTO: 0.3 10E9/L (ref 0–0.7)
EOSINOPHIL NFR BLD AUTO: 5.4 %
ERYTHROCYTE [DISTWIDTH] IN BLOOD BY AUTOMATED COUNT: 15.1 % (ref 10–15)
GFR SERPL CREATININE-BSD FRML MDRD: 67 ML/MIN/1.7M2
GLUCOSE SERPL-MCNC: 135 MG/DL (ref 70–99)
HCT VFR BLD AUTO: 36.6 % (ref 35–47)
HGB BLD-MCNC: 11.6 G/DL (ref 11.7–15.7)
IMM GRANULOCYTES # BLD: 0 10E9/L (ref 0–0.4)
IMM GRANULOCYTES NFR BLD: 0.3 %
INR PPP: 2.64 (ref 0.86–1.14)
LYMPHOCYTES # BLD AUTO: 1.2 10E9/L (ref 0.8–5.3)
LYMPHOCYTES NFR BLD AUTO: 19.9 %
MCH RBC QN AUTO: 30.6 PG (ref 26.5–33)
MCHC RBC AUTO-ENTMCNC: 31.7 G/DL (ref 31.5–36.5)
MCV RBC AUTO: 97 FL (ref 78–100)
MONOCYTES # BLD AUTO: 0.5 10E9/L (ref 0–1.3)
MONOCYTES NFR BLD AUTO: 8.8 %
NEUTROPHILS # BLD AUTO: 3.7 10E9/L (ref 1.6–8.3)
NEUTROPHILS NFR BLD AUTO: 64.9 %
NRBC # BLD AUTO: 0 10*3/UL
NRBC BLD AUTO-RTO: 0 /100
PLATELET # BLD AUTO: 202 10E9/L (ref 150–450)
POTASSIUM SERPL-SCNC: 3.7 MMOL/L (ref 3.4–5.3)
RBC # BLD AUTO: 3.79 10E12/L (ref 3.8–5.2)
SODIUM SERPL-SCNC: 142 MMOL/L (ref 133–144)
WBC # BLD AUTO: 5.8 10E9/L (ref 4–11)

## 2018-09-05 PROCEDURE — 85610 PROTHROMBIN TIME: CPT | Performed by: EMERGENCY MEDICINE

## 2018-09-05 PROCEDURE — 12001 RPR S/N/AX/GEN/TRNK 2.5CM/<: CPT

## 2018-09-05 PROCEDURE — 80048 BASIC METABOLIC PNL TOTAL CA: CPT | Performed by: EMERGENCY MEDICINE

## 2018-09-05 PROCEDURE — 72125 CT NECK SPINE W/O DYE: CPT

## 2018-09-05 PROCEDURE — 99284 EMERGENCY DEPT VISIT MOD MDM: CPT | Mod: 25 | Performed by: EMERGENCY MEDICINE

## 2018-09-05 PROCEDURE — 70450 CT HEAD/BRAIN W/O DYE: CPT

## 2018-09-05 PROCEDURE — 12001 RPR S/N/AX/GEN/TRNK 2.5CM/<: CPT | Mod: Z6 | Performed by: EMERGENCY MEDICINE

## 2018-09-05 PROCEDURE — 85025 COMPLETE CBC W/AUTO DIFF WBC: CPT | Performed by: EMERGENCY MEDICINE

## 2018-09-05 PROCEDURE — 99284 EMERGENCY DEPT VISIT MOD MDM: CPT | Mod: 25

## 2018-09-05 NOTE — ED AVS SNAPSHOT
[facility] Emergency Department    [address] [facility] [address] [address]    Phone:  [phone]    Fax:  [phone]                                       Shanika Stahl   MRN: [id number]    Department:  [facility] Emergency Department   Date of Visit:  9/5/2018           After Visit Summary Signature Page     I have received my discharge instructions, and my questions have been answered. I have discussed any challenges I see with this plan with the nurse or doctor.    ..........................................................................................................................................  Patient/Patient Representative Signature      ..........................................................................................................................................  Patient Representative Print Name and Relationship to Patient    ..................................................               ................................................  Date                                            Time    ..........................................................................................................................................  Reviewed by Signature/Title    ...................................................              ..............................................  Date                                                            Time          22EPIC Rev 08/18

## 2018-09-05 NOTE — ED NOTES
Pt presents to ED with son after falling at about 1 pm today. Pt fell from standing while trying to step out of the elevator. Pt did hit head. Pt did not lose consciousness. Pt reports taking blood thinners. Pt complains of pain on her head. 1 cm laceration on the middle front of her head. Bleeding controlled open to air.

## 2018-09-05 NOTE — ED AVS SNAPSHOT
[facility] Emergency Department    [address] [facility] [facility] [address]    Phone:  [phone]    Fax:  [phone]                                       Shanika Stahl   MRN: [id number]    Department:  [facility] Emergency Department   Date of Visit:  9/5/2018           Patient Information     Date Of Birth          5/26/1924        Your diagnoses for this visit were:     Fall, initial encounter     Closed head injury, initial encounter     Laceration of scalp, initial encounter        You were seen by Dioni Liu MD.      Follow-up Information     Follow up with Milena Jaime DO.    Specialty:  Internal Medicine    Why:  As needed    Contact information:    [address] [place] [address]  [phone]          Follow up with [facility] Emergency Department.    Specialty:  EMERGENCY MEDICINE    Why:  If symptoms worsen    Contact information:    [address] [place] [id number] [phone]    Additional information:    The medical center is located at   [address] [place] [address] (between [facility] and   [facility] in [place], four miles north   of [place]).        Discharge Instructions       Return if symptoms worsen or new symptoms develop.  Follow-up with primary care physician next available.  Drink plenty of fluids.  If increased headache any visual changes or other symptoms present including numbness weakness any extremity please return for further evaluation and care.  Patient should have her [place] removed in 7 days.  Gently cleanse with soap and water and apply bacitracin ointment.    Discharge References/Attachments     LACERATION, SCALP: SUTURES OR STAPLES (ENGLISH)      Your next 10 appointments already scheduled     Nov 27, 2018  2:20 PM CST   Pacemaker Check with [facility] Device Rn   [facility] ([facility])    [address] [facility] [address] [address] [phone]              24 Hour Appointment Hotline       To  make an appointment at any [facility], call [phone]). If you don't have a family doctor or clinic, we will help you find one. [facility] are conveniently located to serve the needs of you and your family.             Review of your medicines      Our records show that you are taking the medicines listed below. If these are incorrect, please call your family doctor or clinic.        Dose / Directions Last dose taken    acetaminophen 325 MG tablet   Commonly known as:  TYLENOL   Dose:  650 mg   Quantity:  100 tablet        Take 2 tablets (650 mg) by mouth 3 times daily   Refills:  0        calcium + D 600-200 MG-UNIT Tabs   Generic drug:  calcium carbonate-vitamin D        1 tablet by mouth daily   Refills:  0        cholecalciferol 1000 UNIT tablet   Commonly known as:  vitamin D3   Quantity:  30        1 TABLET DAILY   Refills:  0        furosemide 20 MG tablet   Commonly known as:  LASIX   Dose:  20 mg   Quantity:  45 tablet        Take 1 tablet (20 mg) by mouth every other day   Refills:  3        JANTOVEN 2.5 MG tablet   Quantity:  150 tablet   Generic drug:  warfarin        As directed by Anticoagulation Clinic (maintenance dose being re-established while at [facility])   Refills:  0        loratadine 10 MG tablet   Commonly known as:  CLARITIN   Dose:  10 mg        Take 10 mg by mouth daily   Refills:  0        metoprolol succinate 25 MG 24 hr tablet   Commonly known as:  TOPROL-XL   Dose:  25 mg   Quantity:  180 tablet        Take 1 tablet (25 mg) by mouth 2 times daily   Refills:  3        naproxen 250 MG tablet   Commonly known as:  NAPROSYN   Dose:  250 mg        Take 1 tablet (250 mg) by mouth every 12 hours as needed for moderate pain , take with meals.   Refills:  0        order for DME   Quantity:  1 each        Knee high compression stockings 10-15 mm Hg   Refills:  1        polyethylene glycol Packet   Commonly known as:  MIRALAX/GLYCOLAX   Dose:  17 g        Take 17 g by  mouth daily as needed for constipation   Refills:  0        senna-docusate 8.6-50 MG per tablet   Commonly known as:  SENOKOT-S;PERICOLACE   Dose:  1 tablet   Quantity:  30 tablet        Take 1 tablet by mouth At Bedtime , stop taking if loose stools develop.   Refills:  0        simvastatin 20 MG tablet   Commonly known as:  ZOCOR   Dose:  20 mg   Quantity:  90 tablet        Take 1 tablet (20 mg) by mouth At Bedtime   Refills:  3                Procedures and tests performed during your visit     Basic metabolic panel    CBC with platelets, differential    CT Cervical Spine w/o Contrast    CT Head w/o Contrast    INR      Orders Needing Specimen Collection     None      Pending Results     No orders found from 9/3/2018 to 9/6/2018.            Pending Culture Results     No orders found from 9/3/2018 to 9/6/2018.            Pending Results Instructions     If you had any lab results that were not finalized at the time of your Discharge, you can call the ED Lab Result RN at [phone]. You will be contacted by this team for any positive Lab results or changes in treatment. The nurses are available 7 days a week from 10A to 6:30P.  You can leave a message 24 hours per day and they will return your call.        Test Results From Your Hospital Stay        9/5/2018  4:03 PM      Component Results     Component Value Ref Range & Units Status    Sodium 142 133 - 144 mmol/L Final    Potassium 3.7 3.4 - 5.3 mmol/L Final    Chloride 110 (H) 94 - 109 mmol/L Final    Carbon Dioxide 26 20 - 32 mmol/L Final    Anion Gap 6 3 - 14 mmol/L Final    Glucose 135 (H) 70 - 99 mg/dL Final    Urea Nitrogen 21 7 - 30 mg/dL Final    Creatinine 0.80 0.52 - 1.04 mg/dL Final    GFR Estimate 67 >60 mL/min/1.7m2 Final    Non  GFR Calc    GFR Estimate If Black 81 >60 mL/min/1.7m2 Final    African American GFR Calc    Calcium 9.0 8.5 - 10.1 mg/dL Final         9/5/2018  3:13 PM      Component Results     Component Value Ref Range  & Units Status    WBC 5.8 4.0 - 11.0 10e9/L Final    RBC Count 3.79 (L) 3.8 - 5.2 10e12/L Final    Hemoglobin 11.6 (L) 11.7 - 15.7 g/dL Final    Hematocrit 36.6 35.0 - 47.0 % Final    MCV 97 78 - 100 fl Final    MCH 30.6 26.5 - 33.0 pg Final    MCHC 31.7 31.5 - 36.5 g/dL Final    RDW 15.1 (H) 10.0 - 15.0 % Final    Platelet Count 202 150 - 450 10e9/L Final    Diff Method Automated Method  Final    % Neutrophils 64.9 % Final    % Lymphocytes 19.9 % Final    % Monocytes 8.8 % Final    % Eosinophils 5.4 % Final    % Basophils 0.7 % Final    % Immature Granulocytes 0.3 % Final    Nucleated RBCs 0 0 /100 Final    Absolute Neutrophil 3.7 1.6 - 8.3 10e9/L Final    Absolute Lymphocytes 1.2 0.8 - 5.3 10e9/L Final    Absolute Monocytes 0.5 0.0 - 1.3 10e9/L Final    Absolute Eosinophils 0.3 0.0 - 0.7 10e9/L Final    Absolute Basophils 0.0 0.0 - 0.2 10e9/L Final    Abs Immature Granulocytes 0.0 0 - 0.4 10e9/L Final    Absolute Nucleated RBC 0.0  Final         9/5/2018  3:41 PM      Component Results     Component Value Ref Range & Units Status    INR 2.64 (H) 0.86 - 1.14 Final         9/5/2018  4:18 PM      Narrative     CT SCAN OF THE HEAD WITHOUT CONTRAST   9/5/2018 4:00 PM     HISTORY: Fall; headache.     TECHNIQUE: Axial images of the head and coronal reformations without  IV contrast material. Radiation dose for this scan was reduced using  automated exposure control, adjustment of the mA and/or kV according  to patient size, or iterative reconstruction technique.    COMPARISON: None.    FINDINGS: There is no evidence of intracranial hemorrhage, mass, acute  infarct or anomaly. There is generalized atrophy of the brain. There  is low attenuation in the white matter of the cerebral hemispheres  consistent with sequelae of small vessel ischemic disease. Chronic  cortical infarct in the anterior right insular region. Ventricular  size is within normal limits without evidence of hydrocephalus.     Polypoid mucosal thickening  along the inferior maxillary sinuses. The  bony calvarium and bones of the skull base appear intact.         Impression     IMPRESSION:     1. No evidence of acute intracranial hemorrhage, mass, or herniation.  2. There is generalized atrophy of the brain. White matter changes are  present in the cerebral hemispheres that are consistent with small  vessel ischemic disease in this age patient.      GISELLE JOSEPH MD         9/5/2018  4:23 PM      Narrative     CT OF THE CERVICAL SPINE WITHOUT CONTRAST   9/5/2018 4:01 PM     COMPARISON: CT cervical spine 6/20/2018.    HISTORY: Fall.      TECHNIQUE: Axial images of the cervical spine were acquired without  intravenous contrast. Multiplanar reformations were created.      FINDINGS: Mild degenerative anterolisthesis of C6 upon C7 again noted.  Alignment of the cervical vertebrae is otherwise normal. Vertebral  body heights of the cervical spine are normal. Craniocervical  alignment is normal. There are no fractures of the cervical spine.  There is degenerative endplate spurring at the C3-C4 and C4-C5 levels  and moderate facet arthropathy throughout the cervical spine on the  left. There is no evidence for fracture of the cervical spine. There  is mild degenerative spinal canal narrowing at the C6-C7 level. There  is no other spinal canal narrowing of the cervical spine. There is no  prevertebral soft tissue swelling.        Impression     IMPRESSION: Diffuse degenerative changes of the cervical spine. No  evidence for fracture or traumatic malalignment.      Radiation dose for this scan was reduced using automated exposure  control, adjustment of the mA and/or kV according to patient size, or  iterative reconstruction technique.    CLAUDIA TELLES MD                Thank you for choosing [facility]       Thank you for choosing [facility] for your care. Our goal is always to provide you with excellent care. Hearing back from our patients is one way we can continue to improve  our services. Please take a few minutes to complete the written survey that you may receive in the mail after you visit with us. Thank you!        [employer] Information     [facility] [employer] [facility] gives you secure access to your electronic health record. If you see a primary care provider, you can also send messages to your care team and make appointments. If you have questions, please call your primary care clinic.  If you do not have a primary care provider, please call [phone] and they will assist you.        Care EveryWhere ID     This is your Care EveryWhere ID. This could be used by other organizations to access your Mobile medical records  [id number]        Equal Access to Services     JAYCEE MOLINA : Jadyn Bolton, harsha ha, shaquille bailey. So [facility] [phone].    ATENCIÓN: Si habla español, tiene a holly disposición servicios gratuitos de asistencia lingüística. Llame al [phone].    We comply with applicable federal civil rights laws and [place] laws. We do not discriminate on the basis of race, color, national origin, age, disability, sex, sexual orientation, or gender identity.            After Visit Summary       This is your record. Keep this with you and show to your community pharmacist(s) and doctor(s) at your next visit.

## 2018-09-05 NOTE — ED PROVIDER NOTES
History     Chief Complaint   Patient presents with     Laceration     fell hitting head.  on blood thinners.  No LOC. lac top of head      HPI  Shanika Stahl is a 89 year old female who is emergency department status post fall.  Patient was coming out of an elevator and reportedly tripped striking the back of her head against a wall.  She did not lose consciousness and is complaining of a headache.  Patient is on Coumadin.  She denies other injury.  She suffered a burst fracture to a thoracic lower vertebrae over 2 months ago and is in the brace.  She denies any back pain.  She has not had any focal numbness weakness any extremity denies any chest pain shortness of breath.  Patient denies any visual changes.  Currently rates her pain a 3 out of 10.    Problem List:    Patient Active Problem List    Diagnosis Date Noted     Closed stable burst fracture of eleventh thoracic vertebra with routine healing 07/13/2018     Priority: Medium     Diarrhea 07/13/2018     Priority: Medium     Oropharyngeal dysphagia 07/13/2018     Priority: Medium     Hyponatremia 07/13/2018     Priority: Medium     Asymptomatic bacteriuria 07/13/2018     Priority: Medium     Vaginal bleeding 07/13/2018     Priority: Medium     Prolonged INR 07/13/2018     Priority: Medium     Generalized weakness 07/13/2018     Priority: Medium     Pulmonary hypertension 07/31/2017     Priority: Medium     Sensorineural hearing loss, bilateral 04/06/2016     Priority: Medium     Chronic atrial fibrillation (H) 10/02/2015     Priority: Medium     On warfarin       Pacemaker 10/02/2015     Priority: Medium     Long term current use of anticoagulant therapy 02/27/2015     Priority: Medium     Problem list name updated by automated process. Provider to review       Health Care Home 02/13/2014     Priority: Medium     **See Letters for H Care Plan: Emergency Care Plan         Cerebral embolism with cerebral infarction (H) 02/10/2014     Priority: Medium      Tachy-pernell and a-fib       Syncope 10/09/2013     Priority: Medium     Advance Care Planning 02/15/2012     Priority: Medium     Advance Care PlanningReceipt of ACP document:  Received: Health Care Directive which was witnessed or notarized on 5/14/2013.  Document not previously scanned.  Validation form completed and sent with document to be scanned.  Code Status reflects choices in most recent ACP document.  Confirmed/documented designated decision maker(s). See permanent comments section of demographics in clinical tab. View document(s) and details by clicking on code status. Added by Joanna Garcia on 5/21/2013.  Health Directive notarized  March 30 1999 Sent to scanning February 15, 2012 [facility] GODFREY MA         Hyperlipidemia LDL goal <100 10/31/2010     Priority: Medium     CHOL      175   7/6/2011  HDL       69   7/6/2011  LDL       93   7/6/2011  TRIG       61   7/6/2011  CHOLHDLRATIO      3.0   7/6/2011  On simvastatin 20mg       Family history of breast cancer      Priority: Medium     Abnormal CXR (chest x-ray)      Priority: Medium     Read as emphysema       Renal hypertension 05/09/2005     Priority: Medium     Known STU--seen on 2001 MRA--did not want angioplasty in past  Controlled on diltiazem, losartan and spironolactone  Problem list name updated by automated process. Provider to review       Atherosclerosis of renal artery (H) 05/09/2005     Priority: Medium     STU by MRA (?records)--medical therapy elected by patient.       Paroxysmal supraventricular tachycardia (H) 05/09/2005     Priority: Medium     SVT-infrequent episode on holter in 2000  Negative stress echo in 2002       Other osteoporosis 05/09/2005     Priority: Medium     Dx 2004 and put on fosamax   BMD 3/05 (T scores):  Lumbar -4.3, Right prox femur -4.0, Left prox femur -4.0  BMD 7/08 (T scores):  Lumbar -4.2, Right prox femur -3.9, Left prox femur -4.1  Fosamax changed to IV reclast and miacalcin 3/09 by   Otf  Had second reclast injection 8/11          Past Medical History:    Past Medical History:   Diagnosis Date     Abnormal CXR (chest x-ray)      Atherosclerosis of renal artery (H)      Atrial fibrillation (H) 3/27/2014     Basal cell carcinoma      ESSENTIAL TREMOR 5/9/2005     Family history of breast cancer      Hyperlipidemia LDL goal <100 10/31/2010     Hypertension goal BP (blood pressure) < 140/90 10/3/2013     Other and unspecified hyperlipidemia      Other osteoporosis      Pacemaker      Paroxysmal supraventricular tachycardia (H)      Postmenopausal atrophic vaginitis      Stroke (H) 2/10/2014     Syncope 10/9/2013     Unspecified essential hypertension        Past Surgical History:    Past Surgical History:   Procedure Laterality Date     EYE SURGERY  2/21/12    left eye cataract     IMPLANT PACEMAKER       SURGICAL HISTORY OF -   1965    FEMORAL HERNIA REPAIR       Family History:    Family History   Problem Relation Age of Onset     Cerebrovascular Disease Mother      Diabetes Mother      Breast Cancer Sister      HEART DISEASE Brother      Cerebrovascular Disease Brother      HEART DISEASE Brother      Prostate Cancer Brother      HEART DISEASE Brother      HEART DISEASE Sister      Gynecology Daughter      Cancer Son      Lung       Social History:  Marital Status:   [5]  Social History   Substance Use Topics     Smoking status: Never Smoker     Smokeless tobacco: Never Used     Alcohol use No        Medications:      acetaminophen (TYLENOL) 325 MG tablet   CALCIUM + D 600-200 MG-UNIT PO TABS   furosemide (LASIX) 20 MG tablet   loratadine (CLARITIN) 10 MG tablet   metoprolol succinate (TOPROL-XL) 25 MG 24 hr tablet   naproxen (NAPROSYN) 250 MG tablet   order for DME   polyethylene glycol (MIRALAX/GLYCOLAX) Packet   senna-docusate (SENOKOT-S;PERICOLACE) 8.6-50 MG per tablet   simvastatin (ZOCOR) 20 MG tablet   VITAMIN D 1000 UNIT OR TABS   warfarin (JANTOVEN) 2.5 MG tablet         Review  of Systems   Constitutional: Positive for activity change. Negative for appetite change and fever.   HENT: Negative for facial swelling and trouble swallowing.    Eyes: Negative for visual disturbance.   Respiratory: Negative for apnea, shortness of breath and wheezing.    Cardiovascular: Negative for chest pain and leg swelling.   Gastrointestinal: Negative for abdominal pain, nausea and vomiting.   Genitourinary: Negative for decreased urine volume and dysuria.   Musculoskeletal: Positive for neck pain. Negative for back pain.   Skin: Positive for wound. Negative for rash.   Neurological: Positive for headaches. Negative for dizziness, facial asymmetry, weakness, light-headedness and numbness.   Hematological: Does not bruise/bleed easily.   Psychiatric/Behavioral: Negative for confusion.       Physical Exam   BP: 165/68  Pulse: 88  Temp: 97.3  F (36.3  C)  Resp: 18  SpO2: 96 %      Physical Exam   Constitutional: She appears well-developed and well-nourished.   HENT:   Head: Normocephalic.   Nose: Nose normal.   Mouth/Throat: Oropharynx is clear and moist.   Scalp with a superior occiput laceration centrally located roughly 1.5 cm in size.  No active bleeding galea intact no midface instability bite symmetrical.   Eyes: Conjunctivae are normal.   Neck:   Mild posterior lateral neck tenderness bilaterally with some midline upper cervical neck pain trachea midline no swelling or erythema.   Cardiovascular: Normal rate, regular rhythm, normal heart sounds and intact distal pulses.    No murmur heard.  Pulmonary/Chest: Effort normal and breath sounds normal. No respiratory distress. She has no wheezes. She has no rales.   Patient is wearing a brace in chest and abdomen secondary to compression fracture.   Abdominal: Soft. Bowel sounds are normal. There is no tenderness.   Musculoskeletal: Normal range of motion. She exhibits no edema.   Patient has some midline back tenderness along her burst fracture but this is  unchanged from previous no other pain in back is noted moving all extremities well pulses sensation symmetrical.   Neurological: She is alert. She exhibits normal muscle tone.   Skin: Skin is warm and dry. No rash noted.   Psychiatric: She has a normal mood and affect.   Nursing note and vitals reviewed.      ED Course     ED Course     Laceration repair  Date/Time: 9/7/2018 11:00 AM  Performed by: CAROLYN PAEZ  Authorized by: CAROLYN PAEZ   Consent: Verbal consent obtained.  Risks and benefits: risks, benefits and alternatives were discussed  Consent given by: patient  Patient understanding: patient states understanding of the procedure being performed  Body area: head/neck  Location details: scalp  Laceration length: 1.5 cm  Foreign bodies: no foreign bodies  Tendon involvement: none  Nerve involvement: none  Vascular damage: no  Anesthesia: local infiltration    Anesthesia:  Local Anesthetic: lidocaine 1% without epinephrine  Anesthetic total: 4 mL    Sedation:  Patient sedated: no  Irrigation solution: saline  Irrigation method: syringe  Amount of cleaning: standard  Debridement: none  Degree of undermining: none  Skin closure: staples  Number of sutures: 4  Technique: simple  Approximation: close  Approximation difficulty: simple  Dressing: antibiotic ointment  Patient tolerance: Patient tolerated the procedure well with no immediate complications                     Critical Care time:  none               No results found for this or any previous visit (from the past 24 hour(s)).    Medications - No data to display    Assessments & Plan (with Medical Decision Making) records were reviewed.  CT scan of the head and cervical spine were obtained.  Labs were obtained.  White count was not elevated hemoglobin was 11.6 platelet count 202 there was no left shift.  Basic metabolic panel without significant abnormality.  INR was 2.64.  CT scan of the head with no evidence of acute intracranial  hemorrhage mass or herniation.  There is generalized atrophy of the brain.  White matter changes are present in the cerebral hemispheres.  Cervical spine CT showed diffuse degenerative change the cervical spine.  No evidence of fracture or traumatic alignment malalignment.  Patient's scalp laceration was repaired with staples.  She tolerated this well.  She ambulated without difficulty and feels comfortable going home.  She will return if any severe headaches visual changes focal numbness weakness any extremity or other symptoms present.  Patient agrees with this plan.     I have reviewed the nursing notes.    I have reviewed the findings, diagnosis, plan and need for follow up with the patient.       Discharge Medication List as of 9/5/2018  5:36 PM          Final diagnoses:   Fall, initial encounter   Closed head injury, initial encounter   Laceration of scalp, initial encounter       9/5/2018   [facility] EMERGENCY DEPARTMENT     Dioni Liu MD  09/07/18 [id number]

## 2018-09-05 NOTE — DISCHARGE INSTRUCTIONS
Return if symptoms worsen or new symptoms develop.  Follow-up with primary care physician next available.  Drink plenty of fluids.  If increased headache any visual changes or other symptoms present including numbness weakness any extremity please return for further evaluation and care.  Patient should have her Meryl removed in 7 days.  Gently cleanse with soap and water and apply bacitracin ointment.

## 2018-09-05 NOTE — PROGRESS NOTES
9/11/18  No INR available at 5:23pm. Writer left VM at [facility] to verify if the INR was drawn. Requested call back to [facility].    Dre Phillips RN, BSN, PHN  Anticoagulation Clinic   [phone]          ADDENDUM: Nursing staff from [facility] called [facility] requesting the AVS be faxed over to their facility so they can request pharmacy to send more warfarin over. Writer faxed the AVS to [phone] per her request.     Pete JETT RN, CACP 9/5/2018 at 11:36 AM    ANTICOAGULATION FOLLOW-UP CLINIC VISIT    Patient Name:  Shanika Stahl  Date:  9/4/2018  Contact Type:  Telephone/ Sydney @ [place]    SUBJECTIVE:     Patient Findings     Comments Writer called Sydney at [place] to find out if patient had an INR, after a 20 min hold Sydney found the result. Per Sydney she denies missed warfarin doses changes to diet, activity or medications, states patient took warfarin as instructed. No signs or symptoms of increased bruising or bleeding reported. Advised Sydney to have patient continue the same warfarin maintenance dose, INR therapeutic.                OBJECTIVE    INR   Date Value Ref Range Status   09/04/2018 2.33  Final       ASSESSMENT / PLAN  No question data found.  Anticoagulation Summary as of 9/4/2018     INR goal 2.0-3.0   Today's INR 2.33   Warfarin maintenance plan 2.5 mg (2.5 mg x 1) on Mon, Wed, Fri; 5 mg (2.5 mg x 2) all other days   Full warfarin instructions 2.5 mg on Mon, Wed, Fri; 5 mg all other days   Weekly warfarin total 27.5 mg   Plan last modified Verenice Morillo RN (5/25/2018)   Next INR check 9/11/2018   Priority INR   Target end date Indefinite    Indications   Atrial fibrillation (H) (Resolved) [I48.91]  Cerebral embolism with cerebral infarction (H) [I63.40]  Long term current use of anticoagulant therapy [Z79.01]         Anticoagulation Episode Summary     INR check location     Preferred lab     Send INR reminders to [place] [facility] [employer] POOL    Comments Keep on low end of therapeutic  range. Uses 2.5mg tablets. Resides at [facility] - [phone]      Anticoagulation Care Providers     Provider Role Specialty Phone number    Milena Jaime,  [facility] Internal Medicine [phone]            See the Encounter Report to view Anticoagulation Flowsheet and Dosing Calendar (Go to Encounters tab in chart review, and find the Anticoagulation Therapy Visit)        Mabel Serna RN

## 2018-09-05 NOTE — TELEPHONE ENCOUNTER
Form from [facility] Discharge Visit Report. Orders were signed, faxed and sent to scanning.    [facility]

## 2018-09-06 RX ORDER — LORATADINE 10 MG/1
TABLET ORAL
Qty: 90 TABLET | Refills: 3 | Status: ON HOLD | OUTPATIENT
Start: 2018-09-06 | End: 2019-01-01

## 2018-09-06 RX ORDER — METOPROLOL SUCCINATE 25 MG/1
TABLET, EXTENDED RELEASE ORAL
Qty: 180 TABLET | Refills: 3 | Status: SHIPPED | OUTPATIENT
Start: 2018-09-06 | End: 2019-01-01

## 2018-09-06 RX ORDER — SIMVASTATIN 20 MG
TABLET ORAL
Qty: 90 TABLET | Refills: 3 | Status: SHIPPED | OUTPATIENT
Start: 2018-09-06 | End: 2019-01-01

## 2018-09-07 ASSESSMENT — ENCOUNTER SYMPTOMS
ACTIVITY CHANGE: 1
HEADACHES: 1
APPETITE CHANGE: 0
FEVER: 0
APNEA: 0
BACK PAIN: 0
FACIAL SWELLING: 0
NECK PAIN: 1
BRUISES/BLEEDS EASILY: 0
VOMITING: 0
DYSURIA: 0
SHORTNESS OF BREATH: 0
WHEEZING: 0
ABDOMINAL PAIN: 0
NUMBNESS: 0
TROUBLE SWALLOWING: 0
LIGHT-HEADEDNESS: 0
WEAKNESS: 0
CONFUSION: 0
NAUSEA: 0
WOUND: 1
DIZZINESS: 0
FACIAL ASYMMETRY: 0

## 2018-09-07 NOTE — TELEPHONE ENCOUNTER
Patient notified of prescriptions sent to pharmacy today  Patient verbalized understanding    Katherine MOTTA Rn

## 2018-09-10 ENCOUNTER — RECORDS - HEALTHEAST (OUTPATIENT)
Dept: LAB | Facility: CLINIC | Age: 83
End: 2018-09-10

## 2018-09-10 ENCOUNTER — TRANSFERRED RECORDS (OUTPATIENT)
Dept: HEALTH INFORMATION MANAGEMENT | Facility: CLINIC | Age: 83
End: 2018-09-10

## 2018-09-11 LAB — INR PPP: 1.88 (ref 0.9–1.1)

## 2018-09-12 ENCOUNTER — ANTICOAGULATION THERAPY VISIT (OUTPATIENT)
Dept: ANTICOAGULATION | Facility: CLINIC | Age: 83
End: 2018-09-12
Payer: MEDICARE

## 2018-09-12 DIAGNOSIS — Z79.01 LONG TERM CURRENT USE OF ANTICOAGULANT THERAPY: ICD-10-CM

## 2018-09-12 DIAGNOSIS — I48.20 CHRONIC ATRIAL FIBRILLATION (H): Chronic | ICD-10-CM

## 2018-09-12 DIAGNOSIS — I63.40 CEREBRAL EMBOLISM WITH CEREBRAL INFARCTION (H): ICD-10-CM

## 2018-09-12 LAB — INR PPP: 1.8

## 2018-09-12 PROCEDURE — 99207 ZZC NO CHARGE NURSE ONLY: CPT

## 2018-09-12 RX ORDER — WARFARIN SODIUM 2.5 MG/1
TABLET ORAL
Qty: 150 TABLET | Refills: 0 | Status: SHIPPED | OUTPATIENT
Start: 2018-09-12 | End: 2018-10-12

## 2018-09-12 NOTE — LETTER
Shanika Stahl   9/12/2018   Anticoagulation Therapy Visit    Description: 89 year old female   Provider: Carly Hernandez, RN   Department: [facility]         INR as of 9/12/2018      Today's INR 1.8!       Anticoagulation Summary as of 9/12/2018      INR goal 2.0-3.0   Today's INR 1.8!   Full warfarin instructions 9/12: 5 mg; Otherwise 2.5 mg on Mon, Wed, Fri; 5 mg all other days   Next INR check 9/19/2018    Indications   Atrial fibrillation (H) (Resolved) [I48.91]   Cerebral embolism with cerebral infarction (H) [I63.40]   Long term current use of anticoagulant therapy [Z79.01]          Contact Numbers      Please call [phone] with any problems or questions regarding your therapy.     If you need to cancel and/or reschedule your appointment please call one of the following numbers:  [facility] - [phone]  [place] - [phone]  [place] - [phone]  \[facility] [place] [facility]\"" [phone]  [place] - [phone]             September 2018 Details    Sun Mon Tue Wed Thu Fri Sat           1                     2                   3                   4                   5                   6                   7                   8                     9                   10                   11                   12       5 mg   See details      13       5 mg           14       2.5 mg           15       5 mg             16       5 mg           17       2.5 mg           18       5 mg           19               20                   21                   22                     23                   24                   25                   26                   27                   28                   29                     30                          Date Details   09/12 This INR check       Date of next INR: 9/19/2018          How to take your warfarin dose      To take: 2.5 mg Take 1 of the 2.5 mg tablets.     To take: 5 mg Take 2 of the 2.5 mg tablets.

## 2018-09-12 NOTE — PROGRESS NOTES
ANTICOAGULATION FOLLOW-UP CLINIC VISIT    Patient Name:  Shanika Stahl  Date:  9/12/2018  Contact Type:  Telephone/ [facility], [facility]    SUBJECTIVE:     Patient Findings     Positives Other complaints (Fall approx 1 week ago per long term), Unexplained INR or factor level change    Comments No changes in medications, diet, or activity. No concerns with bleeding or bruising. Took warfarin as prescribed. Due to low INR, increasing Wed. 9/12/18 dose to 5mg, then maintenance warfarin dose. Will recheck INR in 1 week. Education provided regarding risk of clotting, precautions to take, and when to seek medical attention. Taylor at [facility] verbalizes understanding and agrees with plan, able to verbalize dose back. No further questions at this time.                 OBJECTIVE    INR   Date Value Ref Range Status   09/12/2018 1.8  Final       ASSESSMENT / PLAN  INR assessment SUB    Recheck INR In: 1 WEEK    INR Location Outside lab long term     Anticoagulation Summary as of 9/12/2018     INR goal 2.0-3.0   Today's INR 1.8!   Warfarin maintenance plan 2.5 mg (2.5 mg x 1) on Mon, Wed, Fri; 5 mg (2.5 mg x 2) all other days   Full warfarin instructions 9/12: 5 mg; Otherwise 2.5 mg on Mon, Wed, Fri; 5 mg all other days   Weekly warfarin total 27.5 mg   Plan last modified Verenice Morillo RN (5/25/2018)   Next INR check 9/19/2018   Priority INR   Target end date Indefinite    Indications   Atrial fibrillation (H) (Resolved) [I48.91]  Cerebral embolism with cerebral infarction (H) [I63.40]  Long term current use of anticoagulant therapy [Z79.01]         Anticoagulation Episode Summary     INR check location     Preferred lab     Send INR reminders to [place] [facility] [employer] POOL    Comments Keep on low end of therapeutic range. Uses 2.5mg tablets. Resides at [facility] - [phone]. Fax AVS to [phone]      Anticoagulation Care Providers     Provider Role Specialty Phone number    Milena Jaime DO  [facility] Internal Medicine [phone]            See the Encounter Report to view Anticoagulation Flowsheet and Dosing Calendar (Go to Encounters tab in chart review, and find the Anticoagulation Therapy Visit)        Carly Hernandez RN

## 2018-09-12 NOTE — MR AVS SNAPSHOT
Shanika Stahl   9/12/2018   Anticoagulation Therapy Visit    Description:  89 year old female   Provider:  Carly Hernandez RN   Department:  [place] [facility]           INR as of 9/12/2018     Today's INR 1.8!      Anticoagulation Summary as of 9/12/2018     INR goal 2.0-3.0   Today's INR 1.8!   Full warfarin instructions 9/12: 5 mg; Otherwise 2.5 mg on Mon, Wed, Fri; 5 mg all other days   Next INR check 9/19/2018    Indications   Atrial fibrillation (H) (Resolved) [I48.91]  Cerebral embolism with cerebral infarction (H) [I63.40]  Long term current use of anticoagulant therapy [Z79.01]         September 2018 Details    Sun Mon Tue Wed Thu Fri Sat           1                 2               3               4               5               6               7               8                 9               10               11               12      5 mg   See details      13      5 mg         14      2.5 mg         15      5 mg           16      5 mg         17      2.5 mg         18      5 mg         19            20               21               22                 23               24               25               26               27               28               29                 30                      Date Details   09/12 This INR check       Date of next INR:  9/19/2018         How to take your warfarin dose     To take:  2.5 mg Take 1 of the 2.5 mg tablets.    To take:  5 mg Take 2 of the 2.5 mg tablets.

## 2018-09-14 ENCOUNTER — ALLIED HEALTH/NURSE VISIT (OUTPATIENT)
Dept: FAMILY MEDICINE | Facility: CLINIC | Age: 83
End: 2018-09-14
Payer: MEDICARE

## 2018-09-14 DIAGNOSIS — Z48.02 ENCOUNTER FOR REMOVAL OF SUTURES: Primary | ICD-10-CM

## 2018-09-14 PROCEDURE — 99207 ZZC NO CHARGE NURSE ONLY: CPT

## 2018-09-14 NOTE — MR AVS SNAPSHOT
After Visit Summary   9/14/2018    Shanika Stahl    MRN: [id number]           Patient Information     Date Of Birth          5/26/1924        Visit Information        Provider Department      9/14/2018 2:00 PM LUCIEN LINN FP/IM RN [facility]        Today's Diagnoses     Encounter for removal of sutures    -  1       Follow-ups after your visit        Your next 10 appointments already scheduled     Nov 27, 2018  2:20 PM CST   Pacemaker Check with Wy Device Rn   [facility] ([facility])    [address] [address] [phone]              Who to contact     If you have questions or need follow up information about today's clinic visit or your schedule please contact [facility] directly at [phone].  Normal or non-critical lab and imaging results will be communicated to you by MyChart, letter or phone within 4 business days after the clinic has received the results. If you do not hear from us within 7 days, please contact the clinic through MyChart or phone. If you have a critical or abnormal lab result, we will notify you by phone as soon as possible.  Submit refill requests through [employer] or call your pharmacy and they will forward the refill request to us. Please allow 3 business days for your refill to be completed.          Additional Information About Your Visit        MyChart Information     [employer] gives you secure access to your electronic health record. If you see a primary care provider, you can also send messages to your care team and make appointments. If you have questions, please call your primary care clinic.  If you do not have a primary care provider, please call [phone] and they will assist you.        Care EveryWhere ID     This is your Care EveryWhere ID. This could be used by other organizations to access your [place] medical records  [id number]         Blood Pressure from Last 3 Encounters:    09/05/18 185/84   08/10/18 122/58   07/30/18 144/69    Weight from Last 3 Encounters:   08/10/18 81 lb (36.7 kg)   07/30/18 77 lb (34.9 kg)   07/24/18 76 lb 3.2 oz (34.6 kg)              Today, you had the following     No orders found for display       Primary Care Provider Office Phone # Fax #    Milena Jaime [phone] [address] [address]        Equal Access to Services     YADY MOLINA : Hadii matthew dickson hadasho Socherelle, waaxda luqadaha, qaybta kaalmada valorie, shaquille gillis . So [facility] [phone].    ATENCIÓN: Si habla español, tiene a holly disposición servicios gratuitos de asistencia lingüística. Llame al [phone].    We comply with applicable federal civil rights laws and [place] laws. We do not discriminate on the basis of race, color, national origin, age, disability, sex, sexual orientation, or gender identity.            Thank you!     Thank you for choosing [facility]  for your care. Our goal is always to provide you with excellent care. Hearing back from our patients is one way we can continue to improve our services. Please take a few minutes to complete the written survey that you may receive in the mail after your visit with us. Thank you!             Your Updated Medication List - Protect others around you: Learn how to safely use, store and throw away your medicines at www.disposemymeds.org.          This list is accurate as of 9/14/18  2:53 PM.  Always use your most recent med list.                   Brand Name Dispense Instructions for use Diagnosis    acetaminophen 325 MG tablet    TYLENOL    100 tablet    Take 2 tablets (650 mg) by mouth 3 times daily    Closed stable burst fracture of eleventh thoracic vertebra with routine healing       calcium + D 600-200 MG-UNIT Tabs   Generic drug:  calcium carbonate-vitamin D      1 tablet by mouth daily        cholecalciferol 1000 UNIT tablet    vitamin D3     30    1 TABLET DAILY        furosemide 20 MG tablet    LASIX    45 tablet    Take 1 tablet (20 mg) by mouth every other day    Leg swelling       loratadine 10 MG tablet    CLARITIN    90 tablet    TAKE 1 TAB BY MOUTH ONCE DAILY FOR ALLERGIES    Chronic rhinitis, unspecified type       metoprolol succinate 25 MG 24 hr tablet    TOPROL-XL    180 tablet    TAKE 1 TAB BY MOUTH TWICE A DAY    Renal hypertension, stage 1-4 or unspecified chronic kidney disease       naproxen 250 MG tablet    NAPROSYN     Take 1 tablet (250 mg) by mouth every 12 hours as needed for moderate pain , take with meals.    Closed stable burst fracture of eleventh thoracic vertebra with routine healing       order for DME     1 each    Knee high compression stockings 10-15 mm Hg    Leg swelling       polyethylene glycol Packet    MIRALAX/GLYCOLAX     Take 17 g by mouth daily as needed for constipation        senna-docusate 8.6-50 MG per tablet    SENOKOT-S;PERICOLACE    30 tablet    Take 1 tablet by mouth At Bedtime , stop taking if loose stools develop.    Slow transit constipation       simvastatin 20 MG tablet    ZOCOR    90 tablet    TAKE 1 TAB BY MOUTH AT BEDTIME    Hyperlipidemia LDL goal <100       warfarin 2.5 MG tablet    JANTOVEN    150 tablet    As directed by Anticoagulation Clinic (maintenance dose being re-established while at U)    Chronic atrial fibrillation (H)

## 2018-09-14 NOTE — PROGRESS NOTES
Patient here for staple removal. Seen in ED 09/05/18: Skin closure: staples  Number of sutures: 4    Area on scalp was cleaned with normal saline. 4 staples were visualized and removed with no problems. Area appeared clean and intact. Bacitracin applied per request of patient's son.     Roxanne RODRIGUEZ RN

## 2018-09-18 ENCOUNTER — RECORDS - HEALTHEAST (OUTPATIENT)
Dept: LAB | Facility: CLINIC | Age: 83
End: 2018-09-18

## 2018-09-19 ENCOUNTER — ANTICOAGULATION THERAPY VISIT (OUTPATIENT)
Dept: ANTICOAGULATION | Facility: CLINIC | Age: 83
End: 2018-09-19
Payer: MEDICARE

## 2018-09-19 ENCOUNTER — TRANSFERRED RECORDS (OUTPATIENT)
Dept: HEALTH INFORMATION MANAGEMENT | Facility: CLINIC | Age: 83
End: 2018-09-19

## 2018-09-19 DIAGNOSIS — Z79.01 LONG TERM CURRENT USE OF ANTICOAGULANT THERAPY: ICD-10-CM

## 2018-09-19 DIAGNOSIS — I63.40 CEREBRAL EMBOLISM WITH CEREBRAL INFARCTION (H): ICD-10-CM

## 2018-09-19 LAB
INR PPP: 2.43
INR PPP: 2.43 (ref 0.9–1.1)
INR PPP: 2.43 (ref 0.9–1.1)

## 2018-09-19 PROCEDURE — 99207 ZZC NO CHARGE NURSE ONLY: CPT

## 2018-09-19 NOTE — PROGRESS NOTES
ANTICOAGULATION FOLLOW-UP CLINIC VISIT    Patient Name:  Shanika Stahl  Date:  9/19/2018  Contact Type:  Telephone/ [facility]    SUBJECTIVE:     Patient Findings     Positives No Problem Findings    Comments Patient had 30 mg in the past 7 days. INR is therapeutic today. Writer adjusted dose so patient will have 30 mg by the next INR in 1 week.            OBJECTIVE    INR   Date Value Ref Range Status   09/19/2018 2.43  Final       ASSESSMENT / PLAN  INR assessment THER    Recheck INR In: 1 WEEK    INR Location TCU      Anticoagulation Summary as of 9/19/2018     INR goal 2.0-3.0   Today's INR 2.43   Warfarin maintenance plan 2.5 mg (2.5 mg x 1) on Mon, Wed, Fri; 5 mg (2.5 mg x 2) all other days   Full warfarin instructions 9/19: 5 mg; Otherwise 2.5 mg on Mon, Wed, Fri; 5 mg all other days   Weekly warfarin total 27.5 mg   Plan last modified Verenice Morillo RN (5/25/2018)   Next INR check 9/26/2018   Priority INR   Target end date Indefinite    Indications   Atrial fibrillation (H) (Resolved) [I48.91]  Cerebral embolism with cerebral infarction (H) [I63.40]  Long term current use of anticoagulant therapy [Z79.01]         Anticoagulation Episode Summary     INR check location     Preferred lab     Send INR reminders to [place] [facility] POOL    Comments Keep on low end of therapeutic range. Uses 2.5mg tablets. Resides at [facility] [phone]. Fax AVS to [phone]      Anticoagulation Care Providers     Provider Role Specialty Phone number    Milena Jaime,  [facility] Internal Medicine [phone]            See the Encounter Report to view Anticoagulation Flowsheet and Dosing Calendar (Go to Encounters tab in chart review, and find the Anticoagulation Therapy Visit)        Dre Phillips RN

## 2018-09-19 NOTE — MR AVS SNAPSHOT
Shanika Stahl   9/19/2018   Anticoagulation Therapy Visit    Description:  89 year old female   Provider:  Dre Phillips, RN   [facility]           INR as of 9/19/2018     Today's INR 2.43      Anticoagulation Summary as of 9/19/2018     INR goal 2.0-3.0   Today's INR 2.43   Full warfarin instructions 9/19: 5 mg; Otherwise 2.5 mg on Mon, Wed, Fri; 5 mg all other days   Next INR check 9/26/2018    Indications   Atrial fibrillation (H) (Resolved) [I48.91]  Cerebral embolism with cerebral infarction (H) [I63.40]  Long term current use of anticoagulant therapy [Z79.01]         September 2018 Details    Sun Mon Tue Wed Thu Fri Sat           1                 2               3               4               5               6               7               8                 9               10               11               12               13               14               15                 16               17               18               19      5 mg   See details      20      5 mg         21      2.5 mg         22      5 mg           23      5 mg         24      2.5 mg         25      5 mg         26            27               28               29                 30                      Date Details   09/19 This INR check       Date of next INR:  9/26/2018         How to take your warfarin dose     To take:  2.5 mg Take 1 of the 2.5 mg tablets.    To take:  5 mg Take 2 of the 2.5 mg tablets.

## 2018-09-25 ENCOUNTER — MEDICAL CORRESPONDENCE (OUTPATIENT)
Dept: HEALTH INFORMATION MANAGEMENT | Facility: CLINIC | Age: 83
End: 2018-09-25

## 2018-09-25 ENCOUNTER — RECORDS - HEALTHEAST (OUTPATIENT)
Dept: LAB | Facility: CLINIC | Age: 83
End: 2018-09-25

## 2018-09-25 ENCOUNTER — TELEPHONE (OUTPATIENT)
Dept: FAMILY MEDICINE | Facility: CLINIC | Age: 83
End: 2018-09-25

## 2018-09-26 ENCOUNTER — TELEPHONE (OUTPATIENT)
Dept: PHARMACY | Facility: CLINIC | Age: 83
End: 2018-09-26

## 2018-09-26 LAB
INR PPP: 2.25
INR PPP: 2.25 (ref 0.9–1.1)

## 2018-09-26 NOTE — TELEPHONE ENCOUNTER
INR:2.25  Goal INR:2-3  Reason for therapy: Afib  Current Warfarin dose:  2.5 mg MF, 5 mg all other days of the week this previous week  Plan: Continue 2.5 mg MF, 5 mg all other days of the week  Recheck: 2 weeks  Result and plan discussed with nurse Sydney from [facility].    Milena Mack, Pharm.D.

## 2018-09-27 ENCOUNTER — ANTICOAGULATION THERAPY VISIT (OUTPATIENT)
Dept: ANTICOAGULATION | Facility: CLINIC | Age: 83
End: 2018-09-27
Payer: MEDICARE

## 2018-09-27 DIAGNOSIS — Z79.01 LONG TERM CURRENT USE OF ANTICOAGULANT THERAPY: ICD-10-CM

## 2018-09-27 PROCEDURE — 99207 ZZC NO CHARGE NURSE ONLY: CPT

## 2018-09-27 NOTE — MR AVS SNAPSHOT
Shanika EMILY Stahl   9/27/2018   Anticoagulation Therapy Visit    Description:  89 year old female   Provider:  Vreenice Morillo, RN   Department:  [facility]           INR as of 9/27/2018     Today's INR 2.25 (9/26/2018)      Anticoagulation Summary as of 9/27/2018     INR goal 2.0-3.0   Today's INR 2.25 (9/26/2018)   Full warfarin instructions 2.5 mg on Mon, Wed, Fri; 5 mg all other days   Next INR check 10/10/2018    Indications   Atrial fibrillation (H) (Resolved) [I48.91]  Cerebral embolism with cerebral infarction (H) [I63.40]  Long term current use of anticoagulant therapy [Z79.01]         September 2018 Details    Sun Mon Tue Wed Thu Fri Sat           1                 2               3               4               5               6               7               8                 9               10               11               12               13               14               15                 16               17               18               19               20               21               22                 23               24               25               26               27      5 mg   See details      28      2.5 mg         29      5 mg           30      5 mg                Date Details   09/27 This INR check               How to take your warfarin dose     To take:  2.5 mg Take 1 of the 2.5 mg tablets.    To take:  5 mg Take 2 of the 2.5 mg tablets.           October 2018 Details    Sun Mon Tue Wed Thu Fri Sat      1      2.5 mg         2      5 mg         3      2.5 mg         4      5 mg         5      2.5 mg         6      5 mg           7      5 mg         8      2.5 mg         9      5 mg         10            11               12               13                 14               15               16               17               18               19               20                 21               22               23               24               25               26               27                  28               29               30               31                   Date Details   No additional details    Date of next INR:  10/10/2018         How to take your warfarin dose     To take:  2.5 mg Take 1 of the 2.5 mg tablets.    To take:  5 mg Take 2 of the 2.5 mg tablets.

## 2018-09-27 NOTE — PROGRESS NOTES
ANTICOAGULATION FOLLOW-UP CLINIC VISIT    Patient Name:  Shanika Stahl  Date:  9/27/2018  Contact Type:  chart update    SUBJECTIVE:     Patient Findings     Comments Per phone notes from Milena in pharmacy on 9-26-18:  INR:2.25  Goal INR:2-3  Reason for therapy: Afib  Current Warfarin dose:  2.5 mg MF, 5 mg all other days of the week this previous week  Plan: Continue 2.5 mg MF, 5 mg all other days of the week  Recheck: 2 weeks  Result and plan discussed with nurse Sydney from [facility]           OBJECTIVE    INR   Date Value Ref Range Status   09/26/2018 2.25  Final       ASSESSMENT / PLAN  INR assessment THER    Recheck INR In: 2 WEEKS    INR Location Outside lab praneeth     Anticoagulation Summary as of 9/27/2018     INR goal 2.0-3.0   Today's INR 2.25 (9/26/2018)   Warfarin maintenance plan 2.5 mg (2.5 mg x 1) on Mon, Wed, Fri; 5 mg (2.5 mg x 2) all other days   Full warfarin instructions 2.5 mg on Mon, Wed, Fri; 5 mg all other days   Weekly warfarin total 27.5 mg   No change documented Verenice Morillo, RN   Plan last modified Verenice Morillo, RN (5/25/2018)   Next INR check 10/10/2018   Priority INR   Target end date Indefinite    Indications   Atrial fibrillation (H) (Resolved) [I48.91]  Cerebral embolism with cerebral infarction (H) [I63.40]  Long term current use of anticoagulant therapy [Z79.01]         Anticoagulation Episode Summary     INR check location     Preferred lab     Send INR reminders to [facility] [facility] POOL    Comments Keep on low end of therapeutic range. Uses 2.5mg tablets. Resides at [facility] [phone]. Fax AVS to [phone]      Anticoagulation Care Providers     Provider Role Specialty Phone number    Milena Jaime,  [facility] Internal Medicine [phone]            See the Encounter Report to view Anticoagulation Flowsheet and Dosing Calendar (Go to Encounters tab in chart review, and find the Anticoagulation Therapy Visit)        Verenice  EUGENIA Morillo

## 2018-09-28 ENCOUNTER — MEDICAL CORRESPONDENCE (OUTPATIENT)
Dept: HEALTH INFORMATION MANAGEMENT | Facility: CLINIC | Age: 83
End: 2018-09-28

## 2018-09-28 ENCOUNTER — TELEPHONE (OUTPATIENT)
Dept: FAMILY MEDICINE | Facility: CLINIC | Age: 83
End: 2018-09-28

## 2018-09-28 NOTE — TELEPHONE ENCOUNTER
Reason for Call: Request for an order or referral:    Order or referral being requested: order    Date needed: as soon as possible    Has the patient been seen by the PCP for this problem? NO    Additional comments: Bveerly PT with [facility] calling asking for verbal orders for PT 2 x for 4 wks, 1 x for 2 wks and 1 month.    Phone number Patient can be reached at:  Other phone number:  Beverly is at [phone]     Best Time:  any    Can we leave a detailed message on this number?  YES    Call taken on 9/28/2018 at 4:01 PM by Alesha Montalvo

## 2018-10-02 ENCOUNTER — MEDICAL CORRESPONDENCE (OUTPATIENT)
Dept: HEALTH INFORMATION MANAGEMENT | Facility: CLINIC | Age: 83
End: 2018-10-02

## 2018-10-03 ENCOUNTER — TELEPHONE (OUTPATIENT)
Dept: FAMILY MEDICINE | Facility: CLINIC | Age: 83
End: 2018-10-03

## 2018-10-05 ENCOUNTER — ALLIED HEALTH/NURSE VISIT (OUTPATIENT)
Dept: FAMILY MEDICINE | Facility: CLINIC | Age: 83
End: 2018-10-05
Payer: MEDICARE

## 2018-10-05 DIAGNOSIS — Z23 NEED FOR PROPHYLACTIC VACCINATION AND INOCULATION AGAINST INFLUENZA: Primary | ICD-10-CM

## 2018-10-05 PROCEDURE — G0008 ADMIN INFLUENZA VIRUS VAC: HCPCS

## 2018-10-05 PROCEDURE — 90662 IIV NO PRSV INCREASED AG IM: CPT

## 2018-10-05 NOTE — MR AVS SNAPSHOT
After Visit Summary   10/5/2018    Shanika Stahl    MRN: [id number]           Patient Information     Date Of Birth          5/26/1924        Visit Information        Provider Department      10/5/2018 4:15 PM [facility] FLU [facility]OT [facility]IC [facility]        Today's Diagnoses     Need for prophylactic vaccination and inoculation against influenza    -  1       Follow-ups after your visit        Your next 10 appointments already scheduled     Nov 27, 2018  2:20 PM CST   Pacemaker Check with Wy Device Rn   [facility] ([facility])    [address] [address] [phone]              Who to contact     If you have questions or need follow up information about today's clinic visit or your schedule please contact [facility] directly at [phone].  Normal or non-critical lab and imaging results will be communicated to you by MyChart, letter or phone within 4 business days after the clinic has received the results. If you do not hear from us within 7 days, please contact the clinic through MyChart or phone. If you have a critical or abnormal lab result, we will notify you by phone as soon as possible.  Submit refill requests through [employer] or call your pharmacy and they will forward the refill request to us. Please allow 3 business days for your refill to be completed.          Additional Information About Your Visit        MyChart Information     [employer] gives you secure access to your electronic health record. If you see a primary care provider, you can also send messages to your care team and make appointments. If you have questions, please call your primary care clinic.  If you do not have a primary care provider, please call [phone] and they will assist you.        Care EveryWhere ID     This is your Care EveryWhere ID. This could be used by other organizations to access your [place] medical records  [id number]          Blood Pressure from Last 3 Encounters:   09/05/18 185/84   08/10/18 122/58   07/30/18 144/69    Weight from Last 3 Encounters:   08/10/18 81 lb (36.7 kg)   07/30/18 77 lb (34.9 kg)   07/24/18 76 lb 3.2 oz (34.6 kg)              We Performed the Following     FLU VACCINE, INCREASED ANTIGEN, PRESV FREE, AGE 65+ [68586]     Vaccine Administration, Initial [86328]        Primary Care Provider Office Phone # Fax #    Milena Jaime,  [phone] [address] [address]        Equal Access to Services     JAYCEE MOLINA : Jadyn Bolton, harsha ha, nicholas newellalmabaylee eugene, shaquille garcia. So [facility] [phone].    ATENCIÓN: Si habla español, tiene a holly disposición servicios gratuitos de asistencia lingüística. Llame al [phone].    We comply with applicable federal civil rights laws and [place] laws. We do not discriminate on the basis of race, color, national origin, age, disability, sex, sexual orientation, or gender identity.            Thank you!     Thank you for choosing [facility]  for your care. Our goal is always to provide you with excellent care. Hearing back from our patients is one way we can continue to improve our services. Please take a few minutes to complete the written survey that you may receive in the mail after your visit with us. Thank you!             Your Updated Medication List - Protect others around you: Learn how to safely use, store and throw away your medicines at www.disposemymeds.org.          This list is accurate as of 10/5/18 11:59 PM.  Always use your most recent med list.                   Brand Name Dispense Instructions for use Diagnosis    acetaminophen 325 MG tablet    TYLENOL    100 tablet    Take 2 tablets (650 mg) by mouth 3 times daily    Closed stable burst fracture of eleventh thoracic vertebra with routine healing       calcium + D 600-200 MG-UNIT Tabs   Generic drug:   calcium carbonate-vitamin D      1 tablet by mouth daily        cholecalciferol 1000 UNIT tablet    vitamin D3    30    1 TABLET DAILY        furosemide 20 MG tablet    LASIX    45 tablet    Take 1 tablet (20 mg) by mouth every other day    Leg swelling       loratadine 10 MG tablet    CLARITIN    90 tablet    TAKE 1 TAB BY MOUTH ONCE DAILY FOR ALLERGIES    Chronic rhinitis, unspecified type       * metoprolol succinate 25 MG 24 hr tablet    TOPROL-XL    180 tablet    TAKE 1 TAB BY MOUTH TWICE A DAY    Renal hypertension, stage 1-4 or unspecified chronic kidney disease       * metoprolol succinate 25 MG 24 hr tablet    TOPROL-XL    60 tablet    TAKE 1 TAB BY MOUTH TWICE A DAY    Renal hypertension, stage 1-4 or unspecified chronic kidney disease       naproxen 250 MG tablet    NAPROSYN     Take 1 tablet (250 mg) by mouth every 12 hours as needed for moderate pain , take with meals.    Closed stable burst fracture of eleventh thoracic vertebra with routine healing       order for DME     1 each    Knee high compression stockings 10-15 mm Hg    Leg swelling       polyethylene glycol Packet    MIRALAX/GLYCOLAX     Take 17 g by mouth daily as needed for constipation        senna-docusate 8.6-50 MG per tablet    SENOKOT-S;PERICOLACE    30 tablet    Take 1 tablet by mouth At Bedtime , stop taking if loose stools develop.    Slow transit constipation       * simvastatin 20 MG tablet    ZOCOR    90 tablet    TAKE 1 TAB BY MOUTH AT BEDTIME    Hyperlipidemia LDL goal <100       * simvastatin 20 MG tablet    ZOCOR    30 tablet    TAKE 1 TAB BY MOUTH AT BEDTIME    Hyperlipidemia LDL goal <100       * Notice:  This list has 4 medication(s) that are the same as other medications prescribed for you. Read the directions carefully, and ask your doctor or other care provider to review them with you.

## 2018-10-05 NOTE — PROGRESS NOTES

## 2018-10-10 ENCOUNTER — TRANSFERRED RECORDS (OUTPATIENT)
Dept: HEALTH INFORMATION MANAGEMENT | Facility: CLINIC | Age: 83
End: 2018-10-10

## 2018-10-11 ENCOUNTER — RECORDS - HEALTHEAST (OUTPATIENT)
Dept: LAB | Facility: CLINIC | Age: 83
End: 2018-10-11

## 2018-10-11 ENCOUNTER — TELEPHONE (OUTPATIENT)
Dept: PHARMACY | Facility: CLINIC | Age: 83
End: 2018-10-11

## 2018-10-11 LAB
INR PPP: 2.63
INR PPP: 2.63 (ref 0.9–1.1)

## 2018-10-11 NOTE — TELEPHONE ENCOUNTER
INR 2.6. This was faxed to [facility] earlier today, they did not receive a call back. 2.6 within goal range for patient. Current dose of warfarin 2.5mg MF, 5mg rest of week. Advised to continue this dose for tonight and call [facility] in the AM for futher orders.

## 2018-10-12 ENCOUNTER — RECORDS - HEALTHEAST (OUTPATIENT)
Dept: LAB | Facility: CLINIC | Age: 83
End: 2018-10-12

## 2018-10-12 ENCOUNTER — ANTICOAGULATION THERAPY VISIT (OUTPATIENT)
Dept: ANTICOAGULATION | Facility: CLINIC | Age: 83
End: 2018-10-12
Payer: MEDICARE

## 2018-10-12 ENCOUNTER — TRANSFERRED RECORDS (OUTPATIENT)
Dept: HEALTH INFORMATION MANAGEMENT | Facility: CLINIC | Age: 83
End: 2018-10-12

## 2018-10-12 DIAGNOSIS — I48.20 CHRONIC ATRIAL FIBRILLATION (H): Chronic | ICD-10-CM

## 2018-10-12 DIAGNOSIS — Z79.01 LONG TERM CURRENT USE OF ANTICOAGULANT THERAPY: ICD-10-CM

## 2018-10-12 DIAGNOSIS — I63.40 CEREBRAL EMBOLISM WITH CEREBRAL INFARCTION (H): ICD-10-CM

## 2018-10-12 LAB
ALP SERPL-CCNC: 156 U/L (ref 45–120)
CALCIUM SERPL-MCNC: 10.1 MG/DL (ref 8.5–10.5)
PTH-INTACT SERPL-MCNC: 50 PG/ML (ref 10–86)

## 2018-10-12 PROCEDURE — 99207 ZZC NO CHARGE NURSE ONLY: CPT

## 2018-10-12 RX ORDER — WARFARIN SODIUM 2.5 MG/1
TABLET ORAL
Qty: 150 TABLET | Refills: 0 | COMMUNITY
Start: 2018-10-12 | End: 2018-10-18

## 2018-10-12 NOTE — PROGRESS NOTES
Addendum: Writer spoke with Verenice at [facility] and per pharmacy directions on 9-26, pt has been taking 2.5 mg MF; 5 mg ROW. She is in range at that dose so will continue this. Per Demetria Josue [facility] notes on 10-11-18, pt is on this dose as well. Writer will fax AVS to living facility.    Verenice Morillo RN, BSN, PHN  10-12-18 at 10:35 am    ANTICOAGULATION FOLLOW-UP CLINIC VISIT    Patient Name:  Shanika Stahl  Date:  10/12/2018  Contact Type:  Fax to [facility]    SUBJECTIVE:     Patient Findings     Positives No Problem Findings    Comments Patient will continue weekly maintenance dose. INR is therapeutic.              OBJECTIVE    INR   Date Value Ref Range Status   10/11/2018 2.6  Final       ASSESSMENT / PLAN  INR assessment THER    Recheck INR In: 2 WEEKS      Anticoagulation Summary as of 10/12/2018     INR goal 2.0-3.0   Today's INR 2.6 (10/11/2018)   Warfarin maintenance plan 2.5 mg (2.5 mg x 1) on Mon, Wed, Fri; 5 mg (2.5 mg x 2) all other days   Full warfarin instructions 2.5 mg on Mon, Wed, Fri; 5 mg all other days   Weekly warfarin total 27.5 mg   Plan last modified Verenice Morillo RN (5/25/2018)   Next INR check 10/25/2018   Priority INR   Target end date Indefinite    Indications   Atrial fibrillation (H) (Resolved) [I48.91]  Cerebral embolism with cerebral infarction (H) [I63.40]  Long term current use of anticoagulant therapy [Z79.01]         Anticoagulation Episode Summary     INR check location     Preferred lab     Send INR reminders to [facility]    Comments Keep on low end of therapeutic range. Uses 2.5mg tablets. Resides at [facility] - [phone]. Fax AVS to [phone]      Anticoagulation Care Providers     Provider Role Specialty Phone number    Milena Jaime,  [facility] Internal Medicine [phone]            See the Encounter Report to view Anticoagulation Flowsheet and Dosing Calendar (Go to Encounters tab in chart review, and find the  Anticoagulation Therapy Visit)        Dre Phillips RN

## 2018-10-12 NOTE — LETTER
Shanika EMILY Stahl   10/12/2018   Anticoagulation Therapy Visit    Description: 89 year old female   Provider: Dre Phillips, RN   Department: [facility]         INR as of 10/12/2018      Today's INR        Anticoagulation Summary as of 10/12/2018      INR goal 2.0-3.0   Today's INR    Full warfarin instructions 2.5 mg on Mon, Fri; 5 mg all other days   Next INR check 10/25/2018    Indications   Atrial fibrillation (H) (Resolved) [I48.91]   Cerebral embolism with cerebral infarction (H) [I63.40]   Long term current use of anticoagulant therapy [Z79.01]          Description      Recheck INR on 10-25-18. Pt to take 5 mg on 10-25-18 if INR is not back during [facility] hours.        Contact Numbers      Please call [phone] with any problems or questions regarding your therapy.     If you need to cancel and/or reschedule your appointment please call one of the following numbers:  [facility] [phone]  [place] - [phone]  [facility] [phone]  [facility] [phone]  [place] - [phone]             October 2018 Details    Sun Mon Tue Wed Thu Fri Sat      1                   2                   3                   4                   5                   6                     7                   8                   9                   10                   11                   12       2.5 mg   See details      13       5 mg             14       5 mg           15       2.5 mg           16       5 mg           17       5 mg           18       5 mg           19       2.5 mg           20       5 mg             21       5 mg           22       2.5 mg           23       5 mg           24       5 mg           25               26                   27                     28                   29                   30                   31                       Date Details   10/12 This INR check       Date of next INR: 10/25/2018          How to take your warfarin dose      To take: 2.5 mg Take 1 of the 2.5 mg  tablets.     To take: 5 mg Take 2 of the 2.5 mg tablets.

## 2018-10-12 NOTE — MR AVS SNAPSHOT
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      October 2018 Details    Sun Mon Tue Wed Thu Fri Sat      1               2               3               4               5               6                 7               8               9               10               11               12      2.5 mg   See details      13      5 mg           14      5 mg         15      2.5 mg         16      5 mg         17      2.5 mg         18      5 mg         19      2.5 mg         20      5 mg           21      5 mg         22      2.5 mg         23      5 mg         24      2.5 mg         25            26               27                 28               29               30               31                   Date Details   10/12 This INR check       Date of next INR:  10/25/2018         How to take your warfarin dose     To take:  2.5 mg Take 1 of the 2.5 mg tablets.    To take:  5 mg Take 2 of the 2.5 mg tablets.

## 2018-10-15 ENCOUNTER — MYC MEDICAL ADVICE (OUTPATIENT)
Dept: FAMILY MEDICINE | Facility: CLINIC | Age: 83
End: 2018-10-15

## 2018-10-15 DIAGNOSIS — S22.081D CLOSED STABLE BURST FRACTURE OF ELEVENTH THORACIC VERTEBRA WITH ROUTINE HEALING: ICD-10-CM

## 2018-10-15 LAB — 25(OH)D3 SERPL-MCNC: 60.6 NG/ML (ref 30–80)

## 2018-10-15 NOTE — TELEPHONE ENCOUNTER
Dr Jaime, please see her son's mychart note/ request for Naproxen to be a regular order.     Looks like it was last ordered for her by Milena MCKEON  in July- when she was in transitional care.     Joanna Garcia RNC

## 2018-10-16 ENCOUNTER — MYC MEDICAL ADVICE (OUTPATIENT)
Dept: FAMILY MEDICINE | Facility: CLINIC | Age: 83
End: 2018-10-16

## 2018-10-16 RX ORDER — NAPROXEN 250 MG/1
250 TABLET ORAL 2 TIMES DAILY WITH MEALS
Qty: 60 TABLET | Refills: 11 | OUTPATIENT
Start: 2018-10-16

## 2018-10-16 NOTE — TELEPHONE ENCOUNTER
The naproxen and the warfarin have a drug interaction, I am not sure if the orthopedic provider or other providers have been aware of this.  Taking both increases the risk of bleeding.

## 2018-10-18 DIAGNOSIS — I48.20 CHRONIC ATRIAL FIBRILLATION (H): Chronic | ICD-10-CM

## 2018-10-18 RX ORDER — WARFARIN SODIUM 2.5 MG/1
TABLET ORAL
Qty: 150 TABLET | Refills: 0 | Status: SHIPPED | OUTPATIENT
Start: 2018-10-18 | End: 2019-01-01

## 2018-10-21 ENCOUNTER — MEDICAL CORRESPONDENCE (OUTPATIENT)
Dept: HEALTH INFORMATION MANAGEMENT | Facility: CLINIC | Age: 83
End: 2018-10-21

## 2018-10-22 ENCOUNTER — TELEPHONE (OUTPATIENT)
Dept: FAMILY MEDICINE | Facility: CLINIC | Age: 83
End: 2018-10-22

## 2018-10-22 NOTE — TELEPHONE ENCOUNTER
[facility]  Orders were signed, faxed and sent to scanning.  Beverly Vigil on 10/22/2018 at 8:36 AM

## 2018-10-25 ENCOUNTER — RECORDS - HEALTHEAST (OUTPATIENT)
Dept: LAB | Facility: CLINIC | Age: 83
End: 2018-10-25

## 2018-10-25 ENCOUNTER — TELEPHONE (OUTPATIENT)
Dept: ANTICOAGULATION | Facility: CLINIC | Age: 83
End: 2018-10-25
Payer: MEDICARE

## 2018-10-25 LAB — INR PPP: 2.22 (ref 0.9–1.1)

## 2018-10-25 NOTE — TELEPHONE ENCOUNTER
INR:2.22  Goal INR:2-3  Current Warfarin dose:  2.5 mg MF, 5 mg all other days of the week.  Plan: Continue current dose of 2.5 mg MF, 5 mg all other days of the week.  Recheck: Tuesday, November 13 as [facility] is open later on Tuesdays and labs come back in the later afternoon.    Result and plan discussed with Leonor patient's caregiver at [facility].    Milena Mack, Pharm.D.

## 2018-11-13 NOTE — PROGRESS NOTES
ANTICOAGULATION FOLLOW-UP CLINIC VISIT    Patient Name:  Shanika Stahl  Date:  11/13/2018  Contact Type:  Telephone/ [facility]    SUBJECTIVE:     Patient Findings     Positives No Problem Findings    Comments No changes in medications, activity, or diet noted. No bleeding or increased bruising noted. Took warfarin as prescribed.  Patient will continue weekly maintenance dose. INR is therapeutic.   Recheck in 4 weeks.            OBJECTIVE    INR   Date Value Ref Range Status   11/13/2018 2.4  Final       ASSESSMENT / PLAN  INR assessment THER    Recheck INR In: 4 WEEKS    INR Location [facility]      Anticoagulation Summary as of 11/13/2018     INR goal 2.0-3.0   Today's INR 2.4   Warfarin maintenance plan 2.5 mg (2.5 mg x 1) on Mon, Fri; 5 mg (2.5 mg x 2) all other days   Full warfarin instructions 2.5 mg on Mon, Fri; 5 mg all other days   Weekly warfarin total 30 mg   Plan last modified Verenice Morillo RN (10/12/2018)   Next INR check 12/11/2018   Priority INR   Target end date Indefinite    Indications   Atrial fibrillation (H) (Resolved) [I48.91]  Cerebral embolism with cerebral infarction (H) [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 Uses 2.5mg tablets. Resides at [facility] [phone]. Fax AVS to [phone]      Anticoagulation Care Providers     Provider Role Specialty Phone number    Milena Jaime,  [facility] Internal Medicine [phone]            See the Encounter Report to view Anticoagulation Flowsheet and Dosing Calendar (Go to Encounters tab in chart review, and find the Anticoagulation Therapy Visit)        Dre Phillips, RN

## 2018-11-13 NOTE — MR AVS SNAPSHOT
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]  Long term current use of anticoagulant therapy [Z79.01]         November 2018 Details    Sun Mon Tue Wed Thu Fri Sat         1               2               3                 4               5               6               7               8               9               10                 11               12               13      5 mg   See details      14      5 mg         15      5 mg         16      2.5 mg         17      5 mg           18      5 mg         19      2.5 mg         20      5 mg         21      5 mg         22      5 mg         23      2.5 mg         24      5 mg           25      5 mg         26      2.5 mg         27      5 mg         28      5 mg         29      5 mg         30      2.5 mg           Date Details   11/13 This INR check               How to take your warfarin dose     To take:  2.5 mg Take 1 of the 2.5 mg tablets.    To take:  5 mg Take 2 of the 2.5 mg tablets.           December 2018 Details    Sun Mon Tue Wed Thu Fri Sat           1      5 mg           2      5 mg         3      2.5 mg         4      5 mg         5      5 mg         6      5 mg         7      2.5 mg         8      5 mg           9      5 mg         10      2.5 mg         11            12               13               14               15                 16               17               18               19               20               21               22                 23               24                25               26               27               28               29                 30               31                     Date Details   No additional details    Date of next INR:  12/11/2018         How to take your warfarin dose     To take:  2.5 mg Take 1 of the 2.5 mg tablets.    To take:  5 mg Take 2 of the 2.5 mg tablets.

## 2018-11-16 NOTE — TELEPHONE ENCOUNTER
Form from [facility] from Agency Summary. Orders were signed, faxed and sent to scanning.    [facility]

## 2018-11-27 NOTE — PROGRESS NOTES
Med[employer]onic Pacemaker Device Check/[place]  59VP:  Mode: VVIR        Underlying Rhythm: AF/ variable/controlled VR  Heart Rate: Histogram is stable and adequate  Sensing: Stable    Pacing Threshold: Stable   Impedance: WNL  Battery Status: 3-5 years estimated longevity  Device Site: No issues   Atrial Arrhythmia: permanent AF/ does not take OAC  Ventricular Arrhythmia: 1 NST, 188 bpm, 4 seconds. Tracing suggests NSVT  Setting Change: NONE    Care Plan: Remote in 3 months (3-13-18) l

## 2018-12-07 NOTE — MR AVS SNAPSHOT
After Visit Summary   12/7/2018    Shanika Stahl    MRN: [id number]           Patient Information     Date Of Birth          5/26/1924        Visit Information        Provider Department      12/7/2018 2:20 PM Rohith Landin MD [facility]        Care Instructions      Incision & Drainage    Your healthcare provider has drained the cyst. This should heal in the next few days.      Home care  These tips can help your wound heal:    The wound may drain for the first 2 days. Cover the wound with a clean dry dressing. Change the dressing if it becomes soaked with blood or pus.    You may use acetaminophen or ibuprofen to control pain, unless another pain medicine was prescribed. If you have liver disease or ever had a stomach ulcer, talk with your doctor before using these medicines.    When to seek medical advice  Call your healthcare provider right away if any of these occur:    Increasing redness or swelling    Red streaks in the skin leading away from the wound    Increasing local pain or swelling    Continued pus draining from the wound 2 days after treatment    Fever of 100.4 F (38 C) or higher, or as directed by your healthcare provider    Boil returns when you are at home  Date Last Reviewed: 9/1/2016 2000-2018 [employer]. [address] [place] [address]. All rights reserved. This information is not intended as a substitute for professional medical care. Always follow your healthcare professional's instructions.                Follow-ups after your visit        Follow-up notes from your care team     Return in about 2 weeks (around 12/21/2018) for BP Recheck.      Your next 10 appointments already scheduled     Feb 19, 2019  3:00 PM CST   Return Visit with Ashley Perez PA-C   [facility] ([facility])    [address] [place] [id number]   [phone]            Mar 13, 2019 12:00 AM CDT    CARDIAC DEVICE CHECK - REMOTE with LOVE TECH1   [facility] ([facility])    [address]  [place] [address] [phone]              Who to contact     If you have questions or need follow up information about today's clinic visit or your schedule please contact [facility] directly at [phone].  Normal or non-critical lab and imaging results will be communicated to you by [employer], letter or phone within 4 business days after the clinic has received the results. If you do not hear from us within 7 days, please contact the clinic through [employer] or phone. If you have a critical or abnormal lab result, we will notify you by phone as soon as possible.  Submit refill requests through [employer] or call your pharmacy and they will forward the refill request to us. Please allow 3 business days for your refill to be completed.          Additional Information About Your Visit        [email or web address] Information     [employer] gives you secure access to your electronic health record. If you see a primary care provider, you can also send messages to your care team and make appointments. If you have questions, please call your primary care clinic.  If you do not have a primary care provider, please call [phone] and they will assist you.        Care EveryWhere ID     This is your Care EveryWhere ID. This could be used by other organizations to access your [place] medical records  [id number]        Your Vitals Were     Pulse Temperature Respirations Pulse Oximetry BMI (Body Mass Index)       68 98.7  F (37.1  C) (Tympanic) 14 98% 15.99 kg/m2        Blood Pressure from Last 3 Encounters:   12/07/18 174/68   09/05/18 185/84   08/10/18 122/58    Weight from Last 3 Encounters:   12/07/18 84 lb 9.6 oz (38.4 kg)   08/10/18 81 lb (36.7 kg)   07/30/18 77 lb (34.9 kg)              Today, you had the following     No orders found for display       Primary Care  Provider Office Phone # Fax #    Milena Jaime [phone] [address] [address]        Equal Access to Services     JAYCEE MOLINA : Hadii aad ku hadgustabo Bolton, waaxda luqadaha, qaybta kaalmada valorie, shaquille iglesias mimilety garcia carlin garcia. So [facility] [phone].    ATENCIÓN: Si habla español, tiene a holly disposición servicios gratuitos de asistencia lingüística. Llame al [phone].    We comply with applicable federal civil rights laws and [place] laws. We do not discriminate on the basis of race, color, national origin, age, disability, sex, sexual orientation, or gender identity.            Thank you!     Thank you for choosing [facility]  for your care. Our goal is always to provide you with excellent care. Hearing back from our patients is one way we can continue to improve our services. Please take a few minutes to complete the written survey that you may receive in the mail after your visit with us. Thank you!             Your Updated Medication List - Protect others around you: Learn how to safely use, store and throw away your medicines at www.disposemymeds.org.          This list is accurate as of 12/7/18  3:08 PM.  Always use your most recent med list.                   Brand Name Dispense Instructions for use Diagnosis    acetaminophen 325 MG tablet    TYLENOL    100 tablet    Take 2 tablets (650 mg) by mouth 3 times daily    Closed stable burst fracture of eleventh thoracic vertebra with routine healing       calcium + D 600-200 MG-UNIT Tabs   Generic drug:  calcium carbonate-vitamin D      1 tablet by mouth daily        furosemide 20 MG tablet    LASIX    45 tablet    Take 1 tablet (20 mg) by mouth every other day    Leg swelling       loratadine 10 MG tablet    CLARITIN    90 tablet    TAKE 1 TAB BY MOUTH ONCE DAILY FOR ALLERGIES    Chronic rhinitis, unspecified type       * metoprolol succinate ER 25 MG 24 hr tablet    TOPROL-XL     180 tablet    TAKE 1 TAB BY MOUTH TWICE A DAY    Renal hypertension, stage 1-4 or unspecified chronic kidney disease       * metoprolol succinate ER 25 MG 24 hr tablet    TOPROL-XL    60 tablet    TAKE 1 TAB BY MOUTH TWICE A DAY    Renal hypertension, stage 1-4 or unspecified chronic kidney disease       naproxen 250 MG tablet    NAPROSYN     Take 1 tablet (250 mg) by mouth every 12 hours as needed for moderate pain , take with meals.    Closed stable burst fracture of eleventh thoracic vertebra with routine healing       order for DME     1 each    Knee high compression stockings 10-15 mm Hg    Leg swelling       polyethylene glycol packet    MIRALAX/GLYCOLAX     Take 17 g by mouth daily as needed for constipation        senna-docusate 8.6-50 MG tablet    SENOKOT-S/PERICOLACE    30 tablet    Take 1 tablet by mouth At Bedtime , stop taking if loose stools develop.    Slow transit constipation       * simvastatin 20 MG tablet    ZOCOR    90 tablet    TAKE 1 TAB BY MOUTH AT BEDTIME    Hyperlipidemia LDL goal <100       * simvastatin 20 MG tablet    ZOCOR    30 tablet    TAKE 1 TAB BY MOUTH AT BEDTIME    Hyperlipidemia LDL goal <100       vitamin D3 1000 units (25 mcg) tablet    CHOLECALCIFEROL    30    1 TABLET DAILY        * warfarin 2.5 MG tablet    JANTOVEN    150 tablet    2.5 mg (2.5 mg x 1) on Mon, Fri; 5 mg (2.5 mg x 2) all other days or As directed by Anticoagulation Clinic    Chronic atrial fibrillation (H)       * JANTOVEN 5 MG tablet   Generic drug:  warfarin     30 tablet    2.5 mg (2.5 mg x 1) on Mon, Fri; 5 mg (2.5 mg x 2) all other days or As directed by Anticoagulation clinic    Long term current use of anticoagulant therapy       * Notice:  This list has 6 medication(s) that are the same as other medications prescribed for you. Read the directions carefully, and ask your doctor or other care provider to review them with you.

## 2018-12-07 NOTE — PATIENT INSTRUCTIONS
Incision & Drainage    Your healthcare provider has drained the cyst. This should heal in the next few days.      Home care  These tips can help your wound heal:    The wound may drain for the first 2 days. Cover the wound with a clean dry dressing. Change the dressing if it becomes soaked with blood or pus.    You may use acetaminophen or ibuprofen to control pain, unless another pain medicine was prescribed. If you have liver disease or ever had a stomach ulcer, talk with your doctor before using these medicines.    When to seek medical advice  Call your healthcare provider right away if any of these occur:    Increasing redness or swelling    Red streaks in the skin leading away from the wound    Increasing local pain or swelling    Continued pus draining from the wound 2 days after treatment    Fever of 100.4 F (38 C) or higher, or as directed by your healthcare provider    Boil returns when you are at home  Date Last Reviewed: 9/1/2016 2000-2018 [employer]. [address] [place] [address], [place] [address]. All rights reserved. This information is not intended as a substitute for professional medical care. Always follow your healthcare professional's instructions.

## 2018-12-07 NOTE — PROGRESS NOTES
SUBJECTIVE:   Shanika Stahl is a 89 year old female who presents to clinic today for the following health issues:    Chief Complaint   Patient presents with     Derm Problem     x 20 years, lump on head, seems to be getting bigger.      Letter Request     patient family would like 2 copies of AVS       Lump on forehead       Duration: x 20 years or so, seems to be getting bigger    Description (location/character/radiation): lump on forehead         Problem list and histories reviewed & adjusted, as indicated.  Additional history: as documented    Patient Active Problem List   Diagnosis     Renal hypertension     Atherosclerosis of renal artery (H)     Paroxysmal supraventricular tachycardia (H)     Other osteoporosis     Abnormal CXR (chest x-ray)     Family history of breast cancer     Hyperlipidemia LDL goal <100     Advance Care Planning     Syncope     Cerebral embolism with cerebral infarction (H)     Health Care Home     Long term current use of anticoagulant therapy     Chronic atrial fibrillation (H)     Pacemaker     Sensorineural hearing loss, bilateral     Pulmonary hypertension (H)     Closed stable burst fracture of eleventh thoracic vertebra with routine healing     Diarrhea     Oropharyngeal dysphagia     Hyponatremia     Asymptomatic bacteriuria     Vaginal bleeding     Prolonged INR     Generalized weakness     Past Surgical History:   Procedure Laterality Date     EYE SURGERY  2/21/12    left eye cataract     IMPLANT PACEMAKER       SURGICAL HISTORY OF -   1965    FEMORAL HERNIA REPAIR       Social History   Substance Use Topics     Smoking status: Never Smoker     Smokeless tobacco: Never Used     Alcohol use No     Family History   Problem Relation Age of Onset     Cerebrovascular Disease Mother      Diabetes Mother      Breast Cancer Sister      Heart Disease Brother      Cerebrovascular Disease Brother      Heart Disease Brother      Prostate Cancer Brother      Heart Disease Brother       Heart Disease Sister      Gynecology Daughter      Cancer Son      Lung         Current Outpatient Prescriptions   Medication Sig Dispense Refill     acetaminophen (TYLENOL) 325 MG tablet Take 2 tablets (650 mg) by mouth 3 times daily 100 tablet 0     CALCIUM + D 600-200 MG-UNIT PO TABS 1 tablet by mouth daily       furosemide (LASIX) 20 MG tablet Take 1 tablet (20 mg) by mouth every other day 45 tablet 3     loratadine (CLARITIN) 10 MG tablet TAKE 1 TAB BY MOUTH ONCE DAILY FOR ALLERGIES 90 tablet 3     metoprolol succinate (TOPROL-XL) 25 MG 24 hr tablet TAKE 1 TAB BY MOUTH TWICE A DAY 60 tablet 0     polyethylene glycol (MIRALAX/GLYCOLAX) Packet Take 17 g by mouth daily as needed for constipation       senna-docusate (SENOKOT-S;PERICOLACE) 8.6-50 MG per tablet Take 1 tablet by mouth At Bedtime , stop taking if loose stools develop. 30 tablet      simvastatin (ZOCOR) 20 MG tablet TAKE 1 TAB BY MOUTH AT BEDTIME 30 tablet 0     VITAMIN D 1000 UNIT OR TABS 1 TABLET DAILY 30 0     warfarin (JANTOVEN) 2.5 MG tablet 2.5 mg (2.5 mg x 1) on Mon, Fri; 5 mg (2.5 mg x 2) all other days or As directed by Anticoagulation Clinic 150 tablet 0     warfarin (JANTOVEN) 5 MG tablet 2.5 mg (2.5 mg x 1) on Mon, Fri; 5 mg (2.5 mg x 2) all other days or As directed by Anticoagulation clinic 30 tablet 0     metoprolol succinate (TOPROL-XL) 25 MG 24 hr tablet TAKE 1 TAB BY MOUTH TWICE A  tablet 3     naproxen (NAPROSYN) 250 MG tablet Take 1 tablet (250 mg) by mouth every 12 hours as needed for moderate pain , take with meals.       order for DME Knee high compression stockings 10-15 mm Hg 1 each 1     simvastatin (ZOCOR) 20 MG tablet TAKE 1 TAB BY MOUTH AT BEDTIME 90 tablet 3     Allergies   Allergen Reactions     Allopurinol      Ampicillin Diarrhea     Cipro [Ciprofloxacin] Nausea and Vomiting     Levaquin GI Disturbance     Lisinopril Swelling     AngioEdema     Paxil [Paroxetine] Nausea and Vomiting     Penicillins Diarrhea      Sulfa Drugs GI Disturbance     Zithromax [Macrolides] Nausea and Vomiting     Zoloft Nausea and Vomiting       Reviewed and updated as needed this visit by clinical staff  Tobacco  Allergies  Meds  Med Hx  Surg Hx  Fam Hx  Soc Hx      Reviewed and updated as needed this visit by Provider         ROS:  Constitutional, derm systems are negative, except as otherwise noted.    OBJECTIVE:     /68 (BP Location: Right arm, Patient Position: Chair, Cuff Size: Child)  Pulse 68  Temp 98.7  F (37.1  C) (Tympanic)  Resp 14  Wt 84 lb 9.6 oz (38.4 kg)  SpO2 98%  BMI 15.99 kg/m2  Body mass index is 15.99 kg/(m^2).  GENERAL: healthy, alert and no distress  SKIN: 1 cm raised cyst present on the forehead slightly to right of midline      ASSESSMENT/PLAN:       1. Epidermoid cyst of face    Shanika was advised that the lesion on her forehead was most consistent with a cyst and did not appear malignant.  We discussed that it would certainly be fine to leave this alone or we could attempt drainage, which does have a chance that the cyst could recur, or excision, which would be more definitive for removal.  She decided to attempt drainage.    Risks and benefits of procedure were discussed, and after obtaining verbal consent, incision and drainage was performed.  The area was cleaned with iodine and numbed with 1% lidocaine with epi.  Scapel was used to make a 4mm incision over the area and a moderate amount of keratinous material was expressed.  Bandage was placed over the incision with instructions to change bandage daily until healed and avoid soaking in water.    - Puncture (I&D)Drainage of Lesion/Cyst  [44922]    Advised her to call if the lesion recurs and she would like to have excision performed.  Would likely refer to dermatology.    Her blood pressure was quite elevated today.  Her son reports that he tends to get very high when she comes into the office.  Recommended that she return in 2 weeks for nurse blood  pressure check.    Chart documentation was done using Dragon dictation software. Although reviewed after completion, some errors may remain.     Rohith Landin MD  [facility]

## 2018-12-08 NOTE — PROGRESS NOTES
Subjective: Shaniak Stahl a 89 year old female who presents today for lesion removal. The lesion(s) is/are located on the {location:070121}, number {NUMBERS 1-20:616234} and measures {NUMBERS 1-20:429835}cm She The patient reports the lesion is {symptoms:951314} and denies other significant symptoms on ROS. Medications reviewed.    Pause for the cause has been completed prior to the prceedure.   1. Shanika was identified by both name and date of birth   2. The correct site was identified   3. Site was marked by provider    4. Written informed consent correct and signed or verbal authorization  to proceed was obtained   5. Verifed necessary supplies, equipment, and diagnostics are available    6. Time out was performed immediately prior to procedure    Objective: The lesion(s) is/are of the above mentioned size and location and is/are {description:571538}. The area was prepped and appropriately anesthetized. Using the usual technique, {JW PROCEDURE:776495} was performed. An appropriate dressing was applied. The procedure was well tolerated and without complications.     Assessment: {LESION DX:549592}    Plan: Follow up: {FOLLOW UP:52}. Wound care instructions provided.  Patient was instructed to be alert for any signs of cutaneous infection.

## 2018-12-11 NOTE — PROGRESS NOTES
ANTICOAGULATION FOLLOW-UP CLINIC VISIT    Patient Name:  Shanika Stahl  Date:  2018  Contact Type:  Telephone/ [address]    SUBJECTIVE:     Patient Findings     Positives:   No Problem Findings    Comments:   No changes in medications, activity, or diet noted. No bleeding or increased bruising noted. Took warfarin as prescribed.  Patient will continue weekly maintenance dose. INR is therapeutic.   Recheck in 4 weeks.              OBJECTIVE    INR   Date Value Ref Range Status   2018 2.39  Final       ASSESSMENT / PLAN  INR assessment THER    Recheck INR In: 4 WEEKS    INR Location [facility]      Anticoagulation Summary  As of 2018    INR goal:   2.0-3.0   TTR:   84.0 % (3.7 y)   INR used for dosin.39 (2018)   Warfarin maintenance plan:   2.5 mg (2.5 mg x 1) every Mon, Fri; 5 mg (2.5 mg x 2) all other days   Full warfarin instructions:   2.5 mg every Mon, Fri; 5 mg all other days   Weekly warfarin total:   30 mg   No change documented:   Dre Phillips RN   Plan last modified:   Verenice Morillo RN (10/12/2018)   Next INR check:   2019   Priority:   INR   Target end date:   Indefinite    Indications    Atrial fibrillation (H) (Resolved) [I48.91]  Cerebral embolism with cerebral infarction (H) [I63.40]  Long term current use of anticoagulant therapy [Z79.01]             Anticoagulation Episode Summary     INR check location:       Preferred lab:       Send INR reminders to:   [facility] POOL    Comments:   Keep on low end of therapeutic range. Uses 2.5mg tablets. Resides at [facility] [phone]. Fax AVS to [phone]      Anticoagulation Care Providers     Provider Role Specialty Phone number    Milena Jaime,  [facility] Internal Medicine [phone]            See the Encounter Report to view Anticoagulation Flowsheet and Dosing Calendar (Go to Encounters tab in chart review, and find the Anticoagulation Therapy  Visit)        Dre Phillips RN

## 2019-01-01 ENCOUNTER — TELEPHONE (OUTPATIENT)
Dept: ANTICOAGULATION | Facility: CLINIC | Age: 84
End: 2019-01-01

## 2019-01-01 ENCOUNTER — ANTICOAGULATION THERAPY VISIT (OUTPATIENT)
Dept: ANTICOAGULATION | Facility: CLINIC | Age: 84
End: 2019-01-01

## 2019-01-01 ENCOUNTER — ANTICOAGULATION THERAPY VISIT (OUTPATIENT)
Dept: ANTICOAGULATION | Facility: CLINIC | Age: 84
End: 2019-01-01
Payer: MEDICARE

## 2019-01-01 ENCOUNTER — PATIENT OUTREACH (OUTPATIENT)
Dept: CARE COORDINATION | Facility: CLINIC | Age: 84
End: 2019-01-01

## 2019-01-01 ENCOUNTER — MEDICAL CORRESPONDENCE (OUTPATIENT)
Dept: HEALTH INFORMATION MANAGEMENT | Facility: CLINIC | Age: 84
End: 2019-01-01

## 2019-01-01 ENCOUNTER — TELEPHONE (OUTPATIENT)
Dept: INTERNAL MEDICINE | Facility: CLINIC | Age: 84
End: 2019-01-01

## 2019-01-01 ENCOUNTER — APPOINTMENT (OUTPATIENT)
Dept: CT IMAGING | Facility: CLINIC | Age: 84
End: 2019-01-01
Payer: MEDICARE

## 2019-01-01 ENCOUNTER — ALLIED HEALTH/NURSE VISIT (OUTPATIENT)
Dept: AUDIOLOGY | Facility: CLINIC | Age: 84
End: 2019-01-01
Payer: MEDICARE

## 2019-01-01 ENCOUNTER — AMBULATORY - HEALTHEAST (OUTPATIENT)
Dept: GERIATRICS | Facility: CLINIC | Age: 84
End: 2019-01-01

## 2019-01-01 ENCOUNTER — APPOINTMENT (OUTPATIENT)
Dept: PHYSICAL THERAPY | Facility: CLINIC | Age: 84
DRG: 543 | End: 2019-01-01
Payer: MEDICARE

## 2019-01-01 ENCOUNTER — TELEPHONE (OUTPATIENT)
Dept: FAMILY MEDICINE | Facility: CLINIC | Age: 84
End: 2019-01-01

## 2019-01-01 ENCOUNTER — RECORDS - HEALTHEAST (OUTPATIENT)
Dept: LAB | Facility: CLINIC | Age: 84
End: 2019-01-01

## 2019-01-01 ENCOUNTER — APPOINTMENT (OUTPATIENT)
Dept: CT IMAGING | Facility: CLINIC | Age: 84
DRG: 542 | End: 2019-01-01
Attending: PHYSICIAN ASSISTANT
Payer: MEDICARE

## 2019-01-01 ENCOUNTER — APPOINTMENT (OUTPATIENT)
Dept: GENERAL RADIOLOGY | Facility: CLINIC | Age: 84
DRG: 542 | End: 2019-01-01
Attending: PHYSICIAN ASSISTANT
Payer: MEDICARE

## 2019-01-01 ENCOUNTER — AMBULATORY - HEALTHEAST (OUTPATIENT)
Dept: ADMINISTRATIVE | Facility: CLINIC | Age: 84
End: 2019-01-01

## 2019-01-01 ENCOUNTER — OFFICE VISIT (OUTPATIENT)
Dept: AUDIOLOGY | Facility: CLINIC | Age: 84
End: 2019-01-01
Payer: MEDICARE

## 2019-01-01 ENCOUNTER — OFFICE VISIT (OUTPATIENT)
Dept: CARDIOLOGY | Facility: CLINIC | Age: 84
End: 2019-01-01
Payer: MEDICARE

## 2019-01-01 ENCOUNTER — OFFICE VISIT - HEALTHEAST (OUTPATIENT)
Dept: GERIATRICS | Facility: CLINIC | Age: 84
End: 2019-01-01

## 2019-01-01 ENCOUNTER — APPOINTMENT (OUTPATIENT)
Dept: CT IMAGING | Facility: CLINIC | Age: 84
DRG: 543 | End: 2019-01-01
Attending: EMERGENCY MEDICINE
Payer: MEDICARE

## 2019-01-01 ENCOUNTER — TELEPHONE (OUTPATIENT)
Dept: PHARMACY | Facility: CLINIC | Age: 84
End: 2019-01-01

## 2019-01-01 ENCOUNTER — APPOINTMENT (OUTPATIENT)
Dept: PHYSICAL THERAPY | Facility: CLINIC | Age: 84
DRG: 542 | End: 2019-01-01
Payer: MEDICARE

## 2019-01-01 ENCOUNTER — TRANSFERRED RECORDS (OUTPATIENT)
Dept: HEALTH INFORMATION MANAGEMENT | Facility: CLINIC | Age: 84
End: 2019-01-01

## 2019-01-01 ENCOUNTER — HOSPITAL ENCOUNTER (INPATIENT)
Facility: CLINIC | Age: 84
LOS: 4 days | Discharge: NURSING, REHAB OR LONG-TERM CARE | DRG: 542 | End: 2019-09-10
Attending: PHYSICIAN ASSISTANT | Admitting: FAMILY MEDICINE
Payer: MEDICARE

## 2019-01-01 ENCOUNTER — NURSE TRIAGE (OUTPATIENT)
Dept: NURSING | Facility: CLINIC | Age: 84
End: 2019-01-01

## 2019-01-01 ENCOUNTER — OFFICE VISIT (OUTPATIENT)
Dept: FAMILY MEDICINE | Facility: CLINIC | Age: 84
End: 2019-01-01
Payer: MEDICARE

## 2019-01-01 ENCOUNTER — COMMUNICATION - HEALTHEAST (OUTPATIENT)
Dept: ANTICOAGULATION | Facility: CLINIC | Age: 84
End: 2019-01-01

## 2019-01-01 ENCOUNTER — HOSPITAL ENCOUNTER (OUTPATIENT)
Facility: CLINIC | Age: 84
Setting detail: OBSERVATION
Discharge: NURSING, REHAB OR LONG-TERM CARE | End: 2019-01-10
Attending: EMERGENCY MEDICINE
Payer: MEDICARE

## 2019-01-01 ENCOUNTER — HEALTH MAINTENANCE LETTER (OUTPATIENT)
Age: 84
End: 2019-01-01

## 2019-01-01 ENCOUNTER — APPOINTMENT (OUTPATIENT)
Dept: GENERAL RADIOLOGY | Facility: CLINIC | Age: 84
End: 2019-01-01
Payer: MEDICARE

## 2019-01-01 ENCOUNTER — ANCILLARY PROCEDURE (OUTPATIENT)
Dept: CARDIOLOGY | Facility: CLINIC | Age: 84
End: 2019-01-01
Attending: INTERNAL MEDICINE
Payer: MEDICARE

## 2019-01-01 ENCOUNTER — HOSPITAL ENCOUNTER (INPATIENT)
Facility: CLINIC | Age: 84
LOS: 1 days | Discharge: NURSING, REHAB OR LONG-TERM CARE | DRG: 543 | End: 2019-04-24
Attending: EMERGENCY MEDICINE | Admitting: INTERNAL MEDICINE
Payer: MEDICARE

## 2019-01-01 ENCOUNTER — APPOINTMENT (OUTPATIENT)
Dept: GENERAL RADIOLOGY | Facility: CLINIC | Age: 84
DRG: 543 | End: 2019-01-01
Attending: EMERGENCY MEDICINE
Payer: MEDICARE

## 2019-01-01 ENCOUNTER — NURSE TRIAGE (OUTPATIENT)
Dept: INTERNAL MEDICINE | Facility: CLINIC | Age: 84
End: 2019-01-01

## 2019-01-01 ENCOUNTER — APPOINTMENT (OUTPATIENT)
Dept: CT IMAGING | Facility: CLINIC | Age: 84
DRG: 543 | End: 2019-01-01
Attending: PHYSICIAN ASSISTANT
Payer: MEDICARE

## 2019-01-01 VITALS
WEIGHT: 80.3 LBS | OXYGEN SATURATION: 98 % | RESPIRATION RATE: 16 BRPM | HEART RATE: 70 BPM | DIASTOLIC BLOOD PRESSURE: 59 MMHG | SYSTOLIC BLOOD PRESSURE: 150 MMHG | TEMPERATURE: 97.8 F | HEIGHT: 64 IN | BODY MASS INDEX: 13.71 KG/M2

## 2019-01-01 VITALS
DIASTOLIC BLOOD PRESSURE: 60 MMHG | OXYGEN SATURATION: 98 % | WEIGHT: 85.2 LBS | SYSTOLIC BLOOD PRESSURE: 142 MMHG | HEART RATE: 86 BPM | BODY MASS INDEX: 14.62 KG/M2

## 2019-01-01 VITALS
TEMPERATURE: 97.8 F | BODY MASS INDEX: 14.37 KG/M2 | HEART RATE: 76 BPM | DIASTOLIC BLOOD PRESSURE: 76 MMHG | OXYGEN SATURATION: 97 % | SYSTOLIC BLOOD PRESSURE: 116 MMHG | WEIGHT: 84.2 LBS | HEIGHT: 64 IN

## 2019-01-01 VITALS
BODY MASS INDEX: 13.74 KG/M2 | HEIGHT: 64 IN | WEIGHT: 80.47 LBS | SYSTOLIC BLOOD PRESSURE: 159 MMHG | TEMPERATURE: 97.6 F | HEART RATE: 73 BPM | RESPIRATION RATE: 14 BRPM | DIASTOLIC BLOOD PRESSURE: 73 MMHG | OXYGEN SATURATION: 95 %

## 2019-01-01 VITALS
BODY MASS INDEX: 14.83 KG/M2 | SYSTOLIC BLOOD PRESSURE: 180 MMHG | HEIGHT: 64 IN | TEMPERATURE: 98 F | WEIGHT: 86.86 LBS | HEART RATE: 106 BPM | DIASTOLIC BLOOD PRESSURE: 94 MMHG | RESPIRATION RATE: 20 BRPM | OXYGEN SATURATION: 91 %

## 2019-01-01 VITALS
HEART RATE: 76 BPM | RESPIRATION RATE: 18 BRPM | BODY MASS INDEX: 14.61 KG/M2 | TEMPERATURE: 98.4 F | DIASTOLIC BLOOD PRESSURE: 60 MMHG | OXYGEN SATURATION: 94 % | SYSTOLIC BLOOD PRESSURE: 144 MMHG | WEIGHT: 85.1 LBS

## 2019-01-01 VITALS
RESPIRATION RATE: 12 BRPM | SYSTOLIC BLOOD PRESSURE: 116 MMHG | TEMPERATURE: 97.9 F | DIASTOLIC BLOOD PRESSURE: 72 MMHG | HEART RATE: 76 BPM | OXYGEN SATURATION: 96 %

## 2019-01-01 DIAGNOSIS — R53.81 PHYSICAL DEBILITY: ICD-10-CM

## 2019-01-01 DIAGNOSIS — I48.20 CHRONIC ATRIAL FIBRILLATION (H): Chronic | ICD-10-CM
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
## 2019-01-01 DIAGNOSIS — R11.0 NAUSEA: ICD-10-CM

## 2019-01-01 DIAGNOSIS — I63.40 CEREBRAL EMBOLISM WITH CEREBRAL INFARCTION (H): ICD-10-CM

## 2019-01-01 DIAGNOSIS — Z95.0 CARDIAC PACEMAKER IN SITU: ICD-10-CM

## 2019-01-01 DIAGNOSIS — I49.5 SSS (SICK SINUS SYNDROME) (H): ICD-10-CM

## 2019-01-01 DIAGNOSIS — S62.102D CLOSED FRACTURE OF BOTH WRISTS WITH ROUTINE HEALING, SUBSEQUENT ENCOUNTER: ICD-10-CM
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
## 2019-01-01 DIAGNOSIS — Z79.01 LONG TERM CURRENT USE OF ANTICOAGULANT THERAPY: ICD-10-CM

## 2019-01-01 DIAGNOSIS — I48.20 CHRONIC ATRIAL FIBRILLATION (H): ICD-10-CM

## 2019-01-01 DIAGNOSIS — S62.101A CLOSED FRACTURE OF BOTH WRISTS, INITIAL ENCOUNTER: ICD-10-CM

## 2019-01-01 DIAGNOSIS — R07.89 DISCOMFORT IN CHEST: ICD-10-CM

## 2019-01-01 DIAGNOSIS — S32.10XA FRACTURE OF SACRUM (H): Primary | ICD-10-CM

## 2019-01-01 DIAGNOSIS — S22.081D CLOSED STABLE BURST FRACTURE OF ELEVENTH THORACIC VERTEBRA WITH ROUTINE HEALING: ICD-10-CM

## 2019-01-01 DIAGNOSIS — I27.20 PULMONARY HYPERTENSION (H): ICD-10-CM

## 2019-01-01 DIAGNOSIS — Z76.89 HEALTH CARE HOME: ICD-10-CM

## 2019-01-01 DIAGNOSIS — I10 ESSENTIAL HYPERTENSION: Primary | ICD-10-CM

## 2019-01-01 DIAGNOSIS — J18.9 PNEUMONIA OF BOTH LOWER LOBES DUE TO INFECTIOUS ORGANISM: ICD-10-CM

## 2019-01-01 DIAGNOSIS — M54.9 BACK PAIN, UNSPECIFIED BACK LOCATION, UNSPECIFIED BACK PAIN LATERALITY, UNSPECIFIED CHRONICITY: ICD-10-CM

## 2019-01-01 DIAGNOSIS — H90.3 SENSORINEURAL HEARING LOSS, BILATERAL: Primary | ICD-10-CM

## 2019-01-01 DIAGNOSIS — R30.0 DYSURIA: Primary | ICD-10-CM

## 2019-01-01 DIAGNOSIS — M54.9 BACK PAIN: ICD-10-CM

## 2019-01-01 DIAGNOSIS — R06.02 SHORTNESS OF BREATH: ICD-10-CM

## 2019-01-01 DIAGNOSIS — S52.502A CLOSED FRACTURE OF DISTAL END OF LEFT RADIUS, UNSPECIFIED FRACTURE MORPHOLOGY, INITIAL ENCOUNTER: ICD-10-CM

## 2019-01-01 DIAGNOSIS — S62.101D CLOSED FRACTURE OF RIGHT WRIST WITH ROUTINE HEALING, SUBSEQUENT ENCOUNTER: ICD-10-CM

## 2019-01-01 DIAGNOSIS — J30.9 ALLERGIC RHINITIS, UNSPECIFIED SEASONALITY, UNSPECIFIED TRIGGER: Primary | ICD-10-CM

## 2019-01-01 DIAGNOSIS — I10 HYPERTENSION, UNSPECIFIED TYPE: ICD-10-CM

## 2019-01-01 DIAGNOSIS — R53.1 GENERALIZED WEAKNESS: ICD-10-CM

## 2019-01-01 DIAGNOSIS — R19.7 DIARRHEA, UNSPECIFIED TYPE: Primary | ICD-10-CM

## 2019-01-01 DIAGNOSIS — R14.0 ABDOMINAL DISTENTION: ICD-10-CM

## 2019-01-01 DIAGNOSIS — I63.10 CEREBRAL INFARCTION DUE TO EMBOLISM OF PRECEREBRAL ARTERY (H): ICD-10-CM

## 2019-01-01 DIAGNOSIS — M79.89 LEG SWELLING: ICD-10-CM

## 2019-01-01 DIAGNOSIS — I48.91 ATRIAL FIBRILLATION, UNSPECIFIED TYPE (H): ICD-10-CM

## 2019-01-01 DIAGNOSIS — I63.10 CEREBRAL INFARCTION DUE TO EMBOLISM OF PRECEREBRAL ARTERY (H): Chronic | ICD-10-CM

## 2019-01-01 DIAGNOSIS — R52 PAIN MANAGEMENT: ICD-10-CM

## 2019-01-01 DIAGNOSIS — S52.501A CLOSED FRACTURE OF DISTAL END OF RIGHT RADIUS, UNSPECIFIED FRACTURE MORPHOLOGY, INITIAL ENCOUNTER: ICD-10-CM

## 2019-01-01 DIAGNOSIS — I70.1 ATHEROSCLEROSIS OF RENAL ARTERY (H): ICD-10-CM

## 2019-01-01 DIAGNOSIS — S09.90XA INJURY OF HEAD, INITIAL ENCOUNTER: ICD-10-CM

## 2019-01-01 DIAGNOSIS — R03.0 ELEVATED BLOOD PRESSURE READING WITHOUT DIAGNOSIS OF HYPERTENSION: ICD-10-CM

## 2019-01-01 DIAGNOSIS — M80.00XS AGE-RELATED OSTEOPOROSIS WITH CURRENT PATHOLOGICAL FRACTURE, SEQUELA: Primary | Chronic | ICD-10-CM

## 2019-01-01 DIAGNOSIS — Z71.89 OTHER SPECIFIED COUNSELING: ICD-10-CM

## 2019-01-01 DIAGNOSIS — S32.10XD CLOSED FRACTURE OF SACRUM WITH ROUTINE HEALING, UNSPECIFIED PORTION OF SACRUM, SUBSEQUENT ENCOUNTER: ICD-10-CM

## 2019-01-01 DIAGNOSIS — I10 ESSENTIAL HYPERTENSION: ICD-10-CM
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
## 2019-01-01 DIAGNOSIS — S62.101D CLOSED FRACTURE OF BOTH WRISTS WITH ROUTINE HEALING, SUBSEQUENT ENCOUNTER: ICD-10-CM

## 2019-01-01 DIAGNOSIS — K59.01 SLOW TRANSIT CONSTIPATION: Primary | ICD-10-CM

## 2019-01-01 DIAGNOSIS — I48.20 CHRONIC ATRIAL FIBRILLATION (H): Primary | Chronic | ICD-10-CM

## 2019-01-01 DIAGNOSIS — R79.89 ELEVATED BRAIN NATRIURETIC PEPTIDE (BNP) LEVEL: ICD-10-CM

## 2019-01-01 DIAGNOSIS — S09.90XA CLOSED HEAD INJURY, INITIAL ENCOUNTER: ICD-10-CM

## 2019-01-01 DIAGNOSIS — E78.5 HYPERLIPIDEMIA LDL GOAL <100: Chronic | ICD-10-CM

## 2019-01-01 DIAGNOSIS — S62.102D CLOSED FRACTURE OF LEFT WRIST WITH ROUTINE HEALING, SUBSEQUENT ENCOUNTER: ICD-10-CM

## 2019-01-01 DIAGNOSIS — Z95.0 CARDIAC PACEMAKER IN SITU: Primary | ICD-10-CM

## 2019-01-01 DIAGNOSIS — L98.9 SKIN SORE: Primary | ICD-10-CM

## 2019-01-01 DIAGNOSIS — K59.01 SLOW TRANSIT CONSTIPATION: ICD-10-CM

## 2019-01-01 DIAGNOSIS — R29.6 FALLS FREQUENTLY: Primary | ICD-10-CM

## 2019-01-01 DIAGNOSIS — R41.89 COGNITIVE IMPAIRMENT: ICD-10-CM

## 2019-01-01 DIAGNOSIS — S62.102A CLOSED FRACTURE OF BOTH WRISTS, INITIAL ENCOUNTER: ICD-10-CM

## 2019-01-01 DIAGNOSIS — Z79.01 LONG TERM (CURRENT) USE OF ANTICOAGULANTS: ICD-10-CM

## 2019-01-01 DIAGNOSIS — M80.00XS AGE-RELATED OSTEOPOROSIS WITH CURRENT PATHOLOGICAL FRACTURE, SEQUELA: Chronic | ICD-10-CM

## 2019-01-01 DIAGNOSIS — R29.6 FALLS FREQUENTLY: ICD-10-CM

## 2019-01-01 DIAGNOSIS — I66.9 CEREBRAL EMBOLISM: ICD-10-CM

## 2019-01-01 DIAGNOSIS — K59.00 CONSTIPATION, UNSPECIFIED CONSTIPATION TYPE: Primary | ICD-10-CM

## 2019-01-01 DIAGNOSIS — S09.90XD CLOSED HEAD INJURY, SUBSEQUENT ENCOUNTER: ICD-10-CM

## 2019-01-01 LAB
ALBUMIN SERPL-MCNC: 2.5 G/DL (ref 3.4–5)
ALBUMIN SERPL-MCNC: 3.3 G/DL (ref 3.4–5)
ALBUMIN UR-MCNC: 30 MG/DL
ALBUMIN UR-MCNC: 30 MG/DL
ALBUMIN UR-MCNC: ABNORMAL MG/DL
ALBUMIN UR-MCNC: ABNORMAL MG/DL
ALBUMIN UR-MCNC: NEGATIVE MG/DL
ALP SERPL-CCNC: 141 U/L (ref 40–150)
ALP SERPL-CCNC: 191 U/L (ref 40–150)
ALT SERPL W P-5'-P-CCNC: 27 U/L (ref 0–50)
ALT SERPL W P-5'-P-CCNC: 36 U/L (ref 0–50)
ANION GAP SERPL CALCULATED.3IONS-SCNC: 10 MMOL/L (ref 5–18)
ANION GAP SERPL CALCULATED.3IONS-SCNC: 6 MMOL/L (ref 3–14)
ANION GAP SERPL CALCULATED.3IONS-SCNC: 7 MMOL/L (ref 3–14)
ANION GAP SERPL CALCULATED.3IONS-SCNC: 7 MMOL/L (ref 3–14)
ANION GAP SERPL CALCULATED.3IONS-SCNC: 8 MMOL/L (ref 3–14)
ANION GAP SERPL CALCULATED.3IONS-SCNC: 9 MMOL/L (ref 3–14)
APPEARANCE UR: ABNORMAL
APPEARANCE UR: CLEAR
AST SERPL W P-5'-P-CCNC: 48 U/L (ref 0–45)
AST SERPL W P-5'-P-CCNC: 60 U/L (ref 0–45)
BACTERIA #/AREA URNS HPF: ABNORMAL HPF
BACTERIA #/AREA URNS HPF: ABNORMAL HPF
BACTERIA SPEC CULT: NO GROWTH
BASE DEFICIT BLDV-SCNC: 1.1 MMOL/L
BASOPHILS # BLD AUTO: 0 10E9/L (ref 0–0.2)
BASOPHILS # BLD AUTO: 0 THOU/UL (ref 0–0.2)
BASOPHILS # BLD AUTO: 0 THOU/UL (ref 0–0.2)
BASOPHILS NFR BLD AUTO: 0 % (ref 0–2)
BASOPHILS NFR BLD AUTO: 0 % (ref 0–2)
BASOPHILS NFR BLD AUTO: 0.3 %
BASOPHILS NFR BLD AUTO: 0.4 %
BASOPHILS NFR BLD AUTO: 0.5 %
BILIRUB SERPL-MCNC: 0.7 MG/DL (ref 0.2–1.3)
BILIRUB SERPL-MCNC: 0.9 MG/DL (ref 0.2–1.3)
BILIRUB UR QL STRIP: NEGATIVE
BUN SERPL-MCNC: 16 MG/DL (ref 7–30)
BUN SERPL-MCNC: 18 MG/DL (ref 7–30)
BUN SERPL-MCNC: 21 MG/DL (ref 7–30)
BUN SERPL-MCNC: 21 MG/DL (ref 7–30)
BUN SERPL-MCNC: 22 MG/DL (ref 8–28)
BUN SERPL-MCNC: 23 MG/DL (ref 7–30)
BUN SERPL-MCNC: 25 MG/DL (ref 7–30)
BUN SERPL-MCNC: 26 MG/DL (ref 7–30)
BUN SERPL-MCNC: 27 MG/DL (ref 7–30)
BUN SERPL-MCNC: 27 MG/DL (ref 7–30)
CALCIUM SERPL-MCNC: 7.7 MG/DL (ref 8.5–10.1)
CALCIUM SERPL-MCNC: 8.1 MG/DL (ref 8.5–10.1)
CALCIUM SERPL-MCNC: 8.2 MG/DL (ref 8.5–10.1)
CALCIUM SERPL-MCNC: 8.3 MG/DL (ref 8.5–10.1)
CALCIUM SERPL-MCNC: 8.3 MG/DL (ref 8.5–10.1)
CALCIUM SERPL-MCNC: 8.4 MG/DL (ref 8.5–10.1)
CALCIUM SERPL-MCNC: 8.5 MG/DL (ref 8.5–10.1)
CALCIUM SERPL-MCNC: 8.6 MG/DL (ref 8.5–10.1)
CALCIUM SERPL-MCNC: 8.8 MG/DL (ref 8.5–10.1)
CALCIUM SERPL-MCNC: 8.9 MG/DL (ref 8.5–10.5)
CAOX CRY #/AREA URNS HPF: PRESENT /[HPF]
CHLORIDE BLD-SCNC: 106 MMOL/L (ref 98–107)
CHLORIDE SERPL-SCNC: 106 MMOL/L (ref 94–109)
CHLORIDE SERPL-SCNC: 106 MMOL/L (ref 94–109)
CHLORIDE SERPL-SCNC: 108 MMOL/L (ref 94–109)
CHLORIDE SERPL-SCNC: 110 MMOL/L (ref 94–109)
CHLORIDE SERPL-SCNC: 111 MMOL/L (ref 94–109)
CO2 SERPL-SCNC: 23 MMOL/L (ref 20–32)
CO2 SERPL-SCNC: 23 MMOL/L (ref 20–32)
CO2 SERPL-SCNC: 23 MMOL/L (ref 22–31)
CO2 SERPL-SCNC: 24 MMOL/L (ref 20–32)
CO2 SERPL-SCNC: 25 MMOL/L (ref 20–32)
CO2 SERPL-SCNC: 26 MMOL/L (ref 20–32)
COLOR UR AUTO: YELLOW
CREAT SERPL-MCNC: 0.51 MG/DL (ref 0.52–1.04)
CREAT SERPL-MCNC: 0.53 MG/DL (ref 0.52–1.04)
CREAT SERPL-MCNC: 0.58 MG/DL (ref 0.52–1.04)
CREAT SERPL-MCNC: 0.59 MG/DL (ref 0.52–1.04)
CREAT SERPL-MCNC: 0.64 MG/DL (ref 0.52–1.04)
CREAT SERPL-MCNC: 0.65 MG/DL (ref 0.52–1.04)
CREAT SERPL-MCNC: 0.66 MG/DL (ref 0.52–1.04)
CREAT SERPL-MCNC: 0.67 MG/DL (ref 0.52–1.04)
CREAT SERPL-MCNC: 0.68 MG/DL (ref 0.52–1.04)
CREAT SERPL-MCNC: 0.68 MG/DL (ref 0.6–1.1)
DIFFERENTIAL METHOD BLD: ABNORMAL
DIFFERENTIAL METHOD BLD: ABNORMAL
DIFFERENTIAL METHOD BLD: NORMAL
EOSINOPHIL # BLD AUTO: 0.1 10E9/L (ref 0–0.7)
EOSINOPHIL # BLD AUTO: 0.1 THOU/UL (ref 0–0.4)
EOSINOPHIL # BLD AUTO: 0.2 10E9/L (ref 0–0.7)
EOSINOPHIL # BLD AUTO: 0.2 THOU/UL (ref 0–0.4)
EOSINOPHIL # BLD AUTO: 0.4 10E9/L (ref 0–0.7)
EOSINOPHIL NFR BLD AUTO: 1.1 %
EOSINOPHIL NFR BLD AUTO: 2 % (ref 0–6)
EOSINOPHIL NFR BLD AUTO: 2 % (ref 0–6)
EOSINOPHIL NFR BLD AUTO: 2.4 %
EOSINOPHIL NFR BLD AUTO: 6.1 %
ERYTHROCYTE [DISTWIDTH] IN BLOOD BY AUTOMATED COUNT: 13.2 % (ref 10–15)
ERYTHROCYTE [DISTWIDTH] IN BLOOD BY AUTOMATED COUNT: 13.2 % (ref 10–15)
ERYTHROCYTE [DISTWIDTH] IN BLOOD BY AUTOMATED COUNT: 13.4 % (ref 10–15)
ERYTHROCYTE [DISTWIDTH] IN BLOOD BY AUTOMATED COUNT: 13.5 % (ref 10–15)
ERYTHROCYTE [DISTWIDTH] IN BLOOD BY AUTOMATED COUNT: 13.8 % (ref 10–15)
ERYTHROCYTE [DISTWIDTH] IN BLOOD BY AUTOMATED COUNT: 13.8 % (ref 10–15)
ERYTHROCYTE [DISTWIDTH] IN BLOOD BY AUTOMATED COUNT: 13.9 % (ref 10–15)
ERYTHROCYTE [DISTWIDTH] IN BLOOD BY AUTOMATED COUNT: 13.9 % (ref 10–15)
ERYTHROCYTE [DISTWIDTH] IN BLOOD BY AUTOMATED COUNT: 14.2 % (ref 10–15)
GFR SERPL CREATININE-BSD FRML MDRD: 74 ML/MIN/{1.73_M2}
GFR SERPL CREATININE-BSD FRML MDRD: 75 ML/MIN/{1.73_M2}
GFR SERPL CREATININE-BSD FRML MDRD: 76 ML/MIN/{1.73_M2}
GFR SERPL CREATININE-BSD FRML MDRD: 78 ML/MIN/{1.73_M2}
GFR SERPL CREATININE-BSD FRML MDRD: 78 ML/MIN/{1.73_M2}
GFR SERPL CREATININE-BSD FRML MDRD: 81 ML/MIN/{1.73_M2}
GFR SERPL CREATININE-BSD FRML MDRD: 82 ML/MIN/{1.73_M2}
GFR SERPL CREATININE-BSD FRML MDRD: >60 ML/MIN/1.73M2
GLUCOSE BLD-MCNC: 100 MG/DL (ref 70–125)
GLUCOSE SERPL-MCNC: 104 MG/DL (ref 70–99)
GLUCOSE SERPL-MCNC: 108 MG/DL (ref 70–99)
GLUCOSE SERPL-MCNC: 112 MG/DL (ref 70–99)
GLUCOSE SERPL-MCNC: 140 MG/DL (ref 70–99)
GLUCOSE SERPL-MCNC: 182 MG/DL (ref 70–99)
GLUCOSE SERPL-MCNC: 79 MG/DL (ref 70–99)
GLUCOSE SERPL-MCNC: 93 MG/DL (ref 70–99)
GLUCOSE SERPL-MCNC: 95 MG/DL (ref 70–99)
GLUCOSE SERPL-MCNC: 99 MG/DL (ref 70–99)
GLUCOSE UR STRIP-MCNC: 50 MG/DL
GLUCOSE UR STRIP-MCNC: NEGATIVE MG/DL
HCO3 BLDV-SCNC: 24 MMOL/L (ref 21–28)
HCT VFR BLD AUTO: 31.2 % (ref 35–47)
HCT VFR BLD AUTO: 33.9 % (ref 35–47)
HCT VFR BLD AUTO: 34.2 % (ref 35–47)
HCT VFR BLD AUTO: 36.4 % (ref 35–47)
HCT VFR BLD AUTO: 36.8 % (ref 35–47)
HCT VFR BLD AUTO: 37.1 % (ref 35–47)
HCT VFR BLD AUTO: 39.1 % (ref 35–47)
HCT VFR BLD AUTO: 39.9 % (ref 35–47)
HCT VFR BLD AUTO: 41.8 % (ref 35–47)
HGB BLD-MCNC: 10.1 G/DL (ref 11.7–15.7)
HGB BLD-MCNC: 10.8 G/DL (ref 11.7–15.7)
HGB BLD-MCNC: 10.8 G/DL (ref 11.7–15.7)
HGB BLD-MCNC: 11.2 G/DL (ref 11.7–15.7)
HGB BLD-MCNC: 11.4 G/DL (ref 11.7–15.7)
HGB BLD-MCNC: 11.9 G/DL (ref 11.7–15.7)
HGB BLD-MCNC: 12.2 G/DL (ref 11.7–15.7)
HGB BLD-MCNC: 12.3 G/DL (ref 11.7–15.7)
HGB BLD-MCNC: 13.1 G/DL (ref 11.7–15.7)
HGB UR QL STRIP: NEGATIVE
HYALINE CASTS #/AREA URNS LPF: ABNORMAL LPF
IMM GRANULOCYTES # BLD: 0 10E9/L (ref 0–0.4)
IMM GRANULOCYTES # BLD: 0.1 10E9/L (ref 0–0.4)
IMM GRANULOCYTES # BLD: 0.1 10E9/L (ref 0–0.4)
IMM GRANULOCYTES NFR BLD: 0.3 %
IMM GRANULOCYTES NFR BLD: 0.6 %
IMM GRANULOCYTES NFR BLD: 0.8 %
INR PPP: 1.3 (ref 0.9–1.1)
INR PPP: 1.35 (ref 0.9–1.1)
INR PPP: 1.46 (ref 0.86–1.14)
INR PPP: 1.46 (ref 0.9–1.1)
INR PPP: 1.5 (ref 0.9–1.1)
INR PPP: 1.6 (ref 0.9–1.1)
INR PPP: 1.62 (ref 0.9–1.1)
INR PPP: 1.68 (ref 0.86–1.14)
INR PPP: 1.68 (ref 0.9–1.1)
INR PPP: 1.69 (ref 0.8–1.1)
INR PPP: 1.69 (ref 0.9–1.1)
INR PPP: 1.7 (ref 0.86–1.14)
INR PPP: 1.71 (ref 0.9–1.1)
INR PPP: 1.98 (ref 0.86–1.14)
INR PPP: 1.98 (ref 0.9–1.1)
INR PPP: 2.01 (ref 0.86–1.14)
INR PPP: 2.01 (ref 0.9–1.1)
INR PPP: 2.08 (ref 0.8–1.1)
INR PPP: 2.08 (ref 0.9–1.1)
INR PPP: 2.1 (ref 0.9–1.1)
INR PPP: 2.11 (ref 0.9–1.1)
INR PPP: 2.11 (ref 0.9–1.1)
INR PPP: 2.17 (ref 0.86–1.14)
INR PPP: 2.17 (ref 0.9–1.1)
INR PPP: 2.24 (ref 0.9–1.1)
INR PPP: 2.31 (ref 0.86–1.14)
INR PPP: 2.31 (ref 0.9–1.1)
INR PPP: 2.36 (ref 0.86–1.14)
INR PPP: 2.36 (ref 0.9–1.1)
INR PPP: 2.37 (ref 0.8–1.1)
INR PPP: 2.37 (ref 0.9–1.1)
INR PPP: 2.39 (ref 0.86–1.14)
INR PPP: 2.39 (ref 0.9–1.1)
INR PPP: 2.4 (ref 0.86–1.14)
INR PPP: 2.45
INR PPP: 2.45 (ref 0.9–1.1)
INR PPP: 2.57 (ref 0.86–1.14)
INR PPP: 2.57 (ref 0.9–1.1)
INR PPP: 2.6 (ref 0.86–1.14)
INR PPP: 2.61
INR PPP: 2.61 (ref 0.9–1.1)
INR PPP: 2.62 (ref 0.86–1.14)
INR PPP: 2.62 (ref 0.9–1.1)
INR PPP: 2.71 (ref 0.86–1.14)
INR PPP: 2.71 (ref 0.9–1.1)
INR PPP: 2.76 (ref 0.86–1.14)
INR PPP: 2.78 (ref 0.86–1.14)
INR PPP: 2.78 (ref 0.9–1.1)
INR PPP: 2.8
INR PPP: 2.81 (ref 0.9–1.1)
INR PPP: 2.81 (ref 0.9–1.1)
INR PPP: 2.84 (ref 0.9–1.1)
INR PPP: 2.84 (ref 0.9–1.1)
INR PPP: 2.99 (ref 0.86–1.14)
INR PPP: 2.99 (ref 0.86–1.14)
INR PPP: 3
INR PPP: 3 (ref 0.9–1.1)
INR PPP: 3.18 (ref 0.86–1.14)
INR PPP: 3.18 (ref 0.9–1.1)
INR PPP: 3.2
INR PPP: 3.23 (ref 0.86–1.14)
INR PPP: 3.25 (ref 0.86–1.14)
INR PPP: 3.25 (ref 0.9–1.1)
INR PPP: 3.32 (ref 0.8–1.1)
INR PPP: 3.32 (ref 0.9–1.1)
INR PPP: 3.4
INR PPP: 3.4 (ref 0.9–1.1)
INR PPP: 3.53 (ref 0.86–1.14)
INR PPP: 3.53 (ref 0.86–1.14)
INR PPP: 3.53 (ref 0.9–1.1)
INR PPP: 3.63 (ref 0.86–1.14)
INR PPP: 3.63 (ref 0.9–1.1)
INR PPP: 3.68 (ref 0.86–1.14)
INR PPP: 4.02 (ref 0.9–1.1)
INR PPP: 4.8 (ref 0.9–1.1)
INR PPP: 5.23 (ref 0.86–1.14)
INR PPP: 5.23 (ref 0.9–1.1)
KETONES UR STRIP-MCNC: 5 MG/DL
KETONES UR STRIP-MCNC: ABNORMAL MG/DL
KETONES UR STRIP-MCNC: NEGATIVE MG/DL
LEUKOCYTE ESTERASE UR QL STRIP: ABNORMAL
LEUKOCYTE ESTERASE UR QL STRIP: NEGATIVE
LEUKOCYTE ESTERASE UR QL STRIP: NEGATIVE
LYMPHOCYTES # BLD AUTO: 0.8 10E9/L (ref 0.8–5.3)
LYMPHOCYTES # BLD AUTO: 1.3 THOU/UL (ref 0.8–4.4)
LYMPHOCYTES # BLD AUTO: 1.6 10E9/L (ref 0.8–5.3)
LYMPHOCYTES # BLD AUTO: 1.7 10E9/L (ref 0.8–5.3)
LYMPHOCYTES # BLD AUTO: 1.7 THOU/UL (ref 0.8–4.4)
LYMPHOCYTES NFR BLD AUTO: 16 % (ref 20–40)
LYMPHOCYTES NFR BLD AUTO: 17.3 %
LYMPHOCYTES NFR BLD AUTO: 22 % (ref 20–40)
LYMPHOCYTES NFR BLD AUTO: 26.9 %
LYMPHOCYTES NFR BLD AUTO: 8 %
Lab: NORMAL
MCH RBC QN AUTO: 30.2 PG (ref 26.5–33)
MCH RBC QN AUTO: 30.2 PG (ref 26.5–33)
MCH RBC QN AUTO: 30.3 PG (ref 26.5–33)
MCH RBC QN AUTO: 30.3 PG (ref 26.5–33)
MCH RBC QN AUTO: 30.4 PG (ref 26.5–33)
MCH RBC QN AUTO: 30.5 PG (ref 26.5–33)
MCH RBC QN AUTO: 30.9 PG (ref 26.5–33)
MCH RBC QN AUTO: 30.9 PG (ref 26.5–33)
MCH RBC QN AUTO: 32 PG (ref 26.5–33)
MCHC RBC AUTO-ENTMCNC: 29.8 G/DL (ref 31.5–36.5)
MCHC RBC AUTO-ENTMCNC: 30.4 G/DL (ref 31.5–36.5)
MCHC RBC AUTO-ENTMCNC: 31.2 G/DL (ref 31.5–36.5)
MCHC RBC AUTO-ENTMCNC: 31.3 G/DL (ref 31.5–36.5)
MCHC RBC AUTO-ENTMCNC: 31.3 G/DL (ref 31.5–36.5)
MCHC RBC AUTO-ENTMCNC: 31.6 G/DL (ref 31.5–36.5)
MCHC RBC AUTO-ENTMCNC: 31.9 G/DL (ref 31.5–36.5)
MCHC RBC AUTO-ENTMCNC: 32.4 G/DL (ref 31.5–36.5)
MCHC RBC AUTO-ENTMCNC: 33.2 G/DL (ref 31.5–36.5)
MCV RBC AUTO: 100 FL (ref 78–100)
MCV RBC AUTO: 101 FL (ref 78–100)
MCV RBC AUTO: 95 FL (ref 78–100)
MCV RBC AUTO: 96 FL (ref 78–100)
MCV RBC AUTO: 96 FL (ref 78–100)
MCV RBC AUTO: 97 FL (ref 78–100)
MDC_IDC_LEAD_IMPLANT_DT: NORMAL
MDC_IDC_LEAD_LOCATION: NORMAL
MDC_IDC_LEAD_LOCATION_DETAIL_1: NORMAL
MDC_IDC_LEAD_MFG: NORMAL
MDC_IDC_LEAD_MODEL: NORMAL
MDC_IDC_LEAD_POLARITY_TYPE: NORMAL
MDC_IDC_LEAD_SERIAL: NORMAL
MDC_IDC_MSMT_BATTERY_DTM: NORMAL
MDC_IDC_MSMT_BATTERY_IMPEDANCE: 1873 OHM
MDC_IDC_MSMT_BATTERY_IMPEDANCE: 2060 OHM
MDC_IDC_MSMT_BATTERY_IMPEDANCE: 2247 OHM
MDC_IDC_MSMT_BATTERY_REMAINING_LONGEVITY: 34 MO
MDC_IDC_MSMT_BATTERY_REMAINING_LONGEVITY: 36 MO
MDC_IDC_MSMT_BATTERY_REMAINING_LONGEVITY: 39 MO
MDC_IDC_MSMT_BATTERY_STATUS: NORMAL
MDC_IDC_MSMT_BATTERY_VOLTAGE: 2.75 V
MDC_IDC_MSMT_BATTERY_VOLTAGE: 2.75 V
MDC_IDC_MSMT_BATTERY_VOLTAGE: 2.76 V
MDC_IDC_MSMT_LEADCHNL_RA_IMPEDANCE_VALUE: 67 OHM
MDC_IDC_MSMT_LEADCHNL_RV_IMPEDANCE_VALUE: 471 OHM
MDC_IDC_MSMT_LEADCHNL_RV_IMPEDANCE_VALUE: 485 OHM
MDC_IDC_MSMT_LEADCHNL_RV_IMPEDANCE_VALUE: 502 OHM
MDC_IDC_MSMT_LEADCHNL_RV_PACING_THRESHOLD_AMPLITUDE: 0.62 V
MDC_IDC_MSMT_LEADCHNL_RV_PACING_THRESHOLD_PULSEWIDTH: 0.4 MS
MDC_IDC_MSMT_LEADCHNL_RV_SENSING_INTR_AMPL: 22.4 MV
MDC_IDC_PG_IMPLANT_DTM: NORMAL
MDC_IDC_PG_MFG: NORMAL
MDC_IDC_PG_MODEL: NORMAL
MDC_IDC_PG_SERIAL: NORMAL
MDC_IDC_PG_TYPE: NORMAL
MDC_IDC_SESS_CLINIC_NAME: NORMAL
MDC_IDC_SESS_DTM: NORMAL
MDC_IDC_SESS_TYPE: NORMAL
MDC_IDC_SET_BRADY_LOWRATE: 60 {BEATS}/MIN
MDC_IDC_SET_BRADY_MAX_SENSOR_RATE: 120 {BEATS}/MIN
MDC_IDC_SET_BRADY_MAX_TRACKING_RATE: 105 {BEATS}/MIN
MDC_IDC_SET_BRADY_MODE: NORMAL
MDC_IDC_SET_LEADCHNL_RV_PACING_AMPLITUDE: 2 V
MDC_IDC_SET_LEADCHNL_RV_PACING_ANODE_ELECTRODE_1: NORMAL
MDC_IDC_SET_LEADCHNL_RV_PACING_ANODE_LOCATION_1: NORMAL
MDC_IDC_SET_LEADCHNL_RV_PACING_CAPTURE_MODE: NORMAL
MDC_IDC_SET_LEADCHNL_RV_PACING_CATHODE_ELECTRODE_1: NORMAL
MDC_IDC_SET_LEADCHNL_RV_PACING_CATHODE_LOCATION_1: NORMAL
MDC_IDC_SET_LEADCHNL_RV_PACING_POLARITY: NORMAL
MDC_IDC_SET_LEADCHNL_RV_PACING_PULSEWIDTH: 0.4 MS
MDC_IDC_SET_LEADCHNL_RV_SENSING_ANODE_ELECTRODE_1: NORMAL
MDC_IDC_SET_LEADCHNL_RV_SENSING_ANODE_LOCATION_1: NORMAL
MDC_IDC_SET_LEADCHNL_RV_SENSING_CATHODE_ELECTRODE_1: NORMAL
MDC_IDC_SET_LEADCHNL_RV_SENSING_CATHODE_LOCATION_1: NORMAL
MDC_IDC_SET_LEADCHNL_RV_SENSING_POLARITY: NORMAL
MDC_IDC_SET_LEADCHNL_RV_SENSING_SENSITIVITY: 5.6 MV
MDC_IDC_SET_ZONE_DETECTION_INTERVAL: 333.33 MS
MDC_IDC_SET_ZONE_TYPE: NORMAL
MDC_IDC_STAT_AT_BURDEN_PERCENT: 0 %
MDC_IDC_STAT_AT_DTM_END: NORMAL
MDC_IDC_STAT_AT_DTM_START: NORMAL
MDC_IDC_STAT_BRADY_DTM_END: NORMAL
MDC_IDC_STAT_BRADY_DTM_START: NORMAL
MDC_IDC_STAT_BRADY_RV_PERCENT_PACED: 42 %
MDC_IDC_STAT_BRADY_RV_PERCENT_PACED: 47 %
MDC_IDC_STAT_BRADY_RV_PERCENT_PACED: 55 %
MDC_IDC_STAT_EPISODE_RECENT_COUNT: 0
MDC_IDC_STAT_EPISODE_RECENT_COUNT: 2
MDC_IDC_STAT_EPISODE_RECENT_COUNT_DTM_END: NORMAL
MDC_IDC_STAT_EPISODE_RECENT_COUNT_DTM_START: NORMAL
MDC_IDC_STAT_EPISODE_TYPE: NORMAL
MONOCYTES # BLD AUTO: 0.5 10E9/L (ref 0–1.3)
MONOCYTES # BLD AUTO: 0.5 10E9/L (ref 0–1.3)
MONOCYTES # BLD AUTO: 0.5 THOU/UL (ref 0–0.9)
MONOCYTES # BLD AUTO: 0.7 THOU/UL (ref 0–0.9)
MONOCYTES # BLD AUTO: 0.9 10E9/L (ref 0–1.3)
MONOCYTES NFR BLD AUTO: 5.5 %
MONOCYTES NFR BLD AUTO: 7 % (ref 2–10)
MONOCYTES NFR BLD AUTO: 7.7 %
MONOCYTES NFR BLD AUTO: 8 % (ref 2–10)
MONOCYTES NFR BLD AUTO: 8.9 %
MUCOUS THREADS #/AREA URNS LPF: ABNORMAL LPF
MUCOUS THREADS #/AREA URNS LPF: ABNORMAL LPF
MUCOUS THREADS #/AREA URNS LPF: PRESENT /LPF
MUCOUS THREADS #/AREA URNS LPF: PRESENT /LPF
NEUTROPHILS # BLD AUTO: 3.4 10E9/L (ref 1.6–8.3)
NEUTROPHILS # BLD AUTO: 5.2 THOU/UL (ref 2–7.7)
NEUTROPHILS # BLD AUTO: 6 THOU/UL (ref 2–7.7)
NEUTROPHILS # BLD AUTO: 7.2 10E9/L (ref 1.6–8.3)
NEUTROPHILS # BLD AUTO: 8.4 10E9/L (ref 1.6–8.3)
NEUTROPHILS NFR BLD AUTO: 58.5 %
NEUTROPHILS NFR BLD AUTO: 68 % (ref 50–70)
NEUTROPHILS NFR BLD AUTO: 73.8 %
NEUTROPHILS NFR BLD AUTO: 75 % (ref 50–70)
NEUTROPHILS NFR BLD AUTO: 80.9 %
NITRATE UR QL: NEGATIVE
NRBC # BLD AUTO: 0 10*3/UL
NRBC BLD AUTO-RTO: 0 /100
NT-PROBNP SERPL-MCNC: 6283 PG/ML (ref 0–1800)
O2/TOTAL GAS SETTING VFR VENT: ABNORMAL %
PCO2 BLDV: 38 MM HG (ref 40–50)
PH BLDV: 7.4 PH (ref 7.32–7.43)
PH UR STRIP: 5 PH (ref 5–7)
PH UR STRIP: 5 PH (ref 5–7)
PH UR STRIP: 6 PH (ref 5–7)
PH UR STRIP: 6 [PH] (ref 4.5–8)
PH UR STRIP: 6.5 [PH] (ref 4.5–8)
PLATELET # BLD AUTO: 123 10E9/L (ref 150–450)
PLATELET # BLD AUTO: 145 10E9/L (ref 150–450)
PLATELET # BLD AUTO: 164 10E9/L (ref 150–450)
PLATELET # BLD AUTO: 180 10E9/L (ref 150–450)
PLATELET # BLD AUTO: 194 10E9/L (ref 150–450)
PLATELET # BLD AUTO: 203 10E9/L (ref 150–450)
PLATELET # BLD AUTO: 209 10E9/L (ref 150–450)
PLATELET # BLD AUTO: 219 10E9/L (ref 150–450)
PLATELET # BLD AUTO: 227 10E9/L (ref 150–450)
PO2 BLDV: 40 MM HG (ref 25–47)
POTASSIUM BLD-SCNC: 3.9 MMOL/L (ref 3.5–5)
POTASSIUM SERPL-SCNC: 3.6 MMOL/L (ref 3.4–5.3)
POTASSIUM SERPL-SCNC: 3.7 MMOL/L (ref 3.4–5.3)
POTASSIUM SERPL-SCNC: 3.7 MMOL/L (ref 3.4–5.3)
POTASSIUM SERPL-SCNC: 3.9 MMOL/L (ref 3.4–5.3)
POTASSIUM SERPL-SCNC: 3.9 MMOL/L (ref 3.4–5.3)
POTASSIUM SERPL-SCNC: 4 MMOL/L (ref 3.4–5.3)
POTASSIUM SERPL-SCNC: 4 MMOL/L (ref 3.4–5.3)
POTASSIUM SERPL-SCNC: 4.2 MMOL/L (ref 3.4–5.3)
POTASSIUM SERPL-SCNC: 4.4 MMOL/L (ref 3.4–5.3)
PROCALCITONIN SERPL-MCNC: 0.84 NG/ML
PROT SERPL-MCNC: 5.9 G/DL (ref 6.8–8.8)
PROT SERPL-MCNC: 7.4 G/DL (ref 6.8–8.8)
RBC # BLD AUTO: 3.27 10E12/L (ref 3.8–5.2)
RBC # BLD AUTO: 3.49 10E12/L (ref 3.8–5.2)
RBC # BLD AUTO: 3.55 10E12/L (ref 3.8–5.2)
RBC # BLD AUTO: 3.7 10E12/L (ref 3.8–5.2)
RBC # BLD AUTO: 3.78 10E12/L (ref 3.8–5.2)
RBC # BLD AUTO: 3.84 10E12/L (ref 3.8–5.2)
RBC # BLD AUTO: 3.94 10E12/L (ref 3.8–5.2)
RBC # BLD AUTO: 4.02 10E12/L (ref 3.8–5.2)
RBC # BLD AUTO: 4.3 10E12/L (ref 3.8–5.2)
RBC #/AREA URNS AUTO: 1 /HPF (ref 0–2)
RBC #/AREA URNS AUTO: 2 /HPF (ref 0–2)
RBC #/AREA URNS AUTO: 4 /HPF (ref 0–2)
RBC #/AREA URNS AUTO: ABNORMAL HPF
RBC #/AREA URNS AUTO: ABNORMAL HPF
SODIUM SERPL-SCNC: 137 MMOL/L (ref 133–144)
SODIUM SERPL-SCNC: 138 MMOL/L (ref 133–144)
SODIUM SERPL-SCNC: 139 MMOL/L (ref 136–145)
SODIUM SERPL-SCNC: 140 MMOL/L (ref 133–144)
SODIUM SERPL-SCNC: 143 MMOL/L (ref 133–144)
SOURCE: ABNORMAL
SP GR UR STRIP: 1.01 (ref 1–1.03)
SP GR UR STRIP: 1.02 (ref 1–1.03)
SP GR UR STRIP: 1.03 (ref 1–1.03)
SPECIMEN SOURCE: NORMAL
SQUAMOUS #/AREA URNS AUTO: <1 /HPF (ref 0–1)
SQUAMOUS #/AREA URNS AUTO: ABNORMAL LPF
SQUAMOUS #/AREA URNS AUTO: ABNORMAL LPF
TRANS CELLS #/AREA URNS HPF: ABNORMAL LPF
TROPONIN I SERPL-MCNC: 0.02 UG/L (ref 0–0.04)
UROBILINOGEN UR STRIP-ACNC: ABNORMAL
UROBILINOGEN UR STRIP-ACNC: ABNORMAL
UROBILINOGEN UR STRIP-MCNC: 0 MG/DL (ref 0–2)
UROBILINOGEN UR STRIP-MCNC: 0 MG/DL (ref 0–2)
UROBILINOGEN UR STRIP-MCNC: 4 MG/DL (ref 0–2)
WBC # BLD AUTO: 10.3 10E9/L (ref 4–11)
WBC # BLD AUTO: 3.9 10E9/L (ref 4–11)
WBC # BLD AUTO: 5.9 10E9/L (ref 4–11)
WBC # BLD AUTO: 7.7 10E9/L (ref 4–11)
WBC # BLD AUTO: 8.1 10E9/L (ref 4–11)
WBC # BLD AUTO: 8.2 10E9/L (ref 4–11)
WBC # BLD AUTO: 8.5 10E9/L (ref 4–11)
WBC # BLD AUTO: 9.3 10E9/L (ref 4–11)
WBC # BLD AUTO: 9.7 10E9/L (ref 4–11)
WBC #/AREA URNS AUTO: 1 /HPF (ref 0–5)
WBC #/AREA URNS AUTO: 1 /HPF (ref 0–5)
WBC #/AREA URNS AUTO: 2 /HPF (ref 0–5)
WBC #/AREA URNS AUTO: >100 HPF
WBC #/AREA URNS AUTO: ABNORMAL HPF
WBC CLUMPS #/AREA URNS HPF: PRESENT /[HPF]
WBC: 7.6 THOU/UL (ref 4–11)
WBC: 8.1 THOU/UL (ref 4–11)

## 2019-01-01 PROCEDURE — 85025 COMPLETE CBC W/AUTO DIFF WBC: CPT | Performed by: PHYSICIAN ASSISTANT

## 2019-01-01 PROCEDURE — 25000132 ZZH RX MED GY IP 250 OP 250 PS 637: Mod: GY

## 2019-01-01 PROCEDURE — 96374 THER/PROPH/DIAG INJ IV PUSH: CPT

## 2019-01-01 PROCEDURE — 36415 COLL VENOUS BLD VENIPUNCTURE: CPT | Performed by: FAMILY MEDICINE

## 2019-01-01 PROCEDURE — 99285 EMERGENCY DEPT VISIT HI MDM: CPT | Mod: 25 | Performed by: FAMILY MEDICINE

## 2019-01-01 PROCEDURE — 25000128 H RX IP 250 OP 636: Performed by: FAMILY MEDICINE

## 2019-01-01 PROCEDURE — 99238 HOSP IP/OBS DSCHRG MGMT 30/<: CPT | Performed by: INTERNAL MEDICINE

## 2019-01-01 PROCEDURE — 97110 THERAPEUTIC EXERCISES: CPT | Mod: GP

## 2019-01-01 PROCEDURE — G0378 HOSPITAL OBSERVATION PER HR: HCPCS

## 2019-01-01 PROCEDURE — 80048 BASIC METABOLIC PNL TOTAL CA: CPT | Performed by: FAMILY MEDICINE

## 2019-01-01 PROCEDURE — 99207 ZZC CDG-CODE INCORRECT PER BILLING BASED ON TIME: CPT | Performed by: INTERNAL MEDICINE

## 2019-01-01 PROCEDURE — 80048 BASIC METABOLIC PNL TOTAL CA: CPT | Performed by: EMERGENCY MEDICINE

## 2019-01-01 PROCEDURE — 99207 ZZC NO CHARGE NURSE ONLY: CPT

## 2019-01-01 PROCEDURE — 80053 COMPREHEN METABOLIC PANEL: CPT | Performed by: PHYSICIAN ASSISTANT

## 2019-01-01 PROCEDURE — 99233 SBSQ HOSP IP/OBS HIGH 50: CPT | Performed by: FAMILY MEDICINE

## 2019-01-01 PROCEDURE — A9270 NON-COVERED ITEM OR SERVICE: HCPCS | Mod: GY

## 2019-01-01 PROCEDURE — 25000131 ZZH RX MED GY IP 250 OP 636 PS 637: Performed by: EMERGENCY MEDICINE

## 2019-01-01 PROCEDURE — 80048 BASIC METABOLIC PNL TOTAL CA: CPT | Performed by: PHYSICIAN ASSISTANT

## 2019-01-01 PROCEDURE — 25800030 ZZH RX IP 258 OP 636: Performed by: PHYSICIAN ASSISTANT

## 2019-01-01 PROCEDURE — 93294 REM INTERROG EVL PM/LDLS PM: CPT | Performed by: INTERNAL MEDICINE

## 2019-01-01 PROCEDURE — 12000000 ZZH R&B MED SURG/OB

## 2019-01-01 PROCEDURE — 85610 PROTHROMBIN TIME: CPT | Performed by: FAMILY MEDICINE

## 2019-01-01 PROCEDURE — 25000128 H RX IP 250 OP 636: Performed by: PHYSICIAN ASSISTANT

## 2019-01-01 PROCEDURE — 99285 EMERGENCY DEPT VISIT HI MDM: CPT | Mod: Z6 | Performed by: EMERGENCY MEDICINE

## 2019-01-01 PROCEDURE — 99207 ZZC NO CHARGE NURSE ONLY: CPT | Performed by: PHARMACIST

## 2019-01-01 PROCEDURE — 73030 X-RAY EXAM OF SHOULDER: CPT | Mod: LT

## 2019-01-01 PROCEDURE — 25000132 ZZH RX MED GY IP 250 OP 250 PS 637: Performed by: EMERGENCY MEDICINE

## 2019-01-01 PROCEDURE — 40000193 ZZH STATISTIC PT WARD VISIT: Performed by: PHYSICAL THERAPIST

## 2019-01-01 PROCEDURE — V5299 HEARING SERVICE: HCPCS | Mod: RT | Performed by: AUDIOLOGIST

## 2019-01-01 PROCEDURE — 85025 COMPLETE CBC W/AUTO DIFF WBC: CPT | Performed by: EMERGENCY MEDICINE

## 2019-01-01 PROCEDURE — 92567 TYMPANOMETRY: CPT | Performed by: AUDIOLOGIST

## 2019-01-01 PROCEDURE — 97161 PT EVAL LOW COMPLEX 20 MIN: CPT | Mod: GP | Performed by: PHYSICAL THERAPIST

## 2019-01-01 PROCEDURE — 99213 OFFICE O/P EST LOW 20 MIN: CPT | Performed by: PHYSICIAN ASSISTANT

## 2019-01-01 PROCEDURE — A9270 NON-COVERED ITEM OR SERVICE: HCPCS | Mod: GY | Performed by: EMERGENCY MEDICINE

## 2019-01-01 PROCEDURE — 99285 EMERGENCY DEPT VISIT HI MDM: CPT | Mod: 25 | Performed by: EMERGENCY MEDICINE

## 2019-01-01 PROCEDURE — 87040 BLOOD CULTURE FOR BACTERIA: CPT | Performed by: PHYSICIAN ASSISTANT

## 2019-01-01 PROCEDURE — 71045 X-RAY EXAM CHEST 1 VIEW: CPT

## 2019-01-01 PROCEDURE — 85027 COMPLETE CBC AUTOMATED: CPT | Performed by: FAMILY MEDICINE

## 2019-01-01 PROCEDURE — 87040 BLOOD CULTURE FOR BACTERIA: CPT | Performed by: FAMILY MEDICINE

## 2019-01-01 PROCEDURE — 99217 ZZC OBSERVATION CARE DISCHARGE: CPT | Performed by: FAMILY MEDICINE

## 2019-01-01 PROCEDURE — A9270 NON-COVERED ITEM OR SERVICE: HCPCS | Performed by: EMERGENCY MEDICINE

## 2019-01-01 PROCEDURE — A9270 NON-COVERED ITEM OR SERVICE: HCPCS | Mod: GY | Performed by: PHYSICIAN ASSISTANT

## 2019-01-01 PROCEDURE — 85610 PROTHROMBIN TIME: CPT | Performed by: PHYSICIAN ASSISTANT

## 2019-01-01 PROCEDURE — 25000125 ZZHC RX 250: Performed by: PHYSICIAN ASSISTANT

## 2019-01-01 PROCEDURE — 25000132 ZZH RX MED GY IP 250 OP 250 PS 637: Mod: GY | Performed by: FAMILY MEDICINE

## 2019-01-01 PROCEDURE — 25000132 ZZH RX MED GY IP 250 OP 250 PS 637: Mod: GY | Performed by: PHYSICIAN ASSISTANT

## 2019-01-01 PROCEDURE — 36415 COLL VENOUS BLD VENIPUNCTURE: CPT | Performed by: PHYSICIAN ASSISTANT

## 2019-01-01 PROCEDURE — 81001 URINALYSIS AUTO W/SCOPE: CPT | Performed by: PHYSICIAN ASSISTANT

## 2019-01-01 PROCEDURE — 93005 ELECTROCARDIOGRAM TRACING: CPT | Performed by: FAMILY MEDICINE

## 2019-01-01 PROCEDURE — 99239 HOSP IP/OBS DSCHRG MGMT >30: CPT | Performed by: INTERNAL MEDICINE

## 2019-01-01 PROCEDURE — 87086 URINE CULTURE/COLONY COUNT: CPT | Performed by: PHYSICIAN ASSISTANT

## 2019-01-01 PROCEDURE — 70450 CT HEAD/BRAIN W/O DYE: CPT

## 2019-01-01 PROCEDURE — V5020 CONFORMITY EVALUATION: HCPCS | Mod: GY | Performed by: AUDIOLOGIST

## 2019-01-01 PROCEDURE — A9270 NON-COVERED ITEM OR SERVICE: HCPCS | Performed by: PHYSICIAN ASSISTANT

## 2019-01-01 PROCEDURE — 70450 CT HEAD/BRAIN W/O DYE: CPT | Mod: XE

## 2019-01-01 PROCEDURE — 25000128 H RX IP 250 OP 636

## 2019-01-01 PROCEDURE — 74177 CT ABD & PELVIS W/CONTRAST: CPT

## 2019-01-01 PROCEDURE — 99214 OFFICE O/P EST MOD 30 MIN: CPT | Performed by: INTERNAL MEDICINE

## 2019-01-01 PROCEDURE — 85610 PROTHROMBIN TIME: CPT

## 2019-01-01 PROCEDURE — 96361 HYDRATE IV INFUSION ADD-ON: CPT | Performed by: FAMILY MEDICINE

## 2019-01-01 PROCEDURE — 93296 REM INTERROG EVL PM/IDS: CPT | Performed by: INTERNAL MEDICINE

## 2019-01-01 PROCEDURE — 73110 X-RAY EXAM OF WRIST: CPT | Mod: 50

## 2019-01-01 PROCEDURE — 71046 X-RAY EXAM CHEST 2 VIEWS: CPT

## 2019-01-01 PROCEDURE — 25000128 H RX IP 250 OP 636: Performed by: EMERGENCY MEDICINE

## 2019-01-01 PROCEDURE — 85027 COMPLETE CBC AUTOMATED: CPT | Performed by: PHYSICIAN ASSISTANT

## 2019-01-01 PROCEDURE — 92593 HC HEARING AID CHECK, BINAURAL: CPT | Mod: GY | Performed by: AUDIOLOGIST

## 2019-01-01 PROCEDURE — 93010 ELECTROCARDIOGRAM REPORT: CPT | Mod: Z6 | Performed by: FAMILY MEDICINE

## 2019-01-01 PROCEDURE — 96374 THER/PROPH/DIAG INJ IV PUSH: CPT | Performed by: EMERGENCY MEDICINE

## 2019-01-01 PROCEDURE — 99220 ZZC INITIAL OBSERVATION CARE,LEVL III: CPT | Performed by: PHYSICIAN ASSISTANT

## 2019-01-01 PROCEDURE — 74019 RADEX ABDOMEN 2 VIEWS: CPT

## 2019-01-01 PROCEDURE — 99207 ZZC NO CHARGE LOS: CPT | Performed by: AUDIOLOGIST

## 2019-01-01 PROCEDURE — 99207 ZZC CDG-CODE CATEGORY CHANGED: CPT | Performed by: PHYSICIAN ASSISTANT

## 2019-01-01 PROCEDURE — 99222 1ST HOSP IP/OBS MODERATE 55: CPT | Mod: AI | Performed by: INTERNAL MEDICINE

## 2019-01-01 PROCEDURE — 97161 PT EVAL LOW COMPLEX 20 MIN: CPT | Mod: GP

## 2019-01-01 PROCEDURE — 25800030 ZZH RX IP 258 OP 636: Performed by: FAMILY MEDICINE

## 2019-01-01 PROCEDURE — A9270 NON-COVERED ITEM OR SERVICE: HCPCS | Mod: GY | Performed by: FAMILY MEDICINE

## 2019-01-01 PROCEDURE — 25000132 ZZH RX MED GY IP 250 OP 250 PS 637: Performed by: PHYSICIAN ASSISTANT

## 2019-01-01 PROCEDURE — 92557 COMPREHENSIVE HEARING TEST: CPT | Performed by: AUDIOLOGIST

## 2019-01-01 PROCEDURE — 72128 CT CHEST SPINE W/O DYE: CPT

## 2019-01-01 PROCEDURE — 85610 PROTHROMBIN TIME: CPT | Performed by: EMERGENCY MEDICINE

## 2019-01-01 PROCEDURE — 96360 HYDRATION IV INFUSION INIT: CPT | Mod: 59 | Performed by: FAMILY MEDICINE

## 2019-01-01 PROCEDURE — 84484 ASSAY OF TROPONIN QUANT: CPT | Performed by: PHYSICIAN ASSISTANT

## 2019-01-01 PROCEDURE — 99207 ZZC CDG-CODE CATEGORY CHANGED: CPT | Performed by: INTERNAL MEDICINE

## 2019-01-01 PROCEDURE — 97530 THERAPEUTIC ACTIVITIES: CPT | Mod: GP

## 2019-01-01 PROCEDURE — 81001 URINALYSIS AUTO W/SCOPE: CPT | Performed by: EMERGENCY MEDICINE

## 2019-01-01 PROCEDURE — 99213 OFFICE O/P EST LOW 20 MIN: CPT | Performed by: NURSE PRACTITIONER

## 2019-01-01 PROCEDURE — 99223 1ST HOSP IP/OBS HIGH 75: CPT | Mod: AI | Performed by: FAMILY MEDICINE

## 2019-01-01 PROCEDURE — 72131 CT LUMBAR SPINE W/O DYE: CPT

## 2019-01-01 PROCEDURE — 82803 BLOOD GASES ANY COMBINATION: CPT | Performed by: PHYSICIAN ASSISTANT

## 2019-01-01 PROCEDURE — 83880 ASSAY OF NATRIURETIC PEPTIDE: CPT | Performed by: PHYSICIAN ASSISTANT

## 2019-01-01 PROCEDURE — 25000132 ZZH RX MED GY IP 250 OP 250 PS 637: Mod: GY | Performed by: EMERGENCY MEDICINE

## 2019-01-01 PROCEDURE — 99232 SBSQ HOSP IP/OBS MODERATE 35: CPT | Performed by: PHYSICIAN ASSISTANT

## 2019-01-01 PROCEDURE — 99207 ZZC APP CREDIT; MD BILLING SHARED VISIT: CPT | Performed by: PHYSICIAN ASSISTANT

## 2019-01-01 PROCEDURE — 25000131 ZZH RX MED GY IP 250 OP 636 PS 637: Mod: GY | Performed by: PHYSICIAN ASSISTANT

## 2019-01-01 PROCEDURE — 29125 APPL SHORT ARM SPLINT STATIC: CPT | Mod: RT | Performed by: EMERGENCY MEDICINE

## 2019-01-01 PROCEDURE — 40000894 ZZH STATISTIC OT IP EVAL DEFER

## 2019-01-01 PROCEDURE — 72125 CT NECK SPINE W/O DYE: CPT

## 2019-01-01 PROCEDURE — 84145 PROCALCITONIN (PCT): CPT | Performed by: PHYSICIAN ASSISTANT

## 2019-01-01 PROCEDURE — 74176 CT ABD & PELVIS W/O CONTRAST: CPT

## 2019-01-01 PROCEDURE — 36415 COLL VENOUS BLD VENIPUNCTURE: CPT

## 2019-01-01 PROCEDURE — 94640 AIRWAY INHALATION TREATMENT: CPT

## 2019-01-01 PROCEDURE — V5299 HEARING SERVICE: HCPCS | Performed by: AUDIOLOGIST

## 2019-01-01 RX ORDER — LORATADINE 10 MG/1
10 TABLET ORAL DAILY
COMMUNITY
End: 2019-01-01

## 2019-01-01 RX ORDER — ONDANSETRON 4 MG/1
4 TABLET, ORALLY DISINTEGRATING ORAL EVERY 6 HOURS PRN
Status: DISCONTINUED | OUTPATIENT
Start: 2019-01-01 | End: 2019-01-01

## 2019-01-01 RX ORDER — SENNOSIDES 8.6 MG
8.6 TABLET ORAL 2 TIMES DAILY PRN
Status: DISCONTINUED | OUTPATIENT
Start: 2019-01-01 | End: 2019-01-01 | Stop reason: HOSPADM

## 2019-01-01 RX ORDER — OXYCODONE HYDROCHLORIDE 5 MG/1
2.5 TABLET ORAL 2 TIMES DAILY
Qty: 10 TABLET | Refills: 0 | Status: SHIPPED | OUTPATIENT
Start: 2019-01-01 | End: 2019-01-01

## 2019-01-01 RX ORDER — PROCHLORPERAZINE 25 MG
12.5 SUPPOSITORY, RECTAL RECTAL EVERY 12 HOURS PRN
Status: DISCONTINUED | OUTPATIENT
Start: 2019-01-01 | End: 2019-01-01 | Stop reason: HOSPADM

## 2019-01-01 RX ORDER — LACTOSE-REDUCED FOOD
1 LIQUID (ML) ORAL 2 TIMES DAILY
Qty: 60 EACH | Refills: 11 | Status: SHIPPED | OUTPATIENT
Start: 2019-01-01

## 2019-01-01 RX ORDER — ACETAMINOPHEN 500 MG
1000 TABLET ORAL ONCE
Status: COMPLETED | OUTPATIENT
Start: 2019-01-01 | End: 2019-01-01

## 2019-01-01 RX ORDER — WARFARIN SODIUM 2.5 MG/1
TABLET ORAL
Qty: 10 TABLET | Refills: 0 | COMMUNITY
Start: 2019-01-01

## 2019-01-01 RX ORDER — LORATADINE 10 MG/1
10 TABLET ORAL DAILY
Qty: 90 TABLET | Refills: 3 | Status: SHIPPED | OUTPATIENT
Start: 2019-01-01

## 2019-01-01 RX ORDER — SODIUM CHLORIDE, SODIUM LACTATE, POTASSIUM CHLORIDE, CALCIUM CHLORIDE 600; 310; 30; 20 MG/100ML; MG/100ML; MG/100ML; MG/100ML
INJECTION, SOLUTION INTRAVENOUS CONTINUOUS
Status: ACTIVE | OUTPATIENT
Start: 2019-01-01 | End: 2019-01-01

## 2019-01-01 RX ORDER — AMOXICILLIN 250 MG
2 CAPSULE ORAL AT BEDTIME
Qty: 60 TABLET | Refills: 0 | Status: ON HOLD | OUTPATIENT
Start: 2019-01-01 | End: 2019-01-01

## 2019-01-01 RX ORDER — DOCUSATE SODIUM 100 MG/1
100 CAPSULE, LIQUID FILLED ORAL 2 TIMES DAILY
Status: DISCONTINUED | OUTPATIENT
Start: 2019-01-01 | End: 2019-01-01 | Stop reason: HOSPADM

## 2019-01-01 RX ORDER — AMLODIPINE BESYLATE 10 MG/1
10 TABLET ORAL DAILY
DISCHARGE
Start: 2019-01-01

## 2019-01-01 RX ORDER — MAGNESIUM CARB/ALUMINUM HYDROX 105-160MG
148 TABLET,CHEWABLE ORAL
Status: DISCONTINUED | OUTPATIENT
Start: 2019-01-01 | End: 2019-01-01 | Stop reason: HOSPADM

## 2019-01-01 RX ORDER — ONDANSETRON 2 MG/ML
4 INJECTION INTRAMUSCULAR; INTRAVENOUS EVERY 6 HOURS PRN
Status: DISCONTINUED | OUTPATIENT
Start: 2019-01-01 | End: 2019-01-01 | Stop reason: HOSPADM

## 2019-01-01 RX ORDER — LIDOCAINE 40 MG/G
CREAM TOPICAL
Status: DISCONTINUED | OUTPATIENT
Start: 2019-01-01 | End: 2019-01-01 | Stop reason: HOSPADM

## 2019-01-01 RX ORDER — NALOXONE HYDROCHLORIDE 0.4 MG/ML
.1-.4 INJECTION, SOLUTION INTRAMUSCULAR; INTRAVENOUS; SUBCUTANEOUS
Status: DISCONTINUED | OUTPATIENT
Start: 2019-01-01 | End: 2019-01-01 | Stop reason: HOSPADM

## 2019-01-01 RX ORDER — FUROSEMIDE 20 MG
20 TABLET ORAL EVERY OTHER DAY
Qty: 45 TABLET | Refills: 3 | Status: SHIPPED | OUTPATIENT
Start: 2019-01-01

## 2019-01-01 RX ORDER — ACETAMINOPHEN 500 MG
1000 TABLET ORAL 3 TIMES DAILY
COMMUNITY

## 2019-01-01 RX ORDER — ONDANSETRON 4 MG/1
4 TABLET, ORALLY DISINTEGRATING ORAL EVERY 6 HOURS PRN
Status: DISCONTINUED | OUTPATIENT
Start: 2019-01-01 | End: 2019-01-01 | Stop reason: HOSPADM

## 2019-01-01 RX ORDER — LOPERAMIDE HYDROCHLORIDE 2 MG/1
2 TABLET ORAL 4 TIMES DAILY PRN
Qty: 30 TABLET | Refills: 1 | Status: SHIPPED | OUTPATIENT
Start: 2019-01-01

## 2019-01-01 RX ORDER — POLYETHYLENE GLYCOL 3350 17 G/17G
17 POWDER, FOR SOLUTION ORAL DAILY PRN
Status: DISCONTINUED | OUTPATIENT
Start: 2019-01-01 | End: 2019-01-01 | Stop reason: HOSPADM

## 2019-01-01 RX ORDER — AZITHROMYCIN 250 MG/1
500 TABLET, FILM COATED ORAL ONCE
Status: COMPLETED | OUTPATIENT
Start: 2019-01-01 | End: 2019-01-01

## 2019-01-01 RX ORDER — ACETAMINOPHEN 325 MG/1
650 TABLET ORAL EVERY 4 HOURS PRN
Status: DISCONTINUED | OUTPATIENT
Start: 2019-01-01 | End: 2019-01-01 | Stop reason: HOSPADM

## 2019-01-01 RX ORDER — SIMVASTATIN 20 MG
20 TABLET ORAL AT BEDTIME
Status: DISCONTINUED | OUTPATIENT
Start: 2019-01-01 | End: 2019-01-01 | Stop reason: HOSPADM

## 2019-01-01 RX ORDER — ACETAMINOPHEN 325 MG/1
650 TABLET ORAL 3 TIMES DAILY
Status: DISCONTINUED | OUTPATIENT
Start: 2019-01-01 | End: 2019-01-01 | Stop reason: HOSPADM

## 2019-01-01 RX ORDER — IOPAMIDOL 755 MG/ML
40 INJECTION, SOLUTION INTRAVASCULAR ONCE
Status: COMPLETED | OUTPATIENT
Start: 2019-01-01 | End: 2019-01-01

## 2019-01-01 RX ORDER — BISACODYL 5 MG
5 TABLET, DELAYED RELEASE (ENTERIC COATED) ORAL DAILY PRN
Status: DISCONTINUED | OUTPATIENT
Start: 2019-01-01 | End: 2019-01-01 | Stop reason: HOSPADM

## 2019-01-01 RX ORDER — MULTIVIT WITH MINERALS/LUTEIN
1000 TABLET ORAL DAILY
Qty: 90 CAPSULE | Refills: 3 | Status: SHIPPED | OUTPATIENT
Start: 2019-01-01

## 2019-01-01 RX ORDER — FLUORIDE TOOTHPASTE
TOOTHPASTE DENTAL
COMMUNITY

## 2019-01-01 RX ORDER — ACETAMINOPHEN 500 MG
500-1000 TABLET ORAL EVERY 6 HOURS PRN
Qty: 100 TABLET | Refills: 11 | Status: SHIPPED | OUTPATIENT
Start: 2019-01-01 | End: 2019-01-01

## 2019-01-01 RX ORDER — BISACODYL 5 MG
10 TABLET, DELAYED RELEASE (ENTERIC COATED) ORAL DAILY PRN
Status: DISCONTINUED | OUTPATIENT
Start: 2019-01-01 | End: 2019-01-01 | Stop reason: HOSPADM

## 2019-01-01 RX ORDER — AZITHROMYCIN 250 MG/1
250 TABLET, FILM COATED ORAL DAILY
Qty: 1 TABLET | Refills: 0 | DISCHARGE
Start: 2019-01-01 | End: 2019-01-01

## 2019-01-01 RX ORDER — CEFTRIAXONE SODIUM 1 G/50ML
1 INJECTION, SOLUTION INTRAVENOUS EVERY 24 HOURS
Status: DISCONTINUED | OUTPATIENT
Start: 2019-01-01 | End: 2019-01-01 | Stop reason: HOSPADM

## 2019-01-01 RX ORDER — LIDOCAINE 4 G/G
1 PATCH TOPICAL EVERY 24 HOURS
DISCHARGE
Start: 2019-01-01

## 2019-01-01 RX ORDER — METOPROLOL SUCCINATE 25 MG/1
25 TABLET, EXTENDED RELEASE ORAL 2 TIMES DAILY
Status: DISCONTINUED | OUTPATIENT
Start: 2019-01-01 | End: 2019-01-01 | Stop reason: HOSPADM

## 2019-01-01 RX ORDER — LACTOBACILLUS RHAMNOSUS GG 10B CELL
1 CAPSULE ORAL 2 TIMES DAILY
Status: DISCONTINUED | OUTPATIENT
Start: 2019-01-01 | End: 2019-01-01 | Stop reason: HOSPADM

## 2019-01-01 RX ORDER — ACETAMINOPHEN 325 MG/1
650 TABLET ORAL EVERY 4 HOURS PRN
Status: DISCONTINUED | OUTPATIENT
Start: 2019-01-01 | End: 2019-01-01

## 2019-01-01 RX ORDER — ONDANSETRON 2 MG/ML
4 INJECTION INTRAMUSCULAR; INTRAVENOUS EVERY 6 HOURS PRN
Status: DISCONTINUED | OUTPATIENT
Start: 2019-01-01 | End: 2019-01-01

## 2019-01-01 RX ORDER — BISACODYL 5 MG
15 TABLET, DELAYED RELEASE (ENTERIC COATED) ORAL DAILY PRN
Status: DISCONTINUED | OUTPATIENT
Start: 2019-01-01 | End: 2019-01-01 | Stop reason: HOSPADM

## 2019-01-01 RX ORDER — BISACODYL 10 MG
10 SUPPOSITORY, RECTAL RECTAL ONCE
Status: COMPLETED | OUTPATIENT
Start: 2019-01-01 | End: 2019-01-01

## 2019-01-01 RX ORDER — IPRATROPIUM BROMIDE AND ALBUTEROL SULFATE 2.5; .5 MG/3ML; MG/3ML
3 SOLUTION RESPIRATORY (INHALATION) EVERY 4 HOURS PRN
Status: DISCONTINUED | OUTPATIENT
Start: 2019-01-01 | End: 2019-01-01 | Stop reason: HOSPADM

## 2019-01-01 RX ORDER — FUROSEMIDE 20 MG
20 TABLET ORAL EVERY OTHER DAY
Status: DISCONTINUED | OUTPATIENT
Start: 2019-01-01 | End: 2019-01-01 | Stop reason: HOSPADM

## 2019-01-01 RX ORDER — POLYETHYLENE GLYCOL 3350 17 G/17G
17 POWDER, FOR SOLUTION ORAL ONCE
Status: COMPLETED | OUTPATIENT
Start: 2019-01-01 | End: 2019-01-01

## 2019-01-01 RX ORDER — CEFUROXIME AXETIL 500 MG/1
500 TABLET ORAL 2 TIMES DAILY
Qty: 14 TABLET | Refills: 0 | DISCHARGE
Start: 2019-01-01 | End: 2019-01-01

## 2019-01-01 RX ORDER — METOPROLOL SUCCINATE 50 MG/1
50 TABLET, EXTENDED RELEASE ORAL 2 TIMES DAILY
COMMUNITY
End: 2019-01-01

## 2019-01-01 RX ORDER — POLYETHYLENE GLYCOL 3350 17 G/17G
17 POWDER, FOR SOLUTION ORAL DAILY PRN
Status: DISCONTINUED | OUTPATIENT
Start: 2019-01-01 | End: 2019-01-01

## 2019-01-01 RX ORDER — AZITHROMYCIN 250 MG/1
250 TABLET, FILM COATED ORAL DAILY
Status: DISCONTINUED | OUTPATIENT
Start: 2019-01-01 | End: 2019-01-01 | Stop reason: HOSPADM

## 2019-01-01 RX ORDER — LOPERAMIDE HCL 2 MG
2 CAPSULE ORAL 4 TIMES DAILY PRN
Status: DISCONTINUED | OUTPATIENT
Start: 2019-01-01 | End: 2019-01-01 | Stop reason: HOSPADM

## 2019-01-01 RX ORDER — ACETAMINOPHEN 650 MG/1
650 SUPPOSITORY RECTAL EVERY 4 HOURS PRN
Status: DISCONTINUED | OUTPATIENT
Start: 2019-01-01 | End: 2019-01-01 | Stop reason: HOSPADM

## 2019-01-01 RX ORDER — OXYCODONE HYDROCHLORIDE 5 MG/1
5-10 TABLET ORAL
Status: DISCONTINUED | OUTPATIENT
Start: 2019-01-01 | End: 2019-01-01

## 2019-01-01 RX ORDER — ONDANSETRON 2 MG/ML
4 INJECTION INTRAMUSCULAR; INTRAVENOUS EVERY 30 MIN PRN
Status: DISCONTINUED | OUTPATIENT
Start: 2019-01-01 | End: 2019-01-01 | Stop reason: DRUGHIGH

## 2019-01-01 RX ORDER — LORATADINE 10 MG/1
10 TABLET ORAL DAILY
Status: DISCONTINUED | OUTPATIENT
Start: 2019-01-01 | End: 2019-01-01 | Stop reason: HOSPADM

## 2019-01-01 RX ORDER — TRAMADOL HYDROCHLORIDE 50 MG/1
50 TABLET ORAL EVERY 6 HOURS PRN
Status: SHIPPED | DISCHARGE
Start: 2019-01-01

## 2019-01-01 RX ORDER — NAPROXEN 250 MG/1
250 TABLET ORAL EVERY 12 HOURS PRN
Status: DISCONTINUED | OUTPATIENT
Start: 2019-01-01 | End: 2019-01-01 | Stop reason: HOSPADM

## 2019-01-01 RX ORDER — SODIUM CHLORIDE, SODIUM LACTATE, POTASSIUM CHLORIDE, CALCIUM CHLORIDE 600; 310; 30; 20 MG/100ML; MG/100ML; MG/100ML; MG/100ML
INJECTION, SOLUTION INTRAVENOUS CONTINUOUS
Status: DISCONTINUED | OUTPATIENT
Start: 2019-01-01 | End: 2019-01-01

## 2019-01-01 RX ORDER — LACTOSE-REDUCED FOOD
1 LIQUID (ML) ORAL 2 TIMES DAILY
Status: DISCONTINUED | OUTPATIENT
Start: 2019-01-01 | End: 2019-01-01 | Stop reason: HOSPADM

## 2019-01-01 RX ORDER — HYDROMORPHONE HYDROCHLORIDE 1 MG/ML
0.3 INJECTION, SOLUTION INTRAMUSCULAR; INTRAVENOUS; SUBCUTANEOUS
Status: DISCONTINUED | OUTPATIENT
Start: 2019-01-01 | End: 2019-01-01

## 2019-01-01 RX ORDER — ACETAMINOPHEN 500 MG
1000 TABLET ORAL 3 TIMES DAILY
Status: DISCONTINUED | OUTPATIENT
Start: 2019-01-01 | End: 2019-01-01 | Stop reason: HOSPADM

## 2019-01-01 RX ORDER — ALUMINA, MAGNESIA, AND SIMETHICONE 2400; 2400; 240 MG/30ML; MG/30ML; MG/30ML
20 SUSPENSION ORAL
Status: DISCONTINUED | OUTPATIENT
Start: 2019-01-01 | End: 2019-01-01 | Stop reason: HOSPADM

## 2019-01-01 RX ORDER — ONDANSETRON 4 MG/1
4 TABLET, ORALLY DISINTEGRATING ORAL EVERY 6 HOURS PRN
Qty: 20 TABLET | Refills: 0 | Status: ON HOLD | OUTPATIENT
Start: 2019-01-01 | End: 2019-01-01

## 2019-01-01 RX ORDER — PROCHLORPERAZINE MALEATE 5 MG
5 TABLET ORAL EVERY 6 HOURS PRN
Status: DISCONTINUED | OUTPATIENT
Start: 2019-01-01 | End: 2019-01-01 | Stop reason: HOSPADM

## 2019-01-01 RX ORDER — WARFARIN SODIUM 5 MG/1
5 TABLET ORAL
Status: DISCONTINUED | OUTPATIENT
Start: 2019-01-01 | End: 2019-01-01 | Stop reason: CLARIF

## 2019-01-01 RX ORDER — LIDOCAINE 4 G/G
PATCH TOPICAL
DISCHARGE
Start: 2019-01-01

## 2019-01-01 RX ORDER — WARFARIN SODIUM 2.5 MG/1
2.5 TABLET ORAL
Status: COMPLETED | OUTPATIENT
Start: 2019-01-01 | End: 2019-01-01

## 2019-01-01 RX ORDER — METOPROLOL SUCCINATE 50 MG/1
TABLET, EXTENDED RELEASE ORAL
Qty: 180 TABLET | Refills: 3 | Status: SHIPPED | OUTPATIENT
Start: 2019-01-01

## 2019-01-01 RX ORDER — AMOXICILLIN 250 MG
2 CAPSULE ORAL AT BEDTIME
Qty: 60 TABLET | Refills: 0 | DISCHARGE
Start: 2019-01-01

## 2019-01-01 RX ORDER — METOPROLOL SUCCINATE 50 MG/1
50 TABLET, EXTENDED RELEASE ORAL 2 TIMES DAILY
Status: DISCONTINUED | OUTPATIENT
Start: 2019-01-01 | End: 2019-01-01

## 2019-01-01 RX ORDER — ONDANSETRON 4 MG/1
4 TABLET, ORALLY DISINTEGRATING ORAL ONCE
Status: COMPLETED | OUTPATIENT
Start: 2019-01-01 | End: 2019-01-01

## 2019-01-01 RX ORDER — MAGNESIUM HYDROXIDE/ALUMINUM HYDROXICE/SIMETHICONE 120; 1200; 1200 MG/30ML; MG/30ML; MG/30ML
20 SUSPENSION ORAL
COMMUNITY

## 2019-01-01 RX ORDER — OXYCODONE HYDROCHLORIDE 5 MG/1
2.5 TABLET ORAL 2 TIMES DAILY
Qty: 10 TABLET | Refills: 0 | OUTPATIENT
Start: 2019-01-01

## 2019-01-01 RX ORDER — LIDOCAINE 4 G/G
1 PATCH TOPICAL
Status: DISCONTINUED | OUTPATIENT
Start: 2019-01-01 | End: 2019-01-01 | Stop reason: HOSPADM

## 2019-01-01 RX ORDER — SIMVASTATIN 20 MG
TABLET ORAL
Qty: 90 TABLET | Refills: 3 | Status: SHIPPED | OUTPATIENT
Start: 2019-01-01

## 2019-01-01 RX ORDER — NALOXONE HYDROCHLORIDE 0.4 MG/ML
.1-.4 INJECTION, SOLUTION INTRAMUSCULAR; INTRAVENOUS; SUBCUTANEOUS
Status: DISCONTINUED | OUTPATIENT
Start: 2019-01-01 | End: 2019-01-01

## 2019-01-01 RX ORDER — WARFARIN SODIUM 2.5 MG/1
TABLET ORAL
Qty: 10 TABLET | Refills: 0 | Status: SHIPPED | OUTPATIENT
Start: 2019-01-01 | End: 2019-01-01

## 2019-01-01 RX ORDER — AMOXICILLIN 250 MG
2 CAPSULE ORAL AT BEDTIME
Status: DISCONTINUED | OUTPATIENT
Start: 2019-01-01 | End: 2019-01-01 | Stop reason: HOSPADM

## 2019-01-01 RX ORDER — TRAMADOL HYDROCHLORIDE 50 MG/1
25-50 TABLET ORAL EVERY 6 HOURS PRN
Status: SHIPPED | DISCHARGE
Start: 2019-01-01 | End: 2019-01-01

## 2019-01-01 RX ADMIN — AZITHROMYCIN 250 MG: 250 TABLET, FILM COATED ORAL at 21:07

## 2019-01-01 RX ADMIN — ACETAMINOPHEN 650 MG: 325 TABLET, FILM COATED ORAL at 08:19

## 2019-01-01 RX ADMIN — ACETAMINOPHEN 650 MG: 325 TABLET, FILM COATED ORAL at 20:24

## 2019-01-01 RX ADMIN — Medication 1 CAPSULE: at 08:18

## 2019-01-01 RX ADMIN — WARFARIN SODIUM 2.5 MG: 2.5 TABLET ORAL at 17:38

## 2019-01-01 RX ADMIN — ACETAMINOPHEN 650 MG: 325 TABLET, FILM COATED ORAL at 09:17

## 2019-01-01 RX ADMIN — ALUMINUM HYDROXIDE, MAGNESIUM HYDROXIDE, AND DIMETHICONE 20 ML: 400; 400; 40 SUSPENSION ORAL at 09:26

## 2019-01-01 RX ADMIN — ALUMINUM HYDROXIDE, MAGNESIUM HYDROXIDE, AND DIMETHICONE 20 ML: 400; 400; 40 SUSPENSION ORAL at 13:16

## 2019-01-01 RX ADMIN — ACETAMINOPHEN 650 MG: 325 TABLET, FILM COATED ORAL at 13:35

## 2019-01-01 RX ADMIN — METOPROLOL SUCCINATE 50 MG: 50 TABLET, EXTENDED RELEASE ORAL at 09:13

## 2019-01-01 RX ADMIN — ACETAMINOPHEN 1000 MG: 500 TABLET, FILM COATED ORAL at 22:30

## 2019-01-01 RX ADMIN — ALUMINUM HYDROXIDE, MAGNESIUM HYDROXIDE, AND DIMETHICONE 20 ML: 400; 400; 40 SUSPENSION ORAL at 17:06

## 2019-01-01 RX ADMIN — AZITHROMYCIN 500 MG: 250 TABLET, FILM COATED ORAL at 22:31

## 2019-01-01 RX ADMIN — ACETAMINOPHEN 1000 MG: 500 TABLET, FILM COATED ORAL at 19:29

## 2019-01-01 RX ADMIN — BISACODYL 10 MG: 10 SUPPOSITORY RECTAL at 23:32

## 2019-01-01 RX ADMIN — ACETAMINOPHEN 1000 MG: 500 TABLET, FILM COATED ORAL at 08:47

## 2019-01-01 RX ADMIN — ALUMINUM HYDROXIDE, MAGNESIUM HYDROXIDE, AND DIMETHICONE 20 ML: 400; 400; 40 SUSPENSION ORAL at 13:27

## 2019-01-01 RX ADMIN — Medication 1 CAPSULE: at 09:00

## 2019-01-01 RX ADMIN — SIMVASTATIN 20 MG: 20 TABLET, FILM COATED ORAL at 21:07

## 2019-01-01 RX ADMIN — Medication 62.5 MG: at 09:22

## 2019-01-01 RX ADMIN — SODIUM CHLORIDE, POTASSIUM CHLORIDE, SODIUM LACTATE AND CALCIUM CHLORIDE: 600; 310; 30; 20 INJECTION, SOLUTION INTRAVENOUS at 15:07

## 2019-01-01 RX ADMIN — Medication 2.5 MG: at 04:05

## 2019-01-01 RX ADMIN — LORATADINE 10 MG: 10 TABLET ORAL at 08:25

## 2019-01-01 RX ADMIN — Medication 25 MG: at 02:05

## 2019-01-01 RX ADMIN — DOCUSATE SODIUM 100 MG: 100 CAPSULE, LIQUID FILLED ORAL at 09:00

## 2019-01-01 RX ADMIN — ACETAMINOPHEN 1000 MG: 500 TABLET, FILM COATED ORAL at 09:12

## 2019-01-01 RX ADMIN — ALUMINUM HYDROXIDE, MAGNESIUM HYDROXIDE, AND DIMETHICONE 20 ML: 400; 400; 40 SUSPENSION ORAL at 08:47

## 2019-01-01 RX ADMIN — SENNOSIDES AND DOCUSATE SODIUM 2 TABLET: 8.6; 5 TABLET ORAL at 22:31

## 2019-01-01 RX ADMIN — ACETAMINOPHEN 1000 MG: 500 TABLET, FILM COATED ORAL at 08:25

## 2019-01-01 RX ADMIN — METOPROLOL SUCCINATE 50 MG: 50 TABLET, EXTENDED RELEASE ORAL at 08:25

## 2019-01-01 RX ADMIN — Medication 1.25 MG: at 22:36

## 2019-01-01 RX ADMIN — SIMVASTATIN 20 MG: 20 TABLET, FILM COATED ORAL at 21:31

## 2019-01-01 RX ADMIN — LIDOCAINE 1 PATCH: 560 PATCH PERCUTANEOUS; TOPICAL; TRANSDERMAL at 09:36

## 2019-01-01 RX ADMIN — ONDANSETRON 4 MG: 4 TABLET, ORALLY DISINTEGRATING ORAL at 19:21

## 2019-01-01 RX ADMIN — Medication 25 MG: at 01:14

## 2019-01-01 RX ADMIN — METOPROLOL SUCCINATE 25 MG: 25 TABLET, EXTENDED RELEASE ORAL at 09:17

## 2019-01-01 RX ADMIN — ACETAMINOPHEN 1000 MG: 500 TABLET, FILM COATED ORAL at 13:36

## 2019-01-01 RX ADMIN — Medication 25 MG: at 09:13

## 2019-01-01 RX ADMIN — METOPROLOL SUCCINATE 50 MG: 50 TABLET, EXTENDED RELEASE ORAL at 08:47

## 2019-01-01 RX ADMIN — Medication 0.3 MG: at 03:50

## 2019-01-01 RX ADMIN — SIMVASTATIN 20 MG: 20 TABLET, FILM COATED ORAL at 21:11

## 2019-01-01 RX ADMIN — CEFTRIAXONE SODIUM 1 G: 1 INJECTION, SOLUTION INTRAVENOUS at 21:09

## 2019-01-01 RX ADMIN — AZITHROMYCIN 250 MG: 250 TABLET, FILM COATED ORAL at 21:31

## 2019-01-01 RX ADMIN — ACETAMINOPHEN 1000 MG: 500 TABLET, FILM COATED ORAL at 21:30

## 2019-01-01 RX ADMIN — METOPROLOL SUCCINATE 25 MG: 25 TABLET, EXTENDED RELEASE ORAL at 08:18

## 2019-01-01 RX ADMIN — Medication 2.5 MG: at 08:47

## 2019-01-01 RX ADMIN — Medication 1 BOTTLE: at 19:23

## 2019-01-01 RX ADMIN — ACETAMINOPHEN 1000 MG: 500 TABLET, FILM COATED ORAL at 13:35

## 2019-01-01 RX ADMIN — METOPROLOL SUCCINATE 25 MG: 25 TABLET, EXTENDED RELEASE ORAL at 02:15

## 2019-01-01 RX ADMIN — LORATADINE 10 MG: 10 TABLET ORAL at 09:22

## 2019-01-01 RX ADMIN — SODIUM CHLORIDE, POTASSIUM CHLORIDE, SODIUM LACTATE AND CALCIUM CHLORIDE 500 ML: 600; 310; 30; 20 INJECTION, SOLUTION INTRAVENOUS at 09:01

## 2019-01-01 RX ADMIN — METOPROLOL SUCCINATE 25 MG: 25 TABLET, EXTENDED RELEASE ORAL at 08:46

## 2019-01-01 RX ADMIN — SIMVASTATIN 20 MG: 20 TABLET, FILM COATED ORAL at 20:00

## 2019-01-01 RX ADMIN — ONDANSETRON 4 MG: 2 INJECTION INTRAMUSCULAR; INTRAVENOUS at 04:01

## 2019-01-01 RX ADMIN — LORATADINE 10 MG: 10 TABLET ORAL at 09:13

## 2019-01-01 RX ADMIN — SODIUM CHLORIDE 50 ML: 9 INJECTION, SOLUTION INTRAVENOUS at 20:06

## 2019-01-01 RX ADMIN — FUROSEMIDE 20 MG: 20 TABLET ORAL at 09:22

## 2019-01-01 RX ADMIN — Medication 0.5 MG: at 17:55

## 2019-01-01 RX ADMIN — POLYETHYLENE GLYCOL 3350 17 G: 17 POWDER, FOR SOLUTION ORAL at 02:16

## 2019-01-01 RX ADMIN — DOCUSATE SODIUM 100 MG: 100 CAPSULE, LIQUID FILLED ORAL at 08:18

## 2019-01-01 RX ADMIN — ACETAMINOPHEN 650 MG: 325 TABLET, FILM COATED ORAL at 09:53

## 2019-01-01 RX ADMIN — Medication 1 BOTTLE: at 22:40

## 2019-01-01 RX ADMIN — AZITHROMYCIN 250 MG: 250 TABLET, FILM COATED ORAL at 21:11

## 2019-01-01 RX ADMIN — POLYETHYLENE GLYCOL 3350 17 G: 17 POWDER, FOR SOLUTION ORAL at 19:53

## 2019-01-01 RX ADMIN — METOPROLOL SUCCINATE 50 MG: 50 TABLET, EXTENDED RELEASE ORAL at 22:31

## 2019-01-01 RX ADMIN — METOPROLOL SUCCINATE 50 MG: 50 TABLET, EXTENDED RELEASE ORAL at 19:17

## 2019-01-01 RX ADMIN — LORATADINE 10 MG: 10 TABLET ORAL at 08:18

## 2019-01-01 RX ADMIN — ACETAMINOPHEN 1000 MG: 500 TABLET, FILM COATED ORAL at 09:22

## 2019-01-01 RX ADMIN — Medication 1 BOTTLE: at 09:17

## 2019-01-01 RX ADMIN — SIMVASTATIN 20 MG: 20 TABLET, FILM COATED ORAL at 20:24

## 2019-01-01 RX ADMIN — Medication 25 MG: at 17:07

## 2019-01-01 RX ADMIN — Medication 25 MG: at 17:55

## 2019-01-01 RX ADMIN — Medication 2.5 MG: at 22:31

## 2019-01-01 RX ADMIN — ACETAMINOPHEN 1000 MG: 500 TABLET, FILM COATED ORAL at 13:19

## 2019-01-01 RX ADMIN — Medication 1 BOTTLE: at 09:23

## 2019-01-01 RX ADMIN — ALUMINUM HYDROXIDE, MAGNESIUM HYDROXIDE, AND DIMETHICONE 20 ML: 400; 400; 40 SUSPENSION ORAL at 17:55

## 2019-01-01 RX ADMIN — SENNOSIDES AND DOCUSATE SODIUM 2 TABLET: 8.6; 5 TABLET ORAL at 21:11

## 2019-01-01 RX ADMIN — SODIUM CHLORIDE, POTASSIUM CHLORIDE, SODIUM LACTATE AND CALCIUM CHLORIDE: 600; 310; 30; 20 INJECTION, SOLUTION INTRAVENOUS at 22:05

## 2019-01-01 RX ADMIN — ACETAMINOPHEN 1000 MG: 500 TABLET, FILM COATED ORAL at 19:20

## 2019-01-01 RX ADMIN — FUROSEMIDE 20 MG: 20 TABLET ORAL at 14:20

## 2019-01-01 RX ADMIN — SENNOSIDES AND DOCUSATE SODIUM 2 TABLET: 8.6; 5 TABLET ORAL at 21:30

## 2019-01-01 RX ADMIN — DOCUSATE SODIUM 100 MG: 100 CAPSULE, LIQUID FILLED ORAL at 19:53

## 2019-01-01 RX ADMIN — Medication 1 BOTTLE: at 21:11

## 2019-01-01 RX ADMIN — SIMVASTATIN 20 MG: 20 TABLET, FILM COATED ORAL at 22:31

## 2019-01-01 RX ADMIN — Medication 62.5 MG: at 21:30

## 2019-01-01 RX ADMIN — ALUMINUM HYDROXIDE, MAGNESIUM HYDROXIDE, AND DIMETHICONE 20 ML: 400; 400; 40 SUSPENSION ORAL at 09:13

## 2019-01-01 RX ADMIN — ALUMINUM HYDROXIDE, MAGNESIUM HYDROXIDE, AND DIMETHICONE 20 ML: 400; 400; 40 SUSPENSION ORAL at 17:29

## 2019-01-01 RX ADMIN — ONDANSETRON 4 MG: 4 TABLET, ORALLY DISINTEGRATING ORAL at 05:20

## 2019-01-01 RX ADMIN — Medication 1 CAPSULE: at 19:54

## 2019-01-01 RX ADMIN — Medication 1 BOTTLE: at 08:27

## 2019-01-01 RX ADMIN — CEFTRIAXONE SODIUM 1 G: 1 INJECTION, SOLUTION INTRAVENOUS at 21:31

## 2019-01-01 RX ADMIN — ACETAMINOPHEN 1000 MG: 500 TABLET, FILM COATED ORAL at 14:26

## 2019-01-01 RX ADMIN — Medication 25 MG: at 12:26

## 2019-01-01 RX ADMIN — FUROSEMIDE 20 MG: 20 TABLET ORAL at 09:13

## 2019-01-01 RX ADMIN — SODIUM CHLORIDE, POTASSIUM CHLORIDE, SODIUM LACTATE AND CALCIUM CHLORIDE: 600; 310; 30; 20 INJECTION, SOLUTION INTRAVENOUS at 12:51

## 2019-01-01 RX ADMIN — METOPROLOL SUCCINATE 25 MG: 25 TABLET, EXTENDED RELEASE ORAL at 19:53

## 2019-01-01 RX ADMIN — ACETAMINOPHEN 1000 MG: 500 TABLET, FILM COATED ORAL at 21:11

## 2019-01-01 RX ADMIN — CEFTRIAXONE SODIUM 1 G: 1 INJECTION, SOLUTION INTRAVENOUS at 22:29

## 2019-01-01 RX ADMIN — ACETAMINOPHEN 650 MG: 325 TABLET, FILM COATED ORAL at 08:46

## 2019-01-01 RX ADMIN — ALUMINUM HYDROXIDE, MAGNESIUM HYDROXIDE, AND DIMETHICONE 20 ML: 400; 400; 40 SUSPENSION ORAL at 08:25

## 2019-01-01 RX ADMIN — IOPAMIDOL 40 ML: 755 INJECTION, SOLUTION INTRAVENOUS at 20:06

## 2019-01-01 RX ADMIN — METOPROLOL SUCCINATE 50 MG: 50 TABLET, EXTENDED RELEASE ORAL at 21:11

## 2019-01-01 RX ADMIN — ALUMINUM HYDROXIDE, MAGNESIUM HYDROXIDE, AND DIMETHICONE 20 ML: 400; 400; 40 SUSPENSION ORAL at 11:58

## 2019-01-01 RX ADMIN — METOPROLOL SUCCINATE 25 MG: 25 TABLET, EXTENDED RELEASE ORAL at 20:24

## 2019-01-01 RX ADMIN — Medication 1.25 MG: at 17:06

## 2019-01-01 RX ADMIN — LORATADINE 10 MG: 10 TABLET ORAL at 08:47

## 2019-01-01 RX ADMIN — ACETAMINOPHEN 650 MG: 325 TABLET, FILM COATED ORAL at 14:20

## 2019-01-01 RX ADMIN — ACETAMINOPHEN 650 MG: 325 TABLET, FILM COATED ORAL at 14:33

## 2019-01-01 RX ADMIN — ALUMINUM HYDROXIDE, MAGNESIUM HYDROXIDE, AND DIMETHICONE 20 ML: 400; 400; 40 SUSPENSION ORAL at 13:03

## 2019-01-01 RX ADMIN — Medication 1 BOTTLE: at 10:47

## 2019-01-01 RX ADMIN — CEFTRIAXONE SODIUM 1 G: 1 INJECTION, SOLUTION INTRAVENOUS at 21:17

## 2019-01-01 RX ADMIN — SIMVASTATIN 20 MG: 20 TABLET, FILM COATED ORAL at 02:15

## 2019-01-01 RX ADMIN — IPRATROPIUM BROMIDE AND ALBUTEROL SULFATE 3 ML: .5; 3 SOLUTION RESPIRATORY (INHALATION) at 15:33

## 2019-01-01 RX ADMIN — LORATADINE 10 MG: 10 TABLET ORAL at 08:46

## 2019-01-01 RX ADMIN — ACETAMINOPHEN 650 MG: 325 TABLET, FILM COATED ORAL at 02:04

## 2019-01-01 RX ADMIN — ACETAMINOPHEN 650 MG: 325 TABLET, FILM COATED ORAL at 19:53

## 2019-01-01 ASSESSMENT — ENCOUNTER SYMPTOMS
FEVER: 0
DIAPHORESIS: 0
HEMATOLOGIC/LYMPHATIC NEGATIVE: 1
DIARRHEA: 0
PSYCHIATRIC NEGATIVE: 1
WHEEZING: 0
NUMBNESS: 0
COUGH: 0
WEAKNESS: 1
CONSTITUTIONAL NEGATIVE: 1
LIGHT-HEADEDNESS: 0
HEADACHES: 0
CARDIOVASCULAR NEGATIVE: 1
FREQUENCY: 0
DYSURIA: 0
ENDOCRINE NEGATIVE: 1
ABDOMINAL PAIN: 1
APPETITE CHANGE: 1
SEIZURES: 0
VOMITING: 0
RESPIRATORY NEGATIVE: 1
CONFUSION: 1
WOUND: 0
ALLERGIC/IMMUNOLOGIC NEGATIVE: 1
ACTIVITY CHANGE: 1
ABDOMINAL PAIN: 1
ABDOMINAL DISTENTION: 1
NAUSEA: 0
COLOR CHANGE: 0
BACK PAIN: 1
DIZZINESS: 0
EYES NEGATIVE: 1
NEUROLOGICAL NEGATIVE: 1
SHORTNESS OF BREATH: 0
MUSCULOSKELETAL NEGATIVE: 1

## 2019-01-01 ASSESSMENT — ACTIVITIES OF DAILY LIVING (ADL)
ADLS_ACUITY_SCORE: 24
ADLS_ACUITY_SCORE: 24
ADLS_ACUITY_SCORE: 25
ADLS_ACUITY_SCORE: 23
ADLS_ACUITY_SCORE: 24
RETIRED_COMMUNICATION: 2-->DIFFICULTY SPEAKING (NOT RELATED TO LANGUAGE BARRIER)
ADLS_ACUITY_SCORE: 24
WHICH_OF_THE_ABOVE_FUNCTIONAL_RISKS_HAD_A_RECENT_ONSET_OR_CHANGE?: AMBULATION;TRANSFERRING
ADLS_ACUITY_SCORE: 24
DEPENDENT_IADLS:: CLEANING;COOKING;LAUNDRY;SHOPPING;MEAL PREPARATION;MEDICATION MANAGEMENT;MONEY MANAGEMENT;TRANSPORTATION
ADLS_ACUITY_SCORE: 23
ADLS_ACUITY_SCORE: 23
ADLS_ACUITY_SCORE: 24
ADLS_ACUITY_SCORE: 23
SWALLOWING: 0-->SWALLOWS FOODS/LIQUIDS WITHOUT DIFFICULTY
ADLS_ACUITY_SCORE: 28
ADLS_ACUITY_SCORE: 23
ADLS_ACUITY_SCORE: 24
RETIRED_EATING: 0-->INDEPENDENT
ADLS_ACUITY_SCORE: 24
ADLS_ACUITY_SCORE: 28
TOILETING: 2-->ASSISTIVE PERSON
ADLS_ACUITY_SCORE: 26
BATHING: 2-->ASSISTIVE PERSON
ADLS_ACUITY_SCORE: 24
ADLS_ACUITY_SCORE: 23
ADLS_ACUITY_SCORE: 28
ADLS_ACUITY_SCORE: 24
ADLS_ACUITY_SCORE: 28
ADLS_ACUITY_SCORE: 24
DRESS: 2-->ASSISTIVE PERSON
ADLS_ACUITY_SCORE: 28
ADLS_ACUITY_SCORE: 25

## 2019-01-01 ASSESSMENT — MIFFLIN-ST. JEOR
SCORE: 750
SCORE: 738.34
SCORE: 774
SCORE: 744.24
SCORE: 756.49
SCORE: 751
SCORE: 766.93

## 2019-01-08 NOTE — LETTER
Transition Communication Hand-off for Care Transitions to Next Level of Care Provider    Name: Shanika Stahl  : 1924  MRN #: [id number]  Primary Care Provider: Mliena Jaime  Primary Care MD Name: Addison  Primary Clinic: [address] [place] [id number]  [facility] Name:  [place]  Reason for Hospitalization:  Abdominal distention [R14.0]  Admit Date/Time: 2019  8:23 PM  Discharge Date: 1/10/19  Payor Source: Payor: MEDICARE / Plan: MEDICARE / Product Type: Medicare /     Readmission Assessment Measure (KLAUDIA) Risk Score/category: none         Reason for Communication Hand-off Referral: Fragility    Discharge Plan:return to group home       Concern for non-adherence with plan of care:   Y/N no  Follow-up specialty is recommended: No    Follow-up plan:    Future Appointments   Date Time Provider Department Center   2019  2:30 PM Gregoria Heath AuD [facility]   2019  3:00 PM [facility]   3/13/2019 12:00 AM [facility]     Key Recommendations:  Pt will discharge back to her group home, [facility] (Phone: [phone] Fax:[phone]) tomorrow with [facility]  (phone: [phone] Fax: [phone]). Sons are supportive.     Mikayla Santos MSW, LICSW, [phone]    AVS/Discharge Summary is the source of truth; this is a helpful guide for improved communication of patient story

## 2019-01-09 PROBLEM — R01.1 HEART MURMUR: Status: ACTIVE | Noted: 2019-01-01

## 2019-01-09 PROBLEM — E87.1 HYPONATREMIA: Status: RESOLVED | Noted: 2018-07-13 | Resolved: 2019-01-01

## 2019-01-09 PROBLEM — N93.9 VAGINAL BLEEDING: Status: RESOLVED | Noted: 2018-07-13 | Resolved: 2019-01-01

## 2019-01-09 PROBLEM — I27.20 PULMONARY HYPERTENSION (H): Status: ACTIVE | Noted: 2017-07-31

## 2019-01-09 PROBLEM — R82.71 ASYMPTOMATIC BACTERIURIA: Status: RESOLVED | Noted: 2018-07-13 | Resolved: 2019-01-01

## 2019-01-09 PROBLEM — K59.00 CONSTIPATION: Status: ACTIVE | Noted: 2019-01-01

## 2019-01-09 PROBLEM — S22.081D: Status: ACTIVE | Noted: 2018-07-13

## 2019-01-09 PROBLEM — R14.0 ABDOMINAL DISTENTION: Status: ACTIVE | Noted: 2019-01-01

## 2019-01-09 NOTE — CONSULTS
CARE TRANSITION SOCIAL WORK INITIAL ASSESSMENT:      Met with: Patient and Family.    DATA  Principal Problem:    Constipation  Active Problems:    Renal hypertension    Hyperlipidemia LDL goal <100    History of cerebral embolism with cerebral infarction - 2014    Long term current use of anticoagulant therapy    Chronic atrial fibrillation (H)    Pacemaker    Pulmonary hypertension (H)    Closed stable burst fracture of eleventh thoracic vertebra with routine healing    Abdominal distention    Heart murmur, tricuspid regurgitation       Primary Care Clinic [facility]: [facility]  Primary Care MD Name: Addison  Contact information and PCP information verified: Yes      ASSESSMENT  Cognitive Status: awake, alert and oriented.       Resources List: Home Care              Description of Support System: Supportive, Involved   Who is your support system?: Children, Facility resident(s)/Staff   Support Assessment: Adequate family and caregiver support, Adequate social supports   Insurance Concerns: No Insurance issues identified        This writer met with pt and with pts son introduced self and role. Discussed discharge planning and medicare guidelines in regards to home care and SNF benefits. Pt lives at [facility] (Phone: [phone] Fax:[phone]). She plans on returning there when stable. Pt reports that the [facility] helps her with bathing, medications, cleaning, meals. Pt has had home care in the past with [facility]  (phone: [phone] Fax: [phone]). Pt and family would like another referral placed for PT as pt has been feeling more weak. Referral placed and preliminary information faxed to home care.    This writer did speak with RN at [facility], they are in agreement with her return when stable, [facility] requesting orders PRIOR to discharge.       PLAN    AFL with home care        Mikayla VITALE, [facility], [facility] [phone]

## 2019-01-09 NOTE — PHARMACY-ANTICOAGULATION SERVICE
Clinical Pharmacy - Warfarin Dosing Consult     Pharmacy has been consulted to manage this patient s warfarin therapy.  Indication: Atrial Fibrillation  Therapy Goal: INR 2-3  OP Anticoag Clinic: [facility]  Warfarin Prior to Admission: Yes  Warfarin PTA Regimen: 2.5mg Mon,Fri; 5mg rest of week  Recent documented change in oral intake/nutrition: Unknown    INR   Date Value Ref Range Status   01/08/2019 2.45  Final   12/11/2018 2.39  Final       Recommend warfarin 0 mg now.  Patient took dose 1/8.  Pharmacy will monitor Shanika Stahl daily and order warfarin doses to achieve specified goal.      Please contact pharmacy as soon as possible if the warfarin needs to be held for a procedure or if the warfarin goals change.      Taylor Naqvi, PharmD

## 2019-01-09 NOTE — ED NOTES
Pt arrived via triage, in WC, accomp. By son.  Pt and son state assisted living wanted her to be see for Abd pain, and abd distention.  Pt with h/o CHAD in past.  Pt denies nausea or vomiting.       Abd: Soft, distended, +BS through out, some increased pain with palpation over the RUQ.    MD in room for H & P.  PLAN : XR abd.  Then US if needed.

## 2019-01-09 NOTE — PLAN OF CARE
Patient c/o some abdomen discomfort after eating regular lunch.  States she feels more bloated, slight pain across upper abdomen area and just no appetite for solid foods.

## 2019-01-09 NOTE — PROGRESS NOTES
Skin affirmation note    Admitting nurse completed full skin assessment,  score and  interventions. This writer agrees with the initial skin assessment findings.

## 2019-01-09 NOTE — PROGRESS NOTES
"Up to the bathroom. Med, soft, brown BM. Returned to bed with dry heaves, reporting, \"all that action made me feel sick\". Medicated with Zofran with decrease in nausea.  Abd is less distended and softer when compared to admit.   "

## 2019-01-09 NOTE — TELEPHONE ENCOUNTER
"Leonor MACARIO from [facility] calling reporting patient having intermittent abdominal pain with movement. States patient rates pain at 3/10. Is currently not in the patient's room and states \"we don't have portable phones\". Reports patient's abdomen is distended and bowel sounds are \"sluggish\". Had Bowel movement yesterday a small one. Informed RN will page on call provider to call Leonor MACARIO directly.     Paged on call provider Sagar Drake MD for [facility] at 7:02pm through smart web to call Leonor MACARIO directly at [phone].    Advised Leonor MACARIO to call back if no call from provider within 20 minutes. Caller verbalized understanding. Denies further questions.      Manohar Grace RN  [facility]     "

## 2019-01-09 NOTE — ED NOTES
"Patient has  [place] to Observation  order. Patient has been given the Observation brochure -  What does Observation mean to me.\"  Patient has been given the opportunity to ask questions about observation status and their plan of care.      BIENVENIDO AMBROCIO    "

## 2019-01-09 NOTE — PROGRESS NOTES
ANTICOAGULATION FOLLOW-UP CLINIC VISIT    Patient Name:  Shanika Stahl  Date:  2019  Contact Type:  Telephone/ [facility]    SUBJECTIVE:     Patient Findings     Positives:   No Problem Findings    Comments:   No changes in medications, activity, or diet noted. No bleeding or increased bruising noted. Took warfarin as prescribed.  Patient will continue weekly maintenance dose. INR is therapeutic.   Recheck in 4 weeks.           OBJECTIVE    INR   Date Value Ref Range Status   2019 2.45  Final       ASSESSMENT / PLAN  INR assessment THER    Recheck INR In: 4 WEEKS    INR Location [facility]R      Anticoagulation Summary  As of 2019    INR goal:   2.0-3.0   TTR:   84.3 % (3.8 y)   INR used for dosin.45 (2019)   Warfarin maintenance plan:   2.5 mg (2.5 mg x 1) every Mon, Fri; 5 mg (2.5 mg x 2) all other days   Full warfarin instructions:   2.5 mg every Mon, Fri; 5 mg all other days   Weekly warfarin total:   30 mg   Plan last modified:   Verenice Morillo RN (10/12/2018)   Next INR check:   2019   Priority:   INR   Target end date:   Indefinite    Indications    Atrial fibrillation (H) (Resolved) [I48.91]  Cerebral embolism with cerebral infarction (H) [I63.40]  Long term current use of anticoagulant therapy [Z79.01]             Anticoagulation Episode Summary     INR check location:       Preferred lab:       Send INR reminders to:   [place] [facility] [employer] POOL    Comments:   Keep on low end of therapeutic range. Uses 2.5mg tablets. Resides at [facility] [phone]. Fax AVS to [phone]      Anticoagulation Care Providers     Provider Role Specialty Phone number    Milena Jaime,  Responsible Internal Medicine [phone]            See the Encounter Report to view Anticoagulation Flowsheet and Dosing Calendar (Go to Encounters tab in chart review, and find the Anticoagulation Therapy Visit)        Dre Phillips RN

## 2019-01-09 NOTE — H&P
"[facility]    History and Physical  Hospital Medicine       Date of Admission:  1/8/2019  Date of Service: 1/9/2019     CC: bloating, nausea    Assessment & Plan   Shanika Stahl is a 89 year old female with a past medical history significant for atrial fibrillation on coumadin, tachy-pernell s/p pacemaker, renal and pulmonary hypertension, tricuspid regurgitation, dyslipidemia, history of stroke, and history of vertebral fracture who presents on 1/8/2019 with bloating secondary to constipation.      Constipation  Abdominal distention  Presents with distention and slight pain. X-ray abdomen showing \"moderate stool in colon.\" CT abdomen & pelvis was negative for obstruction, confirmed large amount of stool in the cecum but small amount of stool in remainder of colon. A suppository was given in the emergency department, patient later had moderate bowel movement. Bowel regimen prior to admission is Senokot qhs and prn Miralax q day - both last taken on 1/7/19.  - Bowel regimen protocol ordered  - Possible discharge later today based on how patient is doing      Nausea, vomiting/dry heaves  Developed overnight after arrival to the hospital, more dry heaves than vomiting. Apparently gastroenteritis is going around her care facility.  - Antiemetics available prn  - Tolerated clears at breakfast, will advance to regular diet for lunch, may be able to discharge if no vomiting    ADDENDUM:  Patient feeling worse with increased bloating and discomfort after eating a regular diet for lunch. No vomiting.      Chronic atrial fibrillation  Long term current use of anticoagulant therapy  Rate controlled with metoprolol XL 25 mg bid prior to admission. Anticoagulated with coumadin prior to admission. Admit INR = 2.45.  - Continue prior to admission metoprolol with holding parameters  - Pharmacy to dose coumadin      Renal hypertension  Pulmonary hypertension  Known renal artery stenosis, but elected for " medical management. Managed prior to admission with lasix 20 mg every other day and metoprolol XL 25 mg bid. Blood pressures elevated.  - Continue prior to admission lasix and metoprolol       History of cerebral embolism with cerebral infarction - 2014  Hyperlipidemia LDL goal <100  No focal deficits. Patient is taking coumadin prior to admission for atrial fibrillation, also taking Zocor 20 mg q hs.  - Continue prior to admission Zocor  - Pharmacy to dose coumadin      Closed stable burst fracture of eleventh thoracic vertebra with routine healing  Fracture occurred July 2018. Previously had TLSO brace. Pain managed prior to admission with acetaminophen and naproxen prn, continue.      Heart murmur, tricuspid regurgitation  Known murmur on exam. Most recent echo from 2017 with 2+ tricuspid regurgitation.      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 Appropriate for observation care.  ADDENDUM: Patient's bloating and discomfort are worsening again. Discussed with Social Work, patient needs to return to her assisted living facility before the nurse leaves for the day, so unable to keep patient and observe for a few more hours and discharge later today. Will keep her overnight and reassess tomorrow.    Discussion: Assessment & plan discussed with Dr. Marcel Alegre.     Jane Hood PA-C  [facility] Hospitalist Service  Pager: [phone]          Primary Care Physician   Milena Jaime [phone]    History is obtained from the patient and review of old records via the EMR.    Past Medical History      Past Medical History:   Diagnosis Date     Abnormal CXR (chest x-ray)     Read as emphysema     Atherosclerosis of renal  artery (H)      Atrial fibrillation (H) 3/27/2014     Basal cell carcinoma      ESSENTIAL TREMOR 5/9/2005     Family history of breast cancer      Hyperlipidemia LDL goal <100 10/31/2010    CHOL      175   7/6/2011 HDL       69   7/6/2011 LDL       93   7/6/2011 TRIG       61   7/6/2011 CHOLHDLRATIO      3.0   7/6/2011 On simvastatin 20mg      Hypertension goal BP (blood pressure) < 140/90 10/3/2013     Other and unspecified hyperlipidemia      Other osteoporosis      Pacemaker      Paroxysmal supraventricular tachycardia (H)      Postmenopausal atrophic vaginitis      Stroke (H) 2/10/2014     Syncope 10/9/2013     Unspecified essential hypertension          Diagnosis Date Noted     Heart murmur, tricuspid regurgitation 01/09/2019     Priority: Medium     Tricuspid regurgitation on echo 2017       Closed stable burst fracture of eleventh thoracic vertebra with routine healing 07/13/2018     Priority: Medium     Diarrhea 07/13/2018     Priority: Medium     Oropharyngeal dysphagia 07/13/2018     Priority: Medium     Prolonged INR 07/13/2018     Priority: Medium     Generalized weakness 07/13/2018     Priority: Medium     Pulmonary hypertension (H) 07/31/2017     Priority: Medium     Sensorineural hearing loss, bilateral 04/06/2016     Priority: Medium     Chronic atrial fibrillation (H) 10/02/2015     Priority: Medium     On warfarin       Pacemaker 10/02/2015     Priority: Medium     Long term current use of anticoagulant therapy 02/27/2015     Priority: Medium     Problem list name updated by automated process. Provider to review       History of cerebral embolism with cerebral infarction - 2014 02/10/2014     Priority: Medium     Occurred 2014  Tachy-pernell and a-fib       Syncope 10/09/2013     Priority: Medium     Hyperlipidemia LDL goal <100 10/31/2010     Priority: Medium     CHOL      175   7/6/2011  HDL       69   7/6/2011  LDL       93   7/6/2011  TRIG       61   7/6/2011  CHOLHDLRATIO      3.0   7/6/2011  On  simvastatin 20mg       Family history of breast cancer      Priority: Medium     Abnormal CXR (chest x-ray)      Priority: Medium     Read as emphysema       Renal hypertension 05/09/2005     Priority: Medium     Known STU--seen on 2001 MRA--did not want angioplasty in past  Controlled on diltiazem, losartan and spironolactone           Atherosclerosis of renal artery (H) 05/09/2005     Priority: Medium     STU by MRA (?records)--medical therapy elected by patient.       Paroxysmal supraventricular tachycardia (H) 05/09/2005     Priority: Medium     SVT-infrequent episode on holter in 2000  Negative stress echo in 2002       Other osteoporosis 05/09/2005     Priority: Medium     Dx 2004 and put on fosamax   BMD 3/05 (T scores):  Lumbar -4.3, Right prox femur -4.0, Left prox femur -4.0  BMD 7/08 (T scores):  Lumbar -4.2, Right prox femur -3.9, Left prox femur -4.1  Fosamax changed to IV reclast and miacalcin 3/09 by Dr. Vanessa  Had second reclast injection 8/11          Past Surgical History     Past Surgical History:   Procedure Laterality Date     EYE SURGERY  2/21/12    left eye cataract     IMPLANT PACEMAKER       SURGICAL HISTORY OF -   1965    FEMORAL HERNIA REPAIR        History of Present Illness   Shanika Stahl is a 89 year old female with the above past medical history now presents on 1/8/2019 with abdominal distention and decreased frequency of bowel movements.     Patient lives at [facility]. She has not had a bowel movement for 3 days prior to admission. Her abdomen was distended and mildly tender, and there was concern that she may have an small bowel obstruction, so she was sent to the emergency department. Work-up ruled out a small bowel obstruction but patient appeared to be constipated on imaging. She was given a suppository and later had a moderate sized bowel movement after arrival to the floor.    Patient reports that she vomited a few times overnight. However, her nurse states it  "was more dry heaving that started after patient had bowel movement, but was not truly vomiting. She is currently feeling nauseated and \"not quite right.\" She reports that a GI illness has been going through other parts of her care facility recently. No recent diarrhea.     On review of systems patient denies fever, chills, recent illness, loss of appetite, chest pain, palpitations (but mentions that she has a pacemaker), cough, wheeze, shortness of breath, melena, hematochezia, urinary changes or dysuria, pain, skin changes (although mentions having a chronic rash on her legs that she attributes to a prior sunburn), headache, vision changes, dizziness, recent falls, recent syncope, numbness, tingling, focal weakness, confusion, or slurred speech).      Prior to Admission Medications   Prior to Admission Medications   Prescriptions Last Dose Informant Patient Reported? Taking?   CALCIUM + D 600-200 MG-UNIT PO TABS 2019 at [address] [place] [address] Yes Yes   Si tablet by mouth daily   VITAMIN D 1000 UNIT OR TABS 2019 at [address] [place] [address] Yes Yes   Si TABLET DAILY   acetaminophen (TYLENOL) 325 MG tablet 2019 at [address] [facility] No Yes   Sig: Take 2 tablets (650 mg) by mouth 3 times daily   furosemide (LASIX) 20 MG tablet 2019 at [address] [facility] No Yes   Sig: Take 1 tablet (20 mg) by mouth every other day   loratadine (CLARITIN) 10 MG tablet 2019 at [address] [place] [address] No Yes   Sig: TAKE 1 TAB BY MOUTH ONCE DAILY FOR ALLERGIES   metoprolol succinate (TOPROL-XL) 25 MG 24 hr tablet 2019 at [address] [place] [address] No Yes   Sig: TAKE 1 TAB BY MOUTH TWICE A DAY   naproxen (NAPROSYN) 250 MG tablet Unknown at Unknown time Nursing Home No No   Sig: Take 1 tablet (250 mg) by mouth every 12 hours as needed for moderate pain , take with meals.   order for [facility] No No   Sig: Knee high compression stockings 10-15 mm Hg   polyethylene glycol (MIRALAX/GLYCOLAX) Packet 2019 at [address] [facility] Yes " Yes   Sig: Take 17 g by mouth daily as needed for constipation   senna-docusate (SENOKOT-S;PERICOLACE) 8.6-50 MG per tablet 2019 at [address] [place] [address] No Yes   Sig: Take 1 tablet by mouth At Bedtime , stop taking if loose stools develop.   simvastatin (ZOCOR) 20 MG tablet 2019 at [address] [place] [address] No Yes   Sig: TAKE 1 TAB BY MOUTH AT BEDTIME   warfarin (JANTOVEN) 2.5 MG tablet 2019 at [address] [place] [address] No Yes   Si.5 mg (2.5 mg x 1) on Mon, Fri; 5 mg (2.5 mg x 2) all other days or As directed by Anticoagulation Clinic   warfarin (TOVEN) 5 MG tablet 2019 at [address] [place] [address] Yes Yes   Si.5 mg (2.5 mg x 1) on Mon, Fri; 5 mg (2.5 mg x 2) all other days or As directed by Anticoagulation clinic      Facility-Administered Medications: None     Allergies   Allergies   Allergen Reactions     Allopurinol      Ampicillin Diarrhea     Cipro [Ciprofloxacin] Nausea and Vomiting     Levaquin GI Disturbance     Lisinopril Swelling     AngioEdema     Paxil [Paroxetine] Nausea and Vomiting     Penicillins Diarrhea     Sulfa Drugs GI Disturbance     Zithromax [Macrolides] Nausea and Vomiting     Zoloft Nausea and Vomiting       Family History    Family History   Problem Relation Age of Onset     Cerebrovascular Disease Mother      Diabetes Mother      Breast Cancer Sister      Heart Disease Brother      Cerebrovascular Disease Brother      Heart Disease Brother      Prostate Cancer Brother      Heart Disease Brother      Heart Disease Sister      Gynecology Daughter      Cancer Son         Lung       Social History   Social History     Socioeconomic History     Marital status:      Spouse name: Not on file     Number of children: 4     Years of education: Not on file     Highest education level: Not on file   Social Needs     Financial resource strain: Not on file     Food insecurity - worry: Not on file     Food insecurity - inability: Not on file     Transportation needs - medical: Not on file      "Transportation needs - non-medical: Not on file   Occupational History     Employer: NONE      Employer: RETIRED   Tobacco Use     Smoking status: Never Smoker     Smokeless tobacco: Never Used   Substance and Sexual Activity     Alcohol use: No     Drug use: No     Sexual activity: No   Other Topics Concern      Service Not Asked     Blood Transfusions Not Asked     Caffeine Concern Not Asked     Occupational Exposure Not Asked     Hobby Hazards Not Asked     Sleep Concern Not Asked     Stress Concern Not Asked     Weight Concern Not Asked     Special Diet Not Asked     Back Care Not Asked     Exercise Yes     Bike Helmet Not Asked     Seat Belt Yes     Self-Exams Not Asked     Parent/sibling w/ CABG, MI or angioplasty before 65F 55M? No   Social History Narrative     Not on file       Review of Systems     A complete 10 point review of systems was negative except for items noted in the HPI/subjective.    Physical Exam   /57 (BP Location: Left arm)   Pulse 78   Temp 98  F (36.7  C) (Oral)   Resp 18   Ht 1.626 m (5' 4\")   Wt 36.6 kg (80 lb 11 oz)   SpO2 90%   BMI 13.85 kg/m       Weight: 80 lbs 11.01 oz Body mass index is 13.85 kg/m .     Constitutional: Alert, oriented, cooperative, no apparent distress, appears nontoxic, speaking in full sentences.     Eyes: Eyes are clear, pupils are reactive. No scleral icterus. Extroccular movements intact.     HEENT: Oropharynx is clear but dry appearing, no lesions. Normocephalic, no evidence of cranial trauma.      Cardiovascular: Regular rhythm and rate, normal S1 and S2. Known systolic 2/6 murmur, no rubs or gallops. Peripheral pulses in tact bilaterally. No lower extremity edema.    Respiratory: Lung sounds are clear to auscultation bilaterally without wheezes, rhonchi, or crackles.    GI: Soft, non-distended. Non-tender, no rebound or guarding. No hepatosplenomegaly or masses appreciated. Normal bowel sounds.     Musculoskeletal: Without obvious " deformity, normal range of motion. Normal muscle bulk and tone. Distal CMS intact.      Skin: Warm and dry, no rashes or ecchymoses. No mottling of skin.      Neurologic: Patient moves all extremities. Cranial nerves are grossly intact.  is symmetric. Gross strength and sensation are equal bilaterally.    Genitourinary: Deferred    Data   Data reviewed today:   Recent Labs   Lab 01/09/19  0450 01/09/19  0042 01/08/19   WBC  --  5.9  --    HGB  --  12.3  --    MCV  --  97  --    PLT  --  180  --    INR 2.60*  --  2.45   NA  --  143  --    POTASSIUM  --  4.4  --    CHLORIDE  --  111*  --    CO2  --  24  --    BUN  --  27  --    CR  --  0.68  --    ANIONGAP  --  8  --    BLAISE  --  8.2*  --    GLC  --  99  --        Recent Results (from the past 24 hour(s))   Abdomen, flat/upright (2 views)    Narrative    ABDOMEN TWO VIEWS  1/8/2019 9:06 PM     HISTORY: Abdominal distention and pain, poor oral intake, history of  femoral hernia repair.  Evaluate for obstruction or other acute  intra-abdominal catastrophe.    COMPARISON: None.      Impression    IMPRESSION: Supine and upright views. There is gas throughout  nondilated small and large bowel. No free air or air-fluid levels.  Moderate amount of stool in the colon.    JESSIKA SILVEIRA MD   CT Abdomen Pelvis w/o Contrast    Narrative    CT ABDOMEN AND PELVIS WITHOUT CONTRAST   1/8/2019 9:55 PM     HISTORY: Abdominal pain, unspecified. Abdominal pain and distention,  x-ray abdomen showing air throughout with some distended loops of  bowel with stool. Evaluate for obstruction versus other acute  intra-abdominal catastrophe.    TECHNIQUE: Noncontrast CT abdomen and pelvis was performed. Radiation  dose for this scan was reduced using automated exposure control,  adjustment of the mA and/or kV according to patient size, or iterative  reconstruction technique.    COMPARISON: 7/13/2018.    FINDINGS:  Abdomen: There is scarring at the lung bases. The heart is  enlarged.  Evaluation of the solid abdominal organs is limited by the lack of  intravenous contrast. The liver and spleen are normal in appearance.  The gallbladder wall is prominent. No calcified gallstones. The  pancreas appears grossly normal. Adrenal glands are grossly normal.  There is a 3.2 cm cyst at the upper pole of the left kidney. Several  right renal cysts including a slightly complex septated cyst at the  lower pole of the kidney measuring 3.9 cm. No hydronephrosis. There is  no abdominal or pelvic lymph node enlargement. There is  atherosclerotic calcification of the aorta and its branches. No  aneurysm.    Pelvis: There is a large amount of stool in the cecum. Small amount  stool in the remainder of the colon. No bowel obstruction or  inflammation. The stomach is large and J-shaped containing fluid and  air. No outlet obstruction. There is a small amount of free fluid in  the pelvis. No free intraperitoneal air. Degenerative disease in the  spine and hips. Old T11 compression fracture.      Impression    IMPRESSION:  1. No bowel obstruction or inflammation.  2. Small amount of free fluid in the pelvis.  3. Cardiomegaly.    JESSIKA SILVEIRA MD       I personally reviewed the abdominal x-ray image(s) showing some stool in the colon.    Jane Hood PA-C  [facility] - [facility] Service  Pager: [phone]

## 2019-01-09 NOTE — PLAN OF CARE
"[place] [facility] [place]    Patient admitted to room 2404 at approximately 0125 via cart from emergency room. Patient was accompanied by transport tech.     Verbal SBAR report received from, Zeenat ED RN prior to patient arrival.     Patient ambulated to bed with stand-by assist. Patient alert and oriented X 3. The patient is not having any pain.  . Admission vital signs: Blood pressure 188/75, pulse 94, temperature 96.4  F (35.8  C), temperature source Oral, resp. rate 16, height 1.626 m (5' 4\"), weight 36.6 kg (80 lb 11 oz), SpO2 94 %, not currently breastfeeding. Patient was oriented to plan of care, call light, bed controls, tv, telephone, bathroom and visiting hours.     Risk Assessment    The following safety risks were identified during admission: fall. Yellow risk band applied: YES.     Skin Initial Assessment    This writer admitted this patient and completed a full skin assessment and  score in the Adult PCS flowsheet. Appropriate interventions initiated as needed.     Secondary skin check completed by Yanni CHACON RN.         Delia Benavidez      "

## 2019-01-09 NOTE — PROGRESS NOTES
Discharge Planner   Discharge Plans in progress: Return to [facility]  Barriers to discharge plan: medical stability  Follow up plan: CTS to follow prn       Entered by: Mikayla Santos 01/09/2019 12:01 PM

## 2019-01-09 NOTE — ED PROVIDER NOTES
History     Chief Complaint   Patient presents with     Constipation     abd pain has not had a BM for a few days, abd distended staff worried she has a SMO      HPI  Shanika Stahl is a 89 year old female with a history of atrial fibrillation on Coumadin, pacemaker history of constipation, history of a stroke renal hypertension who presents from a care facility with her son by private car for evaluation for abdominal pain and distention.  She has a history of constipation and does take Senokot daily to help with her bowel movements.  No fever no vomiting.  No back pain and no flank pain and new urinary symptoms.  No recent fall.  Staff at her care facility noted she had a distended abdomen and asked she come to the emergency department be evaluated.    Problem List:    Patient Active Problem List    Diagnosis Date Noted     Closed stable burst fracture of eleventh thoracic vertebra with routine healing 07/13/2018     Priority: Medium     Diarrhea 07/13/2018     Priority: Medium     Oropharyngeal dysphagia 07/13/2018     Priority: Medium     Hyponatremia 07/13/2018     Priority: Medium     Asymptomatic bacteriuria 07/13/2018     Priority: Medium     Vaginal bleeding 07/13/2018     Priority: Medium     Prolonged INR 07/13/2018     Priority: Medium     Generalized weakness 07/13/2018     Priority: Medium     Pulmonary hypertension (H) 07/31/2017     Priority: Medium     Sensorineural hearing loss, bilateral 04/06/2016     Priority: Medium     Chronic atrial fibrillation (H) 10/02/2015     Priority: Medium     On warfarin       Pacemaker 10/02/2015     Priority: Medium     Long term current use of anticoagulant therapy 02/27/2015     Priority: Medium     Problem list name updated by automated process. Provider to review       Health Care Home 02/13/2014     Priority: Medium     **See Letters for [facility] Care Plan: Emergency Care Plan         Cerebral embolism with cerebral infarction (H) 02/10/2014     Priority: Medium      Tachy-pernell and a-fib       Syncope 10/09/2013     Priority: Medium     Advance Care Planning 02/15/2012     Priority: Medium     Advance Care PlanningReceipt of ACP document:  Received: Health Care Directive which was witnessed or notarized on 5/14/2013.  Document not previously scanned.  Validation form completed and sent with document to be scanned.  Code Status reflects choices in most recent ACP document.  Confirmed/documented designated decision maker(s). See permanent comments section of demographics in clinical tab. View document(s) and details by clicking on code status. Added by Joanna Garcia on 5/21/2013.  Health Directive notarized  March 30 1999 Sent to scanning February 15, 2012 [facility] Ashley GODFREY MA         Hyperlipidemia LDL goal <100 10/31/2010     Priority: Medium     CHOL      175   7/6/2011  HDL       69   7/6/2011  LDL       93   7/6/2011  TRIG       61   7/6/2011  CHOLHDLRATIO      3.0   7/6/2011  On simvastatin 20mg       Family history of breast cancer      Priority: Medium     Abnormal CXR (chest x-ray)      Priority: Medium     Read as emphysema       Renal hypertension 05/09/2005     Priority: Medium     Known STU--seen on 2001 MRA--did not want angioplasty in past  Controlled on diltiazem, losartan and spironolactone  Problem list name updated by automated process. Provider to review       Atherosclerosis of renal artery (H) 05/09/2005     Priority: Medium     STU by MRA (?records)--medical therapy elected by patient.       Paroxysmal supraventricular tachycardia (H) 05/09/2005     Priority: Medium     SVT-infrequent episode on holter in 2000  Negative stress echo in 2002       Other osteoporosis 05/09/2005     Priority: Medium     Dx 2004 and put on fosamax   BMD 3/05 (T scores):  Lumbar -4.3, Right prox femur -4.0, Left prox femur -4.0  BMD 7/08 (T scores):  Lumbar -4.2, Right prox femur -3.9, Left prox femur -4.1  Fosamax changed to IV reclast and miacalcin 3/09 by   Otf  Had second reclast injection 8/11          Past Medical History:    Past Medical History:   Diagnosis Date     Abnormal CXR (chest x-ray)      Atherosclerosis of renal artery (H)      Atrial fibrillation (H) 3/27/2014     Basal cell carcinoma      ESSENTIAL TREMOR 5/9/2005     Family history of breast cancer      Hyperlipidemia LDL goal <100 10/31/2010     Hypertension goal BP (blood pressure) < 140/90 10/3/2013     Other and unspecified hyperlipidemia      Other osteoporosis      Pacemaker      Paroxysmal supraventricular tachycardia (H)      Postmenopausal atrophic vaginitis      Stroke (H) 2/10/2014     Syncope 10/9/2013     Unspecified essential hypertension        Past Surgical History:    Past Surgical History:   Procedure Laterality Date     EYE SURGERY  2/21/12    left eye cataract     IMPLANT PACEMAKER       SURGICAL HISTORY OF -   1965    FEMORAL HERNIA REPAIR       Family History:    Family History   Problem Relation Age of Onset     Cerebrovascular Disease Mother      Diabetes Mother      Breast Cancer Sister      Heart Disease Brother      Cerebrovascular Disease Brother      Heart Disease Brother      Prostate Cancer Brother      Heart Disease Brother      Heart Disease Sister      Gynecology Daughter      Cancer Son         Lung       Social History:  Marital Status:   [5]  Social History     Tobacco Use     Smoking status: Never Smoker     Smokeless tobacco: Never Used   Substance Use Topics     Alcohol use: No     Drug use: No        Medications:      No current outpatient medications on file.      Review of Systems   Constitutional: Negative.    HENT: Negative.    Eyes: Negative.    Respiratory: Negative.    Cardiovascular: Negative.    Gastrointestinal: Positive for abdominal distention and abdominal pain.   Endocrine: Negative.    Genitourinary: Negative.    Musculoskeletal: Negative.    Skin: Negative.    Allergic/Immunologic: Negative.    Neurological: Negative.     Hematological: Negative.    Psychiatric/Behavioral: Negative.    All other systems reviewed and are negative.      Physical Exam   BP: 176/74  Pulse: 92  Heart Rate: 81  Temp: 97.8  F (36.6  C)  Resp: 16  Weight: 37.2 kg (82 lb)  SpO2: 97 %      Physical Exam   Constitutional: She appears well-developed and well-nourished.   HENT:   Head: Normocephalic and atraumatic.   Right Ear: External ear normal.   Eyes: EOM are normal. Pupils are equal, round, and reactive to light. Right eye exhibits no discharge. Left eye exhibits no discharge. No scleral icterus.   Neck: Normal range of motion. Neck supple. No JVD present. No tracheal deviation present. No thyromegaly present.   Cardiovascular: Normal rate.   Pulmonary/Chest: Effort normal and breath sounds normal. No stridor. No respiratory distress. She has no wheezes. She has no rales. She exhibits no tenderness.   Abdominal: Soft. Bowel sounds are normal. She exhibits distension. She exhibits no mass. There is no tenderness. There is no rebound and no guarding.       Lymphadenopathy:     She has no cervical adenopathy.   Skin: Capillary refill takes less than 2 seconds.   Psychiatric: She has a normal mood and affect. Her behavior is normal. Judgment and thought content normal.       ED Course        Procedures               Critical Care time:  none                ED medications:  Medications   bisacodyl (DULCOLAX) Suppository 10 mg (10 mg Rectal Given 1/8/19 7428)         ED labs and imaging:    Results for orders placed or performed during the hospital encounter of 01/08/19 (from the past 24 hour(s))   Abdomen, flat/upright (2 views)    Narrative    ABDOMEN TWO VIEWS  1/8/2019 9:06 PM     HISTORY: Abdominal distention and pain, poor oral intake, history of  femoral hernia repair.  Evaluate for obstruction or other acute  intra-abdominal catastrophe.    COMPARISON: None.      Impression    IMPRESSION: Supine and upright views. There is gas throughout  nondilated  small and large bowel. No free air or air-fluid levels.  Moderate amount of stool in the colon.    JESSIKA SILVEIRA MD   UA reflex to Microscopic   Result Value Ref Range    Color Urine Yellow     Appearance Urine Clear     Glucose Urine Negative NEG^Negative mg/dL    Bilirubin Urine Negative NEG^Negative    Ketones Urine Negative NEG^Negative mg/dL    Specific Gravity Urine 1.031 1.003 - 1.035    Blood Urine Negative NEG^Negative    pH Urine 5.0 5.0 - 7.0 pH    Protein Albumin Urine 30 (A) NEG^Negative mg/dL    Urobilinogen mg/dL 4.0 (H) 0.0 - 2.0 mg/dL    Nitrite Urine Negative NEG^Negative    Leukocyte Esterase Urine Small (A) NEG^Negative    Source Unspecified Urine     RBC Urine 4 (H) 0 - 2 /HPF    WBC Urine 2 0 - 5 /HPF    Mucous Urine Present (A) NEG^Negative /LPF   CT Abdomen Pelvis w/o Contrast    Narrative    CT ABDOMEN AND PELVIS WITHOUT CONTRAST   1/8/2019 9:55 PM     HISTORY: Abdominal pain, unspecified. Abdominal pain and distention,  x-ray abdomen showing air throughout with some distended loops of  bowel with stool. Evaluate for obstruction versus other acute  intra-abdominal catastrophe.    TECHNIQUE: Noncontrast CT abdomen and pelvis was performed. Radiation  dose for this scan was reduced using automated exposure control,  adjustment of the mA and/or kV according to patient size, or iterative  reconstruction technique.    COMPARISON: 7/13/2018.    FINDINGS:  Abdomen: There is scarring at the lung bases. The heart is enlarged.  Evaluation of the solid abdominal organs is limited by the lack of  intravenous contrast. The liver and spleen are normal in appearance.  The gallbladder wall is prominent. No calcified gallstones. The  pancreas appears grossly normal. Adrenal glands are grossly normal.  There is a 3.2 cm cyst at the upper pole of the left kidney. Several  right renal cysts including a slightly complex septated cyst at the  lower pole of the kidney measuring 3.9 cm. No hydronephrosis. There  is  no abdominal or pelvic lymph node enlargement. There is  atherosclerotic calcification of the aorta and its branches. No  aneurysm.    Pelvis: There is a large amount of stool in the cecum. Small amount  stool in the remainder of the colon. No bowel obstruction or  inflammation. The stomach is large and J-shaped containing fluid and  air. No outlet obstruction. There is a small amount of free fluid in  the pelvis. No free intraperitoneal air. Degenerative disease in the  spine and hips. Old T11 compression fracture.      Impression    IMPRESSION:  1. No bowel obstruction or inflammation.  2. Small amount of free fluid in the pelvis.  3. Cardiomegaly.    JESSIKA SILVEIRA MD   CBC with platelets differential   Result Value Ref Range    WBC 5.9 4.0 - 11.0 10e9/L    RBC Count 3.84 3.8 - 5.2 10e12/L    Hemoglobin 12.3 11.7 - 15.7 g/dL    Hematocrit 37.1 35.0 - 47.0 %    MCV 97 78 - 100 fl    MCH 32.0 26.5 - 33.0 pg    MCHC 33.2 31.5 - 36.5 g/dL    RDW 13.5 10.0 - 15.0 %    Platelet Count 180 150 - 450 10e9/L    Diff Method Automated Method     % Neutrophils 58.5 %    % Lymphocytes 26.9 %    % Monocytes 7.7 %    % Eosinophils 6.1 %    % Basophils 0.5 %    % Immature Granulocytes 0.3 %    Nucleated RBCs 0 0 /100    Absolute Neutrophil 3.4 1.6 - 8.3 10e9/L    Absolute Lymphocytes 1.6 0.8 - 5.3 10e9/L    Absolute Monocytes 0.5 0.0 - 1.3 10e9/L    Absolute Eosinophils 0.4 0.0 - 0.7 10e9/L    Absolute Basophils 0.0 0.0 - 0.2 10e9/L    Abs Immature Granulocytes 0.0 0 - 0.4 10e9/L    Absolute Nucleated RBC 0.0    Basic metabolic panel   Result Value Ref Range    Sodium 143 133 - 144 mmol/L    Potassium 4.4 3.4 - 5.3 mmol/L    Chloride 111 (H) 94 - 109 mmol/L    Carbon Dioxide 24 20 - 32 mmol/L    Anion Gap 8 3 - 14 mmol/L    Glucose 99 70 - 99 mg/dL    Urea Nitrogen 27 7 - 30 mg/dL    Creatinine 0.68 0.52 - 1.04 mg/dL    GFR Estimate 74 >60 mL/min/[1.73_m2]    GFR Estimate If Black 86 >60 mL/min/[1.73_m2]    Calcium 8.2 (L)  8.5 - 10.1 mg/dL     ED Vitals:    Vitals:    01/08/19 2021 01/09/19 0030 01/09/19 0040   BP: 176/74 (!) 164/121 (!) 182/99   Pulse:  92    Resp: 16     Temp: 97.8  F (36.6  C)     TempSrc: Oral     SpO2: 97% 92% 91%   Weight: 37.2 kg (82 lb)         Assessments & Plan (with Medical Decision Making)   Clinical impression: Pleasant 89-year-old female who arrived by private car with her son for evaluation for abdominal pain, distention and concern about constipation.  Patient is a resident at a care facility. she reports minimal stool output over the last 3 days.  She is on Coumadin for history of atrial fibrillation and has a pacemaker in place.  There is a distant history of a femoral hernia repair in the 60s.  She is reported to be DNR with comfort care measures only (POLST form-7/17/18).  She had dinner tonight without difficulty.  No vomiting, no fever no back pain and no urinary symptoms.  She was noted to be distended with tympanic bowel sounds at her care facility and brought to the emergency department for further care.  .  On my exam she appears her stated age she is pale but in no acute distress. She was initially hypertensive on exam with a blood pressure 176/74.  She had a distended abdomen, soft and nontender with tympanic bowel sounds.     ED course and Plan:  We reviewed differential diagnosis for abdominal distention and pain with concern about constipation despite use of a bowel regimen at home including Senokot daily.  X-ray imaging was obtained to exclude obstruction with distant history of femoral hernia repair and constipation.  INR was 2.45 on 1/8/2019.  X-ray imaging tonight showed  moderate amount of stool in the colon.  With abdominal distention patient's age  I elected to CT the patient to exclude an intra-abdominal catastrophe.  CT imaging did not show any acute obstructive process.  Urinalysis today shows small leukocyte esterase but no evidence for infection.  2 White cells per high-power  field.  After CT imaging patient continued to rest comfortably and did not appear in any distress.  She was given a suppository.  I reviewed options for care with the patient and her son we discussed increasing his Senokot dose from 1 daily at night to twice daily we also discussed a trial of a bowel regimen in the emergency department for results versus discharging her home however the son was uncertain if staff at her care facility in the assisted living would be willing to help with the bowel regimen.  I also offered to admit the patient.  After discussion patient and son requested admission I think this is reasonable with her age.  Plan is for a bowel regimen overnight and follow-up care.    I spoke with Dr. Jeffery Xiao-admitting hospitalist at 12:05 AM.  I reviewed rationale for admission for abdominal distention and constipation with need for a bowel regimen unable to be completed reliably as an outpatient.  He agreed to assume care upon admission, after reviewing ED course, workup clinical history and presentation.        Disclaimer: This note consists of symbols derived from keyboarding, dictation and/or voice recognition software. As a result, there may be errors in the script that have gone undetected. Please consider this when interpreting information found in this chart.  I have reviewed the nursing notes.    I have reviewed the findings, diagnosis, plan and need for follow up with the patient.          Medication List      There are no discharge medications for this visit.         Final diagnoses:   Abdominal distention       1/8/2019   [facility] EMERGENCY DEPARTMENT     Julio Bishop MD  01/09/19 0121       Julio Bishop MD  01/09/19 0122

## 2019-01-10 NOTE — PLAN OF CARE
[facility] DISCHARGE NOTE    Patient discharged to assisted living at 1:35 PM via wheel chair. Accompanied by son and staff. Discharge instructions reviewed with patient and son, opportunity offered to ask questions. Prescriptions sent to patients preferred pharmacy. All belongings sent with patient.    Rika Arteaga

## 2019-01-10 NOTE — DISCHARGE INSTRUCTIONS
No change in meds except would take the miralax every day.      Warfarin Instructions:  Please continue your current warfarin home dose of  2.5mg Mon,Fri; 5mg rest of week.  Your INR in 3.23. Please feel free to eat a few more greens, as your INR is only mildly elevated.  Please have your INR drawn next week and results to the Anticoagulation Clinic. [phone]

## 2019-01-10 NOTE — PROGRESS NOTES
"ANTICOAGULATION FOLLOW-UP CLINIC VISIT    Patient Name:  Shanika Stahl  Date:  1/10/2019  Contact Type:  Chart Review    SUBJECTIVE:     Patient Findings     Comments:    Constipation  Abdominal distention  Presents with distention and slight pain. X-ray abdomen showing \"moderate stool in colon.\" CT abdomen & pelvis was negative for obstruction, confirmed large amount of stool in the cecum but small amount of stool in remainder of colon. A suppository was given in the emergency department, patient later had moderate bowel movement. Bowel regimen prior to admission is Senokot qhs and prn Miralax q day - both last taken on 1/7/19.  - Bowel regimen protocol ordered  - Possible discharge later today based on how patient is doing    Warfarin Instructions:   Please continue your current warfarin home dose of  2.5mg Mon,Fri; 5mg rest of week.   Your INR in 3.23. Please feel free to eat a few more greens, as your INR is only mildly elevated.   Please have your INR drawn next week and results to the Anticoagulation Clinic. [phone]   (Patient is going to TCU facility)   Sydney Wilcox [facility]  P [facility]                 OBJECTIVE    INR   Date Value Ref Range Status   01/10/2019 3.23 (H) 0.86 - 1.14 Final       ASSESSMENT / PLAN  No question data found.  Anticoagulation Summary  As of 1/10/2019    INR goal:   2.0-3.0   TTR:   84.3 % (3.8 y)   INR used for dosing:   No new INR was available at the time of this encounter.   Warfarin maintenance plan:   2.5 mg (2.5 mg x 1) every Mon, Fri; 5 mg (2.5 mg x 2) all other days   Full warfarin instructions:   2.5 mg every Mon, Fri; 5 mg all other days   Weekly warfarin total:   30 mg   Plan last modified:   Verenice Morillo RN (10/12/2018)   Next INR check:   1/17/2019   Priority:   INR   Target end date:   Indefinite    Indications    Atrial fibrillation (H) (Resolved) [I48.91]  History of cerebral embolism with cerebral infarction - 2014 [I63.40]  Long term " current use of anticoagulant therapy [Z79.01]             Anticoagulation Episode Summary     INR check location:       Preferred lab:       Send INR reminders to:   TEMITOPE DOUGLASS vChatterAG POOL    Comments:   Keep on low end of therapeutic range. Uses 2.5mg tablets. Resides at [facility] [phone]. Fax AVS to [phone]      Anticoagulation Care Providers     Provider Role Specialty Phone number    Milena Jaime,  [facility] [phone]            See the Encounter Report to view Anticoagulation Flowsheet and Dosing Calendar (Go to Encounters tab in chart review, and find the Anticoagulation Therapy Visit)        Dre Phillips RN

## 2019-01-10 NOTE — PROGRESS NOTES
Name: Shanika Stahl    MRN#: [id number]    Reason for Hospitalization: Abdominal distention [R14.0]    Discharge Date: 1/10/2019    Patient / Family response to discharge plan: Son Ulises will come and transport patient back at 1300    Follow-Up Appt:   Future Appointments   Date Time Provider Department Center   1/15/2019  1:00 PM Milena Jaime, DO [facility]   1/17/2019  2:30 PM Gregoria Heath AuD [facility]   2/19/2019  3:00 PM Ashley Perez, PO[facility]   3/13/2019 12:00 AM LOVE TECH1 [facility]       Other Providers (Care Coordinator, County Services, PCA services etc): Yes: CC hand off    Discharge Disposition: assisted living [facility] (Phone: [phone] Fax:[phone])    Harper Hammond

## 2019-01-10 NOTE — PHARMACY - DISCHARGE MEDICATION RECONCILIATION
Discharge medication review for this patient is complete. Pharmacist assisted with medication reconciliation of discharge medications with prior to admission medications.     The following changes were made to the discharge medication list based on pharmacist review:  Added:  Zofran        Patient's Discharge Medication List  - medications as listed on After Visit Summary (AVS)     Review of your medicines      START taking      Dose / Directions   ondansetron 4 MG ODT tab  Commonly known as:  ZOFRAN-ODT  Used for:  Nausea      Dose:  4 mg  Take 1 tablet (4 mg) by mouth every 6 hours as needed for nausea or vomiting  Quantity:  20 tablet  Refills:  0        CONTINUE these medicines which may have CHANGED, or have new prescriptions. If we are uncertain of the size of tablets/capsules you have at home, strength may be listed as something that might have changed.      Dose / Directions   senna-docusate 8.6-50 MG tablet  Commonly known as:  SENOKOT-S/PERICOLACE  This may have changed:  how much to take      Dose:  2 tablet  Take 2 tablets by mouth At Bedtime , stop taking if loose stools develop.  Quantity:  60 tablet  Refills:  0        CONTINUE these medicines which have NOT CHANGED      Dose / Directions   acetaminophen 325 MG tablet  Commonly known as:  TYLENOL      Dose:  650 mg  Take 2 tablets (650 mg) by mouth 3 times daily  Quantity:  100 tablet  Refills:  0     calcium + D 600-200 MG-UNIT Tabs  Generic drug:  calcium carbonate-vitamin D      1 tablet by mouth daily  Refills:  0     furosemide 20 MG tablet  Commonly known as:  LASIX  Used for:  Leg swelling      Dose:  20 mg  Take 1 tablet (20 mg) by mouth every other day  Quantity:  45 tablet  Refills:  3     loratadine 10 MG tablet  Commonly known as:  CLARITIN  Used for:  Chronic rhinitis, unspecified type      TAKE 1 TAB BY MOUTH ONCE DAILY FOR ALLERGIES  Quantity:  90 tablet  Refills:  3     metoprolol succinate ER 25 MG 24 hr tablet  Commonly known as:   TOPROL-XL  Used for:  Renal hypertension, stage 1-4 or unspecified chronic kidney disease      TAKE 1 TAB BY MOUTH TWICE A DAY  Quantity:  180 tablet  Refills:  3     naproxen 250 MG tablet  Commonly known as:  NAPROSYN      Dose:  250 mg  Take 1 tablet (250 mg) by mouth every 12 hours as needed for moderate pain , take with meals.  Refills:  0     order for DME  Used for:  Leg swelling      Knee high compression stockings 10-15 mm Hg  Quantity:  1 each  Refills:  1     polyethylene glycol packet  Commonly known as:  MIRALAX/GLYCOLAX      Dose:  17 g  Take 17 g by mouth daily as needed for constipation  Refills:  0     simvastatin 20 MG tablet  Commonly known as:  ZOCOR      TAKE 1 TAB BY MOUTH AT BEDTIME  Quantity:  90 tablet  Refills:  3     vitamin D3 1000 units (25 mcg) tablet  Commonly known as:  CHOLECALCIFEROL      1 TABLET DAILY  Quantity:  30  Refills:  0     * warfarin 2.5 MG tablet  Commonly known as:  JANTOVEN      Take as directed. If you are unsure how to take this medication, talk to your nurse or doctor.  Original instructions:  2.5 mg (2.5 mg x 1) on Mon, Fri; 5 mg (2.5 mg x 2) all other days or As directed by Anticoagulation Clinic  Quantity:  150 tablet  Refills:  0                           Where to get your medicines      Some of these will need a paper prescription and others can be bought over the counter. Ask your nurse if you have questions.    Bring a paper prescription for each of these medications    ondansetron 4 MG ODT tab    senna-docusate 8.6-50 MG tablet

## 2019-01-10 NOTE — PLAN OF CARE
"Pt alert, forgetful. Assist 1. Denies pain, nausea, SOB. IV saline locked. Rested comfortably overnight. No bowel movements overnight. Regular diet. Pt is [place]. Continue to monitor. /53   Pulse 78   Temp 97.4  F (36.3  C) (Oral)   Resp 16   Ht 1.626 m (5' 4\")   Wt 36.6 kg (80 lb 11 oz)   SpO2 91%   BMI 13.85 kg/m      "

## 2019-01-10 NOTE — DISCHARGE SUMMARY
[facility] Discharge Summary    Shanika Stahl MRN# [id number]   Age: 89 year old YOB: 1924     Date of Admission:  1/8/2019  Date of Discharge::  1/10/2019  1:35 PM  Admitting Physician:  Jeffery Xiao MD  Discharge Physician:  Elias Goss MD  Primary Physician: Milean Jaime       Home clinic: [facility]               Discharge Diagnosis:   Principle diagnosis: acute gastroenteritis     Secondary diagnoses:  constipation   Permanent AF  HTN due to renal artery stenosis        Discharge Instructions:   No change in meds  Start miralax daily     Follow up with primary care provider in 7 days        Procedures:       Results for orders placed or performed during the hospital encounter of 01/08/19   Abdomen, flat/upright (2 views)    Narrative    ABDOMEN TWO VIEWS  1/8/2019 9:06 PM     HISTORY: Abdominal distention and pain, poor oral intake, history of  femoral hernia repair.  Evaluate for obstruction or other acute  intra-abdominal catastrophe.    COMPARISON: None.      Impression    IMPRESSION: Supine and upright views. There is gas throughout  nondilated small and large bowel. No free air or air-fluid levels.  Moderate amount of stool in the colon.    JESSIKA SILVEIRA MD   CT Abdomen Pelvis w/o Contrast    Narrative    CT ABDOMEN AND PELVIS WITHOUT CONTRAST   1/8/2019 9:55 PM     HISTORY: Abdominal pain, unspecified. Abdominal pain and distention,  x-ray abdomen showing air throughout with some distended loops of  bowel with stool. Evaluate for obstruction versus other acute  intra-abdominal catastrophe.    TECHNIQUE: Noncontrast CT abdomen and pelvis was performed. Radiation  dose for this scan was reduced using automated exposure control,  adjustment of the mA and/or kV according to patient size, or iterative  reconstruction technique.    COMPARISON: 7/13/2018.    FINDINGS:  Abdomen: There is scarring at the lung bases. The heart is enlarged.  Evaluation  of the solid abdominal organs is limited by the lack of  intravenous contrast. The liver and spleen are normal in appearance.  The gallbladder wall is prominent. No calcified gallstones. The  pancreas appears grossly normal. Adrenal glands are grossly normal.  There is a 3.2 cm cyst at the upper pole of the left kidney. Several  right renal cysts including a slightly complex septated cyst at the  lower pole of the kidney measuring 3.9 cm. No hydronephrosis. There is  no abdominal or pelvic lymph node enlargement. There is  atherosclerotic calcification of the aorta and its branches. No  aneurysm.    Pelvis: There is a large amount of stool in the cecum. Small amount  stool in the remainder of the colon. No bowel obstruction or  inflammation. The stomach is large and J-shaped containing fluid and  air. No outlet obstruction. There is a small amount of free fluid in  the pelvis. No free intraperitoneal air. Degenerative disease in the  spine and hips. Old T11 compression fracture.      Impression    IMPRESSION:  1. No bowel obstruction or inflammation.  2. Small amount of free fluid in the pelvis.  3. Cardiomegaly.    JESSIKA SILVEIRA MD                    Allergies:      Allergies   Allergen Reactions     Allopurinol      Ampicillin Diarrhea     Cipro [Ciprofloxacin] Nausea and Vomiting     Levaquin GI Disturbance     Lisinopril Swelling     AngioEdema     Paxil [Paroxetine] Nausea and Vomiting     Penicillins Diarrhea     Sulfa Drugs GI Disturbance     Zithromax [Macrolides] Nausea and Vomiting     Zoloft Nausea and Vomiting                  Discharge Medications:     Current Discharge Medication List      START taking these medications    Details   ondansetron (ZOFRAN-ODT) 4 MG ODT tab Take 1 tablet (4 mg) by mouth every 6 hours as needed for nausea or vomiting  Qty: 20 tablet, Refills: 0    Associated Diagnoses: Nausea         CONTINUE these medications which have CHANGED    Details   senna-docusate  (SENOKOT-S/PERICOLACE) 8.6-50 MG tablet Take 2 tablets by mouth At Bedtime , stop taking if loose stools develop.  Qty: 60 tablet, Refills: 0    Comments: Decrease to 1 tab qhs if patient develops diarrhea / stool is too loose  Associated Diagnoses: Slow transit constipation         CONTINUE these medications which have NOT CHANGED    Details   acetaminophen (TYLENOL) 325 MG tablet Take 2 tablets (650 mg) by mouth 3 times daily  Qty: 100 tablet, Refills: 0    Associated Diagnoses: Closed stable burst fracture of eleventh thoracic vertebra with routine healing      CALCIUM + D 600-200 MG-UNIT PO TABS 1 tablet by mouth daily      furosemide (LASIX) 20 MG tablet Take 1 tablet (20 mg) by mouth every other day  Qty: 45 tablet, Refills: 3    Comments: Patient will call to fill  Associated Diagnoses: Leg swelling      loratadine (CLARITIN) 10 MG tablet TAKE 1 TAB BY MOUTH ONCE DAILY FOR ALLERGIES  Qty: 90 tablet, Refills: 3    Associated Diagnoses: Chronic rhinitis, unspecified type      metoprolol succinate (TOPROL-XL) 25 MG 24 hr tablet TAKE 1 TAB BY MOUTH TWICE A DAY  Qty: 180 tablet, Refills: 3    Associated Diagnoses: Renal hypertension, stage 1-4 or unspecified chronic kidney disease      polyethylene glycol (MIRALAX/GLYCOLAX) Packet Take 17 g by mouth daily as needed for constipation      simvastatin (ZOCOR) 20 MG tablet TAKE 1 TAB BY MOUTH AT BEDTIME  Qty: 90 tablet, Refills: 3    Associated Diagnoses: Hyperlipidemia LDL goal <100      VITAMIN D 1000 UNIT OR TABS 1 TABLET DAILY  Qty: 30, Refills: 0      !! warfarin (JANTOVEN) 2.5 MG tablet 2.5 mg (2.5 mg x 1) on Mon, Fri; 5 mg (2.5 mg x 2) all other days or As directed by Anticoagulation Clinic  Qty: 150 tablet, Refills: 0    Associated Diagnoses: Chronic atrial fibrillation (H)      !! warfarin (JANTOVEN) 5 MG tablet 2.5 mg (2.5 mg x 1) on Mon, Fri; 5 mg (2.5 mg x 2) all other days or As directed by Anticoagulation clinic  Qty: 30 tablet, Refills: 0    Associated  "Diagnoses: Long term current use of anticoagulant therapy      naproxen (NAPROSYN) 250 MG tablet Take 1 tablet (250 mg) by mouth every 12 hours as needed for moderate pain , take with meals.    Associated Diagnoses: Closed stable burst fracture of eleventh thoracic vertebra with routine healing      order for DME Knee high compression stockings 10-15 mm Hg  Qty: 1 each, Refills: 1    Associated Diagnoses: Leg swelling       !! - Potential duplicate medications found. Please discuss with provider.                Consultations:   none          Brief History of Presenting Illness:   Shanika Stahl is a 89 year old female with the above past medical history now presents on 1/8/2019 with abdominal distention and decreased frequency of bowel movements.      Patient lives at [facility]. She has not had a bowel movement for 3 days prior to admission. Her abdomen was distended and mildly tender, and there was concern that she may have an small bowel obstruction, so she was sent to the emergency department. Work-up ruled out a small bowel obstruction but patient appeared to be constipated on imaging. She was given a suppository and later had a moderate sized bowel movement after arrival to the floor.     Patient reports that she vomited a few times overnight. However, her nurse states it was more dry heaving that started after patient had bowel movement, but was not truly vomiting. She is currently feeling nauseated and \"not quite right.\" She reports that a GI illness has been going through other parts of her care facility recently. No recent diarrhea.                 Hospital Course:   Assessment & Plan     Shanika Stahl is a 89 year old female with a past medical history significant for atrial fibrillation on coumadin, tachy-pernell s/p pacemaker, renal and pulmonary hypertension, tricuspid regurgitation, dyslipidemia, history of stroke, and history of vertebral fracture who presents on 1/8/2019 with bloating " "secondary to constipation.        Constipation  Abdominal distention  Presents with distention and slight pain. X-ray abdomen showing \"moderate stool in colon.\" CT abdomen & pelvis was negative for obstruction, confirmed large amount of stool in the cecum but small amount of stool in remainder of colon. A suppository was given in the emergency department, patient later had moderate bowel movement. Bowel regimen prior to admission is Senokot qhs and prn Miralax q day - both last taken on 1/7/19.  - Bowel regimen protocol ordered  - Possible discharge later today based on how patient is doing        Nausea, vomiting/dry heaves  Developed overnight after arrival to the hospital, more dry heaves than vomiting. Apparently gastroenteritis is going around her care facility.  - Antiemetics available prn  - Tolerated clears at breakfast, will advance to regular diet for lunch, may be able to discharge if no vomiting  With the acute vomiting even after she had emptied her bowels, this was likely an acute gastroenteritis. Appears to be resolving.       ADDENDUM:  Patient feeling worse with increased bloating and discomfort after eating a regular diet for lunch. No vomiting.        Chronic atrial fibrillation  Long term current use of anticoagulant therapy  Rate controlled with metoprolol XL 25 mg bid prior to admission. Anticoagulated with coumadin prior to admission. Admit INR = 2.45.  - Continue prior to admission metoprolol with holding parameters  - Pharmacy to dose coumadin        Renal hypertension  Pulmonary hypertension  Known renal artery stenosis, but elected for medical management. Managed prior to admission with lasix 20 mg every other day and metoprolol XL 25 mg bid. Blood pressures elevated.  - Continue prior to admission lasix and metoprolol         History of cerebral embolism with cerebral infarction - 2014  Hyperlipidemia LDL goal <100  No focal deficits. Patient is taking coumadin prior to admission for " "atrial fibrillation, also taking Zocor 20 mg q hs.  - Continue prior to admission Zocor  - Pharmacy to dose coumadin        Closed stable burst fracture of eleventh thoracic vertebra with routine healing  Fracture occurred July 2018. Previously had TLSO brace. Pain managed prior to admission with acetaminophen and naproxen prn, continue.        Heart murmur, tricuspid regurgitation  Known murmur on exam. Most recent echo from 2017 with 2+ tricuspid regurgitation.        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 Appropriate for observation care.  ADDENDUM: Patient's bloating and discomfort are worsening again. Discussed with Social Work, patient needs to return to her assisted living facility before the nurse leaves for the day, so unable to keep patient and observe for a few more hours and discharge later today. Will keep her overnight and reassess tomorrow.                  Discharge Exam:   OBJECTIVE:   /73   Pulse 73   Temp 97.6  F (36.4  C) (Oral)   Resp 14   Ht 1.626 m (5' 4\")   Wt 36.5 kg (80 lb 7.5 oz)   SpO2 95%   BMI 13.81 kg/m      GENERAL APPEARANCE:  Alert, NAD, Ox3     RESP:clear      CV: regular rates and rhythm,no murmur, no click or rub - no edema     Abdomen: soft, nontender, no liver or spleen enlargement, no masses, BSs normal   Skin: no cyanosis, pallor, or jaundice            Pending Tests at Discharge:     Unresulted Labs Ordered in the Past 30 Days of this Admission     No orders found from 11/9/2018 to 1/9/2019.                   Discharge Disposition:   Discharged to assisted living      Attestation:  Amount of time performed on this discharge : 30 minutes.    Elias Goss MD       "

## 2019-01-10 NOTE — PLAN OF CARE
Patient denies abdominal pain.  Abdomen feels somewhat firm.  No further bowel movement since early this morning.  Patient was given colace and miralax this evening.  Ate about 25% of dinner tray but no complaints after eating.  Patient is forgetful, alarms in use for safety.  Ambulating in room with assist of one.

## 2019-01-11 NOTE — PROGRESS NOTES
SUBJECTIVE:   Shanika Stahl is a 89 year old female who presents to clinic today for the following health issues:      Hospital Follow-up Visit:    Hospital/Nursing Home/IP Rehab Facility: [facility]  Date of Admission: 1/8/19   Date of Discharge: 1/10/19   Reason(s) for Admission: Acute Gastroenteritis             Problems taking medications regularly:  None       Medication changes since discharge: Doubled her Senna at night, Zofran as needed- hasn't had to take any yet        Problems adhering to non-medication therapy:  None  Summary of hospitalization:  [facility] discharge summary reviewed  Diagnostic Tests/Treatments reviewed.  Follow up needed: none  Other Healthcare Providers Involved in Patient s Care:         Care Coordination  Update since discharge: improved.     Post Discharge Medication Reconciliation: discharge medications reconciled, continue medications without change.  Plan of care communicated with patient and family     Coding guidelines for this visit:  Type of Medical   Decision Making Face-to-Face Visit       within 7 Days of discharge Face-to-Face Visit        within 14 days of discharge   Moderate Complexity [id number]   High Complexity [id number]          Thought to have gastroenteritis, constipation.  Started on senna-S 2 tabs HS, Miralax daily PRN, zofran PRN.  She has not needed the zofran.  I reviewed her JACLYN MAR, and is correct.    She reports occasional sharp/ shooting abdominal pain, maybe 2 x day, none yet today.  Comes on when getting in/out of bed.  It lasts just a few seconds to a min.  Reports daily, soft bowel movement since discharge from hospital.  Eating and drinking normally, no fevers, no acute abdominal distension.  She does have chronic abdominal distension but reports she is eating more since being in skilled nursing.    Low back pain:  Changing positions brings on the pain. Has seen [facility], no surgical intervention.  She takes Tylenol 650 TID  scheduled.  She saw physical therapy yesterday who thought it was muscle.  She is to get ongoing physical therapy.  Worse with getting in/out of bed. Lasts a few sec - min.  Not bothering her too much.     Low BMI:  In 2005 she weighed around 92 lbs. consistently.  Today is 84 lbs    Current Outpatient Medications   Medication Sig Dispense Refill     acetaminophen (TYLENOL) 325 MG tablet Take 2 tablets (650 mg) by mouth 3 times daily 100 tablet 0     CALCIUM + D 600-200 MG-UNIT PO TABS 1 tablet by mouth daily       loratadine (CLARITIN) 10 MG tablet TAKE 1 TAB BY MOUTH ONCE DAILY FOR ALLERGIES 90 tablet 3     metoprolol succinate (TOPROL-XL) 25 MG 24 hr tablet TAKE 1 TAB BY MOUTH TWICE A  tablet 3     senna-docusate (SENOKOT-S/PERICOLACE) 8.6-50 MG tablet Take 2 tablets by mouth At Bedtime , stop taking if loose stools develop. 60 tablet 0     simvastatin (ZOCOR) 20 MG tablet TAKE 1 TAB BY MOUTH AT BEDTIME 90 tablet 3     VITAMIN D 1000 UNIT OR TABS 1 TABLET DAILY 30 0     warfarin (JANTOVEN) 2.5 MG tablet 2.5 mg (2.5 mg x 1) on Mon, Fri; 5 mg (2.5 mg x 2) all other days or As directed by Anticoagulation Clinic 150 tablet 0     warfarin (JANTOVEN) 5 MG tablet 2.5 mg (2.5 mg x 1) on Mon, Fri; 5 mg (2.5 mg x 2) all other days or As directed by Anticoagulation clinic 30 tablet 0     furosemide (LASIX) 20 MG tablet Take 1 tablet (20 mg) by mouth every other day (Patient not taking: Reported on 1/15/2019) 45 tablet 3     naproxen (NAPROSYN) 250 MG tablet Take 1 tablet (250 mg) by mouth every 12 hours as needed for moderate pain , take with meals. (Patient not taking: Reported on 1/15/2019)       ondansetron (ZOFRAN-ODT) 4 MG ODT tab Take 1 tablet (4 mg) by mouth every 6 hours as needed for nausea or vomiting (Patient not taking: Reported on 1/15/2019) 20 tablet 0     order for DME Knee high compression stockings 10-15 mm Hg 1 each 1     polyethylene glycol (MIRALAX/GLYCOLAX) Packet Take 17 g by mouth daily as  "needed for constipation         Reviewed and updated as needed this visit by clinical staff       Reviewed and updated as needed this visit by Provider         ROS:  Constitutional, HEENT, cardiovascular, pulmonary, GI, , musculoskeletal, neuro, skin, endocrine and psych systems are negative, except as otherwise noted.    OBJECTIVE:     /76 (BP Location: Right arm, Patient Position: Sitting, Cuff Size: Adult Large)   Pulse 76   Temp 97.8  F (36.6  C) (Tympanic)   Ht 1.626 m (5' 4\")   Wt 38.2 kg (84 lb 3.2 oz)   SpO2 97%   BMI 14.45 kg/m    Body mass index is 14.45 kg/m .  GENERAL APPEARANCE: alert, no distress and frail, thin  RESP: lungs clear to auscultation - no rales, rhonchi or wheezes  CV: regular rates and rhythm, normal S1 S2, no S3 or S4 and no murmur, click or rub  LYMPHATICS: trace bilateral pedal edeam  ABDOMEN: soft, active bowel sounds, mild-moderate lower abdominal distension without tenderness  MS: diffuse muscle wasting and weakness, assist of 2 to get on exam table, lay flat, sit up  MENTAL STATUS EXAM:  Appearance/Behavior: No apparent distress and Neatly groomed  Speech: Normal  Mood/Affect: normal affect  Insight: Limited    Diagnostic Test Results:  Results for orders placed or performed during the hospital encounter of 01/08/19   Abdomen, flat/upright (2 views)    Narrative    ABDOMEN TWO VIEWS  1/8/2019 9:06 PM     HISTORY: Abdominal distention and pain, poor oral intake, history of  femoral hernia repair.  Evaluate for obstruction or other acute  intra-abdominal catastrophe.    COMPARISON: None.      Impression    IMPRESSION: Supine and upright views. There is gas throughout  nondilated small and large bowel. No free air or air-fluid levels.  Moderate amount of stool in the colon.    JESSIKA SILVEIRA MD   CT Abdomen Pelvis w/o Contrast    Narrative    CT ABDOMEN AND PELVIS WITHOUT CONTRAST   1/8/2019 9:55 PM     HISTORY: Abdominal pain, unspecified. Abdominal pain and " distention,  x-ray abdomen showing air throughout with some distended loops of  bowel with stool. Evaluate for obstruction versus other acute  intra-abdominal catastrophe.    TECHNIQUE: Noncontrast CT abdomen and pelvis was performed. Radiation  dose for this scan was reduced using automated exposure control,  adjustment of the mA and/or kV according to patient size, or iterative  reconstruction technique.    COMPARISON: 7/13/2018.    FINDINGS:  Abdomen: There is scarring at the lung bases. The heart is enlarged.  Evaluation of the solid abdominal organs is limited by the lack of  intravenous contrast. The liver and spleen are normal in appearance.  The gallbladder wall is prominent. No calcified gallstones. The  pancreas appears grossly normal. Adrenal glands are grossly normal.  There is a 3.2 cm cyst at the upper pole of the left kidney. Several  right renal cysts including a slightly complex septated cyst at the  lower pole of the kidney measuring 3.9 cm. No hydronephrosis. There is  no abdominal or pelvic lymph node enlargement. There is  atherosclerotic calcification of the aorta and its branches. No  aneurysm.    Pelvis: There is a large amount of stool in the cecum. Small amount  stool in the remainder of the colon. No bowel obstruction or  inflammation. The stomach is large and J-shaped containing fluid and  air. No outlet obstruction. There is a small amount of free fluid in  the pelvis. No free intraperitoneal air. Degenerative disease in the  spine and hips. Old T11 compression fracture.      Impression    IMPRESSION:  1. No bowel obstruction or inflammation.  2. Small amount of free fluid in the pelvis.  3. Cardiomegaly.    JESSIKA SILVEIRA MD   UA reflex to Microscopic   Result Value Ref Range    Color Urine Yellow     Appearance Urine Clear     Glucose Urine Negative NEG^Negative mg/dL    Bilirubin Urine Negative NEG^Negative    Ketones Urine Negative NEG^Negative mg/dL    Specific Gravity Urine 1.031  1.003 - 1.035    Blood Urine Negative NEG^Negative    pH Urine 5.0 5.0 - 7.0 pH    Protein Albumin Urine 30 (A) NEG^Negative mg/dL    Urobilinogen mg/dL 4.0 (H) 0.0 - 2.0 mg/dL    Nitrite Urine Negative NEG^Negative    Leukocyte Esterase Urine Small (A) NEG^Negative    Source Unspecified Urine     RBC Urine 4 (H) 0 - 2 /HPF    WBC Urine 2 0 - 5 /HPF    Mucous Urine Present (A) NEG^Negative /LPF   CBC with platelets differential   Result Value Ref Range    WBC 5.9 4.0 - 11.0 10e9/L    RBC Count 3.84 3.8 - 5.2 10e12/L    Hemoglobin 12.3 11.7 - 15.7 g/dL    Hematocrit 37.1 35.0 - 47.0 %    MCV 97 78 - 100 fl    MCH 32.0 26.5 - 33.0 pg    MCHC 33.2 31.5 - 36.5 g/dL    RDW 13.5 10.0 - 15.0 %    Platelet Count 180 150 - 450 10e9/L    Diff Method Automated Method     % Neutrophils 58.5 %    % Lymphocytes 26.9 %    % Monocytes 7.7 %    % Eosinophils 6.1 %    % Basophils 0.5 %    % Immature Granulocytes 0.3 %    Nucleated RBCs 0 0 /100    Absolute Neutrophil 3.4 1.6 - 8.3 10e9/L    Absolute Lymphocytes 1.6 0.8 - 5.3 10e9/L    Absolute Monocytes 0.5 0.0 - 1.3 10e9/L    Absolute Eosinophils 0.4 0.0 - 0.7 10e9/L    Absolute Basophils 0.0 0.0 - 0.2 10e9/L    Abs Immature Granulocytes 0.0 0 - 0.4 10e9/L    Absolute Nucleated RBC 0.0    Basic metabolic panel   Result Value Ref Range    Sodium 143 133 - 144 mmol/L    Potassium 4.4 3.4 - 5.3 mmol/L    Chloride 111 (H) 94 - 109 mmol/L    Carbon Dioxide 24 20 - 32 mmol/L    Anion Gap 8 3 - 14 mmol/L    Glucose 99 70 - 99 mg/dL    Urea Nitrogen 27 7 - 30 mg/dL    Creatinine 0.68 0.52 - 1.04 mg/dL    GFR Estimate 74 >60 mL/min/[1.73_m2]    GFR Estimate If Black 86 >60 mL/min/[1.73_m2]    Calcium 8.2 (L) 8.5 - 10.1 mg/dL   INR   Result Value Ref Range    INR 2.60 (H) 0.86 - 1.14   INR   Result Value Ref Range    INR 3.23 (H) 0.86 - 1.14   CBC with platelets   Result Value Ref Range    WBC 3.9 (L) 4.0 - 11.0 10e9/L    RBC Count 3.49 (L) 3.8 - 5.2 10e12/L    Hemoglobin 10.8 (L) 11.7 - 15.7  g/dL    Hematocrit 33.9 (L) 35.0 - 47.0 %    MCV 97 78 - 100 fl    MCH 30.9 26.5 - 33.0 pg    MCHC 31.9 31.5 - 36.5 g/dL    RDW 13.4 10.0 - 15.0 %    Platelet Count 123 (L) 150 - 450 10e9/L   Basic metabolic panel   Result Value Ref Range    Sodium 143 133 - 144 mmol/L    Potassium 4.0 3.4 - 5.3 mmol/L    Chloride 111 (H) 94 - 109 mmol/L    Carbon Dioxide 26 20 - 32 mmol/L    Anion Gap 6 3 - 14 mmol/L    Glucose 79 70 - 99 mg/dL    Urea Nitrogen 16 7 - 30 mg/dL    Creatinine 0.66 0.52 - 1.04 mg/dL    GFR Estimate 75 >60 mL/min/[1.73_m2]    GFR Estimate If Black 87 >60 mL/min/[1.73_m2]    Calcium 7.7 (L) 8.5 - 10.1 mg/dL   Social Work IP Consult    Narrative    Mikayla Santos, JAIME     1/9/2019 12:00 PM  CARE TRANSITION SOCIAL WORK INITIAL ASSESSMENT:      Met with: Patient and Family.    DATA  Principal Problem:    Constipation  Active Problems:    Renal hypertension    Hyperlipidemia LDL goal <100    History of cerebral embolism with cerebral infarction - 2014    Long term current use of anticoagulant therapy    Chronic atrial fibrillation (H)    Pacemaker    Pulmonary hypertension (H)    Closed stable burst fracture of eleventh thoracic vertebra with   routine healing    Abdominal distention    Heart murmur, tricuspid regurgitation       [facility]: [facility]  Primary Care MD Name: Addison  Contact information and PCP information verified: Yes      ASSESSMENT  Cognitive Status: awake, alert and oriented.       Resources List: Home Care              Description of Support System: Supportive, Involved   Who is your support system?: Children, Facility resident(s)/Staff     Support Assessment: Adequate family and caregiver support,   Adequate social supports   Insurance Concerns: No Insurance issues identified        This writer met with pt and with pts son introduced self and   role. Discussed discharge planning and medicare guidelines in   regards to home care and SNF benefits. Pt lives at [facility], [place] (Phone: [phone] Fax:[phone]). She   plans on returning there when stable. Pt reports that the [facility]   helps her with bathing, medications, cleaning, meals. Pt has had   home care in the past with [facility]  (phone:   [phone] Fax: [phone]). Pt and family would like another   referral placed for PT as pt has been feeling more weak. Referral   placed and preliminary information faxed to home care.    This writer did speak with RN at [facility], they are in agreement with   her return when stable, [facility] requesting orders PRIOR to discharge.         PLAN    AFL with home care        Mikayla Santos [facility], [facility], [facility] [phone]     Care Coordinator IP Consult    Narrative    Mikayla Santos LICSW     1/9/2019 12:00 PM  CARE TRANSITION SOCIAL WORK INITIAL ASSESSMENT:      Met with: Patient and Family.    DATA  Principal Problem:    Constipation  Active Problems:    Renal hypertension    Hyperlipidemia LDL goal <100    History of cerebral embolism with cerebral infarction - 2014    Long term current use of anticoagulant therapy    Chronic atrial fibrillation (H)    Pacemaker    Pulmonary hypertension (H)    Closed stable burst fracture of eleventh thoracic vertebra with   routine healing    Abdominal distention    Heart murmur, tricuspid regurgitation       Primary Care [facility] Name: [facility]  Primary Care MD Name: Addison  Contact information and PCP information verified: Yes      ASSESSMENT  Cognitive Status: awake, alert and oriented.       Resources List: Home Care              Description of Support System: Supportive, Involved   Who is your support system?: Children, Facility resident(s)/Staff     Support Assessment: Adequate family and caregiver support,   Adequate social supports   Insurance Concerns: No Insurance issues identified        This writer met with pt and with pts son introduced self and   role. Discussed discharge planning and medicare guidelines in   regards to home  care and SNF benefits. Pt lives at [facility] (Phone: [phone] Fax:[phone]). She   plans on returning there when stable. Pt reports that the [facility]   helps her with bathing, medications, cleaning, meals. Pt has had   home care in the past with [facility]  (phone:   [phone] Fax: [phone]). Pt and family would like another   referral placed for PT as pt has been feeling more weak. Referral   placed and preliminary information faxed to home care.    This writer did speak with RN at [facility], they are in agreement with   her return when stable, [facility] requesting orders PRIOR to discharge.         PLAN    AFL with home care        Mikayla VITALE, [facility], [facility] [phone]         ASSESSMENT/PLAN:     1. Slow transit constipation - continue meds unchanged.    2. Closed stable burst fracture of eleventh thoracic vertebra with routine healing - continue Tylenol.  Suspect physical therapy will also help w/her back pain.    3. Generalized weakness - due to age, osteoporosis , OA.  Needs physical therapy to prevent falls.      Milena Jaime, DO  [facility]

## 2019-01-11 NOTE — PROGRESS NOTES
Clinic Care Coordination Contact  Care Coordination Communication    Referral Source: IP Handoff    Clinical Data: Patient was hospitalized at [facility] from 1/8/19 to 1/10/19 with diagnosis of acute gastroenteritis likely to be from constipation.     Home Care Contact:              Home Care Agency: [facility]              Contact name () and phone number: juanito Roman ph: [phone]              Care Coordination contacted home care: Yes              Anticipated start of care date: 1/12/19    Patient Contact:               Patient resides at [facility] (Phone:[phone]; Fax: [phone]). RN CC called [facility] and left message for RN to return call to ensure no questions or concerns regarding patient's medications or plan of care after hospital discharge.              Follow up appointment is scheduled for 1/15/19.              Provided 24 Hour Nurse Line and/or 24 Hour Appointment Scheduling: [facility] has clinic information including numbers as do [facility] and patient's sons who are very involved per chart review.              Home care has contacted patient: Yes              Patient questions/concerns: RN CC did not speak with patient directly. Voicemail left for [facility] care team.    Plan: RN/SW Care Coordinator will await notification from home care staff informing RN/SW Care Coordinator of patients discharge plans/needs. RN/SW Care Coordinator will review chart and outreach to home care every 4 weeks and as needed.      CLAUS Lucia, RN   RN Clinic Care Coordinator  [facility]:  [place]  Phone: [phone]

## 2019-01-14 NOTE — TELEPHONE ENCOUNTER
Reason for Call:  Other orders    Detailed comments: Wants to know if it is ok for patient to continue warfarin and naproxen as there is an interaction between the two. Also B/P parameters, and PT 2 times a week for 3 weeks, and 1 time a week for 2 weeks, and skilled nurse visit to [facility].    Phone Number Jen can be reached at: Other phone number:  [phone]    Best Time: any    Can we leave a detailed message on this number? YES    Call taken on 1/14/2019 at 4:08 PM by Crissy Santoro

## 2019-01-15 PROBLEM — R14.0 ABDOMINAL DISTENTION: Status: RESOLVED | Noted: 2019-01-01 | Resolved: 2019-01-01

## 2019-01-15 PROBLEM — K59.00 CONSTIPATION: Status: RESOLVED | Noted: 2019-01-01 | Resolved: 2019-01-01

## 2019-01-15 NOTE — PATIENT INSTRUCTIONS
1. Continue with Tylenol for back pain.  Try heat or ice if the back pain gets worse.  2. Continue with med changes per hospital discharge

## 2019-01-15 NOTE — TELEPHONE ENCOUNTER
Agree with plan for his physical therapy.  Avoid NSAIDs while on warfarin  Notify provider/be seen if BP less than 80 or greater than 190

## 2019-01-17 NOTE — PROGRESS NOTES
[facility]         SUBJECTIVE:  Shanika Stahl, 89 year old female comes in with her son for in office repair of her hearing aids. Patient is currently wearing Phonak Bolero V50-P hearing aids with custom earmolds that were fit 2/3/2016. She reports her hearing aids are no longer loud enough for her.     OBJECTIVE:  Replaced plugged earmold tubing bilaterally. Verified hearing aid functionality.      ASSESSMENT/PLAN:    Return to clinic for hearing evaluation and possible hearing aid reprogramming.     Gregoria Heath M.A. F-AAA, #[id number]

## 2019-01-18 NOTE — PROGRESS NOTES
ANTICOAGULATION FOLLOW-UP CLINIC VISIT    Patient Name:  Shanika Stahl  Date:  1/18/2019  Contact Type:  Telephone/ Verenice [facility]    SUBJECTIVE:     Patient Findings     Positives:   Inflammation (bowels)    Comments:   INR remains slightly elevated post-hospitalization.   Writer will have patient continue on maintenance dose.  Recheck the INR in 6 days.              OBJECTIVE    INR   Date Value Ref Range Status   01/18/2019 3.2  Final       ASSESSMENT / PLAN  INR assessment SUPRA    Recheck INR In: 6 DAYS    INR Location Homecare INR      Anticoagulation Summary  As of 1/18/2019    INR goal:   2.0-3.0   TTR:   83.9 % (3.8 y)   INR used for dosing:   3.2! (1/18/2019)   Warfarin maintenance plan:   2.5 mg (2.5 mg x 1) every Mon, Fri; 5 mg (2.5 mg x 2) all other days   Full warfarin instructions:   2.5 mg every Mon, Fri; 5 mg all other days   Weekly warfarin total:   30 mg   Plan last modified:   Verenice Morillo RN (10/12/2018)   Next INR check:   1/24/2019   Priority:   INR   Target end date:   Indefinite    Indications    Atrial fibrillation (H) (Resolved) [I48.91]  History of cerebral embolism with cerebral infarction - 2014 [I63.40]  Long term current use of anticoagulant therapy [Z79.01]             Anticoagulation Episode Summary     INR check location:       Preferred lab:       Send INR reminders to:   TEMITOPE MARINO POOL    Comments:   Keep on low end of therapeutic range. Uses 2.5mg tablets. Resides at [facility] - [phone]. Fax AVS to [phone]      Anticoagulation Care Providers     Provider Role Specialty Phone number    Milena Jaime,  [facility] [phone]            See the Encounter Report to view Anticoagulation Flowsheet and Dosing Calendar (Go to Encounters tab in chart review, and find the Anticoagulation Therapy Visit)        Dre Phillips RN

## 2019-01-18 NOTE — TELEPHONE ENCOUNTER
"Consent to Communicate in EPIC for son Ulises,   Family is Requesting a new hearing test for hearing aide programing. Ulises states it has been several years since she had her hearing checked and hearing aides programmed.   Medicare will not cover without orders from provider.     Pended audiology referral with \"comprehesive audiology evaluation\".    Provider please review and advise. Thank you.    "

## 2019-01-18 NOTE — TELEPHONE ENCOUNTER
Reason for Call:  Other orders    Detailed comments: [facility] calling for orders for audiology.    Phone Number guille can be reached at: Home number on file [phone] (home)    Best Time: any    Can we leave a detailed message on this number? YES    Call taken on 1/18/2019 at 1:56 PM by Crissy Santoro

## 2019-01-21 NOTE — TELEPHONE ENCOUNTER
OK for verbal orders?    Order or referral being requested: skilled nursing x1 a week for x3 weeks for INR and bowel management monitoring       RADHA BritoN, RN

## 2019-01-21 NOTE — TELEPHONE ENCOUNTER
Reason for Call: Request for an order or referral:    Order or referral being requested: skilled nursing x1 a week for x3 weeks for INR and bowel management monitoring    Date needed: as soon as possible    Has the patient been seen by the PCP for this problem? Not Applicable    Additional comments: [facility] calling for orders.     Phone number Patient can be reached at:  Other phone number:  [phone] Sara amin     Best Time:  Any    Can we leave a detailed message on this number?  YES-Rn please leave name if leaving a VM    Call taken on 1/21/2019 at 9:06 AM by Beverly Vigil

## 2019-01-24 NOTE — PROGRESS NOTES
ADDENDUM: Writer spoke with the [facility], patient is going to start having her INR drawn at the facility. They are standardizing how labs are done and request they do all INRs as well. Writer let the facility know our clinic closes around 4-4:30pm. All INRs will need to be called in or dosing instructions given through the following day if the results come back after hours. They use HE labs and it is uncertain when the results come back. Avoid  if possible.    Pete JETT RN, CACP 2019 at 12:11 PM     ANTICOAGULATION FOLLOW-UP CLINIC VISIT    Patient Name:  Shanika Stahl  Date:  2019  Contact Type:  Telephone/ [facility]  warfarin dosing instructions faxed to [facility]    SUBJECTIVE:     Patient Findings     Positives:   No Problem Findings    Comments:   No changes in EPIC noted, no changes reported by homecare nurse.                OBJECTIVE    INR   Date Value Ref Range Status   2019 2.8  Final       ASSESSMENT / PLAN  No question data found.  Anticoagulation Summary  As of 2019    INR goal:   2.0-3.0   TTR:   83.7 % (3.9 y)   INR used for dosin.8 (2019)   Warfarin maintenance plan:   2.5 mg (2.5 mg x 1) every Mon, Fri; 5 mg (2.5 mg x 2) all other days   Full warfarin instructions:   2.5 mg every Mon, Fri; 5 mg all other days   Weekly warfarin total:   30 mg   No change documented:   Tori Toledo RN   Plan last modified:   Verenice Morillo RN (10/12/2018)   Next INR check:   2019   Priority:   INR   Target end date:   Indefinite    Indications    Atrial fibrillation (H) (Resolved) [I48.91]  History of cerebral embolism with cerebral infarction - 2014 [I63.40]  Long term current use of anticoagulant therapy [Z79.01]             Anticoagulation Episode Summary     INR check location:       Preferred lab:       Send INR reminders to:   [place] PHONE ANTICOAG POOL    Comments:   Keep on low end of therapeutic range. Uses 2.5mg tablets. Resides at  [facility] - [phone]. Fax AVS to [phone]      Anticoagulation Care Providers     Provider Role Specialty Phone number    Jaime Milena Taylor,  [facility] Internal Medicine [phone]            See the Encounter Report to view Anticoagulation Flowsheet and Dosing Calendar (Go to Encounters tab in chart review, and find the Anticoagulation Therapy Visit)        Tori Toledo RN [facility]

## 2019-01-24 NOTE — LETTER
INR Today:   2.8     INR Goal: 2-3    Please call the Coumadin/INR clinic if you have any of the following listed below:  Bleeding Signs/Symptoms  Signs/Symptoms of a blood clot  Medication Changes  Dietary Changes  Activity Changes  Bacterial/Viral Infection  Missed Warfarin Doses  Other Concerns    PLAN:  New Warfarin Dose: 2.5 mg (2.5 mg x 1) every Mon, Fri; 5 mg (2.5 mg x 2) all other days    Next INR: Homecare to recheck the INR on January 30th    Thank you,  Pete JETT, RN  [phone]

## 2019-01-28 NOTE — TELEPHONE ENCOUNTER
[facility]   Orders were signed, faxed and sent to scanning.  Beverly Vigil on 1/28/2019 at 11:34 AM

## 2019-01-30 NOTE — PROGRESS NOTES
Clinic Care Coordination Contact  Care Coordination Communication    Clinical Data: [place] [facility] [place] 1[place]10[place]19 with diagnosis of acute gastroenteritis likely to be from constipation. Last RN CC outreach 1/11/19.     Home Care Contact:           Home Care Agency: [facility]          Contact name () and phone number:   Jessie, juanito ph: [phone]                Care Coordination contacted home care: Yes- patient remains open to skilled home RN (anticipate ongoing for weekly INR checks) and PT for at least 3 more weeks    Patient resides at [facility] (Phone: [phone] Fax:[phone]) where all her medical and ADL needs can be met.    Plan: No additional Care Coordination needs identified due to: patient is close to baseline for her needs and cares per [facility]. Clinic Care Coordination contact information provided to JACLYN as well as home care if additional needs or concerns arise.      CLAUS Lucia, RN   RN Clinic Care Coordinator  [facility]:  [place]  Phone: [phone]

## 2019-01-31 NOTE — PROGRESS NOTES
[facility]         SUBJECTIVE:  Shanika Stahl ,89 year old female comes in with her son for hearing aid reprogramming. Her hearing was last evaluated 1/31/2019 and revealed stable thresholds bilaterally. She reports difficulty hearing in the dining east. Her son reports the family feels she is hearing well.      OBJECTIVE:  Reprogrammed hearing aids and verified to NAL-NL2 targets. Patient reports speech is very clear and feels the volume is better. Discussed realistic hearing aid benefits and limitations.      ASSESSMENT/PLAN:    Return to clinic for hearing aid rechecks and reprogramming as needed.    Gregoria TORRES-[facility], #[id number]

## 2019-01-31 NOTE — PROGRESS NOTES
AUDIOLOGY REPORT    SUBJECTIVE:  Shanika Stahl is a 89 year old female who was seen in the Audiology Clinic at [facility] for an audiologic evaluation, referred by Dr. Milena Jaime.  The patient has been seen previously in this clinic for assessment and results indicated a mild to severe sensorineural hearing loss bilaterally. The patient reports difficulty hearing in her dining room. The patient is wearing Phonak Survela V50-SP hearing aids with custom earmolds. The patient denies bilateral otalgia.     OBJECTIVE:  Otoscopic exam indicates ears are clear of cerumen bilaterally     Pure Tone Thresholds assessed using conventional audiometry with good  reliability from 250-8000 Hz bilaterally using circumaural headphones     RIGHT:  mild, moderate and severe sensorineural hearing loss    LEFT:    mild, moderate and severe sensorineural hearing loss    Tympanogram:    RIGHT: normal eardrum mobility    LEFT:   normal eardrum mobility    Speech Reception Threshold:    RIGHT: 60 dB HL    LEFT:   55 dB HL  Word Recognition Score:     RIGHT: 40% at 80 dB HL using W22 recorded word list.    LEFT:   72% at 80 dB HL using W22 recorded word list.      ASSESSMENT:   Mild sloping to severe sensorineural hearing loss bilaterally. Speech recognition is better in her left ear.     Compared to patient's previous audiogram dated 3/30/2017, hearing has remained stable. Today s results were discussed with the patient and her son in detail.     PLAN: It is recommended that the patient be seen for hearing aid programming and verification. . Patient was counseled regarding hearing loss and impact on communication.    Please call this clinic with questions regarding these results or recommendations.        Gregoria Heath M.A. [facility]  Clinical audiologist [place] [id number]  1/31/2019

## 2019-02-02 NOTE — TELEPHONE ENCOUNTER
INR=2.84  Goal INR 2-3  Reason for therapy: Afib and History of cerebral embolism with cerebral infarction   Current warfarin dose: 2.5mg MF, 5mg rest of week  Other concerns: No changes in diet or activity. No bleeding or bruising. INR came back late, so I suggested a Tuesday check for the next draw if they are going to be getting their results back late, or to expect dosing given the following day unless out of range. She opted for a Tuesday recheck.  Plan: Continue current dose of 2.5mg MF, 5mg rest of week.  Recheck: 2/12/19  Result and plan discussed with Leonor BELLO.  Ashley Schoen PharmD

## 2019-02-05 NOTE — TELEPHONE ENCOUNTER
Form signed and faxed back to [facility] regarding Coumadin and INR.    Rosie Linton on 2/5/2019 at 10:50 AM

## 2019-02-12 NOTE — TELEPHONE ENCOUNTER
Forms skilled nursing discharging summary signed and faxed and sent to scanning.    Rosie Linton on 2/12/2019 at 1:07 PM

## 2019-02-12 NOTE — PROGRESS NOTES
ANTICOAGULATION FOLLOW-UP CLINIC VISIT    Patient Name:  Shanika Stahl  Date:  2019  Contact Type:  Telephone/ Leonor BELLO-[facility]    SUBJECTIVE:     Patient Findings     Positives:   No Problem Findings    Comments:   No changes in medications, activity, health, or diet noted. No bleeding or increased bruising noted. Took warfarin as prescribed.  Patient will continue weekly maintenance dose. INR is therapeutic.   Recheck in 2 weeks.   Patient verbalizes understanding and agrees to plan. No further questions or concerns.             OBJECTIVE    INR   Date Value Ref Range Status   2019 2.57 (A) 0.86 - 1.14 Final       ASSESSMENT / PLAN  INR assessment THER    Recheck INR In: 2 WEEKS    INR Location [facility] INR      Anticoagulation Summary  As of 2019    INR goal:   2.0-3.0   TTR:   83.9 % (3.9 y)   INR used for dosin.57 (2019)   Warfarin maintenance plan:   2.5 mg (2.5 mg x 1) every Mon, Fri; 5 mg (2.5 mg x 2) all other days   Full warfarin instructions:   2.5 mg every Mon, Fri; 5 mg all other days   Weekly warfarin total:   30 mg   Plan last modified:   Verenice Morillo RN (10/12/2018)   Next INR check:   2019   Priority:   INR   Target end date:   Indefinite    Indications    Atrial fibrillation (H) (Resolved) [I48.91]  History of cerebral embolism with cerebral infarction -  [I63.40]  Long term current use of anticoagulant therapy [Z79.01]             Anticoagulation Episode Summary     INR check location:       Preferred lab:       Send INR reminders to:   [place] PHONE ANTICOAG POOL    Comments:   Keep on low end of therapeutic range. Uses 2.5mg tablets. Resides at [facility] [phone]. Fax AVS to [phone]      Anticoagulation Care Providers     Provider Role Specialty Phone number    Milena Jaime,  [facility] Internal Medicine [phone]            See the Encounter Report to view Anticoagulation Flowsheet and Dosing Calendar (Go to  Encounters tab in chart review, and find the Anticoagulation Therapy Visit)        Dre Phillips RN

## 2019-02-12 NOTE — TELEPHONE ENCOUNTER
[facility] medication. Signed faxed back and sent to josé miguel Linton on 2/12/2019 at 1:09 PM

## 2019-02-14 NOTE — TELEPHONE ENCOUNTER
Forms from [facility] Discharge Summary. Signed and faxed sent to scanning.    Rosie Linton on 2/14/2019 at 8:16 AM

## 2019-02-19 NOTE — PROGRESS NOTES
Please see separate dictation for HPI, PHYSICAL EXAM AND IMPRESSION/PLAN.    CURRENT MEDICATIONS:  Current Outpatient Medications   Medication Sig Dispense Refill     acetaminophen (TYLENOL) 325 MG tablet Take 2 tablets (650 mg) by mouth 3 times daily 100 tablet 0     CALCIUM + D 600-200 MG-UNIT PO TABS 1 tablet by mouth daily       furosemide (LASIX) 20 MG tablet Take 1 tablet (20 mg) by mouth every other day 45 tablet 3     loratadine (CLARITIN) 10 MG tablet TAKE 1 TAB BY MOUTH ONCE DAILY FOR ALLERGIES 90 tablet 3     metoprolol succinate (TOPROL-XL) 25 MG 24 hr tablet TAKE 1 TAB BY MOUTH TWICE A  tablet 3     order for DME Knee high compression stockings 10-15 mm Hg 1 each 1     polyethylene glycol (MIRALAX/GLYCOLAX) Packet Take 17 g by mouth daily as needed for constipation       simvastatin (ZOCOR) 20 MG tablet TAKE 1 TAB BY MOUTH AT BEDTIME 90 tablet 3     VITAMIN D 1000 UNIT OR TABS 1 TABLET DAILY 30 0     warfarin (JANTOVEN) 2.5 MG tablet 2.5 mg (2.5 mg x 1) on Mon, Fri; 5 mg (2.5 mg x 2) all other days or As directed by Anticoagulation Clinic 150 tablet 0     warfarin (JANTOVEN) 5 MG tablet 2.5 mg (2.5 mg x 1) on Mon, Fri; 5 mg (2.5 mg x 2) all other days or As directed by Anticoagulation clinic 30 tablet 0       ALLERGIES:     Allergies   Allergen Reactions     Allopurinol      Ampicillin Diarrhea     Cipro [Ciprofloxacin] Nausea and Vomiting     Levaquin GI Disturbance     Lisinopril Swelling     AngioEdema     Paxil [Paroxetine] Nausea and Vomiting     Penicillins Diarrhea     Sulfa Drugs GI Disturbance     Zithromax [Macrolides] Nausea and Vomiting     Zoloft Nausea and Vomiting       PAST MEDICAL HISTORY:  Past Medical History:   Diagnosis Date     Abnormal CXR (chest x-ray)     Read as emphysema     Atherosclerosis of renal artery (H)      Atrial fibrillation (H) 3/27/2014     Basal cell carcinoma      ESSENTIAL TREMOR 5/9/2005     Family history of breast cancer      Hyperlipidemia LDL goal  <100 10/31/2010    CHOL      175   7/6/2011 HDL       69   7/6/2011 LDL       93   7/6/2011 TRIG       61   7/6/2011 CHOLHDLRATIO      3.0   7/6/2011 On simvastatin 20mg      Hypertension goal BP (blood pressure) < 140/90 10/3/2013     Other and unspecified hyperlipidemia      Other osteoporosis      Pacemaker      Paroxysmal supraventricular tachycardia (H)      Postmenopausal atrophic vaginitis      Stroke (H) 2/10/2014     Syncope 10/9/2013     Unspecified essential hypertension        PAST SURGICAL HISTORY:  Past Surgical History:   Procedure Laterality Date     EYE SURGERY  2/21/12    left eye cataract     IMPLANT PACEMAKER       SURGICAL HISTORY OF -   1965    FEMORAL HERNIA REPAIR       SOCIAL HISTORY:  Social History     Socioeconomic History     Marital status:      Spouse name: None     Number of children: 4     Years of education: None     Highest education level: None   Social Needs     Financial resource strain: None     Food insecurity - worry: None     Food insecurity - inability: None     Transportation needs - medical: None     Transportation needs - non-medical: None   Occupational History     Employer: NONE      Employer: RETIRED   Tobacco Use     Smoking status: Never Smoker     Smokeless tobacco: Never Used   Substance and Sexual Activity     Alcohol use: No     Drug use: No     Sexual activity: No   Other Topics Concern      Service Not Asked     Blood Transfusions Not Asked     Caffeine Concern Not Asked     Occupational Exposure Not Asked     Hobby Hazards Not Asked     Sleep Concern Not Asked     Stress Concern Not Asked     Weight Concern Not Asked     Special Diet Not Asked     Back Care Not Asked     Exercise Yes     Bike Helmet Not Asked     Seat Belt Yes     Self-Exams Not Asked     Parent/sibling w/ CABG, MI or angioplasty before 65F 55M? No   Social History Narrative     None       FAMILY HISTORY:  Family History   Problem Relation Age of Onset     Cerebrovascular  Disease Mother      Diabetes Mother      Breast Cancer Sister      Heart Disease Brother      Cerebrovascular Disease Brother      Heart Disease Brother      Prostate Cancer Brother      Heart Disease Brother      Heart Disease Sister      Gynecology Daughter      Cancer Son         Lung       Review of Systems:  Skin:  Positive for itching;bruising     Eyes:  Positive for glasses    ENT:  Positive for nasal congestion;hearing loss    Respiratory:  Positive for dyspnea on exertion     Cardiovascular:    edema;Positive for    Gastroenterology: Negative      Genitourinary:  Positive for urinary frequency;urgency    Musculoskeletal:  Positive for back pain;joint stiffness osteoporosis  Neurologic:  Positive for tremors;memory problems    Psychiatric:  Negative      Heme/Lymph/Imm:  Negative      Endocrine:  Negative

## 2019-02-19 NOTE — LETTER
2/19/2019      Milena Jaime, DO  [address] [address]      RE: Shanika Stahl       Dear Colleague,    I had the pleasure of seeing Shanika Stahl in the [facility].    Service Date: 02/19/2019      HISTORY OF PRESENT ILLNESS:  Shanika is a pleasant 89-year-old female who presents to the office today for an annual followup.      Her cardiac history includes chronic atrial fibrillation along with a history of tachybrady syndrome, status post permanent pacemaker implantation.  She has hypertension and has been noted to have mild to moderate valvular regurgitation in the mitral and tricuspid positions.      Overall, the patient continues to do well from a cardiac standpoint.  She has not been having any chest discomfort, dyspnea on exertion, shortness of breath, orthopnea or PND.  She did have some very mild lower extremity edema, but this has been well controlled on her low-dose of Lasix.  In the past year, she did have 2 mechanical falls which resulted in lacerations that she needed stitches for.  About 6 months ago, she moved into [facility] in [place], [place].  She enjoys living there very much.      CURRENT CARDIAC MEDICATIONS:   1.  Furosemide 20 mg every other day.   2.  Toprol-XL 25 mg b.i.d.   3.  Simvastatin 20 mg nightly.   4.  Coumadin as directed.      The remainder of her medications, allergies and review of systems were reviewed and as are documented separately.      PHYSICAL EXAMINATION:   GENERAL:  The patient is a pleasant 89-year-old female who appears her stated age.  She is in no apparent distress.   VITAL SIGNS:  Her blood pressure is 142/60, pulse is 86.  Weight is 85 pounds which is stable.     PULMONARY:  Breathing is nonlabored.  Lungs are clear to auscultation.   CARDIAC:  Reveals a regular rate and rhythm.  She does have a 2/6 systolic ejection murmur heard best at the upper left sternal border.   EXTREMITIES:  Lower  extremities show trace edema bilaterally.   NEUROLOGIC:  Alert and oriented.      ASSESSMENT AND PLAN:  Ms. Negron is a very pleasant 89-year-old female with a history of chronic atrial fibrillation and prior tachybrady syndrome status post permanent pacemaker implantation.  She continues to follow in the Device Clinic routinely.  She also has a history of mild to moderate valvular insufficiency but none of this has been severe.  She does not have any symptoms that would indicate severe valvular insufficiency.  Her last echocardiogram was a year ago and findings were overall stable.  I did not recommend a repeat echocardiogram at this time.  I think we can continue to follow her symptomatically and decide if we want to repeat an echocardiogram.      She will follow up in the Cardiology office in 1 year.  Of course, I encouraged her to contact us sooner with questions or concerns.         YRN DE PAZ PA-C             D: 2019   T: 2019   MT: KYLEIGH      Name:     JULITO NEGRON   MRN:      [id number]        Account:      [id number]   :      1924           Service Date: 2019      Document: [id number]         Outpatient Encounter Medications as of 2019   Medication Sig Dispense Refill     acetaminophen (TYLENOL) 325 MG tablet Take 2 tablets (650 mg) by mouth 3 times daily 100 tablet 0     CALCIUM + D 600-200 MG-UNIT PO TABS 1 tablet by mouth daily       furosemide (LASIX) 20 MG tablet Take 1 tablet (20 mg) by mouth every other day 45 tablet 3     loratadine (CLARITIN) 10 MG tablet TAKE 1 TAB BY MOUTH ONCE DAILY FOR ALLERGIES 90 tablet 3     metoprolol succinate (TOPROL-XL) 25 MG 24 hr tablet TAKE 1 TAB BY MOUTH TWICE A  tablet 3     order for DME Knee high compression stockings 10-15 mm Hg 1 each 1     polyethylene glycol (MIRALAX/GLYCOLAX) Packet Take 17 g by mouth daily as needed for constipation       simvastatin (ZOCOR) 20 MG tablet TAKE 1 TAB BY MOUTH AT BEDTIME 90  tablet 3     VITAMIN D 1000 UNIT OR TABS 1 TABLET DAILY 30 0     warfarin (JANTOVEN) 2.5 MG tablet 2.5 mg (2.5 mg x 1) on Mon, Fri; 5 mg (2.5 mg x 2) all other days or As directed by Anticoagulation Clinic 150 tablet 0     warfarin (JANTOVEN) 5 MG tablet 2.5 mg (2.5 mg x 1) on Mon, Fri; 5 mg (2.5 mg x 2) all other days or As directed by Anticoagulation clinic 30 tablet 0     [] ondansetron (ZOFRAN-ODT) 4 MG ODT tab Take 1 tablet (4 mg) by mouth every 6 hours as needed for nausea or vomiting (Patient not taking: Reported on 1/15/2019) 20 tablet 0     [] senna-docusate (SENOKOT-S/PERICOLACE) 8.6-50 MG tablet Take 2 tablets by mouth At Bedtime , stop taking if loose stools develop. 60 tablet 0     No facility-administered encounter medications on file as of 2019.        Again, thank you for allowing me to participate in the care of your patient.      Sincerely,    Ashley Perez PA-C     [facility]

## 2019-02-19 NOTE — PROGRESS NOTES
Service Date: 02/19/2019      HISTORY OF PRESENT ILLNESS:  Shanika is a pleasant 89-year-old female who presents to the office today for an annual followup.      Her cardiac history includes chronic atrial fibrillation along with a history of tachybrady syndrome, status post permanent pacemaker implantation.  She has hypertension and has been noted to have mild to moderate valvular regurgitation in the mitral and tricuspid positions.      Overall, the patient continues to do well from a cardiac standpoint.  She has not been having any chest discomfort, dyspnea on exertion, shortness of breath, orthopnea or PND.  She did have some very mild lower extremity edema, but this has been well controlled on her low-dose of Lasix.  In the past year, she did have 2 mechanical falls which resulted in lacerations that she needed stitches for.  About 6 months ago, she moved into [facility] in [place], [place].  She enjoys living there very much.      CURRENT CARDIAC MEDICATIONS:   1.  Furosemide 20 mg every other day.   2.  Toprol-XL 25 mg b.i.d.   3.  Simvastatin 20 mg nightly.   4.  Coumadin as directed.      The remainder of her medications, allergies and review of systems were reviewed and as are documented separately.      PHYSICAL EXAMINATION:   GENERAL:  The patient is a pleasant 89-year-old female who appears her stated age.  She is in no apparent distress.   VITAL SIGNS:  Her blood pressure is 142/60, pulse is 86.  Weight is 85 pounds which is stable.     PULMONARY:  Breathing is nonlabored.  Lungs are clear to auscultation.   CARDIAC:  Reveals a regular rate and rhythm.  She does have a 2/6 systolic ejection murmur heard best at the upper left sternal border.   EXTREMITIES:  Lower extremities show trace edema bilaterally.   NEUROLOGIC:  Alert and oriented.      ASSESSMENT AND PLAN:  Ms. Stahl is a very pleasant 89-year-old female with a history of chronic atrial fibrillation and prior tachybrady  syndrome status post permanent pacemaker implantation.  She continues to follow in the Device Clinic routinely.  She also has a history of mild to moderate valvular insufficiency but none of this has been severe.  She does not have any symptoms that would indicate severe valvular insufficiency.  Her last echocardiogram was a year ago and findings were overall stable.  I did not recommend a repeat echocardiogram at this time.  I think we can continue to follow her symptomatically and decide if we want to repeat an echocardiogram.      She will follow up in the Cardiology office in 1 year.  Of course, I encouraged her to contact us sooner with questions or concerns.         YRN DE PAZ PA-C             D: 2019   T: 2019   MT: KYLEIGH      Name:     JULITO NEGRON   MRN:      [id number]        Account:      [id number]   :      1924           Service Date: 2019      Document: [id number]

## 2019-02-19 NOTE — LETTER
2/19/2019    Milena Taylor Addison, DO  [address] [facility] [address]    RE: Shanika Stahl       Dear Colleague,    I had the pleasure of seeing Shanika Stahl in the [facility].    Please see separate dictation for HPI, PHYSICAL EXAM AND IMPRESSION/PLAN.    CURRENT MEDICATIONS:  Current Outpatient Medications   Medication Sig Dispense Refill     acetaminophen (TYLENOL) 325 MG tablet Take 2 tablets (650 mg) by mouth 3 times daily 100 tablet 0     CALCIUM + D 600-200 MG-UNIT PO TABS 1 tablet by mouth daily       furosemide (LASIX) 20 MG tablet Take 1 tablet (20 mg) by mouth every other day 45 tablet 3     loratadine (CLARITIN) 10 MG tablet TAKE 1 TAB BY MOUTH ONCE DAILY FOR ALLERGIES 90 tablet 3     metoprolol succinate (TOPROL-XL) 25 MG 24 hr tablet TAKE 1 TAB BY MOUTH TWICE A  tablet 3     order for DME Knee high compression stockings 10-15 mm Hg 1 each 1     polyethylene glycol (MIRALAX/GLYCOLAX) Packet Take 17 g by mouth daily as needed for constipation       simvastatin (ZOCOR) 20 MG tablet TAKE 1 TAB BY MOUTH AT BEDTIME 90 tablet 3     VITAMIN D 1000 UNIT OR TABS 1 TABLET DAILY 30 0     warfarin (JANTOVEN) 2.5 MG tablet 2.5 mg (2.5 mg x 1) on Mon, Fri; 5 mg (2.5 mg x 2) all other days or As directed by Anticoagulation Clinic 150 tablet 0     warfarin (JANTOVEN) 5 MG tablet 2.5 mg (2.5 mg x 1) on Mon, Fri; 5 mg (2.5 mg x 2) all other days or As directed by Anticoagulation clinic 30 tablet 0       ALLERGIES:     Allergies   Allergen Reactions     Allopurinol      Ampicillin Diarrhea     Cipro [Ciprofloxacin] Nausea and Vomiting     Levaquin GI Disturbance     Lisinopril Swelling     AngioEdema     Paxil [Paroxetine] Nausea and Vomiting     Penicillins Diarrhea     Sulfa Drugs GI Disturbance     Zithromax [Macrolides] Nausea and Vomiting     Zoloft Nausea and Vomiting       PAST MEDICAL HISTORY:  Past Medical History:   Diagnosis Date     Abnormal CXR (chest x-ray)      Read as emphysema     Atherosclerosis of renal artery (H)      Atrial fibrillation (H) 3/27/2014     Basal cell carcinoma      ESSENTIAL TREMOR 5/9/2005     Family history of breast cancer      Hyperlipidemia LDL goal <100 10/31/2010    CHOL      175   7/6/2011 HDL       69   7/6/2011 LDL       93   7/6/2011 TRIG       61   7/6/2011 CHOLHDLRATIO      3.0   7/6/2011 On simvastatin 20mg      Hypertension goal BP (blood pressure) < 140/90 10/3/2013     Other and unspecified hyperlipidemia      Other osteoporosis      Pacemaker      Paroxysmal supraventricular tachycardia (H)      Postmenopausal atrophic vaginitis      Stroke (H) 2/10/2014     Syncope 10/9/2013     Unspecified essential hypertension        PAST SURGICAL HISTORY:  Past Surgical History:   Procedure Laterality Date     EYE SURGERY  2/21/12    left eye cataract     IMPLANT PACEMAKER       SURGICAL HISTORY OF -   1965    FEMORAL HERNIA REPAIR       SOCIAL HISTORY:  Social History     Socioeconomic History     Marital status:      Spouse name: None     Number of children: 4     Years of education: None     Highest education level: None   Social Needs     Financial resource strain: None     Food insecurity - worry: None     Food insecurity - inability: None     Transportation needs - medical: None     Transportation needs - non-medical: None   Occupational History     Employer: NONE      Employer: RETIRED   Tobacco Use     Smoking status: Never Smoker     Smokeless tobacco: Never Used   Substance and Sexual Activity     Alcohol use: No     Drug use: No     Sexual activity: No   Other Topics Concern      Service Not Asked     Blood Transfusions Not Asked     Caffeine Concern Not Asked     Occupational Exposure Not Asked     Hobby Hazards Not Asked     Sleep Concern Not Asked     Stress Concern Not Asked     Weight Concern Not Asked     Special Diet Not Asked     Back Care Not Asked     Exercise Yes     Bike Helmet Not Asked     Seat Belt Yes      Self-Exams Not Asked     Parent/sibling w/ CABG, MI or angioplasty before 65F 55M? No   Social History Narrative     None       FAMILY HISTORY:  Family History   Problem Relation Age of Onset     Cerebrovascular Disease Mother      Diabetes Mother      Breast Cancer Sister      Heart Disease Brother      Cerebrovascular Disease Brother      Heart Disease Brother      Prostate Cancer Brother      Heart Disease Brother      Heart Disease Sister      Gynecology Daughter      Cancer Son         Lung       Review of Systems:  Skin:  Positive for itching;bruising     Eyes:  Positive for glasses    ENT:  Positive for nasal congestion;hearing loss    Respiratory:  Positive for dyspnea on exertion     Cardiovascular:    edema;Positive for    Gastroenterology: Negative      Genitourinary:  Positive for urinary frequency;urgency    Musculoskeletal:  Positive for back pain;joint stiffness osteoporosis  Neurologic:  Positive for tremors;memory problems    Psychiatric:  Negative      Heme/Lymph/Imm:  Negative      Endocrine:  Negative        Service Date: 02/19/2019      HISTORY OF PRESENT ILLNESS:  Shanika is a pleasant 89-year-old female who presents to the office today for an annual followup.      Her cardiac history includes chronic atrial fibrillation along with a history of tachybrady syndrome, status post permanent pacemaker implantation.  She has hypertension and has been noted to have mild to moderate valvular regurgitation in the mitral and tricuspid positions.      Overall, the patient continues to do well from a cardiac standpoint.  She has not been having any chest discomfort, dyspnea on exertion, shortness of breath, orthopnea or PND.  She did have some very mild lower extremity edema, but this has been well controlled on her low-dose of Lasix.  In the past year, she did have 2 mechanical falls which resulted in lacerations that she needed stitches for.  About 6 months ago, she moved into [facility]  Living in [place], [place].  She enjoys living there very much.      CURRENT CARDIAC MEDICATIONS:   1.  Furosemide 20 mg every other day.   2.  Toprol-XL 25 mg b.i.d.   3.  Simvastatin 20 mg nightly.   4.  Coumadin as directed.      The remainder of her medications, allergies and review of systems were reviewed and as are documented separately.      PHYSICAL EXAMINATION:   GENERAL:  The patient is a pleasant 89-year-old female who appears her stated age.  She is in no apparent distress.   VITAL SIGNS:  Her blood pressure is 142/60, pulse is 86.  Weight is 85 pounds which is stable.     PULMONARY:  Breathing is nonlabored.  Lungs are clear to auscultation.   CARDIAC:  Reveals a regular rate and rhythm.  She does have a 2/6 systolic ejection murmur heard best at the upper left sternal border.   EXTREMITIES:  Lower extremities show trace edema bilaterally.   NEUROLOGIC:  Alert and oriented.      ASSESSMENT AND PLAN:  Ms. Negron is a very pleasant 89-year-old female with a history of chronic atrial fibrillation and prior tachybrady syndrome status post permanent pacemaker implantation.  She continues to follow in the [facility] routinely.  She also has a history of mild to moderate valvular insufficiency but none of this has been severe.  She does not have any symptoms that would indicate severe valvular insufficiency.  Her last echocardiogram was a year ago and findings were overall stable.  I did not recommend a repeat echocardiogram at this time.  I think we can continue to follow her symptomatically and decide if we want to repeat an echocardiogram.      She will follow up in the Cardiology office in 1 year.  Of course, I encouraged her to contact us sooner with questions or concerns.         YRN DE PAZ PA-C             D: 2019   T: 2019   MT: KYLEIGH      Name:     JULITO NEGRON   MRN:      [id number]        Account:      [id number]   :      1924           Service Date: 2019       Document: [id number]       Thank you for allowing me to participate in the care of your patient.      Sincerely,     Ashley Perez PA-C     [facility]    cc:   Dixon Amato MD  [facility]  [address] [address]

## 2019-02-27 NOTE — PROGRESS NOTES
ANTICOAGULATION FOLLOW-UP CLINIC VISIT    Patient Name:  Shanika Stahl  Date:  2019  Contact Type:  Telephone/ Ronda BELLO-[facility]    SUBJECTIVE:     Patient Findings     Positives:   No Problem Findings    Comments:   No changes in medications, activity, health, or diet noted. No bleeding or increased bruising noted. Took warfarin as prescribed.  Patient will continue weekly maintenance dose. INR is therapeutic.   Recheck in 2 weeks.              OBJECTIVE    INR   Date Value Ref Range Status   2019 2.71 (A) 0.86 - 1.14 Final       ASSESSMENT / PLAN  INR assessment THER    Recheck INR In: 2 WEEKS    INR Location Outside lab      Anticoagulation Summary  As of 2019    INR goal:   2.0-3.0   TTR:   84.1 % (4 y)   INR used for dosin.71 (2019)   Warfarin maintenance plan:   2.5 mg (2.5 mg x 1) every Mon, Fri; 5 mg (2.5 mg x 2) all other days   Full warfarin instructions:   2.5 mg every Mon, Fri; 5 mg all other days   Weekly warfarin total:   30 mg   No change documented:   Dre Phillips RN   Plan last modified:   Verenice Morillo RN (10/12/2018)   Next INR check:   3/13/2019   Priority:   INR   Target end date:   Indefinite    Indications    Atrial fibrillation (H) (Resolved) [I48.91]  History of cerebral embolism with cerebral infarction -  [I63.40]  Long term current use of anticoagulant therapy [Z79.01]             Anticoagulation Episode Summary     INR check location:       Preferred lab:       Send INR reminders to:   [facility] POOL    Comments:   Keep on low end of therapeutic range. Uses 2.5mg tablets. Resides at [facility] [phone]. Fax AVS to [phone]      Anticoagulation Care Providers     Provider Role Specialty Phone number    Milena Jaime,  [facility] Internal Medicine [phone]            See the Encounter Report to view Anticoagulation Flowsheet and Dosing Calendar (Go to Encounters tab in chart review, and find  the Anticoagulation Therapy Visit)        Dre Phillips RN

## 2019-03-13 NOTE — PROGRESS NOTES
ANTICOAGULATION FOLLOW-UP CLINIC VISIT    Patient Name:  Shanika Stahl  Date:  3/13/2019  Contact Type:  Telephone/ Ronda BELLO-[place]    SUBJECTIVE:     Patient Findings     Comments:   Patient will continue weekly maintenance dose. INR is therapeutic.   Recheck in 2 weeks.            OBJECTIVE    INR   Date Value Ref Range Status   2019 2.31 (A) 0.86 - 1.14 Final       ASSESSMENT / PLAN  INR assessment THER    Recheck INR In: 2 WEEKS    INR Location [facility]      Anticoagulation Summary  As of 3/13/2019    INR goal:   2.0-3.0   TTR:   84.2 % (4 y)   INR used for dosin.31 (3/13/2019)   Warfarin maintenance plan:   2.5 mg (2.5 mg x 1) every Mon, Fri; 5 mg (2.5 mg x 2) all other days   Full warfarin instructions:   2.5 mg every Mon, Fri; 5 mg all other days   Weekly warfarin total:   30 mg   No change documented:   Dre Phillips RN   Plan last modified:   Verenice Morillo RN (10/12/2018)   Next INR check:   3/27/2019   Priority:   INR   Target end date:   Indefinite    Indications    Atrial fibrillation (H) (Resolved) [I48.91]  History of cerebral embolism with cerebral infarction -  [I63.40]  Long term current use of anticoagulant therapy [Z79.01]             Anticoagulation Episode Summary     INR check location:       Preferred lab:       Send INR reminders to:   [place] [facility] POOL    Comments:   Keep on low end of therapeutic range. Uses 2.5mg tablets. Resides at [facility] [phone]. Fax AVS to [phone]      Anticoagulation Care Providers     Provider Role Specialty Phone number    Milena Jaime,  Responsible Internal Medicine [phone]            See the Encounter Report to view Anticoagulation Flowsheet and Dosing Calendar (Go to Encounters tab in chart review, and find the Anticoagulation Therapy Visit)        Dre Phillips RN

## 2019-03-27 NOTE — PROGRESS NOTES
ANTICOAGULATION FOLLOW-UP CLINIC VISIT    Patient Name:  Shanika Stahl  Date:  3/27/2019  Contact Type:  Telephone/ Ronda BELLO-[place]    SUBJECTIVE:     Patient Findings     Comments:   No changes in medications, activity, health, or diet noted. No bleeding or increased bruising noted. Took warfarin as prescribed.  Patient will continue weekly maintenance dose. INR is therapeutic.   Recheck in 2 weeks.             OBJECTIVE    INR   Date Value Ref Range Status   2019 2.36 (A) 0.86 - 1.14 Final       ASSESSMENT / PLAN  INR assessment THER    Recheck INR In: 2 WEEKS    INR Location [facility]      Anticoagulation Summary  As of 3/27/2019    INR goal:   2.0-3.0   TTR:   84.4 % (4 y)   INR used for dosin.36 (3/27/2019)   Warfarin maintenance plan:   2.5 mg (2.5 mg x 1) every Mon, Fri; 5 mg (2.5 mg x 2) all other days   Full warfarin instructions:   2.5 mg every Mon, Fri; 5 mg all other days   Weekly warfarin total:   30 mg   Plan last modified:   Verenice Morillo RN (10/12/2018)   Next INR check:   4/10/2019   Priority:   INR   Target end date:   Indefinite    Indications    Atrial fibrillation (H) (Resolved) [I48.91]  History of cerebral embolism with cerebral infarction -  [I63.40]  Long term current use of anticoagulant therapy [Z79.01]             Anticoagulation Episode Summary     INR check location:       Preferred lab:       Send INR reminders to:   [place] [employer] POOL    Comments:   Keep on low end of therapeutic range. Uses 2.5mg tablets. Resides at [facility] [phone]. Fax AVS to [phone]      Anticoagulation Care Providers     Provider Role Specialty Phone number    Milena Jaime,  Responsible Internal Medicine [phone]            See the Encounter Report to view Anticoagulation Flowsheet and Dosing Calendar (Go to Encounters tab in chart review, and find the Anticoagulation Therapy Visit)        Dre Phillips RN

## 2019-04-11 NOTE — PROGRESS NOTES
ANTICOAGULATION FOLLOW-UP CLINIC VISIT    Patient Name:  Shanika Stahl  Date:  2019  Contact Type:  Telephone/ Ronda, [facility] JACLYN  AVS faxed to [phone]    SUBJECTIVE:     Patient Findings     Comments:   No changes in medications, diet, or activity noted. Took warfarin as instructed, denies any missed doses. No issues with bleeding or unusual bruising noted.            OBJECTIVE    INR   Date Value Ref Range Status   04/10/2019 2.37 (A) 0.8 - 1.1 Final       ASSESSMENT / PLAN  No question data found.  Anticoagulation Summary  As of 2019    INR goal:   2.0-3.0   TTR:   84.5 % (4.1 y)   INR used for dosin.37 (4/10/2019)   Warfarin maintenance plan:   2.5 mg (2.5 mg x 1) every Mon, Fri; 5 mg (2.5 mg x 2) all other days   Full warfarin instructions:   2.5 mg every Mon, Fri; 5 mg all other days   Weekly warfarin total:   30 mg   No change documented:   Tori Toledo RN   Plan last modified:   Verenice Morillo RN (10/12/2018)   Next INR check:   2019   Priority:   INR   Target end date:   Indefinite    Indications    Atrial fibrillation (H) (Resolved) [I48.91]  History of cerebral embolism with cerebral infarction -  [I63.40]  Long term current use of anticoagulant therapy [Z79.01]             Anticoagulation Episode Summary     INR check location:       Preferred lab:       Send INR reminders to:   [employer] POOL    Comments:   Keep on low end of therapeutic range. Uses 2.5mg tablets. Resides at [facility] [phone]. Fax AVS to [phone]      Anticoagulation Care Providers     Provider Role Specialty Phone number    AddisonDebbiMilena Sara,  [facility] Internal Medicine [phone]            See the Encounter Report to view Anticoagulation Flowsheet and Dosing Calendar (Go to Encounters tab in chart review, and find the Anticoagulation Therapy Visit)        Tori Toledo RN [facility]

## 2019-04-11 NOTE — PATIENT INSTRUCTIONS
Continue 2.5mg Mon,Fri and 5mg all other days. Recheck the INR in 3 weeks, May 1st and call the results to the Anticoagulation Clinic on May 2nd for further dosing instructions.

## 2019-04-23 PROBLEM — J18.9 PNEUMONIA: Status: RESOLVED | Noted: 2018-02-02 | Resolved: 2019-01-01

## 2019-04-23 PROBLEM — R53.1 GENERALIZED WEAKNESS: Status: ACTIVE | Noted: 2018-07-13

## 2019-04-23 PROBLEM — R19.7 DIARRHEA IN ADULT PATIENT: Status: ACTIVE | Noted: 2018-07-13

## 2019-04-23 PROBLEM — I27.20 PULMONARY HYPERTENSION (H): Chronic | Status: ACTIVE | Noted: 2017-07-31

## 2019-04-23 PROBLEM — J18.9 PNEUMONIA: Status: ACTIVE | Noted: 2018-02-02

## 2019-04-23 PROBLEM — S22.081D: Status: RESOLVED | Noted: 2018-07-13 | Resolved: 2019-01-01

## 2019-04-23 PROBLEM — S62.101A WRIST FRACTURE, BILATERAL: Status: ACTIVE | Noted: 2019-01-01

## 2019-04-23 PROBLEM — S62.102A WRIST FRACTURE, BILATERAL: Status: ACTIVE | Noted: 2019-01-01

## 2019-04-23 PROBLEM — S09.90XA HEAD INJURY DUE TO TRAUMA: Status: ACTIVE | Noted: 2019-01-01

## 2019-04-23 PROBLEM — G93.40 ACUTE ENCEPHALOPATHY: Status: ACTIVE | Noted: 2019-01-01

## 2019-04-23 PROBLEM — Z86.73 HISTORY OF STROKE: Status: ACTIVE | Noted: 2018-02-02

## 2019-04-23 PROBLEM — R79.1 PROLONGED INR: Status: RESOLVED | Noted: 2018-07-13 | Resolved: 2019-01-01

## 2019-04-23 PROBLEM — R13.12 OROPHARYNGEAL DYSPHAGIA: Status: ACTIVE | Noted: 2018-07-13

## 2019-04-23 PROBLEM — R01.1 HEART MURMUR: Chronic | Status: ACTIVE | Noted: 2019-01-01

## 2019-04-23 NOTE — CONSULTS
CARE TRANSITION SOCIAL WORK INITIAL ASSESSMENT:      Met with: Patient.    DATA  Active Problems:    Renal hypertension    Wrist fracture, bilateral             Contact information and PCP information verified: Yes      ASSESSMENT  Cognitive Status: alert and confused. Writer to complete assessment once able to contact family.                                  Insurance Concerns: Pt is on Observation status. Pt will not have TCU coverage.           This writer met with pt, introduced self and role. Pt is orientated to self but seems confused to time and place. Writer received permission to talk with marcia Montgomery and Antoine Stahl. Called sons and left messages to c/b.       PLAN    Anticipate pt will needs to discharge to SNF. Await return call from marcia.       Jessica Ellison [facility] [phone]

## 2019-04-23 NOTE — CONSULTS
[facility] Consultation    Consultation - [facility]  Level of consult: One-time consult to assist in determining a diagnosis and to recommend an appropriate treatment plan    Shanika Stahl,  1924, MRN [id number]     Admitting Dx: Closed head injury, initial encounter [S09.90XA]  Fall, initial encounter [W19.XXXA]  Closed fracture of both wrists, initial encounter [S62.101A, S62.102A]     PCP: Milena Jaime, [phone]     Code status:  Prior     Extended Emergency Contact Information  Primary Emergency Contact: Itzel Stahln  Address: [address] [place] [address] [facility]  Home Phone: [phone]  Mobile Phone: [phone]  Relation: Son  Secondary Emergency Contact: Naina  Address: [address] [address] [facility]  Home Phone: [phone]  Work Phone: [phone]  Mobile Phone: [phone]  Relation: Son     Assessment:  Right distal radius fx with well alignment on AP, however mild dorsal displacement on lateral. No indications of reduction attempt in ED. Right non displaced ulnar styloid fx. Left comminuted distal radius fx with extension into the articular surface without displacement. Left comminuted distal ulna/ ulnar styloid fx with minimal displacement. Left non displaced trapezium fx.     Plan:  Spoke with Dr. Luna's team who also reviewed imaging. Will currently plan to treat conservatively with close follow up. Will likely cast left wrist in future visits and will monitor right wrist and adjust plan as necessary. Continue with bilateral wrist splits to remain intact until follow up. Patient will follow up with Chandrika Purcell PA-C in 7 days, appointment scheduled. Non weight bearing bilateral wrists may use platform walker with therapy. Okay to use gentle weight on platform walker to facilitate mobility. May discharge when medically stable and appropriate placement available.     Active  Problems:    Wrist fracture, bilateral       Chief Complaint  fall     HPI  We have been requested to evaluate Shanika Stahl who is a 89 year old year old female for bilateral radius and ulna fxs. Patients current residence is at an assisted living facility. She is unable to answer my questions. She is verbal but pleasantly confused. She shows no sign of pain to her bilateral wrists which was observed as patient transferred to the commode and to the chair.      Unable to obtain a history from the patient due to mental status     Past Medical History  Past Medical History:   Diagnosis Date     Abnormal CXR (chest x-ray)     Read as emphysema     Atherosclerosis of renal artery (H)      Atrial fibrillation (H) 3/27/2014     Basal cell carcinoma      ESSENTIAL TREMOR 5/9/2005     Family history of breast cancer      Hyperlipidemia LDL goal <100 10/31/2010    CHOL      175   7/6/2011 HDL       69   7/6/2011 LDL       93   7/6/2011 TRIG       61   7/6/2011 CHOLHDLRATIO      3.0   7/6/2011 On simvastatin 20mg      Hypertension goal BP (blood pressure) < 140/90 10/3/2013     Other and unspecified hyperlipidemia      Other osteoporosis      Pacemaker      Paroxysmal supraventricular tachycardia (H)      Postmenopausal atrophic vaginitis      Stroke (H) 2/10/2014     Syncope 10/9/2013     Unspecified essential hypertension        Surgical History  Past Surgical History:   Procedure Laterality Date     EYE SURGERY  2/21/12    left eye cataract     IMPLANT PACEMAKER       SURGICAL HISTORY OF -   1965    FEMORAL HERNIA REPAIR        Social History  Social History     Socioeconomic History     Marital status:      Spouse name: Not on file     Number of children: 4     Years of education: Not on file     Highest education level: Not on file   Occupational History     Employer: NONE      Employer: RETIRED   Social Needs     Financial resource strain: Not on file     Food insecurity:     Worry: Not on file     Inability:  Not on file     Transportation needs:     Medical: Not on file     Non-medical: Not on file   Tobacco Use     Smoking status: Never Smoker     Smokeless tobacco: Never Used   Substance and Sexual Activity     Alcohol use: No     Drug use: No     Sexual activity: Never   Lifestyle     Physical activity:     Days per week: Not on file     Minutes per session: Not on file     Stress: Not on file   Relationships     Social connections:     Talks on phone: Not on file     Gets together: Not on file     Attends [employer] service: Not on file     Active member of club or organization: Not on file     Attends meetings of clubs or organizations: Not on file     Relationship status: Not on file     Intimate partner violence:     Fear of current or ex partner: Not on file     Emotionally abused: Not on file     Physically abused: Not on file     Forced sexual activity: Not on file   Other Topics Concern      Service Not Asked     Blood Transfusions Not Asked     Caffeine Concern Not Asked     Occupational Exposure Not Asked     Hobby Hazards Not Asked     Sleep Concern Not Asked     Stress Concern Not Asked     Weight Concern Not Asked     Special Diet Not Asked     Back Care Not Asked     Exercise Yes     Bike Helmet Not Asked     Seat Belt Yes     Self-Exams Not Asked     Parent/sibling w/ CABG, MI or angioplasty before 65F 55M? No   Social History Narrative     Not on file       Family History  Family History   Problem Relation Age of Onset     Cerebrovascular Disease Mother      Diabetes Mother      Breast Cancer Sister      Heart Disease Brother      Cerebrovascular Disease Brother      Heart Disease Brother      Prostate Cancer Brother      Heart Disease Brother      Heart Disease Sister      Gynecology Daughter      Cancer Son         Lung        Allergies:  Allopurinol; Ampicillin; Cipro [ciprofloxacin]; Levaquin; Lisinopril; Paxil [paroxetine]; Penicillins; Sulfa drugs; Zithromax [macrolides]; and  Zoloft      Current Medications:  Current Facility-Administered Medications   Medication     acetaminophen (TYLENOL) tablet 650 mg     naloxone (NARCAN) injection 0.1-0.4 mg     ondansetron (ZOFRAN-ODT) ODT tab 4 mg    Or     ondansetron (ZOFRAN) injection 4 mg     ondansetron (ZOFRAN) injection 4 mg     oxyCODONE (ROXICODONE) tablet 5-10 mg     prochlorperazine (COMPAZINE) injection 5 mg    Or     prochlorperazine (COMPAZINE) tablet 5 mg    Or     prochlorperazine (COMPAZINE) Suppository 12.5 mg       Review of Systems:  Review of systems is limited by patient factors - mental status    Physical Exam:  Temp:  [97.1  F (36.2  C)-97.5  F (36.4  C)] 97.5  F (36.4  C)  Pulse:  [77-85] 78  Resp:  [18] 18  BP: (182-232)/() 182/62  SpO2:  [88 %-97 %] 93 %    MSK: bilateral wrists splinted in volar short arm splints. Digits are mobile and sensation is intact. Left ring finger rings are removed to avoid suffocation to  The finger if edema becomes problematic.      Pertinent Labs  Lab Results: personally reviewed.  Lab Results   Component Value Date    WBC 9.7 04/23/2019    HGB 12.2 04/23/2019    HCT 39.1 04/23/2019    MCV 97 04/23/2019     04/23/2019     Recent Labs   Lab 04/23/19  0305   INR 1.98*       Pertinent Radiology  Radiology Results: images and radiology report reviewed  Recent Results (from the past 24 hour(s))   XR Wrist Bilateral G/E 3 Views    Narrative    BILATERAL WRISTS 6 VIEWS  4/23/2019 3:21 AM     HISTORY: Bilateral deformities of the wrists and ecchymoses after fall  from standing.    COMPARISON: None.      Impression    IMPRESSION:  1. Mildly impacted acute transverse fracture of the distal right  radius. There is 0.6 cm of posterior displacement of the distal  fracture fragment and mild posterior angulation about the fracture.  2. Nondisplaced acute fracture of the right ulnar styloid process.  3. Acute comminuted fractures of the distal left radius and ulna. The  radius fracture involves  the radiocarpal joint. There is mild  posterior angulation about the fractures.  4. An apparent linear lucency within the left trapezium bone, only  visualized on the oblique projection image, equivocal for a  nondisplaced acute fracture. Recommend clinical correlation.  5. Diffuse osteopenia.    KRYSTLE TREJO MD   XR Chest 1 View    Narrative    CHEST SINGLE VIEW  4/23/2019 3:32 AM     HISTORY: Fall from standing.    COMPARISON: 2/1/2018.    FINDINGS: Mild chronic-appearing interstitial opacities in both lungs.  A few linear opacities in the lung bases likely represent scarring  and/or atelectasis. The lungs are otherwise clear. No pneumothorax.  Moderate enlargement of the cardiopericardial silhouette.  Atherosclerotic calcification in the thoracic aorta. Left anterior  chest wall cardiac device with lead tips in the right atrium and  ventricle.      Impression    IMPRESSION: No evidence of active cardiopulmonary disease.    KRYSTLE TREJO MD   Shoulder XR, 2 view left    Narrative    LEFT SHOULDER 2 VIEWS  4/23/2019 3:33 AM     HISTORY: Fall from standing. Proximal humerus tenderness.    COMPARISON: 10/8/2013.      Impression    IMPRESSION:  1. No visualized acute fracture or malalignment of the left shoulder.  2. Diffuse osteopenia.    KRYSTLE TREJO MD   CT Head w/o Contrast    Narrative    CT HEAD WITHOUT CONTRAST  4/23/2019 3:49 AM     HISTORY: Head trauma.    COMPARISON: 9/5/2018.    TECHNIQUE: Without intravenous contrast, helical sections were  acquired through the brain. Coronal reconstructions were generated.  Radiation dose for this scan was reduced using automated exposure  control, adjustment of the mA and/or kV according to the patient's  size, or iterative reconstruction technique.     FINDINGS: Moderate to severe diffuse cerebral atrophy. Moderate  periventricular white matter low attenuation, likely relating to  chronic small vessel ischemic disease. No intra-axial mass, mass  affect or  midline shift. Normal gray-white matter differentiation. No  visualized acute intra-axial hemorrhage. The cerebral ventricles are  normal in caliber. The basal cisterns are patent. No extra-axial fluid  collection. The visualized portions of the paranasal sinuses and  mastoid air cells are unremarkable. Small to moderate-sized hematoma  in the scalp overlying the left side of the frontal bone.      Impression    IMPRESSION:  1. No evidence of acute intracranial trauma.  2. Small to moderate-sized hematoma in the scalp overlying the left  side of the frontal bone.    KRYSTLE TREJO MD   Cervical spine CT w/o contrast    Narrative    CT CERVICAL SPINE WITHOUT CONTRAST  4/23/2019 3:50 AM     HISTORY: Fall from standing. Head injury.    COMPARISON: 9/5/2018.    TECHNIQUE: Without intravenous contrast, helical sections were  acquired through the cervical spine from the skull base through the  bottom of the T1 vertebral body. Coronal and sagittal reconstructions  were generated. Radiation dose for this scan was reduced using  automated exposure control, adjustment of the mA and/or kV according  to the patient's size, or iterative reconstruction technique.    FINDINGS: No visualized acute fracture of the cervical spine. Moderate  degenerative changes in the cervical spine. Grade I anterolisthesis of  the C6 on C7 vertebral body, likely related to degenerative changes.  Otherwise, normal alignment of the cervical spine. No prevertebral  soft tissue swelling. Atherosclerotic calcification in bilateral  carotid arteries.      Impression    IMPRESSION: No visualized acute fracture of the cervical spine.    KRYSTLE TREJO MD       Attestation:  I have reviewed today's vital signs, notes, medications, labs and imaging.  Amount of time performed on this consult: 20 minutes.     Sherly Catherine

## 2019-04-23 NOTE — PROGRESS NOTES
04/23/19 1500   Quick Adds   Type of Visit Initial PT Evaluation   Living Environment   Lives With facility resident   Living Arrangements apartment;assisted living   Functional Level Prior   Ambulation 1-->assistive equipment   Transferring 1-->assistive equipment   Toileting 1-->assistive equipment   Bathing 2-->assistive person   Communication 0-->understands/communicates without difficulty   Swallowing 0-->swallows foods/liquids without difficulty   Cognition 0 - no cognition issues reported   Fall history within last six months yes   Number of times patient has fallen within last six months 1   Prior Functional Level Comment PLOF-  Pt confused- a fair historain- does reports she uses a walker at home-    General Information   Onset of Illness/Injury or Date of Surgery - Date 04/22/19   Patient/Family Goals Statement Pt unable to state goal   Pertinent History of Current Problem (include personal factors and/or comorbidities that impact the POC) 89 year old female admitted on 4/23/2019. She presents with bilateral wrist fractures from a fall early this morning.; admitted to the hospitlal with Encephalopathy.  Pt seen by orthopedics- currently non - surgical management, may use platform walker for ease of mobility     Precautions/Limitations fall precautions   General Observations Pt alert, confused- unaware she is in the hospital-  Pain appers to be contolled   Pain Assessment   Patient Currently in Pain Yes, see Vital Sign flowsheet   Posture    Posture Comments frail, kyphotic   Range of Motion (ROM)   ROM Comment Appears WFL, bialteral wrist splints in place   Strength   Strength Comments Unable to test fully due to cognition   Bed Mobility   Bed Mobility Comments min. assistance of one   Transfer Skills   Transfer Comments Pt transferred chair> bed w/ bilateral platform walker, able to take several steps with the tranfer,  Needing assistance to gently place UEs on/off platforms    Balance   Balance  "Comments fair/ good dynamic standing balance w/ RW use   General Therapy Interventions   Planned Therapy Interventions bed mobility training;gait training;transfer training;progressive activity/exercise   Clinical Impression   Criteria for Skilled Therapeutic Intervention yes, treatment indicated   PT Diagnosis Bilateral wrist fractures   Influenced by the following impairments NWB bilateral UEs( bed mobility ) , transfers); PWB/ platform use w/ ambulation,; confusion   Functional limitations due to impairments dependent mobility   Clinical Presentation Stable/Uncomplicated   Clinical Presentation Rationale clinical judgement   Clinical Decision Making (Complexity) Low complexity   Therapy Frequency` daily   Predicted Duration of Therapy Intervention (days/wks) 3 days   Anticipated Equipment Needs at Discharge other (see comments)  (walker platform attachments)   Anticipated Discharge Disposition Transitional Care Facility   Risk & Benefits of therapy have been explained Yes   Patient, Family & other staff in agreement with plan of care Yes   [facility] AM-PAC  \"6 Clicks\" V.2 Basic Mobility Inpatient Short Form   1. Turning from your back to your side while in a flat bed without using bedrails? 3 - A Little   2. Moving from lying on your back to sitting on the side of a flat bed without using bedrails? 3 - A Little   3. Moving to and from a bed to a chair (including a wheelchair)? 3 - A Little   4. Standing up from a chair using your arms (e.g., wheelchair, or bedside chair)? 3 - A Little   5. To walk in hospital room? 2 - A Lot  (NT)   6. Climbing 3-5 steps with a railing? 2 - A Lot  (NT)   Basic Mobility Raw Score (Score out of 24.Lower scores equate to lower levels of function) 16   Total Evaluation Time   Total Evaluation Time (Minutes) 15     "

## 2019-04-23 NOTE — ED PROVIDER NOTES
History     Chief Complaint   Patient presents with     Fall     Wrist Pain     patricia MITCHELL  Shanika Stahl is a 89 year old female who presents for evaluation after a fall.  History obtained from the patient and EMS.  Just prior to arrival the patient was found down in her bathroom at her assisted living.  She reports going to the bathroom and slipped and falling.  She is not sure if she hit her head and denies headache.  Minor neck pain.  She denies chest pain or difficulty breathing.  She reports significant bilateral wrist pain, severe when she moves some, throbbing, nonradiating.  She denies abdominal pain.  She has had several episodes of vomiting since this happened.  No pelvic pain or lower extremity pain.  She is on warfarin.    Allergies:  Allergies   Allergen Reactions     Allopurinol      Ampicillin Diarrhea     Cipro [Ciprofloxacin] Nausea and Vomiting     Levaquin GI Disturbance     Lisinopril Swelling     AngioEdema     Paxil [Paroxetine] Nausea and Vomiting     Penicillins Diarrhea     Sulfa Drugs GI Disturbance     Zithromax [Macrolides] Nausea and Vomiting     Zoloft Nausea and Vomiting       Problem List:    Patient Active Problem List    Diagnosis Date Noted     Wrist fracture, bilateral 04/23/2019     Priority: Medium     Heart murmur, tricuspid regurgitation 01/09/2019     Priority: Medium     Tricuspid regurgitation on echo 2017       Closed stable burst fracture of eleventh thoracic vertebra with routine healing 07/13/2018     Priority: Medium     Diarrhea 07/13/2018     Priority: Medium     Oropharyngeal dysphagia 07/13/2018     Priority: Medium     Prolonged INR 07/13/2018     Priority: Medium     Generalized weakness 07/13/2018     Priority: Medium     History of stroke 02/02/2018     Priority: Medium     Pneumonia 02/02/2018     Priority: Medium     Pulmonary hypertension (H) 07/31/2017     Priority: Medium     Sensorineural hearing loss, bilateral 04/06/2016     Priority: Medium      Chronic atrial fibrillation (H) 10/02/2015     Priority: Medium     On warfarin       Pacemaker 10/02/2015     Priority: Medium     Long term current use of anticoagulant therapy 02/27/2015     Priority: Medium     Problem list name updated by automated process. Provider to review       History of cerebral embolism with cerebral infarction - 2014 02/10/2014     Priority: Medium     Occurred 2014  Tachy-pernell and a-fib       Syncope 10/09/2013     Priority: Medium     Advance Care Planning 02/15/2012     Priority: Medium     Advance Care PlanningReceipt of ACP document:  Received: Health Care Directive which was witnessed or notarized on 5/14/2013.  Document not previously scanned.  Validation form completed and sent with document to be scanned.  Code Status reflects choices in most recent ACP document.  Confirmed/documented designated decision maker(s). See permanent comments section of demographics in clinical tab. View document(s) and details by clicking on code status. Added by Joanna Garcia on 5/21/2013.  Health Directive notarized  March 30 1999 Sent to scanning February 15, 2012 [facility] Ashley GODFREY MA         Hyperlipidemia LDL goal <100 10/31/2010     Priority: Medium     CHOL      175   7/6/2011  HDL       69   7/6/2011  LDL       93   7/6/2011  TRIG       61   7/6/2011  CHOLHDLRATIO      3.0   7/6/2011  On simvastatin 20mg       Family history of breast cancer      Priority: Medium     Abnormal CXR (chest x-ray)      Priority: Medium     Read as emphysema       Renal hypertension 05/09/2005     Priority: Medium     Known STU--seen on 2001 MRA--did not want angioplasty in past  Controlled on diltiazem, losartan and spironolactone           Atherosclerosis of renal artery (H) 05/09/2005     Priority: Medium     STU by MRA (?records)--medical therapy elected by patient.       Paroxysmal supraventricular tachycardia (H) 05/09/2005     Priority: Medium     SVT-infrequent episode on holter in  2000  Negative stress echo in 2002       Other osteoporosis 05/09/2005     Priority: Medium     Dx 2004 and put on fosamax   BMD 3/05 (T scores):  Lumbar -4.3, Right prox femur -4.0, Left prox femur -4.0  BMD 7/08 (T scores):  Lumbar -4.2, Right prox femur -3.9, Left prox femur -4.1  Fosamax changed to IV reclast and miacalcin 3/09 by Dr. Vanessa  Had second reclast injection 8/11          Past Medical History:    Past Medical History:   Diagnosis Date     Abnormal CXR (chest x-ray)      Atherosclerosis of renal artery (H)      Atrial fibrillation (H) 3/27/2014     Basal cell carcinoma      ESSENTIAL TREMOR 5/9/2005     Family history of breast cancer      Hyperlipidemia LDL goal <100 10/31/2010     Hypertension goal BP (blood pressure) < 140/90 10/3/2013     Other and unspecified hyperlipidemia      Other osteoporosis      Pacemaker      Paroxysmal supraventricular tachycardia (H)      Postmenopausal atrophic vaginitis      Stroke (H) 2/10/2014     Syncope 10/9/2013     Unspecified essential hypertension        Past Surgical History:    Past Surgical History:   Procedure Laterality Date     EYE SURGERY  2/21/12    left eye cataract     IMPLANT PACEMAKER       SURGICAL HISTORY OF -   1965    FEMORAL HERNIA REPAIR       Family History:    Family History   Problem Relation Age of Onset     Cerebrovascular Disease Mother      Diabetes Mother      Breast Cancer Sister      Heart Disease Brother      Cerebrovascular Disease Brother      Heart Disease Brother      Prostate Cancer Brother      Heart Disease Brother      Heart Disease Sister      Gynecology Daughter      Cancer Son         Lung       Social History:  Marital Status:   [5]  Social History     Tobacco Use     Smoking status: Never Smoker     Smokeless tobacco: Never Used   Substance Use Topics     Alcohol use: No     Drug use: No        Medications:      No current outpatient medications on file.      Review of Systems  Pertinent positives and  negatives listed in the HPI, all other systems reviewed and are negative.    Physical Exam   BP: (!) 212/95  Pulse: 85  Temp: 97.1  F (36.2  C)  Resp: 18  Weight: 40.8 kg (90 lb)  SpO2: 94 %      Physical Exam  BP (!) 202/72   Pulse 77   Temp 97.1  F (36.2  C) (Axillary)   Resp 18   Wt 40.8 kg (90 lb)   SpO2 96%   BMI 15.45 kg/m     GENERAL: Alert, cooperative, mild distress  HEAD: Left sided scalp hematoma and abrasion.  EYES: Conjunctivae clear, EOM intact. PERLL, Pupils are 3 mm.  HEENT:  Normal external ears, normal TM's without evidence of hemotympanum.  No nasal discharge or evidence of septal hematoma. No facial instability or asymmetry. No evidence of intraoral trauma.  Intact bite without malalignment.  NECK: Mild midline tenderness, no lateral tenderness.  CHEST:  No crepitus or tenderness with AP or lateral compression  CARDIOVASCULAR : Nml rate, distal pulses are normal and symmetric  LUNGS: No respiratory distress.  GI: Soft, ND, NT.   PELVIS: No crepitus or tenderness with AP or lateral compression  BACK: No step-offs palpated, no tenderness to palpation of thoracic or lumbar spine.  EXTREMITIES: Bilateral wrist deformities with ecchymosis and tenderness and crepitus.  Ecchymosis and tenderness of the left proximal humerus  NEUROLOGICAL : GCS= 15.  Awake, alert, and oriented x 3.  Cranial nerves II-XII grossly intact. Normal muscle strength and tone, sensation to light touch grossly intact in all extremities.  No focal deficits.   SKIN : Normal color, no jaundice or rash. No abrasions.      ED Course        Orthopedic injury tx  Date/Time: 4/23/2019 7:20 AM  Performed by: Jayy Allen MD  Authorized by: Jayy Allen MD   Consent: Verbal consent obtained.  Consent given by: patient  Patient identity confirmed: verbally with patient  Injury location: wrist  Location details: right wrist  Injury type: fracture  Fracture type: distal radius  Pre-procedure neurovascular assessment:  neurovascularly intact  Pre-procedure distal perfusion: normal  Pre-procedure neurological function: normal  Pre-procedure range of motion: reduced  Manipulation performed: no  Immobilization: splint  Splint type: dynamic short arm  Supplies used: Ortho-Glass  Post-procedure neurovascular assessment: post-procedure neurovascularly intact  Patient tolerance: Patient tolerated the procedure well with no immediate complications    Orthopedic injury tx  Date/Time: 4/23/2019 7:21 AM  Performed by: Jayy Allen MD  Authorized by: Jayy Allen MD   Consent: Verbal consent obtained.  Consent given by: patient  Pre-procedure neurovascular assessment: neurovascularly intact  Pre-procedure range of motion: reduced  Immobilization: splint  Splint type: dynamic short arm  Supplies used: Ortho-Glass  Post-procedure neurovascular assessment: post-procedure neurovascularly intact  Post-procedure distal perfusion: normal  Post-procedure neurological function: normal  Patient tolerance: Patient tolerated the procedure well with no immediate complications                     Critical Care time:  none        Trauma:  Level of trauma activation: Emergency Department evaluation  C-collar and immobilization: not indicated, cleared.  CSpine Clearance: by Nexus Criteria and C spine cleared clinically and CT scan  GCS at arrival: 15  GCS at disposition: unchanged  Full Primary and Secondary survey with appropriate immobilization of spine completed in exam section.  Consults prior to admission or transfer: Orthopedics called at 0520  Procedures done in the ED: Splinting of bilateral wrists, CMS intact post procedure        Results for orders placed or performed during the hospital encounter of 04/23/19 (from the past 24 hour(s))   Basic metabolic panel   Result Value Ref Range    Sodium 137 133 - 144 mmol/L    Potassium 3.7 3.4 - 5.3 mmol/L    Chloride 106 94 - 109 mmol/L    Carbon Dioxide 24 20 - 32 mmol/L    Anion Gap 7  3 - 14 mmol/L    Glucose 182 (H) 70 - 99 mg/dL    Urea Nitrogen 23 7 - 30 mg/dL    Creatinine 0.64 0.52 - 1.04 mg/dL    GFR Estimate 76 >60 mL/min/[1.73_m2]    GFR Estimate If Black 88 >60 mL/min/[1.73_m2]    Calcium 8.6 8.5 - 10.1 mg/dL   CBC with platelets differential   Result Value Ref Range    WBC 9.7 4.0 - 11.0 10e9/L    RBC Count 4.02 3.8 - 5.2 10e12/L    Hemoglobin 12.2 11.7 - 15.7 g/dL    Hematocrit 39.1 35.0 - 47.0 %    MCV 97 78 - 100 fl    MCH 30.3 26.5 - 33.0 pg    MCHC 31.2 (L) 31.5 - 36.5 g/dL    RDW 13.2 10.0 - 15.0 %    Platelet Count 209 150 - 450 10e9/L    Diff Method Automated Method     % Neutrophils 73.8 %    % Lymphocytes 17.3 %    % Monocytes 5.5 %    % Eosinophils 2.4 %    % Basophils 0.4 %    % Immature Granulocytes 0.6 %    Nucleated RBCs 0 0 /100    Absolute Neutrophil 7.2 1.6 - 8.3 10e9/L    Absolute Lymphocytes 1.7 0.8 - 5.3 10e9/L    Absolute Monocytes 0.5 0.0 - 1.3 10e9/L    Absolute Eosinophils 0.2 0.0 - 0.7 10e9/L    Absolute Basophils 0.0 0.0 - 0.2 10e9/L    Abs Immature Granulocytes 0.1 0 - 0.4 10e9/L    Absolute Nucleated RBC 0.0    INR   Result Value Ref Range    INR 1.98 (H) 0.86 - 1.14   XR Wrist Bilateral G/E 3 Views    Narrative    BILATERAL WRISTS 6 VIEWS  4/23/2019 3:21 AM     HISTORY: Bilateral deformities of the wrists and ecchymoses after fall  from standing.    COMPARISON: None.      Impression    IMPRESSION:  1. Mildly impacted acute transverse fracture of the distal right  radius. There is 0.6 cm of posterior displacement of the distal  fracture fragment and mild posterior angulation about the fracture.  2. Nondisplaced acute fracture of the right ulnar styloid process.  3. Acute comminuted fractures of the distal left radius and ulna. The  radius fracture involves the radiocarpal joint. There is mild  posterior angulation about the fractures.  4. An apparent linear lucency within the left trapezium bone, only  visualized on the oblique projection image, equivocal  for a  nondisplaced acute fracture. Recommend clinical correlation.  5. Diffuse osteopenia.    KRYSTLE TREJO MD   XR Chest 1 View    Narrative    CHEST SINGLE VIEW  4/23/2019 3:32 AM     HISTORY: Fall from standing.    COMPARISON: 2/1/2018.    FINDINGS: Mild chronic-appearing interstitial opacities in both lungs.  A few linear opacities in the lung bases likely represent scarring  and/or atelectasis. The lungs are otherwise clear. No pneumothorax.  Moderate enlargement of the cardiopericardial silhouette.  Atherosclerotic calcification in the thoracic aorta. Left anterior  chest wall cardiac device with lead tips in the right atrium and  ventricle.      Impression    IMPRESSION: No evidence of active cardiopulmonary disease.    KRYSTLE TREJO MD   Shoulder XR, 2 view left    Narrative    LEFT SHOULDER 2 VIEWS  4/23/2019 3:33 AM     HISTORY: Fall from standing. Proximal humerus tenderness.    COMPARISON: 10/8/2013.      Impression    IMPRESSION:  1. No visualized acute fracture or malalignment of the left shoulder.  2. Diffuse osteopenia.    KRYSTLE TREJO MD   CT Head w/o Contrast    Narrative    CT HEAD WITHOUT CONTRAST  4/23/2019 3:49 AM     HISTORY: Head trauma.    COMPARISON: 9/5/2018.    TECHNIQUE: Without intravenous contrast, helical sections were  acquired through the brain. Coronal reconstructions were generated.  Radiation dose for this scan was reduced using automated exposure  control, adjustment of the mA and/or kV according to the patient's  size, or iterative reconstruction technique.     FINDINGS: Moderate to severe diffuse cerebral atrophy. Moderate  periventricular white matter low attenuation, likely relating to  chronic small vessel ischemic disease. No intra-axial mass, mass  affect or midline shift. Normal gray-white matter differentiation. No  visualized acute intra-axial hemorrhage. The cerebral ventricles are  normal in caliber. The basal cisterns are patent. No extra-axial  fluid  collection. The visualized portions of the paranasal sinuses and  mastoid air cells are unremarkable. Small to moderate-sized hematoma  in the scalp overlying the left side of the frontal bone.      Impression    IMPRESSION:  1. No evidence of acute intracranial trauma.  2. Small to moderate-sized hematoma in the scalp overlying the left  side of the frontal bone.    KRYSTLE TREJO MD   Cervical spine CT w/o contrast    Narrative    CT CERVICAL SPINE WITHOUT CONTRAST  4/23/2019 3:50 AM     HISTORY: Fall from standing. Head injury.    COMPARISON: 9/5/2018.    TECHNIQUE: Without intravenous contrast, helical sections were  acquired through the cervical spine from the skull base through the  bottom of the T1 vertebral body. Coronal and sagittal reconstructions  were generated. Radiation dose for this scan was reduced using  automated exposure control, adjustment of the mA and/or kV according  to the patient's size, or iterative reconstruction technique.    FINDINGS: No visualized acute fracture of the cervical spine. Moderate  degenerative changes in the cervical spine. Grade I anterolisthesis of  the C6 on C7 vertebral body, likely related to degenerative changes.  Otherwise, normal alignment of the cervical spine. No prevertebral  soft tissue swelling. Atherosclerotic calcification in bilateral  carotid arteries.      Impression    IMPRESSION: No visualized acute fracture of the cervical spine.    KRYSTLE TREJO MD       Medications   acetaminophen (TYLENOL) tablet 650 mg (has no administration in time range)   naloxone (NARCAN) injection 0.1-0.4 mg (has no administration in time range)   oxyCODONE (ROXICODONE) tablet 5-10 mg (has no administration in time range)   ondansetron (ZOFRAN-ODT) ODT tab 4 mg (has no administration in time range)     Or   ondansetron (ZOFRAN) injection 4 mg (has no administration in time range)   prochlorperazine (COMPAZINE) injection 5 mg (has no administration in time range)      Or   prochlorperazine (COMPAZINE) tablet 5 mg (has no administration in time range)     Or   prochlorperazine (COMPAZINE) Suppository 12.5 mg (has no administration in time range)   ondansetron (ZOFRAN) injection 4 mg (has no administration in time range)   ondansetron (ZOFRAN-ODT) ODT tab 4 mg (4 mg Oral Given 4/23/19 0520)       Assessments & Plan (with Medical Decision Making)   89-year-old female who presents for evaluation after a fall.  Heart rate 90, SPO2 is 94% on 2 L of oxygen via nasal cannula.  She is given IV hydromorphone for the pain.  Notable images obtained including CT of the head, CT cervical spine, chest x-ray, left shoulder x-ray, and bilateral wrist x-rays.  All images reviewed by myself as well as radiology reads reviewed.  No signs of intracranial hemorrhage or mass-effect, no cervical spine fracture.  No signs of fracture of the left shoulder.  She does have bilateral distal radial fractures with posterior displacement.  Given the patient's age and comorbidities, the fractures were not reducible left in place and splinted as above.  I have discussed the case with the on-call orthopedic surgeon, Dr. Mendoza who agrees with this plan.  He says that orthopedic surgery will come to evaluate the patient in the hospital today.  Given the patient's age of 89 with bilateral wrist fractures and that she lives at an assisted living with only minimal help, she is not safe to discharge home at this time.  Therefore she is admitted to the medicine service for further treatment.  I discussed the case with the on-call hospitalist, Dr. Jensen who agrees to admit the patient to her service.    I have reviewed the nursing notes.    I have reviewed the findings, diagnosis, plan and need for follow up with the patient.          Medication List      ASK your doctor about these medications    ondansetron 4 MG ODT tab  Commonly known as:  ZOFRAN-ODT  4 mg, Oral, EVERY 6 HOURS PRN  Ask about: Should I take this  medication?     senna-docusate 8.6-50 MG tablet  Commonly known as:  SENOKOT-S/PERICOLACE  2 tablets, Oral, AT BEDTIME, , stop taking if loose stools develop.  Ask about: Should I take this medication?            Final diagnoses:   Fall, initial encounter   Closed fracture of both wrists, initial encounter   Closed head injury, initial encounter       4/23/2019   [facility] EMERGENCY DEPARTMENT     Jayy Allen MD  04/23/19 0717       Jayy Allen MD  04/23/19 0722

## 2019-04-23 NOTE — ED TRIAGE NOTES
Found on bathroom floor by staff after pt was head pounding on floor was last checked on around [id number] found 0130    bilat wrist deformity pain in L shoulder  Pt is alert does not know why she fell

## 2019-04-23 NOTE — PROGRESS NOTES
[place] [facility] [place] ADMISSION NOTE    Patient admitted to room 2401 at approximately 0600 via cart from emergency room. Patient was accompanied by transport tech and son.     Verbal SBAR report received from Estrellita CHINO  prior to patient arrival.     Patient ambulated to bed with assist of 2. Patient alert and oriented X 3. Pain not well controlled, has order for oxycodone but is nauseous   . Admission vital signs: Blood pressure (!) 202/72, pulse 77, temperature 97.1  F (36.2  C), temperature source Axillary, resp. rate 18, weight 40.8 kg (90 lb), SpO2 96 %, not currently breastfeeding. Patient was oriented to plan of care, call light, bed controls, tv, telephone, bathroom and visiting hours.     Risk Assessment    The following safety risks were identified during admission: fall. Yellow risk band applied: YES.     Skin Initial Assessment    This writer admitted this patient and completed a full skin assessment and  score in the Adult PCS flowsheet. Appropriate interventions initiated as needed.     Secondary skin check completed by Yanni Feliciano Risk Assessment  Sensory Perception: 3-->slightly limited  Moisture: 3-->occasionally moist  Activity: 2-->chairfast  Mobility: 2-->very limited  Nutrition: 3-->adequate  Friction and Shear: 3-->no apparent problem   Score: 16  Bed Support Surface: Atmos Air mattress  Reassessed using Bed Algorithm: No   Intervention(s) Implemented: draw sheets, heels suspended, HOB elevated 30 degrees or less, patient education on pressure relief reinforced    Patient has bruises on left side of forehead, jaw, shoulder, bilateral knees and inside of right ankle. She also has scattered brown coloring on bilateral legs.     She has both arms splinted with ace wraps.   Krystle Adorno

## 2019-04-23 NOTE — PROGRESS NOTES
Name: Shanika Stahl    MRN#: [id number]    Reason for Hospitalization: Closed head injury, initial encounter [S09.90XA]  Fall, initial encounter [W19.XXXA]  Closed fracture of both wrists, initial encounter [S62.101A, S62.102A]    Anticipated Discharge Date: 4/24/19    Patient / Family response to discharge plan: Spoke to pt's son, Antoine via telephone briefly. Antoine referred writer to speak with his brother Ulises tele:[phone]. Spoke to Ulises and completed AIDET. Discussed pt's medical disposition and anticipated discharge needs. Pt will need TCU. Although, pt does not have coverage due to only being on Observation status. Explained Medicare not covering TCU and that SNF will require a down payment up front. Ulises states he understands and that should not be an issue. Writer put in TCU referrals with son's choices as follows: Sonya tele:[phone], Nathalie tele:[phone] and Juan tele:[phone]. Writer spoke to Sonya today. They will review and follow up tomorrow morning with Care Transitions. Nathalie Admissions, Claire states should have TCU bed available in their long term area and they would require $5000 deposit. Writer finally was also able to speak to [facility], Verenice Gil RN Ph:[phone]/FAX:[phone]. Verenice states they would not be able to accommodate pt's medical needs to return at this time. Although, they look forward to pt returning home there as soon as possible after TCU.  Pt was put on Memory Care Unit waiting list to be assessed as appropriate when returns.    Follow-Up Appt:   Future Appointments   Date Time Provider Department Center   4/24/2019  9:15 AM Genny Arriola PT [facility]   7/3/2019 12:00 AM [facility] [address] [facility]       Other Providers (Care Coordinator, County Services, PCA services etc): No    Discharge Disposition: TCU - Needs ALENA.    NAKITA Aj     Name band;

## 2019-04-23 NOTE — H&P
"[facility]    History and Physical - Hospitalist Service       Date of Admission:  4/23/2019    Assessment & Plan   Shanika Stahl is a 89 year old female admitted on 4/23/2019. She presents with bilateral wrist fractures from a fall early this morning. She is very confused and was unable to meaningfully contribute to this interview.    Acute encephalopathy / delirium  Minor head injury, anticoagulated  Fall early this AM, likely mechanical. Anticoagulated with heparin. Hematoma on left forehead. Extremely confused. Unable to tell me what happened or where she is. Repeatedly calling to \"Antoine\" who is not in the room. CT head negative at 4AM. Only opiate administered was a single dose of Dilaudid 0.3 mg at 0350.  Afebrile, no leukocytosis.   - Follow CBC, BMP  - VBG  - UA with reflex UC  - Blood Cx  - Repeat head CT  - Hold coumadin tonight, pharmacy to dose tomorrow if CT clear.      Fall  Wrist fracture, bilateral  Per ED note, mechanical slip/fall in bathroom this morning. Presents with bilateral wrist fractures, splinted but not reduced in the ED. Right distal radius fracture with dorsal displacement of the distal fragment. Left comminuted but non-displaced radial and ulnar fractures. Per ortho, conservative management, keep splints in place, follow-up with ortho in 7 days. She lives in an assisted living facility with minimal assistance. She likely will not be able to return to this facility with bilateral wrist fractures.  Remainder of trauma work-up negative (CT neck, XR left shoulder, CT head).  - Tylenol for pain  - Avoid opiates due to encephalopathy  - TCU placement    Renal hypertension   Pulmonary hypertension   Known renal artery stenosis, but elected for medical management. Managed prior to admission with lasix 20 mg every other day and metoprolol XL 25 mg bid. Blood pressures elevated.  - Continue prior to admission lasix and metoprolol     Hyperlipidemia   History of " cerebral embolism with cerebral infarction - 2014  No focal deficits. Patient is taking coumadin prior to admission for atrial fibrillation, also taking Zocor 20 mg q hs.  - Continue prior to admission Zocor  - Hold coumadin tonight, pharmacy to dose tomorrow if CT clear.    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    Chronic atrial fibrillation   Long term current use of anticoagulant therapy  Rate controlled with metoprolol XL 25 mg bid prior to admission. Anticoagulated with coumadin prior to admission. Admit INR = 1.98.  - Continue prior to admission metoprolol with holding parameters  - Hold coumadin tonight, pharmacy to dose tomorrow if CT clear.       Diet: Regular Diet Adult    DVT Prophylaxis: Warfarin  Chauhan Catheter: not present  Code Status: DNR/DNI - Confirmed with sonUlises.    Disposition Plan   Expected discharge: 2 - 3 days, recommended to transitional care unit once adequate pain management/ tolerating PO medications, safe disposition plan/ TCU bed available and encephalopathy resolves.  Entered: Dixon Angel PA-C 04/23/2019, 1:48 PM     The patient's care was discussed with the Attending Physician, Dr. Aldridge and Patient's Family.    Dixon Angel PA-C  [facility]    ______________________________________________________________________    Chief Complaint   Bilateral wrist pain    History is obtained from the electronic health record, emergency department physician and patient's son, Ulises.  She is extremely confused and was not able to meaningfully participate in this interview.    History of Present Illness   Shanika Stahl is a 89 year old female with a history of atrial fibrillation, anticoagulated on heparin who presents after a probable mechanical fall this morning.  When she was first seen in the ED she was able to describe the events of this morning.  She states she slipped and fell, hitting her head on the wall.  Right now  she is unable to recall what happened and does not seem to be aware that she has injured her arms.    According to her son, Ulises, she is beginning to exhibit signs of dementia including forgetfulness and confusion.  She has good days and bad days, on bad days she forgets the names of people around her although she recognizes them.    Review of Systems    Review of systems not obtained due to patient factors - confusion    Past Medical History    I have reviewed this patient's medical history and updated it with pertinent information if needed.   Past Medical History:   Diagnosis Date     Abnormal CXR (chest x-ray)     Read as emphysema     Atherosclerosis of renal artery (H)      Atrial fibrillation (H) 3/27/2014     Basal cell carcinoma      ESSENTIAL TREMOR 5/9/2005     Family history of breast cancer      Hyperlipidemia LDL goal <100 10/31/2010    CHOL      175   7/6/2011 HDL       69   7/6/2011 LDL       93   7/6/2011 TRIG       61   7/6/2011 CHOLHDLRATIO      3.0   7/6/2011 On simvastatin 20mg      Hypertension goal BP (blood pressure) < 140/90 10/3/2013     Other and unspecified hyperlipidemia      Other osteoporosis      Pacemaker      Paroxysmal supraventricular tachycardia (H)      Postmenopausal atrophic vaginitis      Stroke (H) 2/10/2014     Syncope 10/9/2013     Unspecified essential hypertension        Past Surgical History   I have reviewed this patient's surgical history and updated it with pertinent information if needed.  Past Surgical History:   Procedure Laterality Date     EYE SURGERY  2/21/12    left eye cataract     IMPLANT PACEMAKER       SURGICAL HISTORY OF -   1965    FEMORAL HERNIA REPAIR       Social History   I have reviewed this patient's social history and updated it with pertinent information if needed.  Social History     Tobacco Use     Smoking status: Never Smoker     Smokeless tobacco: Never Used   Substance Use Topics     Alcohol use: No     Drug use: No       Family History   I  have reviewed this patient's family history and updated it with pertinent information if needed.   Family History   Problem Relation Age of Onset     Cerebrovascular Disease Mother      Diabetes Mother      Breast Cancer Sister      Heart Disease Brother      Cerebrovascular Disease Brother      Heart Disease Brother      Prostate Cancer Brother      Heart Disease Brother      Heart Disease Sister      Gynecology Daughter      Cancer Son         Lung       Prior to Admission Medications   Prior to Admission Medications   Prescriptions Last Dose Informant Patient Reported? Taking?   CALCIUM + D 600-200 MG-UNIT PO TABS 2019 at [address] [place] [address] Yes Yes   Si tablet by mouth daily   VITAMIN D 1000 UNIT OR TABS 2019 at [address] [place] [address] Yes Yes   Si TABLET by mouth DAILY   acetaminophen (TYLENOL) 325 MG tablet 2019 at [address] [place] [address] No Yes   Sig: Take 2 tablets (650 mg) by mouth 3 times daily   furosemide (LASIX) 20 MG tablet 2019 at [address] [place] [address] No Yes   Sig: Take 1 tablet (20 mg) by mouth every other day   loratadine (CLARITIN) 10 MG tablet 2019 at [address] [place] [address] No Yes   Sig: TAKE 1 TAB BY MOUTH ONCE DAILY FOR ALLERGIES   metoprolol succinate (TOPROL-XL) 25 MG 24 hr tablet 2019 at [address] [place] [address] No Yes   Sig: TAKE 1 TAB BY MOUTH TWICE A DAY   ondansetron (ZOFRAN-ODT) 4 MG ODT tab Unknown at Unknown time  No No   Sig: Take 1 tablet (4 mg) by mouth every 6 hours as needed for nausea or vomiting   Patient not taking: Reported on 1/15/2019   order for [facility] No No   Sig: Knee high compression stockings 10-15 mm Hg   polyethylene glycol (MIRALAX/GLYCOLAX) Packet 19 [facility] Yes Yes   Sig: Take 17 g by mouth daily as needed for constipation   senna-docusate (SENOKOT-S/PERICOLACE) 8.6-50 MG tablet 2019 at [address] [place] [address] No Yes   Sig: Take 2 tablets by mouth At Bedtime , stop taking if loose stools develop.   simvastatin (ZOCOR) 20 MG tablet  2019 at [address] [place] [address] No Yes   Sig: TAKE 1 TAB BY MOUTH AT BEDTIME   warfarin (JANTOVEN) 2.5 MG tablet 2019 at [address] [place] [address] No Yes   Si.5 mg (2.5 mg x 1) on Mon, Fri; 5 mg (2.5 mg x 2) all other days or As directed by Anticoagulation Clinic      Facility-Administered Medications: None     Allergies   Allergies   Allergen Reactions     Allopurinol Unknown     Ampicillin Diarrhea     Cipro [Ciprofloxacin] Nausea and Vomiting     Levaquin GI Disturbance     Lisinopril Swelling     AngioEdema     Paxil [Paroxetine] Nausea and Vomiting     Penicillins Diarrhea     Sulfa Drugs GI Disturbance     Zithromax [Macrolides] Nausea and Vomiting     Zoloft Nausea and Vomiting       Physical Exam   Vital Signs: Temp: 97.8  F (36.6  C) Temp src: Oral BP: 159/59 Pulse: 67   Resp: 16 SpO2: 93 % O2 Device: None (Room air) Oxygen Delivery: 1 LPM  Weight: 90 lbs 0 oz    General: Appears calm, comfortable. Answers questions slowly with clear speech.   Skin: Pink, warm, dry.  Eyes: PERRL. Sclera are white.  HENT:  Normocephalic.  Hematoma and contusion in the left side of her forehead.  Oropharynx is moist, without lesions or exudate.  Lymph/Hematologic: No occipital, anterior/posterior cervical, supraclavicular, or axillary lymphadenopathy.  CV: RRR, clear S1/S2 without murmur, rub, or gallop. Radial pulses equal bilaterally. No lower extremity edema.  Respiratory: CTA and equal bilaterally. Normal respiratory effort.  GI: Soft, nontender. Normal bowel sounds.  Musculoskeletal: Splints in place on both forearms.  Moving all extremities symmetrically.  Neuro: Knows she lives in [place].  Knows it is springtime.  Does not know she is in the hospital.  Repeatedly calling out people who were not present.  CN II - XII grossly intact. Symmetrical extremity strength.  Psych: Unable to assess due to confusion.    Data   Data reviewed today: I reviewed all medications, new labs and imaging results over the last 24 hours. I  personally reviewed the chest x-ray image(s) showing No effusions or infiltrates and a normal cardiac silhouette and the Bilateral wrist image(s) showing Bilateral radial and ulnar fractures as described above.    Recent Labs   Lab 04/23/19  0305   WBC 9.7   HGB 12.2   MCV 97      INR 1.98*      POTASSIUM 3.7   CHLORIDE 106   CO2 24   BUN 23   CR 0.64   ANIONGAP 7   BLAISE 8.6   *     Recent Results (from the past 24 hour(s))   XR Wrist Bilateral G/E 3 Views    Narrative    BILATERAL WRISTS 6 VIEWS  4/23/2019 3:21 AM     HISTORY: Bilateral deformities of the wrists and ecchymoses after fall  from standing.    COMPARISON: None.      Impression    IMPRESSION:  1. Mildly impacted acute transverse fracture of the distal right  radius. There is 0.6 cm of posterior displacement of the distal  fracture fragment and mild posterior angulation about the fracture.  2. Nondisplaced acute fracture of the right ulnar styloid process.  3. Acute comminuted fractures of the distal left radius and ulna. The  radius fracture involves the radiocarpal joint. There is mild  posterior angulation about the fractures.  4. An apparent linear lucency within the left trapezium bone, only  visualized on the oblique projection image, equivocal for a  nondisplaced acute fracture. Recommend clinical correlation.  5. Diffuse osteopenia.    KRYSTLE TREJO MD   XR Chest 1 View    Narrative    CHEST SINGLE VIEW  4/23/2019 3:32 AM     HISTORY: Fall from standing.    COMPARISON: 2/1/2018.    FINDINGS: Mild chronic-appearing interstitial opacities in both lungs.  A few linear opacities in the lung bases likely represent scarring  and/or atelectasis. The lungs are otherwise clear. No pneumothorax.  Moderate enlargement of the cardiopericardial silhouette.  Atherosclerotic calcification in the thoracic aorta. Left anterior  chest wall cardiac device with lead tips in the right atrium and  ventricle.      Impression    IMPRESSION: No  evidence of active cardiopulmonary disease.    KRYSTLE TREJO MD   Shoulder XR, 2 view left    Narrative    LEFT SHOULDER 2 VIEWS  4/23/2019 3:33 AM     HISTORY: Fall from standing. Proximal humerus tenderness.    COMPARISON: 10/8/2013.      Impression    IMPRESSION:  1. No visualized acute fracture or malalignment of the left shoulder.  2. Diffuse osteopenia.    KRYSTLE TREJO MD   CT Head w/o Contrast    Narrative    CT HEAD WITHOUT CONTRAST  4/23/2019 3:49 AM     HISTORY: Head trauma.    COMPARISON: 9/5/2018.    TECHNIQUE: Without intravenous contrast, helical sections were  acquired through the brain. Coronal reconstructions were generated.  Radiation dose for this scan was reduced using automated exposure  control, adjustment of the mA and/or kV according to the patient's  size, or iterative reconstruction technique.     FINDINGS: Moderate to severe diffuse cerebral atrophy. Moderate  periventricular white matter low attenuation, likely relating to  chronic small vessel ischemic disease. No intra-axial mass, mass  affect or midline shift. Normal gray-white matter differentiation. No  visualized acute intra-axial hemorrhage. The cerebral ventricles are  normal in caliber. The basal cisterns are patent. No extra-axial fluid  collection. The visualized portions of the paranasal sinuses and  mastoid air cells are unremarkable. Small to moderate-sized hematoma  in the scalp overlying the left side of the frontal bone.      Impression    IMPRESSION:  1. No evidence of acute intracranial trauma.  2. Small to moderate-sized hematoma in the scalp overlying the left  side of the frontal bone.    KRYSTLE TREJO MD   Cervical spine CT w/o contrast    Narrative    CT CERVICAL SPINE WITHOUT CONTRAST  4/23/2019 3:50 AM     HISTORY: Fall from standing. Head injury.    COMPARISON: 9/5/2018.    TECHNIQUE: Without intravenous contrast, helical sections were  acquired through the cervical spine from the skull base through  the  bottom of the T1 vertebral body. Coronal and sagittal reconstructions  were generated. Radiation dose for this scan was reduced using  automated exposure control, adjustment of the mA and/or kV according  to the patient's size, or iterative reconstruction technique.    FINDINGS: No visualized acute fracture of the cervical spine. Moderate  degenerative changes in the cervical spine. Grade I anterolisthesis of  the C6 on C7 vertebral body, likely related to degenerative changes.  Otherwise, normal alignment of the cervical spine. No prevertebral  soft tissue swelling. Atherosclerotic calcification in bilateral  carotid arteries.      Impression    IMPRESSION: No visualized acute fracture of the cervical spine.    KRYSTLE TREJO MD

## 2019-04-23 NOTE — LETTER
Transition Communication Hand-off for Care Transitions to Next Level of Care Provider    Name: Shanika Stahl  : 1924  MRN #: [id number]  Primary Care Provider: Milena Jaime     Primary Clinic: [address] [place] [id number]     Reason for Hospitalization:  Closed head injury, initial encounter [S09.90XA]  Fall, initial encounter [W19.XXXA]  Closed fracture of both wrists, initial encounter [S62.101A, S62.102A]  Admit Date/Time: 2019  2:39 AM  Discharge Date: 19  Payor Source: Payor: MEDICARE / Plan: MEDICARE / Product Type: Medicare /     Readmission Assessment Measure (KLAUDIA) Risk Score/category: average         Reason for Communication Hand-off Referral: Fragility    Discharge Plan:home       Concern for non-adherence with plan of care:   Y/N no  Follow-up specialty is recommended: No    Follow-up plan:    Future Appointments   Date Time Provider Department Center   7/3/2019 12:00 AM LOVE TECH1 [facility]             Key Recommendations:  Pt is discharging to [facility] Phone: (Admissions: [phone] RN Report: [phone] Fax: [phone])  Today. [facility]. Pt is private pay and son is in agreement with this.     Mikayla VITALE, [facility] Center[facility], [phone]    AVS/Discharge Summary is the source of truth; this is a helpful guide for improved communication of patient story

## 2019-04-23 NOTE — ED NOTES
"Patient has  [facility] to Observation  order. Patient has been given the Observation brochure -  What does Observation mean to me.\"  Patient has been given the opportunity to ask questions about observation status and their plan of care.      Clark Clark  "

## 2019-04-23 NOTE — PLAN OF CARE
"Patient is confused to time and place, stated she thought it was 7 of 1940. Knew Anjel is the President. Moves legs freely in bed, attempted to swing them over the edge to get up. Alarms on for safety. Assisted to bedside commode with 2 assist. Patient did bear weight well and took steps. States her arms are \"heavy\" when asked if having pain. Fingers warm with no discoloration. Splints to bilateral forearms in place secured with ACE wraps. Medicated with tylenol. Has scattered bruises from head to legs. Ate bites for breakfast fed by staff. MIKE Shafer notified of elevated blood pressures, home meds to be addressed. Has been sleeping most of the morning.   "

## 2019-04-24 PROBLEM — R19.7 DIARRHEA IN ADULT PATIENT: Status: RESOLVED | Noted: 2018-07-13 | Resolved: 2019-01-01

## 2019-04-24 NOTE — PHARMACY - DISCHARGE MEDICATION RECONCILIATION
Discharge medication review for this patient is complete.   Patient was not counseled or given any education materials as discharged to  TCU facility.  See EPIC for allergy information, prior to admission medications and immunization status.   Pharmacist assisted with medication reconciliation of discharge medications with PTA medications.    MD was contacted with any questions/concerns:None    Additional medication history information:None    Discharge Medication List     Review of your medicines      CONTINUE these medicines which have NOT CHANGED      Dose / Directions   acetaminophen 325 MG tablet  Commonly known as:  TYLENOL      Dose:  650 mg  Take 2 tablets (650 mg) by mouth 3 times daily  Quantity:  100 tablet  Refills:  0     calcium + D 600-200 MG-UNIT Tabs  Generic drug:  calcium carbonate-vitamin D      1 tablet by mouth daily  Refills:  0     furosemide 20 MG tablet  Commonly known as:  LASIX  Used for:  Leg swelling      Dose:  20 mg  Take 1 tablet (20 mg) by mouth every other day  Quantity:  45 tablet  Refills:  3     metoprolol succinate ER 25 MG 24 hr tablet  Commonly known as:  TOPROL-XL  Used for:  Renal hypertension, stage 1-4 or unspecified chronic kidney disease      TAKE 1 TAB BY MOUTH TWICE A DAY  Quantity:  180 tablet  Refills:  3     order for DME  Used for:  Leg swelling      Knee high compression stockings 10-15 mm Hg  Quantity:  1 each  Refills:  1     polyethylene glycol packet  Commonly known as:  MIRALAX/GLYCOLAX      Dose:  17 g  Take 17 g by mouth daily as needed for constipation  Refills:  0     senna-docusate 8.6-50 MG tablet  Commonly known as:  SENOKOT-S/PERICOLACE  Used for:  Slow transit constipation      Dose:  2 tablet  Take 2 tablets by mouth At Bedtime , stop taking if loose stools develop.  Quantity:  60 tablet  Refills:  0     simvastatin 20 MG tablet  Commonly known as:  ZOCOR  Used for:  Hyperlipidemia LDL goal <100      TAKE 1 TAB BY MOUTH AT BEDTIME  Quantity:  90  tablet  Refills:  3     vitamin D3 1000 units (25 mcg) tablet  Commonly known as:  CHOLECALCIFEROL      1 TABLET by mouth DAILY  Quantity:  30  Refills:  0     warfarin 2.5 MG tablet  Commonly known as:  JANTOVEN  Used for:  Chronic atrial fibrillation (H)      Take as directed. If you are unsure how to take this medication, talk to your nurse or doctor.  Original instructions:  2.5 mg (2.5 mg x 1) on Mon, Fri; 5 mg (2.5 mg x 2) all other days or As directed by Anticoagulation Clinic  Quantity:  150 tablet  Refills:  0        STOP taking    loratadine 10 MG tablet  Commonly known as:  CLARITIN        ondansetron 4 MG ODT tab  Commonly known as:  ZOFRAN-ODT                 Genna Jackson, ZenaidaD

## 2019-04-24 NOTE — PLAN OF CARE
Patient remains confused but pleasant and appreciative. Attempted to swing legs over edge of bed again this evening without calling for help. Incontinent of urine once this afternoon and voided on commode at this time with assist of 2. Fed bites for supper. Pain to arms with movement. On-coming nurse aware and will medicate. 2 gold rings removed from left 4th finger and given to son Ulises alford to bring home. Alarms on for safety.

## 2019-04-24 NOTE — PROGRESS NOTES
"SPIRITUAL HEALTH SERVICES  SPIRITUAL ASSESSMENT Progress Note  [facility] [place] - Med/Surg    Shanika welcomed visit today.  She remembered I had stopped yesterday and talked about things not \"feeling better yet\" and that her arms felt \"heavy.\"  Very pleasant in conversation.  She talked about the Confucianist service they are having now at the [facility] where she lives - and that she was able to attend Confucianist on Sunday, \"even with my walker\".  I shared encouragement for her around her mariza.  She has been a member at [facility] for many years.  I offered prayer for her as she processes through this challenging time - waiting for her broken bones to be set and heal.     Rik George M.A., [facility]  Staff [facility]  Office: [phone]  Cell: [phone]  Pager [phone]    "

## 2019-04-24 NOTE — PLAN OF CARE
Physical Therapy Discharge Summary    Reason for therapy discharge:    Discharged to transitional care facility.    Progress towards therapy goal(s). See goals on Care Plan in [facility] electronic health record for goal details.  Goals not met.  Barriers to achieving goals:   discharge from facility.    Therapy recommendation(s):    Continued therapy is recommended.  Rationale/Recommendations:  to improve mobility.

## 2019-04-24 NOTE — PLAN OF CARE
Patient confused but clearing. Alert to self and place, unsure of time and situation.  Up with assist of 2 with gate belt and her arms resting on ours. As needed Tylenol for pain.  Ice applied, unsure if helped or not.   Voiding during night with no incontinence.  Sips of water overnight and applesauce with crushed medication.  Turned every few hours.   Bruises on head, jaw, shoulder and knees.  Bed alarm on for safety and she has the squeeze call light.

## 2019-04-24 NOTE — PLAN OF CARE
[facility] DISCHARGE NOTE    Patient discharged to transitional care unit at 3:40 PM via wheel chair. Accompanied by other:[facility] transport. Discharge instructions reviewed with caregiver, opportunity offered to ask questions. Prescriptions sent with patient to fill . All belongings sent with patient.  Report called to Ayana at [facility].   Raven Jimenez

## 2019-04-24 NOTE — PLAN OF CARE
Patient is alert today, more interactive with staff, talking clearly still with some confusion. Does not consistently use the call light. Alarms on for safety. Up to commode today and to chair with 2 assist. Tylenol helpful for bilateral arm/wrist pain. Splints in place. Arms elevated on pillows. Fingers swollen, warm, good capillary refill. Totally fed by staff, appetite poor. Drank boost today. Continent of urine.

## 2019-04-24 NOTE — DISCHARGE INSTRUCTIONS
[facility] Discharge Instructions     Discharge disposition:  Discharged to short-term care facility       Diet:  Regular       Activity Activity as tolerated       Follow-up: Follow up with primary care provider in 7 days       Additional instructions: Follow up with Chandrika Purcell at [facility] on Tuesday, April 30th at 10:00 am. Staff from TCU or SNF must accompany patient to visit or family .    Continue with bilateral wrist splits to remain intact until follow up.   Non weight bearing bilateral wrists may use platform walker with therapy. Okay to use gentle weight on platform walker to facilitate mobility

## 2019-04-24 NOTE — PROGRESS NOTES
Name: Shanika Stahl    MRN#: [id number]    Reason for Hospitalization: Closed head injury, initial encounter [S09.90XA]  Fall, initial encounter [W19.XXXA]  Closed fracture of both wrists, initial encounter [S62.101A, S62.102A]    Discharge Date: 4/24/2019    Patient / Family response to discharge plan: Pt has been accepted at [facility] Phone: (Admissions: [phone] RN Report: [phone] Fax: [phone])  And will discharge there today. Pts son in agreement, he is also in agreement for private pay at the facility. [facility] does NOT require up front paymen, they bill pt, son in agreement. Pt will discharge at 1530.    Other Providers (Care Coordinator, County Services, PCA services etc): No    CTS Hand Off Completed: Yes: completed    PAS #: PAS-RR    Per DHS regulation, CTS team completed and submitted PAS-RR to [place] [employer] Direct Connect via the [employer] Line. CTS team advised SNF and they are aware a PAS-RR has been submitted.     CTS team reviewed with pt or health care agent that they may be contacted for a follow up appointment within 10 days of hospital discharge if SNF stay is <30 days. Contact information for [employer] Line was also provided.     Pt or health care agent verbalized understanding.     PAS-RR # [id number]      KLAUDIA Score: average    Future Appointments:   Future Appointments   Date Time Provider Department Center   7/3/2019 12:00 AM [address] [facility]       Discharge Disposition: transitional care unit    Discharge Planner   Discharge Plans in progress: [facility]CU  Barriers to discharge plan: none  Follow up plan: TCU       Entered by: Mikayla Santos 04/24/2019 2:01 PM           Mikayla VITALE, LICSW, [phone]

## 2019-04-24 NOTE — DISCHARGE SUMMARY
Discharge Summary    Shanika Stahl MRN# [id number]   YOB: 1924 Age: 89 year old     Date of Admission:  4/23/2019  Date of Discharge:  4/24/2019  Admitting Physician:  Ovi Aldridge MD  Discharge Physician:  Ovi Aldridge MD  Discharging Service:  Hospitalist       Primary Provider: Milena Jaime              Discharge Diagnosis:     Principal Problem:    Wrist fracture, bilateral  Active Problems:    History of cerebral embolism with cerebral infarction - 2014    Renal hypertension    Hyperlipidemia LDL goal <100    Long term current use of anticoagulant therapy    Chronic atrial fibrillation (H)    Pacemaker    Head injury due to trauma    Acute encephalopathy    Age related osteoporosis    Pulmonary hypertension (H)    Oropharyngeal dysphagia               Discharge Disposition:     Discharged to short-term care facility           Condition on Discharge:     Discharge condition:   Stable   Discharge vitals: Blood pressure 144/60, pulse 76, temperature 98.4  F (36.9  C), temperature source Oral, resp. rate 18, weight 38.6 kg (85 lb 1.6 oz), SpO2 94 %, not currently breastfeeding.     Code status on discharge: DNR / DNI           Procedures / Labs / Imaging:     Bilateral arm splints    Results for orders placed or performed during the hospital encounter of 04/23/19   CT Head w/o Contrast    Narrative    CT HEAD WITHOUT CONTRAST  4/23/2019 3:49 AM     HISTORY: Head trauma.    COMPARISON: 9/5/2018.    TECHNIQUE: Without intravenous contrast, helical sections were  acquired through the brain. Coronal reconstructions were generated.  Radiation dose for this scan was reduced using automated exposure  control, adjustment of the mA and/or kV according to the patient's  size, or iterative reconstruction technique.     FINDINGS: Moderate to severe diffuse cerebral atrophy. Moderate  periventricular white matter low attenuation, likely relating to  chronic small vessel ischemic disease. No intra-axial  mass, mass  affect or midline shift. Normal gray-white matter differentiation. No  visualized acute intra-axial hemorrhage. The cerebral ventricles are  normal in caliber. The basal cisterns are patent. No extra-axial fluid  collection. The visualized portions of the paranasal sinuses and  mastoid air cells are unremarkable. Small to moderate-sized hematoma  in the scalp overlying the left side of the frontal bone.      Impression    IMPRESSION:  1. No evidence of acute intracranial trauma.  2. Small to moderate-sized hematoma in the scalp overlying the left  side of the frontal bone.    KRYSTLE TREJO MD       Cervical spine CT w/o contrast    Narrative    CT CERVICAL SPINE WITHOUT CONTRAST  4/23/2019 3:50 AM     HISTORY: Fall from standing. Head injury.    COMPARISON: 9/5/2018.    TECHNIQUE: Without intravenous contrast, helical sections were  acquired through the cervical spine from the skull base through the  bottom of the T1 vertebral body. Coronal and sagittal reconstructions  were generated. Radiation dose for this scan was reduced using  automated exposure control, adjustment of the mA and/or kV according  to the patient's size, or iterative reconstruction technique.    FINDINGS: No visualized acute fracture of the cervical spine. Moderate  degenerative changes in the cervical spine. Grade I anterolisthesis of  the C6 on C7 vertebral body, likely related to degenerative changes.  Otherwise, normal alignment of the cervical spine. No prevertebral  soft tissue swelling. Atherosclerotic calcification in bilateral  carotid arteries.      Impression    IMPRESSION: No visualized acute fracture of the cervical spine.    KRYSTLE TREJO MD   Shoulder XR, 2 view left    Narrative    LEFT SHOULDER 2 VIEWS  4/23/2019 3:33 AM     HISTORY: Fall from standing. Proximal humerus tenderness.    COMPARISON: 10/8/2013.      Impression    IMPRESSION:  1. No visualized acute fracture or malalignment of the left shoulder.  2.  Diffuse osteopenia.    KRYSTLE TREJO MD       XR Wrist Bilateral G/E 3 Views    Narrative    BILATERAL WRISTS 6 VIEWS  4/23/2019 3:21 AM     HISTORY: Bilateral deformities of the wrists and ecchymoses after fall  from standing.    COMPARISON: None.      Impression    IMPRESSION:  1. Mildly impacted acute transverse fracture of the distal right  radius. There is 0.6 cm of posterior displacement of the distal  fracture fragment and mild posterior angulation about the fracture.  2. Nondisplaced acute fracture of the right ulnar styloid process.  3. Acute comminuted fractures of the distal left radius and ulna. The  radius fracture involves the radiocarpal joint. There is mild  posterior angulation about the fractures.  4. An apparent linear lucency within the left trapezium bone, only  visualized on the oblique projection image, equivocal for a  nondisplaced acute fracture. Recommend clinical correlation.  5. Diffuse osteopenia.    KRYSTLE TREJO MD       XR Chest 1 View    Narrative    CHEST SINGLE VIEW  4/23/2019 3:32 AM     HISTORY: Fall from standing.    COMPARISON: 2/1/2018.    FINDINGS: Mild chronic-appearing interstitial opacities in both lungs.  A few linear opacities in the lung bases likely represent scarring  and/or atelectasis. The lungs are otherwise clear. No pneumothorax.  Moderate enlargement of the cardiopericardial silhouette.  Atherosclerotic calcification in the thoracic aorta. Left anterior  chest wall cardiac device with lead tips in the right atrium and  ventricle.      Impression    IMPRESSION: No evidence of active cardiopulmonary disease.    KRYSTLE TREJO MD       CT Head w/o Contrast    Narrative    CT SCAN OF THE HEAD WITHOUT CONTRAST   4/23/2019 5:48 PM     HISTORY: Head trauma, minor, patient on anticoagulation, on warfarin.    TECHNIQUE: Axial images of the head and coronal reformations without  IV contrast material. Radiation dose for this scan was reduced using  automated exposure  control, adjustment of the mA and/or kV according  to patient size, or iterative reconstruction technique.    COMPARISON: Head CT from earlier the same day at 3:39 AM.    FINDINGS: Evidence of acute intracranial hemorrhage. No mass effect or  midline shift. There is extensive periventricular white matter  hypodensities that are nonspecific, but likely related to chronic  microvascular ischemic disease. Chronic cortical infarct in the  anterior right insular region. Moderate diffuse parenchymal volume  loss. Chronic left cerebellar infarct.    Left frontal scalp contusion and soft tissue injury is slightly  decreased since prior. No evidence of a calvarial fracture.    The visualized portions of the sinuses and mastoids appear normal. The  bony calvarium and bones of the skull base appear intact.       Impression    IMPRESSION:     1. No evidence of acute intracranial hemorrhage, mass, or herniation.  2. There is generalized atrophy of the brain. White matter changes are  present in the cerebral hemispheres that are consistent with small  vessel ischemic disease in this age patient. Chronic-appearing infarct  in the anterior right insular region. Overall, no significant change  since prior.      GISELLE JOSEPH MD              Discharge Medications:     Current Discharge Medication List      CONTINUE these medications which have CHANGED    Details   senna-docusate (SENOKOT-S/PERICOLACE) 8.6-50 MG tablet Take 2 tablets by mouth At Bedtime , stop taking if loose stools develop.  Qty: 60 tablet, Refills: 0    Associated Diagnoses: Slow transit constipation         CONTINUE these medications which have NOT CHANGED    Details   acetaminophen (TYLENOL) 325 MG tablet Take 2 tablets (650 mg) by mouth 3 times daily  Qty: 100 tablet, Refills: 0    Associated Diagnoses: Closed stable burst fracture of eleventh thoracic vertebra with routine healing      CALCIUM + D 600-200 MG-UNIT PO TABS 1 tablet by mouth daily      furosemide  (LASIX) 20 MG tablet Take 1 tablet (20 mg) by mouth every other day  Qty: 45 tablet, Refills: 3    Comments: Patient will call to fill  Associated Diagnoses: Leg swelling      metoprolol succinate (TOPROL-XL) 25 MG 24 hr tablet TAKE 1 TAB BY MOUTH TWICE A DAY  Qty: 180 tablet, Refills: 3    Associated Diagnoses: Renal hypertension, stage 1-4 or unspecified chronic kidney disease      polyethylene glycol (MIRALAX/GLYCOLAX) Packet Take 17 g by mouth daily as needed for constipation      simvastatin (ZOCOR) 20 MG tablet TAKE 1 TAB BY MOUTH AT BEDTIME  Qty: 90 tablet, Refills: 3    Associated Diagnoses: Hyperlipidemia LDL goal <100      VITAMIN D 1000 UNIT OR TABS 1 TABLET by mouth DAILY  Qty: 30, Refills: 0      warfarin (JANTOVEN) 2.5 MG tablet 2.5 mg (2.5 mg x 1) on Mon, Fri; 5 mg (2.5 mg x 2) all other days or As directed by Anticoagulation Clinic  Qty: 150 tablet, Refills: 0    Associated Diagnoses: Chronic atrial fibrillation (H)      order for DME Knee high compression stockings 10-15 mm Hg  Qty: 1 each, Refills: 1    Associated Diagnoses: Leg swelling         STOP taking these medications       loratadine (CLARITIN) 10 MG tablet Comments:   Reason for Stopping:         ondansetron (ZOFRAN-ODT) 4 MG ODT tab Comments:   Reason for Stopping:                     Consultations:     Consultation during this admission received from orthopedics             Brief History of Illness:     Shanika Stahl is a 89 year old female with a history of atrial fibrillation, anticoagulated on heparin who presents after a probable mechanical fall this morning.  When she was first seen in the ED she was able to describe the events of this morning.  She states she slipped and fell, hitting her head on the wall.  Right now she is unable to recall what happened and does not seem to be aware that she has injured her arms.     According to her son, Ulises, she is beginning to exhibit signs of dementia including forgetfulness and confusion.   "She has good days and bad days, on bad days she forgets the names of people around her although she recognizes them.          Hospital Course:     Acute encephalopathy / delirium  Minor head injury, anticoagulated  Fall early AM day of admission, likely mechanical. Anticoagulated with warfarin. Hematoma on left forehead. On admission extremely confused. Unable to tell me what happened or where she is. Repeatedly calling to \"Antoine\" who is not in the room. CT head negative at 4 AM. Only opiate administered was a single dose of Dilaudid 0.3 mg at 0350. Afebrile, no leukocytosis.   - Repeat CT of head negative, infectious/metabolic workup negative  - Held coumadin, resume on discharge (see below)   - Discuss with PCP stopping warfarin due to fall risk     On 4/24/2019 patient appears more alert, interactive with probable baseline dimentia        Fall with resultant fractures due to osteoporotic bones  Wrist fracture, bilateral  Per ED note, mechanical slip/fall in bathroom this morning. Presents with bilateral wrist fractures, splinted but not reduced in the ED. Right distal radius fracture with dorsal displacement of the distal fragment. Left comminuted but non-displaced radial and ulnar fractures.  Remainder of trauma work-up negative (CT neck, XR left shoulder, CT head). She lives in an assisted living facility with minimal assistance. She likely will not be able to return to this facility with bilateral wrist fractures.    -Per ortho, conservative management, keep splints in place, follow-up with ortho in 7 days.- Tylenol for pain  - Avoid opiates due to encephalopathy  - TCU placement       Renal hypertension   Pulmonary hypertension   Known renal artery stenosis, but elected for medical management. Managed prior to admission with lasix 20 mg every other day and metoprolol XL 25 mg bid. Blood pressures elevated.  - Continued prior to admission lasix and metoprolol      Hyperlipidemia   History of cerebral embolism " with cerebral infarction - 2014  No focal deficits. Patient is taking coumadin prior to admission for atrial fibrillation, also taking Zocor 20 mg q hs.  - Continued  prior to admission Zocor  - Held coumadin tonight, resume upon discharge.   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     Chronic atrial fibrillation   Long term current use of anticoagulant therapy  Rate controlled with metoprolol XL 25 mg bid prior to admission. Anticoagulated with coumadin prior to admission. Admit INR = 1.98.  - Continued  prior to admission metoprolol  - Held coumadin as above               Final Day of Progress before Discharge:       Assessment and Plan:  Discharge to TCU, as above          Interval History:  Improved mentation. No events  Pain controlled with tylenol          Physical Exam:  Vitals were reviewed  Constitutional:   awake, alert, cooperative, no apparent distress, and appears stated age          Data:  CMP  Recent Labs   Lab 04/24/19  0451 04/23/19  0305    137   POTASSIUM 3.9 3.7   CHLORIDE 106 106   CO2 25 24   ANIONGAP 7 7   * 182*   BUN 18 23   CR 0.67 0.64   GFRESTIMATED 75 76   GFRESTBLACK 87 88   BLAISE 8.3* 8.6     CBC  Recent Labs   Lab 04/24/19  0451 04/23/19  0305   WBC 7.7 9.7   RBC 3.27* 4.02   HGB 10.1* 12.2   HCT 31.2* 39.1   MCV 95 97   MCH 30.9 30.3   MCHC 32.4 31.2*   RDW 13.2 13.2   * 209     INR  Recent Labs   Lab 04/24/19  0451 04/23/19  0305   INR 1.68* 1.98*               Pending Results:       Unresulted Labs Ordered in the Past 30 Days of this Admission     Date and Time Order Name Status Description    4/23/2019 1444 Blood culture Preliminary     4/23/2019 1444 Blood culture Preliminary                  Discharge Instructions and Follow-Up:     Discharge disposition:  Discharged to short-term care facility       Diet:  Regular       Activity Activity as tolerated       Follow-up: Follow  up with primary care provider in 7 days       Additional instructions: Follow up with Chandrika Purcell at [facility] on Tuesday, April 30th at 10:00 am. Staff from TCU or SNF must accompany patient to visit.     Continue with bilateral wrist splits to remain intact until follow up.   Non weight bearing bilateral wrists may use platform walker with therapy. Okay to use gentle weight on platform walker to facilitate mobility     I spent 30 minutes with discharge

## 2019-04-25 NOTE — PROGRESS NOTES
Clinic Care Coordination Contact  Care Coordination Transition Communication    Referral Source: IP Handoff    Clinical Data: Patient was hospitalized at  [facility] from 4/23/19 to 4/24/19 with diagnosis of bilateral wrist fracture sustained by mechanical fall; acute encphalopathy/delirium.     Transition to Facility:              Facility Name: [facility] Phone: (Admissions: [phone] RN Report: [phone] Fax: [phone])    Patient resides at [facility]. Per inpatient SW notes, she was recently placed on their memory care unit waiting list as she start to exhibit signs of dementia including forgetfulness and confusion.    Plan: RN/SW Care Coordinator will await notification from facility staff informing RN/SW Care Coordinator of patient's discharge plans/needs. RN/SW Care Coordinator will review chart and outreach to facility staff every 4 weeks and as needed.     CLAUS Lucia, RN   [facility] - RN Care Coordinator  Phone: [phone]

## 2019-04-28 NOTE — TELEPHONE ENCOUNTER
Reason for call:  Other   Patient called regarding (reason for call): Other  Additional comments: Please disregard request. Another central scheduler stated that the  called back and stated that they will keep the appt on 05/08/2019. Closing encounter.    Phone number to reach patient:  Home number on file [phone] (home)    Best Time:  NA    Can we leave a detailed message on this number?  Not Applicable     Daily GARZON  Central Scheduler

## 2019-04-28 NOTE — TELEPHONE ENCOUNTER
Reason for call:  Other   Patient called regarding (reason for call): appointment  Additional comments: Please call sonAntoine, to see if patient can be worked in sooner for a hospital follow up. She was recently seen in the ED for a fall and currently in a transitional care unit. The discharging doctor from the ED advised that patient follow up with her pcp within a week or so. Son can only do this Wednesday in the morning. Patient is currently scheduled for 5/8/19.    Phone number to reach patient:  Other phone number:  [phone] (T)    Best Time:  any    Can we leave a detailed message on this number?  YES     Daily GARZON  Central Scheduler

## 2019-05-02 NOTE — TELEPHONE ENCOUNTER
Patient is currently at [facility]. Writer called and spoke with Cory in Admissions. She will place a nursing order for warfarin dosing and INR records to be faxed to [facility] once she is discharged.    Pete JETT RN, CACP

## 2019-05-08 PROBLEM — R41.89 COGNITIVE IMPAIRMENT: Status: ACTIVE | Noted: 2019-01-01

## 2019-05-08 PROBLEM — G93.40 ACUTE ENCEPHALOPATHY: Status: RESOLVED | Noted: 2019-01-01 | Resolved: 2019-01-01

## 2019-05-08 PROBLEM — R29.6 FALLS FREQUENTLY: Status: ACTIVE | Noted: 2019-01-01

## 2019-05-08 NOTE — PROGRESS NOTES
Clinic Care Coordination Contact    Situation: Patient chart reviewed by care coordinator.    Background: [place]  [facility] [place] 4[place]24/19 with diagnosis of bilateral wrist fracture sustained by mechanical fall; acute encphalopathy/delirium.  She discharged to [facility]     Assessment: Patient discharged from TCU today and had PCP office visit. She has returned to [facility] in [place].      Plan/Recommendations: Since patient had PCP follow up today, RN Care Coordinator will attempt outreach tomorrow to assess if any questions or additional needs are present.    RADHA LuciaN, RN   [facility] - RN Care Coordinator  Phone: [phone]

## 2019-05-08 NOTE — TELEPHONE ENCOUNTER
Ronda from [facility] called looking for written orders to discontinue  Hydralazine and blood pressure checks 3x daily. Patient was discharged from TCU with orders to take hydralazine and josefina blood pressure 3x daily.     During today's visit med was discontinued 05/8 but no mention to discontinue blood pressure checks. They are considering this one order vs two separate.     Fax [phone] attn nursing.     Bharati GARZON  Station

## 2019-05-08 NOTE — PROGRESS NOTES
SUBJECTIVE:   Shanika Stahl is a 89 year old female who presents to clinic today for the following   health issues:    Pt verbalize that one of the medications she is taking now     Son wonder if pt should get off the blood thinners: because of bruising and internal bleeding    Back on the  Assisting living    Son also verbalize that her right hand is too get too tight and probably having a sore from the wrap they did. Pt verbalize no numbness on the hands        Hospital Follow-up Visit:    Hospital/Nursing Home/IP Rehab Facility: [facility]  Date of Admission: 4/23/19  Date of Discharge: 4/24/19  Reason(s) for Admission: Wrist fracture, bilateral            Problems taking medications regularly:  None       Medication changes since discharge: None       Problems adhering to non-medication therapy:  None    Summary of hospitalization:  [facility] discharge summary reviewed  Diagnostic Tests/Treatments reviewed.  Follow up needed: Orthopedics  Other Healthcare Providers Involved in Patient s Care:         Specialist appointment - seen Ortho  Update since discharge: improved.     Post Discharge Medication Reconciliation: discharge medications reconciled and changed, per note/orders (see AVS).  Plan of care communicated with patient and family     Coding guidelines for this visit:  Type of Medical   Decision Making Face-to-Face Visit       within 7 Days of discharge Face-to-Face Visit        within 14 days of discharge   Moderate Complexity [id number]   High Complexity [id number]          She is [facility] - [facility].    A-fib: She has a history of an acute ischemic stroke in 2014.  Affected the right M2 artery with sudden onset left facial droop and slurred speech.  She has a history of tachybradycardia syndrome with pacemaker placed in 2014.  She is on warfarin.    Bilateral Wrist Fractures/Fall: Known osteoporosis.  Was on bisphosponate for 10 years,  last in 2013.  Management is non-operative.  Details on the fall are unknown - patient does not remember due to her dementia.  Was found by staff.  This was the 3rd fall in the last 1 year.  Son notes she is getting a pressure sore on the right hand where the cast is placed.  Using Tylenol 650 mg TID for pain, managing pain well.  Had hematoma of face and neck - improving.    Dementia: cognitive score 5/30 in TCU.      Reviewed  and updated as needed this visit by clinical staff  Tobacco  Allergies  Meds  Med Hx  Surg Hx  Fam Hx  Soc Hx        Reviewed and updated as needed this visit by Provider  Meds         Current Outpatient Medications   Medication Sig Dispense Refill     acetaminophen (TYLENOL) 500 MG tablet Take 1,000 mg by mouth 3 times daily       alum & mag hydroxide-simethicone (MYLANTA/MAALOX) 200-200-20 MG/5ML SUSP suspension Take 20 mLs by mouth 3 times daily (with meals)       artificial saliva (BIOTENE DRY MOUTHWASH) LIQD liquid Take by mouth every 2 hours as needed for dry mouth       CALCIUM + D 600-200 MG-UNIT PO TABS 1 tablet by mouth daily       furosemide (LASIX) 20 MG tablet Take 1 tablet (20 mg) by mouth every other day 45 tablet 3     loratadine (CLARITIN) 10 MG tablet Take 10 mg by mouth daily       metoprolol succinate ER (TOPROL-XL) 50 MG 24 hr tablet Take 50 mg by mouth 2 times daily       order for DME Knee high compression stockings 10-15 mm Hg 1 each 1     polyethylene glycol (MIRALAX/GLYCOLAX) Packet Take 17 g by mouth daily as needed for constipation       senna-docusate (SENOKOT-S/PERICOLACE) 8.6-50 MG tablet Take 2 tablets by mouth At Bedtime , stop taking if loose stools develop. 60 tablet 0     simvastatin (ZOCOR) 20 MG tablet TAKE 1 TAB BY MOUTH AT BEDTIME 90 tablet 3     VITAMIN D 1000 UNIT OR TABS 1 TABLET by mouth DAILY 30 0     warfarin (JANTOVEN) 2.5 MG tablet 2.5 mg (2.5 mg x 1) on Mon, Fri; 5 mg (2.5 mg x 2) all other days or As directed by Anticoagulation Clinic  150 tablet 0       ROS:  Constitutional, HEENT, cardiovascular, pulmonary, GI, , musculoskeletal, neuro, skin, endocrine and psych systems are negative, except as otherwise noted.    OBJECTIVE:     /72   Pulse 76   Temp 97.9  F (36.6  C) (Tympanic)   Resp 12   SpO2 96%   Breastfeeding? No   There is no height or weight on file to calculate BMI.  GENERAL APPEARANCE: alert and no distress  RESP: lungs clear to auscultation - no rales, rhonchi or wheezes  CV: Appears regular, normal rate, no murmurs noted  MS: 4-5 strength bilateral upper and lower extremities  SKIN: Extensive evolving ecchymosis of left face, left neck, bilateral hands.  There is a superficial ulceration  of the webspace of the right thumb where the cast contacts the skin  PSYCH: patient looks to son for most answers, flat affect, memory impaired      ASSESSMENT/PLAN:       1. Chronic atrial fibrillation (H) -very long discussion with patient and son about the risks and the benefits of continuing anticoagulation.  Her [id number] score is 7 translating to about 11% yearly risk of stroke. After discussion about the benefits of continuing anticoagulation versus the risks and patient and son opted to continue which is reasonable.  If her functional capacity declines significantly further, we would revisit the issue.    2. Cognitive impairment -start med as below.  Not a good candidate for anticholinergic medications due to increased fall risk.  Worsened memory since ~2018.  Cognitive score 5/30 at [facility] 4/2019  - vitamin E (TOCOPHEROL) 1000 units (450 mg) capsule; Take 1 capsule (1,000 Units) by mouth daily  Dispense: 90 capsule; Refill: 3    3. Pulmonary hypertension (H) -seen on echocardiogram    4. Atherosclerosis of renal artery (H) -contributes to hypertension    5. Age-related osteoporosis with current pathological fracture, sequelaReceived approximately greater than 10 years of bisphosphonate treatment.  Bilateral wrist fractures after  falls 4/2018.  Further bisphosphonate treatment not likely to be helpful    6. Closed fracture of both wrists with routine healing, subsequent encounter -cast causing an early pressure ulcer.  Advised to follow-up with orthopedic clinic for placement of new cast    7. Falls frequently -she is receiving physical therapy.  Reviewed her meds to eliminate any that could be contributing to her falls, none found.      1. Go over today to the Orthopedic clinic to get appointment for new cast.  2. Look into insurance coverage for booster shot for shingles  3. Risk of stroke is about 16% per year.  Risk of major bleed (does not quantify fall risk) is 4%  4. I recommend you continue on warfarin for now.  If you and family change your mind, let Dr. Jaime know.  5. Recommend to start Vitamin E 1000 units  daily.  It has been shown to slow functional decline in Alzheimer's Disease without increased risk of serious side effects.  6. See Dr. Jaime 3-6 months for routine follow-up.          Dr. Milena Jaime,   [facility] Internal Medicine

## 2019-05-08 NOTE — NURSING NOTE
Pt will call insurance about Shirnrix.  Pt is already in our wait list just in case is cover.  Aniya Raines CMA (DANISHA)   (aka: Elizabeth Raines)

## 2019-05-08 NOTE — PATIENT INSTRUCTIONS
1. Go over today to the Orthopedic clinic to get appointment for new cast.  2. Look into insurance coverage for booster shot for shingles  3. Risk of stroke is about 16% per year.  Risk of major bleed (does not quantify fall risk) is 4%  4. I recommend you continue on warfarin for now.  If you and family change your mind, let Dr. Jaime know.  5. Recommend to start Vitamin E 1000 units  daily.  It has been shown to slow functional decline in Alzheimer's Disease without increased risk of serious side effects.  6. See Dr. Jaime 3-6 months for routine follow-up.

## 2019-05-08 NOTE — TELEPHONE ENCOUNTER
Faxed to [phone] attn nursing    Confirmed with right fax    No further action necessary.  Encounter closed.    Lew Cruz RN

## 2019-05-08 NOTE — LETTER
[facility]  [address] [address]  Phone: [phone]      05/08/19      Shanika Stahl  [address] [place] [address]      To whom it may concern:     Terrence Stahl's TID Blood Pressure checks and her prescription for Hydralazine has been discontinued on 5/8/19.  Please check blood pressure once weekly    Sincerely,      Milena Jaime, DO

## 2019-05-09 NOTE — TELEPHONE ENCOUNTER
Call from Aleksandra Hammond at [facility] [phone]. Fax [phone].    Patient recent return there from TCU. Milena Jaime MD say her last. Looks like she has not been followed by [facility] in the past but has been followed by [facility]. May need referral from Milena Jaime.    Patient has been on 5mg daily 5/5 to 5/8, had INR of 4.8 on 5/2. Maintenance dose at AL prior to last hospitalization due to bilteral broken wrists as 2.5mg MF, 5mg TWThSS.    INR today 4.02. Advised to hold warfarin 5/9 and 5/10, then resume 2.5mg MF, 5mg TWThSS on 5/11. Repeat INR 5/14/19.

## 2019-05-09 NOTE — PROGRESS NOTES
Clinic Care Coordination Contact  [facility]/Voicemail    Referral Source: SNF/TCU  See initial note below    Clinical Data: Care Coordinator Outreach    Outreach attempted x 1 to patient's sonUlises.  Left message on voicemail with call back information and invited return call if clinic care coordination support is desired.    Assessment: Per chart review, including PCP OV note from 5/8 visit, it appears patient has all support and needs met by her family and memory care unit staff.     Plan: Care Coordinator will remain available if patient's son returns call, but do no further outreaches at this time.    RADHA LuciaN, RN   [facility] - RN Care Coordinator  Phone: [phone]

## 2019-05-10 NOTE — TELEPHONE ENCOUNTER
Reason for Call:  Home Health Care    Beverly PTwith [place] [facility] called regarding (reason for call): PT    Orders are needed for this patient.     PT:  2x a week for 6 weeks   1x a week for 2 weeks  Gait and balance training.    Phone Number Homecare Nurse can be reached at: [phone]- Verbal Order are ok     Can we leave a detailed message on this number? YES    Phone number patient can be reached at: Home number on file [phone] (home)    Best Time: any    Call taken on 5/10/2019 at 12:12 PM by Rosie Linton

## 2019-05-10 NOTE — TELEPHONE ENCOUNTER
Reason for Call: Request for an order or referral:    Order or referral being requested: OT 1 wk X 4 wks, 2 x wk for 4 wks  Self cares and strengthening  Date needed: as soon as possible    Has the patient been seen by the PCP for this problem?     Additional comments:     Phone number Patient can be reached at:  Other phone number:  Carly Jean at home [phone]    Best Time:  any    Can we leave a detailed message on this number?  Yes    Call taken on 5/10/2019 at 12:27 PM by Bella Hackett

## 2019-05-10 NOTE — TELEPHONE ENCOUNTER
Beverly called back and was given verbal order with Dr. Jaime's approval of plan.    EUGENIA Hodge

## 2019-05-14 NOTE — PROGRESS NOTES
ANTICOAGULATION FOLLOW-UP CLINIC VISIT    Patient Name:  Shanika Stahl  Date:  2019  Contact Type:  Fax [facility]    SUBJECTIVE:  Patient Findings     Positives:   Missed doses    Comments:   Previous hold per documentation on 19 by pharmacy.  Patient had 22.5 mg in the past 7 days.   Writer adjusted dose to 22.5 mg by the next INR.   Recheck in 3 days.         Clinical Outcomes     Comments:   Previous hold per documentation on 19 by pharmacy.  Patient had 22.5 mg in the past 7 days.   Writer adjusted dose to 22.5 mg by the next INR.   Recheck in 3 days.            OBJECTIVE    INR   Date Value Ref Range Status   2019 2.78 (A) 0.86 - 1.14 Final       ASSESSMENT / PLAN  INR assessment THER    Recheck INR In: 3 DAYS    INR Location Outside lab      Anticoagulation Summary  As of 2019    INR goal:   2.0-3.0   TTR:   84.6 % (4.1 y)   INR used for dosin.78 (2019)   Warfarin maintenance plan:   2.5 mg (2.5 mg x 1) every Mon, Fri; 5 mg (2.5 mg x 2) all other days   Full warfarin instructions:   : 2.5 mg; 5/15: 2.5 mg; Otherwise 2.5 mg every Mon, Fri; 5 mg all other days   Weekly warfarin total:   30 mg   Plan last modified:   Verenice Morillo RN (10/12/2018)   Next INR check:   2019   Priority:   INR   Target end date:   Indefinite    Indications    Atrial fibrillation (H) (Resolved) [I48.91]  History of cerebral embolism with cerebral infarction - 2014 [I63.40]  Long term current use of anticoagulant therapy [Z79.01]             Anticoagulation Episode Summary     INR check location:       Preferred lab:       Send INR reminders to:   [place] PHONE [employer] POOL    Comments:   Keep on low end of therapeutic range. Uses 2.5mg tablets. Resides at [facility] [phone]. Fax AVS to [phone]      Anticoagulation Care Providers     Provider Role Specialty Phone number    Milena Jaime,  [facility] Internal Medicine [phone]            See the  Encounter Report to view Anticoagulation Flowsheet and Dosing Calendar (Go to Encounters tab in chart review, and find the Anticoagulation Therapy Visit)        Dre Phillips RN

## 2019-05-17 NOTE — PROGRESS NOTES
ANTICOAGULATION FOLLOW-UP CLINIC VISIT    Patient Name:  Shanika Stahl  Date:  2019  Contact Type:  Telephone/ spoke with Ronda. AVS faxed    SUBJECTIVE:  Patient Findings     Comments:   No changes in diet, activity level, medications (including over the counter), or health. No missed doses of warfarin. Patient took dosing as prescribed. No signs of clots or bleeding concerns. Patient has only had 22.5 mg/week and INR in range. Will continue with this weekly dosing. She is recovering from bilateral wrist fractures. AVS faxed to facility.          Clinical Outcomes     Negatives:   Major bleeding event, Thromboembolic event, Anticoagulation-related hospital admission, Anticoagulation-related ED visit, Anticoagulation-related fatality    Comments:   No changes in diet, activity level, medications (including over the counter), or health. No missed doses of warfarin. Patient took dosing as prescribed. No signs of clots or bleeding concerns. Patient has only had 22.5 mg/week and INR in range. Will continue with this weekly dosing. She is recovering from bilateral wrist fractures. AVS faxed to facility.             OBJECTIVE    INR   Date Value Ref Range Status   2019 2.81 (A) 0.9 - 1.1 Final       ASSESSMENT / PLAN  INR assessment THER    Recheck INR In: 5 DAYS    INR Location Outside lab [facility]     Anticoagulation Summary  As of 2019    INR goal:   2.0-3.0   TTR:   84.7 % (4.1 y)   INR used for dosin.81 (2019)   Warfarin maintenance plan:   2.5 mg (2.5 mg x 1) every Mon, Fri; 5 mg (2.5 mg x 2) all other days   Full warfarin instructions:   : 2.5 mg; : 2.5 mg; : 5 mg; : 2.5 mg; Otherwise 2.5 mg every Mon, Fri; 5 mg all other days   Weekly warfarin total:   30 mg   Plan last modified:   Verenice Morillo RN (10/12/2018)   Next INR check:   2019   Priority:   INR   Target end date:   Indefinite    Indications    Atrial fibrillation (H) (Resolved) [I48.91]  History of  cerebral embolism with cerebral infarction - 2014 [I63.40]  Long term current use of anticoagulant therapy [Z79.01]             Anticoagulation Episode Summary     INR check location:       Preferred lab:       Send INR reminders to:   [place] PHONE ANTICOAG POOL    Comments:   Keep on low end of therapeutic range. Uses 2.5mg tablets. Resides at [facility] [phone]. Fax AVS to [phone] (facility administers meds but cannot do half tabs)      Anticoagulation Care Providers     Provider Role Specialty Phone number    Debbi Jaimeecjeffry Vazquez DO [facility] Internal Medicine [phone]            See the Encounter Report to view Anticoagulation Flowsheet and Dosing Calendar (Go to Encounters tab in chart review, and find the Anticoagulation Therapy Visit)        Verenice Morillo RN

## 2019-05-22 NOTE — PROGRESS NOTES
ANTICOAGULATION FOLLOW-UP CLINIC VISIT    Patient Name:  Shanika Stahl  Date:  2019  Contact Type:  [employer]    SUBJECTIVE:  Patient Findings     Comments:   No changes in medications, activity, health, or diet noted. No bleeding or increased bruising noted. Took warfarin as prescribed.  Patient was previously taking 30 mg weekly. Patient had 22.5 mg in the past 7 days. Writer adjusted dose to patient will have 27.5 mg by the INR on Friday.  Recheck in 2 days.           Clinical Outcomes     Negatives:   Major bleeding event, Thromboembolic event, Anticoagulation-related hospital admission, Anticoagulation-related ED visit, Anticoagulation-related fatality    Comments:   No changes in medications, activity, health, or diet noted. No bleeding or increased bruising noted. Took warfarin as prescribed.  Patient was previously taking 30 mg weekly. Patient had 22.5 mg in the past 7 days. Writer adjusted dose to patient will have 27.5 mg by the INR on Friday.  Recheck in 2 days.              OBJECTIVE    INR   Date Value Ref Range Status   2019 1.46 (A) 0.86 - 1.14 Final       ASSESSMENT / PLAN  INR assessment SUB    Recheck INR In: 2 DAYS    INR Location Outside lab      Anticoagulation Summary  As of 2019    INR goal:   2.0-3.0   TTR:   84.6 % (4.1 y)   INR used for dosin.46! (2019)   Warfarin maintenance plan:   2.5 mg (2.5 mg x 1) every Mon, Fri; 5 mg (2.5 mg x 2) all other days   Full warfarin instructions:   : 7.5 mg; Otherwise 2.5 mg every Mon, Fri; 5 mg all other days   Weekly warfarin total:   30 mg   Plan last modified:   Verenice Morillo RN (10/12/2018)   Next INR check:   2019   Priority:   INR   Target end date:   Indefinite    Indications    Atrial fibrillation (H) (Resolved) [I48.91]  History of cerebral embolism with cerebral infarction - 2014 [I63.40]  Long term current use of anticoagulant therapy [Z79.01]             Anticoagulation Episode Summary      INR check location:       Preferred lab:       Send INR reminders to:   [place] PHONE ANTICOAG POOL    Comments:   Keep on low end of therapeutic range. Uses 2.5mg tablets. Resides at [facility] [phone]. Fax AVS to [phone] (facility administers meds but cannot do half tabs)      Anticoagulation Care Providers     Provider Role Specialty Phone number    Milena Jaime,  [facility] Internal Medicine [phone]            See the Encounter Report to view Anticoagulation Flowsheet and Dosing Calendar (Go to Encounters tab in chart review, and find the Anticoagulation Therapy Visit)        Dre Phillips RN

## 2019-05-22 NOTE — TELEPHONE ENCOUNTER
Called [facility]  Left message for staff to call the [facility] clinic back.  Patient's  saw Dr. Jaime today and gave her a note that the nursing home has questions on patient's orders for Ensure/boost and Loratadine 10 mg.    Katherine OMTTA Rn

## 2019-05-23 NOTE — TELEPHONE ENCOUNTER
Pt resides in memory care at [facility] in [place], [place], [phone].  Left non-detailed message for patient to return a call to the clinic RN.     Eneida Roldna RN

## 2019-05-24 NOTE — TELEPHONE ENCOUNTER
I reached EUGENIA Bond, at [facility].    Ronda has two remaining unresolved medications since pt's last hospitalization.  Pt returned to [facility] with these two not addressed.    Ronda needs to know if pt is to continue or discontinue   1.  Boost/Ensure  2.  Loratidine?    I find loratadine remains on pt's active med list.  I have cued this up to print and fax.  I searched Ensure but there are MANY options.  I searched Boost but do not find it in Epic.    Ronda is aware that Dr Jaime is out of clinic until 5/29/19.      Please fax orders to [phone].    Thank you.  Eneida Roldan RN

## 2019-05-24 NOTE — TELEPHONE ENCOUNTER
Covering for PCP:  Prescriptions printed and placed in my outbasket.  Last note I can see in the chart suggested she is using 2 Boost per day- prescription provided for that, type per patient preference.    Rohith Landin MD

## 2019-05-30 NOTE — PROGRESS NOTES
ANTICOAGULATION FOLLOW-UP CLINIC VISIT    Patient Name:  Shanika Stahl  Date:  5/30/2019  Contact Type:  Telephone/ Ragini Bond [facility]  warfarin dosing faxed to [facility]    SUBJECTIVE:  Patient Findings     Comments:   Patient denies any identifiable changes that caused the supratherapeutic INR. Will continue same dose, recheck in 1 week. If at that time the INR remains elevated, will decrease maintenance dose.    No issues with bleeding or unusual bruising noted.           Clinical Outcomes     Negatives:   Major bleeding event, Thromboembolic event, Anticoagulation-related hospital admission, Anticoagulation-related ED visit, Anticoagulation-related fatality    Comments:   Patient denies any identifiable changes that caused the supratherapeutic INR. Will continue same dose, recheck in 1 week. If at that time the INR remains elevated, will decrease maintenance dose.    No issues with bleeding or unusual bruising noted.              OBJECTIVE    INR   Date Value Ref Range Status   05/30/2019 3.32 (A) 0.8 - 1.1 Final       ASSESSMENT / PLAN  No question data found.  Anticoagulation Summary  As of 5/30/2019    INR goal:   2.0-3.0   TTR:   84.5 % (4.2 y)   INR used for dosing:   3.32! (5/30/2019)   Warfarin maintenance plan:   2.5 mg (2.5 mg x 1) every Mon, Fri; 5 mg (2.5 mg x 2) all other days   Full warfarin instructions:   2.5 mg every Mon, Fri; 5 mg all other days   Weekly warfarin total:   30 mg   No change documented:   Tori Toledo RN   Plan last modified:   Verenice Morillo RN (10/12/2018)   Next INR check:   6/6/2019   Priority:   INR   Target end date:   Indefinite    Indications    Atrial fibrillation (H) (Resolved) [I48.91]  History of cerebral embolism with cerebral infarction - 2014 [I63.40]  Long term current use of anticoagulant therapy [Z79.01]             Anticoagulation Episode Summary     INR check location:       Preferred lab:       Send INR reminders to:   [place] PHONE ANTICOAG POOL     Comments:   Keep on low end of therapeutic range. Uses 2.5mg tablets. Resides at [facility] [phone]. Fax AVS to [phone] (facility administers meds but cannot do half tabs)      Anticoagulation Care Providers     Provider Role Specialty Phone number    Addison Milena Taylor,  [facility] Internal Medicine [phone]            See the Encounter Report to view Anticoagulation Flowsheet and Dosing Calendar (Go to Encounters tab in chart review, and find the Anticoagulation Therapy Visit)        Tori Toledo RN CACP

## 2019-05-30 NOTE — PROGRESS NOTES
[facility]         SUBJECTIVE:  Shanika Stahl, 89 year old female requests in office repair of her right 2016 Phonak Bolero V50-SP hearing aid and custom earmold. She reports the hearing aid will not work.     OBJECTIVE:  Replaced plugged earmold tubing. Verified hearing aid functionality.     ASSESSMENT/PLAN:    Discussed repair with son who will  the hearing aid at the specialty clinic .  See chart in the hearing aid room.     Gregoria Heath M.A. -[facility], #[id number]

## 2019-06-07 NOTE — PROGRESS NOTES
ANTICOAGULATION FOLLOW-UP CLINIC VISIT    Patient Name:  Shanika Stahl  Date:  6/7/2019  Contact Type:  Telephone/ Ronda Lackey    SUBJECTIVE:  Patient Findings     Comments:   No changes in medications, activity, health, or diet noted. No bleeding or increased bruising noted. Took warfarin as prescribed.  Patient was instructed to hold Thursday's dose, writer adjusted weekly dose to 22.5 mg.   Recheck in 1 week.   Patient denies signs or symptoms of bleeding. Writer educated patient regarding increased bleed risk and when to seek immediate medical attention. Patient verbalized understanding.          Clinical Outcomes     Negatives:   Major bleeding event, Thromboembolic event, Anticoagulation-related hospital admission, Anticoagulation-related ED visit, Anticoagulation-related fatality    Comments:   No changes in medications, activity, health, or diet noted. No bleeding or increased bruising noted. Took warfarin as prescribed.  Patient was instructed to hold Thursday's dose, writer adjusted weekly dose to 22.5 mg.   Recheck in 1 week.   Patient denies signs or symptoms of bleeding. Writer educated patient regarding increased bleed risk and when to seek immediate medical attention. Patient verbalized understanding.             OBJECTIVE    INR   Date Value Ref Range Status   06/06/2019 3.63 (A) 0.86 - 1.14 Corrected       ASSESSMENT / PLAN  INR assessment SUPRA    Recheck INR In: 1 WEEK    INR Location [facility]      Anticoagulation Summary  As of 6/7/2019    INR goal:   2.0-3.0   TTR:   84.1 % (4.2 y)   INR used for dosing:   3.63! 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 Uses 2.5mg tablets. Resides at [facility] [phone]. Fax AVS to [phone] (facility administers meds but cannot do half tabs)      Anticoagulation Care Providers     Provider Role Specialty Phone number    Jaime Milena Taylor,  [facility] Internal Medicine [phone]            See the Encounter Report to view Anticoagulation Flowsheet and Dosing Calendar (Go to Encounters tab in chart review, and find the Anticoagulation Therapy Visit)        Dre Phillips RN

## 2019-06-14 NOTE — PROGRESS NOTES
ANTICOAGULATION FOLLOW-UP CLINIC VISIT    Patient Name:  Shanika Stahl  Date:  2019  Contact Type:  Telephone/ Ronda [place]    SUBJECTIVE:  Patient Findings     Positives:   Missed doses    Comments:   Patient had 22.5 mg in the last 7 days. Writer adjusted dose to 25 mg weekly.   Recheck in 1 week.         Clinical Outcomes     Negatives:   Major bleeding event, Thromboembolic event, Anticoagulation-related hospital admission, Anticoagulation-related ED visit, Anticoagulation-related fatality    Comments:   Patient had 22.5 mg in the last 7 days. Writer adjusted dose to 25 mg weekly.   Recheck in 1 week.            OBJECTIVE    INR   Date Value Ref Range Status   2019 2.39 (A) 0.86 - 1.14 Final       ASSESSMENT / PLAN  INR assessment THER    Recheck INR In: 6 DAYS    INR Location Outside lab      Anticoagulation Summary  As of 2019    INR goal:   2.0-3.0   TTR:   83.9 % (4.2 y)   INR used for dosin.39 (2019)   Warfarin maintenance plan:   2.5 mg (2.5 mg x 1) every Mon, Fri; 5 mg (2.5 mg x 2) all other days   Full warfarin instructions:   : 5 mg; 6/15: 2.5 mg; : 5 mg; : 2.5 mg; Otherwise 2.5 mg every Mon, Fri; 5 mg all other days   Weekly warfarin total:   30 mg   Plan last modified:   Dre Phillips RN (2019)   Next INR check:   2019   Priority:   INR   Target end date:   Indefinite    Indications    Atrial fibrillation (H) (Resolved) [I48.91]  History of cerebral embolism with cerebral infarction - 2014 [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 Uses 2.5mg tablets. Resides at [facility] [phone]. Fax AVS to [phone] (facility administers meds but cannot do half tabs)      Anticoagulation Care Providers      Provider Role Specialty Phone number    Debbi Jaimeecjeffry Vazquez DO [facility] Internal Medicine [phone]            See the Encounter Report to view Anticoagulation Flowsheet and Dosing Calendar (Go to Encounters tab in chart review, and find the Anticoagulation Therapy Visit)        Dre Phillips RN

## 2019-06-17 NOTE — TELEPHONE ENCOUNTER
Reason for Call:  Other prescription    Detailed comments: Ronda with [facility] is calling for a PRN on Ammodium for Shanika.  Please fax request to [phone], at [place].  But they use [facility] in [place] for their pharmacy.    Phone Number Patient can be reached at: Other phone number:  Ronda is at [phone]    Best Time: any    Can we leave a detailed message on this number? YES    Call taken on 6/17/2019 at 8:24 AM by Lucia Bradley

## 2019-06-17 NOTE — TELEPHONE ENCOUNTER
S-(situation): Nurse requesting Imodium for patient PRN.     B-(background): Patient living in [facility].     A-(assessment):   EUGENIA Bond reports patient having diarrhea / loose stools. Nurse reports patient is afraid to go anywhere or do anything do to loose stools. Having loose stools intermittently, do hold Senna when occurs.   Asking to have something so patient doesn't feel hindered to not go to the doctors or out with family if having Loose stools.      R-(recommendations):    Will need a signed order. Please local print and sign. Will fax once completed if agree.

## 2019-06-17 NOTE — TELEPHONE ENCOUNTER
Reason for Call:  Other orders    Detailed comments: Marisel from [facility] would like an order for Tylenol for break through pain for this patient.  Fax order to [phone]  Phone Number Marisel can be reached at: Other phone number:  [phone]    Best Time: any    Can we leave a detailed message on this number? YES    Call taken on 6/17/2019 at 4:52 PM by Crissy Santoro

## 2019-06-18 NOTE — TELEPHONE ENCOUNTER
Shraddha, a nurse from [facility] reports patient is wearing wrist guards from a fall awhile ago. Guards are for healing and exercises. Marisel just noticed patient is developing one sore on her right wrist from the guard. Sore is a [place] less than an inch with redness around it. Denies fever. Shraddha is going to add padding between the wrist guard and patient's skin.    Triaged and advised to be seen within 24 hours. Shraddha reports no doctors on site to look at patient's wrist. Advised for Shraddha to speak with patient and help her make an appointment with primary care doctor. Caller verbalized understanding and had no further questions.     Tanya Nash, RN/[facility]    Reason for Disposition    [1] Small red streak or spreading redness (<2 inches; 5 cm) AND [2] no fever    Additional Information    Negative: Patient sounds very sick or weak to the triager    Negative: [1] Red area or streak AND [2] fever    Negative: [1] Looks infected (spreading redness, pus) AND [2] large red area (> 2 in. or 5 cm)    Negative: [1] Looks infected (spreading redness, pus) AND [2] diabetes mellitus or weak immune system (e.g., HIV positive,  cancer chemotherapy, chronic steroid treatment, splenectomy)    Negative: [1] Red streak runs from sore AND [2] longer than 1 inch (2.5 cm)    Negative: [1] Ulcer with black scab AND [2] spreading AND [3] painless AND [4] cause unknown    Protocols used: SORES-A-

## 2019-06-18 NOTE — TELEPHONE ENCOUNTER
Ronda, nurse from [facility], informed of message below from provider. nurse states occasionally has breakthrough pain in her right wrist, this was only 2 times this has occurred.   Will call if patient has further pain.

## 2019-06-18 NOTE — TELEPHONE ENCOUNTER
She is already on acetaminophen at 1000 mg 3 times a day, this is the maximum daily dose.  She cannot use NSAIDs due to interaction with warfarin.  Please clarify the facility's request -either not using Tylenol at all?

## 2019-06-18 NOTE — TELEPHONE ENCOUNTER
Attempted to contact nursing staff at [facility], unavailable, left message for nursing staff to call back.

## 2019-06-19 NOTE — PROGRESS NOTES
"Subjective     Shanika Stahl is a 89 year old female who presents to clinic today for the following health issues:    HPI   Sore  on right wrist       Duration: One week     Description (location/character/radiation): Sore on right wrist- is a [place], with redness around it     Intensity:  moderate    Accompanying signs and symptoms: Has been wearing wrist guards     History (similar episodes/previous evaluation): None    Precipitating or alleviating factors: None    Therapies tried and outcome: None       Reviewed and updated as needed this visit by Provider         Review of Systems   ROS COMP: Constitutional, HEENT, cardiovascular, pulmonary, gi and gu systems are negative, except as otherwise noted.      Objective    /59 (BP Location: Left arm, Patient Position: Sitting, Cuff Size: Adult Regular)   Pulse 70   Temp 97.8  F (36.6  C) (Tympanic)   Resp 16   Ht 1.626 m (5' 4\")   Wt 36.4 kg (80 lb 4.8 oz)   SpO2 98%   BMI 13.78 kg/m    Body mass index is 13.78 kg/m .  Physical Exam   GENERAL: healthy, alert and no distress  SKIN: small circular lesion on the lateral side of the R wrist about 1.5 cm in diameter with mild surrounding edema, no infection and discharge noted   PSYCH: mentation appears normal, affect normal/bright    Diagnostic Test Results:  Labs reviewed in Epic        Assessment & Plan     1. Skin sore  -the patient is wearing braces due to recent bilateral wrist fractures, one of the brace-R side is slightly tight and pressing on the R ulnar styloid process causing skin irritation  -advised to follow up with occupational therapy for possible adjustments of the R wrist brace.       See Patient Instructions    Return in about 1 week (around 6/26/2019), or CARMELA Ibrahim [facility]      "

## 2019-06-19 NOTE — PATIENT INSTRUCTIONS
Pressure sore from tight brace, please call occupational therapy to make adjustments for wrist brace, make it looser on on side

## 2019-06-20 NOTE — PHARMACY-ANTICOAGULATION SERVICE
INR 2.62 called in from [facility] facilty-Aleksandra.  Patient will take 5 mg today only and follow up with the the [facility] in the morning for further instructions.

## 2019-06-21 NOTE — PROGRESS NOTES
ANTICOAGULATION FOLLOW-UP CLINIC VISIT    Patient Name:  Shanika Stahl  Date:  2019  Contact Type:  Fax [facility]    SUBJECTIVE:  Patient Findings     Positives:   Missed doses    Comments:   Patient will continue weekly maintenance dose. INR is therapeutic.   Recheck in 1 week.        Clinical Outcomes     Negatives:   Major bleeding event, Thromboembolic event, Anticoagulation-related hospital admission, Anticoagulation-related ED visit, Anticoagulation-related fatality    Comments:   Patient will continue weekly maintenance dose. INR is therapeutic.   Recheck in 1 week.           OBJECTIVE    INR   Date Value Ref Range Status   2019 2.62 (A) 0.86 - 1.14 Final       ASSESSMENT / PLAN  INR assessment THER    Recheck INR In: 1 WEEK    INR Location Outside lab      Anticoagulation Summary  As of 2019    INR goal:   2.0-3.0   TTR:   83.9 % (4.2 y)   INR used for dosin.62 (2019)   Warfarin maintenance plan:   2.5 mg (2.5 mg x 1) every Mon, Fri; 5 mg (2.5 mg x 2) all other days   Full warfarin instructions:   2.5 mg every Mon, Fri; 5 mg all other days   Weekly warfarin total:   30 mg   No change documented:   Dre Phillips, RN   Plan last modified:   Dre Phillips RN (2019)   Next INR check:   2019   Priority:   INR   Target end date:   Indefinite    Indications    Atrial fibrillation (H) (Resolved) [I48.91]  History of cerebral embolism with cerebral infarction -  [I63.40]  Long term current use of anticoagulant therapy [Z79.01]             Anticoagulation Episode Summary     INR check location:       Preferred lab:       Send INR reminders to:   [place] [facility] POOL    Comments:   Keep on low end of therapeutic range. Uses 2.5mg tablets. Resides at [facility] - [phone]. Fax AVS to [phone] (facility administers meds but cannot do half tabs)      Anticoagulation Care Providers     Provider Role Specialty Phone number    Milena Jaime  DO [facility] Internal Medicine [phone]            See the Encounter Report to view Anticoagulation Flowsheet and Dosing Calendar (Go to Encounters tab in chart review, and find the Anticoagulation Therapy Visit)        Dre Phillips RN

## 2019-06-27 NOTE — PATIENT INSTRUCTIONS
Recheck the INR on Tuesday, July 2nd. Okay to continue same warfarin dose and call the results to the Anticoagulation Clinic on Wednesday, July 3rd.

## 2019-06-27 NOTE — PROGRESS NOTES
VM left for nurse to return call to [facility] [phone]. Dosing instructions not given.   Clarify dosing/plan/pt findings.  They need to call during office hours or physician. Pharmacy line is for emergencies.  Tori Toledo RN on 2019 at 10:47 AM    ANTICOAGULATION FOLLOW-UP CLINIC VISIT    Patient Name:  Shanika Stahl  Date:  2019  Contact Type:   Telephone with nurse Aleksandra at [facility]  warfarin dosing faxed to [phone]    SUBJECTIVE:  Patient Findings     Positives:   Extra doses (Patient has been taking 5mg on Friday instead of 2.5mg)    Comments:   Writer spoke with nurse Aleksandra at [facility]. INRs keep getting called in patient pharmacy, which has been unnecessary due to therapeutic numbers. Patient should receive dosing through the day of her INR draw with results to be called in the next day.     Will adjust dose to keep weekly mg total the same as she has actually taken in the past week. Recheck INR on ,  to call results in .        Clinical Outcomes     Comments:   Writer spoke with nurse Aleksandra at [facility]. INRs keep getting called in patient pharmacy, which has been unnecessary due to therapeutic numbers. Patient should receive dosing through the day of her INR draw with results to be called in the next day.     Will adjust dose to keep weekly mg total the same as she has actually taken in the past week. Recheck INR on , ok to call results in .           OBJECTIVE    INR   Date Value Ref Range Status   2019 2.61  Final       ASSESSMENT / PLAN  INR assessment THER    Recheck INR In: 1 WEEK    INR Location [facility]      Anticoagulation Summary  As of 2019    INR goal:   2.0-3.0   TTR:   84.0 % (4.2 y)   INR used for dosin.61 (2019)   Warfarin maintenance plan:   2.5 mg (2.5 mg x 1) every Mon; 5 mg (2.5 mg x 2) all other days   Full warfarin instructions:   2.5 mg every Mon; 5 mg all other days   Weekly warfarin total:    32.5 mg   Plan last modified:   Tori Toledo RN (6/27/2019)   Next INR check:   7/3/2019   Priority:   INR   Target end date:   Indefinite    Indications    Atrial fibrillation (H) (Resolved) [I48.91]  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 Give dose for day of INR draw.      Anticoagulation Care Providers     Provider Role Specialty Phone number    Milena Jaime,  [facility] Internal Medicine [phone]            See the Encounter Report to view Anticoagulation Flowsheet and Dosing Calendar (Go to Encounters tab in chart review, and find the Anticoagulation Therapy Visit)      -------------------------------------------------------------------------  Contact type: Telephone/ Shraddha from [facility]  I gave one dose of 5 mg today.  RN from facility stated they have instructions of 2.5 mg on Monday and 5 mg the rest of the days of th week. This didn't correlate with [facility] documentation. I have asked her to follow up during regular business hours and clarify further dosing and monitoring.     Sydney Wilcox [facility]

## 2019-07-02 NOTE — PROGRESS NOTES
ANTICOAGULATION FOLLOW-UP CLINIC VISIT    Patient Name:  Shanika Stahl  Date:  7/2/2019  Contact Type:  Telephone/ spoke with mckayla cazares. avs faxed to [facility]    SUBJECTIVE:  Patient Findings     Comments:   Patient had 32.5 mg in the previous 7 days, will decrease dose to 30 mg by the next INR check on 7/16/19, which is a 7.7% decrease.   No changes in medications, diet, or activity. No concerns with bleeding or bruising. Took warfarin as prescribed.             Clinical Outcomes     Negatives:   Major bleeding event, Thromboembolic event, Anticoagulation-related hospital admission, Anticoagulation-related ED visit, Anticoagulation-related fatality    Comments:   Patient had 32.5 mg in the previous 7 days, will decrease dose to 30 mg by the next INR check on 7/16/19, which is a 7.7% decrease.   No changes in medications, diet, or activity. No concerns with bleeding or bruising. Took warfarin as prescribed.                OBJECTIVE    INR   Date Value Ref Range Status   07/02/2019 3.40  Final       ASSESSMENT / PLAN  INR assessment SUPRA    Recheck INR In: 2 WEEKS    INR Location Outside lab      Anticoagulation Summary  As of 7/2/2019    INR goal:   2.0-3.0   TTR:   83.9 % (4.2 y)   INR used for dosing:   3.40! 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 Give dose for day of INR draw.      Anticoagulation Care Providers     Provider Role Specialty Phone number    Addison Milena Vazquez,  [facility] Internal Medicine [phone]            See the Encounter Report to view Anticoagulation Flowsheet and Dosing Calendar (Go to Encounters tab in chart review, and find the Anticoagulation Therapy Visit)    Dosage adjustment made based on physician directed care plan.    Carly Hernandez RN

## 2019-07-08 NOTE — TELEPHONE ENCOUNTER
Left verbal orders on secured voicemail. Call back if further questions.      Roxanne RODRIGUEZ BSN, RN

## 2019-07-08 NOTE — TELEPHONE ENCOUNTER
Reason for Call: Request for an order or referral:    Order or referral being requested: Extention of OT orders  2X week for 2 weeks and 1 X week for 2 weeks  Bilateral wrist fractures - Wrist range of motion and ADL's    Date needed: as soon as possible    Has the patient been seen by the PCP for this problem?     Additional comments:     Phone number Patient can be reached at:  Other phone number:  [facility]arly [facility] [phone]    Best Time:  any    Can we leave a detailed message on this number?  YES    Call taken on 7/8/2019 at 8:03 AM by Bella Hackett

## 2019-07-09 NOTE — TELEPHONE ENCOUNTER
[facility] ROUTINE NP SNF VISIT:      SUBJECTIVE:    Suzanna Qureshi is a 62 year old female nursing home resident seen today for routine NP visit, follow up chronic health problems.  Multiple sclerosis, essential hypertension, anxiety disorder, COPD, current smoker, fibromyalgia, chronic pain syndrome, history of urinary retention constipation, hypokalemia, insomnia. Resident is followed by pain clinic. She has been seen by urology 10/2018 for urinary incontinence. Resident has been voiding on her own without any difficulty or any report of any problems. Resident was seen by orthopedics 2/1/19 for left shoulder pain she does have history of prior rotator cuff tear. Dr. Denney did cortisone injection to shoulder. She currently is without complaints of shoulder pain.  Resident currently up in electric wheelchair, denies any headache, dizziness or lightheadedness. No change in vision. Denies any pain. No difficulty chewing or swallowing. Her only complaint today is that her mattress on her bed is not holding air and needs this looked at. Denies chest pain, increased shortness of breath or cough. No nausea or vomiting. Bowels are moving. Denies any urinary symptoms. Sleeping well. Appetite has been good. She states that she is now able to stand and pivot transfer herself standby assist. She continues to smoke 4-6 cig/day. No desire to quit    CODE STATUS: Full code    [facility]ACILITY[facility]    DIET:  Regular diet. Thin liquids    ACTIVITY:  Up as tolerated. Resident does transfer self. Utilizes electric wheelchair for all distances    Review of systems:   CONSTITUTIONAL:  Negative for fever, chills.   SKIN: Negative for rash.   HEENT:  Negative for eye drainage, rhinorrhea, ear pain, sore throat.   RESPIRATORY:  Negative for cough, wheezing or shortness of breath.   CARDIOVASCULAR:  Negative for chest pain, chest pressure, palpitations or                                       Diaphoresis.  Invalid number. Unable to contact [facility] at [facility].     Roxanne RODRIGUEZ, BSN, RN       GASTROINTESTINAL:  Negative for nausea,vomiting,diarrhea or abdominal pain.   GENITOURINARY:  Negative for dysuria, urgency, frequency,hematuria or flank pain.   EXTREMITIES:  Negative for joint swelling or joint pain.   NEUROLOGICAL:  Negative for headache,negative for change in sensory or motor                                   function.   ENDOCRINE:  Negative for heat or cold intolerance, weight gain or loss.   HEMATOLOGICAL: Negative for bleeding, bruising or adenopathy.   PSYCHIATRIC:  Negative change in affect, mentation or sleep disturbance.       PHYSICAL EXAM:  VITAL SIGNS:  130/60, 132/66-84-20-97.0    SaO2 95% on room air  CONSTITUTIONAL:  Well nourished.  No acute distress.  EYES: Sclera clear. No discharge.  HENT: Negative   NECK: No JVD. Range of motion to neck within normal limits. No cervical tenderness   RESPIRATORY: Clear A&P. Respirations are easy nonlabored. No wheeze, rhonchi, rales. No cough   CARDIOVASCULAR: Regular S1-S2. No murmur   GASTROINTESTINAL: Soft, nontender bowel sounds present. No guarding   MUSCULOSKELETAL:  Range of motion to upper extremities is limited due to weakness and shoulder pain. No redness, warmth or swelling to any upper extremity joints. Range of motion to lower extremities is limited due to generalized weakness, no redness warmth or swelling to any lower extremity joints.  EXTREMITIES:  No edema. No open foot areas  GENITOURINARY:  Continent of urine most of the time she does have periods of incontinence. No hematuria  RECTAL:  No open areas  NEUROLOGICAL:  Alert and oriented X3.  CN II-XII intact, DTR +2 upper both and lower extremities.    SKIN:  Warm and dry, no rashes, lesions or masses   PRESSURE ULCER:  None  LYMPHATIC:  No lymphadenopathy   PSYCHIATRIC:  Cooperative, demonstrates good judgement, insight and affect    BIMS: 15               PHQ9:  7               LAB RESULTS:   Recent Labs   Lab 11/16/18  0515 09/20/18  1118 09/18/18  0550  07/12/18  0530   07/11/18  0510  07/10/18  0528 07/09/18  1744  05/02/18  0535   Glucose 83 99 93   < > 101*  --  117*  --  98 101*   < >  --    Sodium 144.0 143 142.0   < > 142  --  141  --  139 134*   < >  --    Potassium 3.9 4.1 3.9   < > 4.2   < > 2.9*   < > 3.1* 3.7   < > 4.2   Chloride 106 105 107   < > 109*  --  103  --  106 98   < >  --    BUN 14 9 10   < > 16  --  10  --  8 9   < >  --    Creatinine 0.63 0.48* 0.43*   < > 0.40*  --  0.39*  --  0.42* 0.54   < >  --    Anion Gap 9.8 15  --   --  12  --  12  --  11 11   < >  --    CALCIUM 9.1 9.5 9.1   < > 9.0  --  9.2  --  8.0* 9.4   < >  --    MAGNESIUM  --   --   --   --  2.0  --  1.9  --   --   --   --  2.1   GLOBULIN  --  4.6*  --   --   --   --   --   --  4.2* 5.2*   < >  --    Albumin  --  3.4*  --   --   --   --   --   --  2.2* 2.9*   < >  --    AST/SGOT  --  136*  --   --   --   --   --   --  14 18   < >  --    ALK PHOSPHATASE  --  83  --   --   --   --   --   --  66 89   < >  --    ALT/SGPT  --  83*  --   --   --   --   --   --  14 17   < >  --     < > = values in this interval not displayed.     Recent Labs   Lab 11/16/18  0515 09/20/18  1118 09/18/18  0550   WBC 6.3 10.2 7.5   RBC 4.93 5.46* 5.07   HGB 13.6 15.0 14.0   HCT 41.8 46.7* 41.4   MCV 84.8 85.5 81.8    289 260   Absolute Neutrophil 2.5* 7.4 3.4   Absolute Lymph 3.0 2.1 3.1   Absolute Mono 0.4 0.5 0.5   Absolute Eos 0.4* 0.2 0.3   Absolute Baso 0.1 0.1 0.1     Lab Results   Component Value Date    CHOLESTEROL 156 03/20/2018    HDL 45 03/20/2018    CALCLDL 81 03/20/2018    TRIGLYCERIDE 148 03/20/2018     Lab Results   Component Value Date    TSH 2.39 09/29/2017         RADIOLOGY RESULTS: None    ASSESSMENT/PLAN:  (G35) MS (multiple sclerosis) (CMS/[facility])  (primary encounter diagnosis)  Plan:  Cyclobenzaprine.  Glatiramer acetate.   Arrange for follow up with Neurology.  Resident had been seeing Dr Rodrigez     (I10) Essential hypertension  Plan:  Monitor blood pressure. Clonidine    (J42) Chronic  bronchitis, unspecified chronic bronchitis type (CMS/HCC)  Plan:  Strongly encouraged to quit smoking. DuoNeb's has needed. Flovent. Ventolin p.r.n.    (F17.200) Smoker  Plan: Strongly encouraged to quit smoking.    (F32.9) Depressive disorder, not elsewhere classified  Plan: Mood appears stable.  Duloxetine    (M79.7) Fibromyalgia  Plan:  Follows with pain clinic.  Lyrica.  Butrans patch.    (F41.1) Generalized anxiety disorder  Plan:  Clonazepam.  Dose reduction in November 2018.  Tolerating reduction.  No increase in anxiety.    (E78.2) Mixed hyperlipidemia  Plan: Simvastatin. Monitor lipid panel    Hypokalemia: Monitor potassium level    Constipation: Senna. MiraLAX.    Left shoulder pain/rotator cuff tear status post cortisone injection. Follow-up with orthopedics, continue physical therapy      ORDERS PLACED THIS VISIT: CBC, basic metabolic panel, lipid panel, AST, CK--hypokalemia, hyperlipidemia, MS  Please have maintenance check air mattress on bed-question of leak  Please arrange appointment with neurology--follow up and MS, resident previously had seen Dr. Rain MISHRA  Internal Medicine  NPI: [id number]  Pager [phone]  Fax [phone]

## 2019-07-09 NOTE — TELEPHONE ENCOUNTER
[facility]--requesting verbal orders for Physical therapy.    2x a week for 3 weeks and 1x a week for 2 weeks to work on gait and balance.      Patient recently had a fall fracturing bilateral wrists. Patient is learning to walk with a new walker.     Ok to leave message on secure line if receive [facility]  [facility]

## 2019-07-16 NOTE — PROGRESS NOTES
ANTICOAGULATION FOLLOW-UP CLINIC VISIT    Patient Name:  Shanika Stahl  Date:  7/16/2019  Contact Type:  Fax [facility]    SUBJECTIVE:  Patient Findings     Comments:   Patient will continue weekly maintenance dose.  Recheck in 2 weeks.         Clinical Outcomes     Negatives:   Major bleeding event, Thromboembolic event, Anticoagulation-related hospital admission, Anticoagulation-related ED visit, Anticoagulation-related fatality    Comments:   Patient will continue weekly maintenance dose.  Recheck in 2 weeks.            OBJECTIVE    INR   Date Value Ref Range Status   07/16/2019 3.18 (A) 0.86 - 1.14 Final       ASSESSMENT / PLAN  INR assessment SUPRA    Recheck INR In: 2 WEEKS    INR Location Outside lab      Anticoagulation Summary  As of 7/16/2019    INR goal:   2.0-3.0   TTR:   83.1 % (4.3 y)   INR used for dosing:   3.18! (7/16/2019)   Warfarin maintenance plan:   2.5 mg (2.5 mg x 1) every Mon, Thu; 5 mg (2.5 mg x 2) all other days   Full warfarin instructions:   2.5 mg every Mon, Thu; 5 mg all other days   Weekly warfarin total:   30 mg   No change documented:   Dre Phillips RN   Plan last modified:   Carly Hernandez RN (7/2/2019)   Next INR check:   7/30/2019   Priority:   INR   Target end date:   Indefinite    Indications    Atrial fibrillation (H) (Resolved) [I48.91]  History of cerebral embolism with cerebral infarction - 2014 [I63.40]  Long term current use of anticoagulant therapy [Z79.01]             Anticoagulation Episode Summary     INR check location:       Preferred lab:       Send INR reminders to:   [place] [facility] POOL    Comments:   *  Resides at [facility] Fax AVS to [phone] (facility administers meds but cannot do half tabs). Give dose for day of INR draw.      Anticoagulation Care Providers     Provider Role Specialty Phone number    Milena Jaime,  [facility] Internal Medicine [phone]            See the Encounter Report to view Anticoagulation Flowsheet  and Dosing Calendar (Go to Encounters tab in chart review, and find the Anticoagulation Therapy Visit)        Dre Phillips RN

## 2019-07-16 NOTE — TELEPHONE ENCOUNTER
Physician communication and interim order signed and faxed back.  Rosie Linton on 7/16/2019 at 1:03 PM

## 2019-07-31 NOTE — PROGRESS NOTES
ANTICOAGULATION FOLLOW-UP CLINIC VISIT    Patient Name:  Shanika Stahl  Date:  7/31/2019  Contact Type:  Fax sent to facility     SUBJECTIVE:  Patient Findings     Comments:   Fax sent to [facility] - no changes reported.        Clinical Outcomes     Negatives:   Major bleeding event, Thromboembolic event, Anticoagulation-related hospital admission, Anticoagulation-related ED visit, Anticoagulation-related fatality    Comments:   Fax sent to [facility] - no changes reported.           OBJECTIVE    INR   Date Value Ref Range Status   07/30/2019 3.0  Final       ASSESSMENT / PLAN  INR assessment THER    Recheck INR In: 2 WEEKS    INR Location Outside lab      Anticoagulation Summary  As of 7/31/2019    INR goal:   2.0-3.0   TTR:   82.4 % (4.3 y)   INR used for dosing:   3.0 (7/30/2019)   Warfarin maintenance plan:   2.5 mg (2.5 mg x 1) every Mon, Thu; 5 mg (2.5 mg x 2) all other days   Full warfarin instructions:   2.5 mg every Mon, Thu; 5 mg all other days   Weekly warfarin total:   30 mg   Plan last modified:   Carly Hernandez RN (7/2/2019)   Next INR check:   8/13/2019   Priority:   INR   Target end date:   Indefinite    Indications    Atrial fibrillation (H) (Resolved) [I48.91]  History of cerebral embolism with cerebral infarction - 2014 [I63.40]  Long term current use of anticoagulant therapy [Z79.01]             Anticoagulation Episode Summary     INR check location:       Preferred lab:       Send INR reminders to:   [facility] POOL    Comments:   *  Resides at [facility] Fax AVS to [phone] (facility administers meds but cannot do half tabs). Give dose for day of INR draw.      Anticoagulation Care Providers     Provider Role Specialty Phone number    Milena Jaime,  Responsible Internal Medicine [phone]            See the Encounter Report to view Anticoagulation Flowsheet and Dosing Calendar (Go to Encounters tab in chart review, and find the Anticoagulation Therapy  Visit)        Veronica Mason RN

## 2019-07-31 NOTE — LETTER
INR 7/30/19:   3.0   Current Warfarin Dose:  2.5 mg every Mon, Thur; 5 mg all other days  INR Goal: 2-3    Please call the Coumadin/INR clinic if you have any of the following listed below:  Bleeding Signs/Symptoms  Signs/Symptoms of a blood clot  Medication Changes  Dietary Changes  Activity Changes  Bacterial/Viral Infection  Missed Warfarin Doses  Other Concerns    PLAN:  New Warfarin Dose: No Change    Next INR: 2 weeks on 8/13/19    Thank you,  Veronica MOTTA RN  [phone]

## 2019-07-31 NOTE — TELEPHONE ENCOUNTER
"Requested Prescriptions   Pending Prescriptions Disp Refills     simvastatin (ZOCOR) 20 MG tablet [Pharmacy Med Name: SIMVASTATIN 20MG TABLET] 90 tablet 3     Sig: TAKE 1 TAB BY MOUTH AT BEDTIME       Statins Protocol Failed - 7/31/2019  1:35 PM        Failed - LDL on file in past 12 months     Recent Labs   Lab Test 02/11/14  0645   LDL 78             Passed - No abnormal creatine kinase in past 12 months     No lab results found.             Passed - Recent (12 mo) or future (30 days) visit within the authorizing provider's specialty     Patient had office visit in the last 12 months or has a visit in the next 30 days with authorizing provider or within the authorizing provider's specialty.  See \"Patient Info\" tab in inbasket, or \"Choose Columns\" in Meds & Orders section of the refill encounter.              Passed - Medication is active on med list        Passed - Patient is age 18 or older        Passed - No active pregnancy on record        Passed - No positive pregnancy test in past 12 months        Last Written Prescription Date:  9/6/18  Last Fill Quantity: 90,  # refills: 3   Last office visit: 10/2/2012 with prescribing provider:  Addison   Future Office Visit:      "

## 2019-08-01 NOTE — TELEPHONE ENCOUNTER
Routing refill request to provider for review/approval because:  Labs not current:  FLP - ready.    Ivett MOTTA RN

## 2019-08-01 NOTE — TELEPHONE ENCOUNTER
Routing refill request to provider for review/approval because:  Medication is reported/historical - not sure why this is?  Maybe an error?  Was d/c'd and added back 5-8-19.      Ivett MOTTA RN

## 2019-08-13 NOTE — PATIENT INSTRUCTIONS
Some signs and symptoms of bleeding include: Nose bleed or cut that does not stop bleeding in 10 minutes, bleeding of the gums, vomiting (will look like coffee grounds) or coughing up blood, unusual, easy or large areas of bruising, increased or unexpected vaginal bleeding or increased menstrual flow, red or black stools, red or orange urine, prolonged or severe headache, pale skin, unusual or constant tiredness.  If you have these please call 911 or seek medical care immediately.

## 2019-08-13 NOTE — PROGRESS NOTES
ANTICOAGULATION FOLLOW-UP CLINIC VISIT    Patient Name:  Shanika Stahl  Date:  2019  Contact Type:  Fax [facility]    SUBJECTIVE:  Patient Findings     Comments:   Fax received at 6:02 pm from [facility].  Writer instructed patient to hold the Coumadin x 2 days.   Recheck INR in 3 days.           Clinical Outcomes     Negatives:   Major bleeding event, Thromboembolic event, Anticoagulation-related hospital admission, Anticoagulation-related ED visit, Anticoagulation-related fatality    Comments:   Fax received at 6:02 pm from [employer].  Writer instructed patient to hold the Coumadin x 2 days.   Recheck INR in 3 days.              OBJECTIVE    INR   Date Value Ref Range Status   2019 5.23 (A) 0.86 - 1.14 Final       ASSESSMENT / PLAN  INR assessment SUPRA    Recheck INR In: 3 DAYS    INR Location Outside lab      Anticoagulation Summary  As of 2019    INR goal:   2.0-3.0   TTR:   81.7 % (4.4 y)   INR used for dosin.23! (2019)   Warfarin maintenance plan:   2.5 mg (2.5 mg x 1) every Mon, Thu; 5 mg (2.5 mg x 2) all other days   Full warfarin instructions:   : Hold; : Hold; Otherwise 2.5 mg every Mon, Thu; 5 mg all other days   Weekly warfarin total:   30 mg   Plan last modified:   Carly Hernandez RN (2019)   Next INR check:   8/15/2019   Priority:   INR   Target end date:   Indefinite    Indications    Atrial fibrillation (H) (Resolved) [I48.91]  History of cerebral embolism with cerebral infarction -  [I63.40]  Long term current use of anticoagulant therapy [Z79.01]             Anticoagulation Episode Summary     INR check location:       Preferred lab:       Send INR reminders to:   [place] [facility] [facility] POOL    Comments:   *  Resides at [facility] Fax AVS to [phone] (facility administers meds but cannot do half tabs). Give dose for day of INR draw.      Anticoagulation Care Providers     Provider Role Specialty Phone number    Milena Jaime DO Responsible  Internal Medicine [phone]            See the Encounter Report to view Anticoagulation Flowsheet and Dosing Calendar (Go to Encounters tab in chart review, and find the Anticoagulation Therapy Visit)        Dre Phillips RN

## 2019-08-15 NOTE — TELEPHONE ENCOUNTER
Reason for Call: Request for an order or referral:    Order or referral being requested: Patient is having UTI symptoms they are asking for a order for a UA    Date needed: at your convenience    Has the patient been seen by the PCP for this problem? NO    Phone number Patient can be reached at:  Other phone number:      Best Time:  any    Can we leave a detailed message on this number?  YES    Call taken on 8/15/2019 at 1:38 PM by Rosie Linton

## 2019-08-15 NOTE — TELEPHONE ENCOUNTER
INR:2.11  Goal INR:2-3  Reason for therapy: Afib  Current Warfarin dose:  Held the previous 2 days for an elevated INR.  Previous regimen was 2.5 mg M,Th, 5 mg all other days of the week  Plan: 5 mg tonight, then per the anticoagulation clinic  Recheck: per the anticoagulation clinic.    Result and plan discussed with Aleksandra Hammond and faxed to [facility] Fax  [phone]     Milena Mack, Pharm.D.

## 2019-08-15 NOTE — TELEPHONE ENCOUNTER
Called Verenice Chacon at [facility]. Need to know what symptoms patient is experiencing.     Left message for Verenice to return call to clinic.     Roxanne Us, RADHAN, RN

## 2019-08-15 NOTE — TELEPHONE ENCOUNTER
"S-(situation): UA/UC order    B-(background): LOV 06/19/19 Rafaela    A-(assessment): EUGENIA Caldera from [facility] asking for UA/UC orders for possible UTI. Patient experiecing increase frequency, incontinence, discomfort with urination, and confusion.    No other concerns noted. See additional information below.     R-(recommendations): Routing to provider for consideration. Verenice murphy lab is performed in house, sent to [facility], results sent to provider. Fax order to: [phone] Attn: Verenice LITTLEJOHN RN    Pended UA with reflex to microscopic and culture. Unsure if [facility] would reflex to culture if not noted.      CLAUS Brito, RN          Additional Information    Negative: [1] Unable to urinate (or only a few drops) > 4 hours AND     [2] bladder feels very full (e.g., palpable bladder or strong urge to urinate)    Negative: [1] Decreased urination and [2] drinking very little AND [2] dehydration suspected (e.g., dark urine, no urine > 12 hours, very dry mouth, very lightheaded)    Negative: Patient sounds very sick or weak to the triager    Negative: Shock suspected (e.g., cold/pale/clammy skin, too weak to stand, low BP, rapid pulse)    Negative: Sounds like a life-threatening emergency to the triager    Answer Assessment - Initial Assessment Questions  1. SYMPTOM: \"What's the main symptom you're concerned about?\" (e.g., frequency, incontinence)      Frequency, incontinence, discomfort with urination, confusion  2. ONSET: \"When did the  Urinary symptoms  start?\"      A few days ago  3. PAIN: \"Is there any pain?\" If so, ask: \"How bad is it?\" (Scale: 1-10; mild, moderate, severe)      Mild to moderate  4. CAUSE: \"What do you think is causing the symptoms?\"      UTI  5. OTHER SYMPTOMS: \"Do you have any other symptoms?\" (e.g., fever, flank pain, blood in urine, pain with urination)      Urination discomfort  6. PREGNANCY: \"Is there any chance you are pregnant?\" \"When was your last " "menstrual period?\"      n/a    Protocols used: URINARY SYMPTOMS-A-AH      "

## 2019-08-15 NOTE — TELEPHONE ENCOUNTER
The UA should be back within a few hours of being sent to lab.  Cannot rely on [facility] to fax me results - this will likely be delayed until Tuesday - fax will come to , not be distributed until late Friday or Monday, etc.  Please work with facility a way to get me results before end of the day tomorrow

## 2019-08-16 NOTE — PROGRESS NOTES
Writer left a message for Verenice Everett RN at [facility] to call [facility] back. Sounds like the patient may have a UTI and if she is placed on abx, we will need a call with that update.    Potential dosing is in calendar through Monday. Please give Monday's dosing to facility also as they usually do not call [facility] by 4pm the day of the INR order.    Also let facility staff know they need to use their on call doctor for orders after [facility] hours and should not call inpatient pharmacy.    Verenice Morillo RN, BSN, PHN  8-16-19 at 9:10am

## 2019-08-16 NOTE — PROGRESS NOTES
"ANTICOAGULATION FOLLOW-UP CLINIC VISIT    Patient Name:  Shanika Stahl  Date:  2019  Contact Type:  Telephone/ Verenice Christie    SUBJECTIVE:  Patient Findings     Positives:   Change in health (possible UTI), Missed doses (Hold x 2 days)    Comments:   Per phone encounter from 8-15-19 \"EUGENIA Caldera from [facility] asking for UA/UC orders for possible UTI. Patient experiecing increase frequency, incontinence, discomfort with urination, and confusion.\"  Results from UA order are not in Epic at this time. This may explain elevated INR reading on 19. Patient was dosed by Milena Mack pharmD with results faxed to [facility]        Clinical Outcomes     Comments:   Per phone encounter from 8-15-19 \"EUGENIA Caldera from [facility] asking for UA/UC orders for possible UTI. Patient experiecing increase frequency, incontinence, discomfort with urination, and confusion.\"  Results from UA order are not in Epic at this time. This may explain elevated INR reading on 19. Patient was dosed by Milena Mack pharmD with results faxed to [facility].           OBJECTIVE    INR   Date Value Ref Range Status   08/15/2019 2.11 (A) 0.9 - 1.1 Final       ASSESSMENT / PLAN  INR assessment THER    Recheck INR In: 3 DAYS    INR Location Outside lab      Anticoagulation Summary  As of 2019    INR goal:   2.0-3.0   TTR:   81.6 % (4.4 y)   INR used for dosin.11 (8/15/2019)   Warfarin maintenance plan:   2.5 mg (2.5 mg x 1) every Mon, Thu; 5 mg (2.5 mg x 2) all other days   Full warfarin instructions:   : 3.75 mg; : 2.5 mg; : 3.75 mg; Otherwise 2.5 mg every Mon, Thu; 5 mg all other days   Weekly warfarin total:   30 mg   Plan last modified:   Carly Hernandez RN (2019)   Next INR check:   2019   Priority:   INR   Target end date:   Indefinite    Indications    Atrial fibrillation (H) (Resolved) [I48.91]  History of cerebral embolism with cerebral infarction -  " [I63.40]  Long term current use of anticoagulant therapy [Z79.01]             Anticoagulation Episode Summary     INR check location:       Preferred lab:       Send INR reminders to:   [place] PH[place] [facility] POOL    Comments:   *  Resides at [facility] Fax AVS to [phone] (facility administers meds but cannot do half tabs). Give dose for day of INR draw.      Anticoagulation Care Providers     Provider Role Specialty Phone number    Milena Jaime DO [facility] Internal Medicine [phone]            See the Encounter Report to view Anticoagulation Flowsheet and Dosing Calendar (Go to Encounters tab in chart review, and find the Anticoagulation Therapy Visit)        Dre Phillips RN

## 2019-08-16 NOTE — TELEPHONE ENCOUNTER
Verenice called, she verifies that fax was received, do not need to fax this to [facility] per Verenice.     Verenice states patient is out to Ortho appointment but will complete the UA once she returns today.    Awaiting results.    EUGENIA Hodge

## 2019-08-16 NOTE — TELEPHONE ENCOUNTER
Called Verenice to check status on completion of UA, Verenice says patient just got back and has not completed yet, but says she will work on this. Told Verenice that clinic closes at 5 today, she will try to get the patient to complete the UA.    EUGENIA Hodge

## 2019-08-16 NOTE — ADDENDUM NOTE
Addended by: MODESTA SENA on: 8/16/2019 10:36 AM     Modules accepted: Level of Service, SmartSet

## 2019-08-16 NOTE — TELEPHONE ENCOUNTER
Verenice called back, read her Dr. Jaime's message below, Verenice states we need to fax the order over to [facility] now, fax number is [phone], they will collect it and sent this to [facility], Verenice will get the results and then fax them back to Dr. Jaime today, gave Verenice the fax number to [facility].    CSS, please fax this UA order to [facility] listed above.  Will await fax back with results.      EUGENIA Hodge

## 2019-08-16 NOTE — TELEPHONE ENCOUNTER
Left message for Verenice to call back, will ask CSS to fax order to [facility].    See comment below.      Milena Jaime, DO  [place] [facility] Pool 15 hours ago (5:43 PM)      Maybe they can call with verbal results?  dont want her suffering all weekend          EUGENIA Hodge

## 2019-08-19 NOTE — PROGRESS NOTES
ANTICOAGULATION FOLLOW-UP CLINIC VISIT    Patient Name:  Shanika Stahl  Date:  2019  Contact Type:  Telephone/ EUGENIA Lake    SUBJECTIVE:  Patient Findings     Comments:   Spoke with EUGENIA Lake at [facility] who reports patient's UA was negative. No antibiotics started, no medication changes. She reports patient is doing somewhat better in regards to confusion. She confirms dosing that patient has taken since last INR. Aleksandra reports labs are generally done Mon-Wed there. Due to this, will have patient resume her usual dose and recheck on Wednesday; patient will have 22.5 mg by next INR. Her previous maintenance dose was 30mg. She reports no changes to patient's diet, medications. No concerns with bleeding or bruising reported. Faxed AVS to [facility] at [phone]        Clinical Outcomes     Comments:   Spoke with EUGENIA Lake at [facility] who reports patient's UA was negative. No antibiotics started, no medication changes. She reports patient is doing somewhat better in regards to confusion. She confirms dosing that patient has taken since last INR. Aleksandra reports labs are generally done Mon-Wed there. Due to this, will have patient resume her usual dose and recheck on Wednesday; patient will have 22.5 mg by next INR. Her previous maintenance dose was 30mg. She reports no changes to patient's diet, medications. No concerns with bleeding or bruising reported. Faxed AVS to [facility] at [phone]           OBJECTIVE    INR   Date Value Ref Range Status   2019 2.08 (A) 0.8 - 1.1 Final       ASSESSMENT / PLAN  No question data found.  Anticoagulation Summary  As of 2019    INR goal:   2.0-3.0   TTR:   81.6 % (4.4 y)   INR used for dosin.08 (2019)   Warfarin maintenance plan:   2.5 mg (2.5 mg x 1) every Mon, Thu; 5 mg (2.5 mg x 2) all other days   Full warfarin instructions:   2.5 mg every Mon, Thu; 5 mg all other days   Weekly warfarin total:   30 mg   Plan last modified:   Carly Hernandez RN  (7/2/2019)   Next INR check:   8/21/2019   Priority:   INR   Target end date:   Indefinite    Indications    Atrial fibrillation (H) (Resolved) [I48.91]  History of cerebral embolism with cerebral infarction - 2014 [I63.40]  Long term current use of anticoagulant therapy [Z79.01]             Anticoagulation Episode Summary     INR check location:       Preferred lab:       Send INR reminders to:   [place] PHONE [employer] POOL    Comments:   *  Resides at [facility] Fax AVS to [phone] (facility administers meds but cannot do half tabs). Give dose for day of INR draw.      Anticoagulation Care Providers     Provider Role Specialty Phone number    Milena Jaime Taylor,  Responsible Internal Medicine [phone]            See the Encounter Report to view Anticoagulation Flowsheet and Dosing Calendar (Go to Encounters tab in chart review, and find the Anticoagulation Therapy Visit)      Tanya Bailey RN

## 2019-08-19 NOTE — LETTER
INR Today:   2.08   Current Warfarin Dose:  2.5 mg every Mon, Thurs; 5 mg all other days   INR Goal: 2-3    Please call the Coumadin/INR clinic if you have any of the following listed below:  Bleeding Signs/Symptoms  Signs/Symptoms of a blood clot  Medication Changes  Dietary Changes  Activity Changes  Bacterial/Viral Infection  Missed Warfarin Doses  Other Concerns    PLAN:  New Warfarin Dose: 8/19 take 2.5 mg; 8/20 take 5 mg; 8/21 *if INR results do not come back the same day* then take 5 mg  Next INR: 2 days, recheck 8/21/19    Thank you,  Tanya LAZO, RN  [phone]

## 2019-08-20 NOTE — TELEPHONE ENCOUNTER
UA does not appear to show infection.  Please have facility CALL me when culture is available.  The UA came as a fax, and not in my inbox to review immediately.  Thus if they just fax UC, it could be another few days before I am able to review it.

## 2019-08-20 NOTE — TELEPHONE ENCOUNTER
Reason for Call:  Other     Detailed comments: Verenice Eid calling back and reporting that Shanika's UC states no growth and that she had faxed over results last Friday and again today.      Phone Number Patient can be reached at: Other phone number: [phone]    Best Time: any    Can we leave a detailed message on this number? YES    Call taken on 8/20/2019 at 10:23 AM by Lucia Bradley

## 2019-08-20 NOTE — TELEPHONE ENCOUNTER
I called Verenice Chacon RN, [facility], [phone] and left a detailed message asking that the urine culture result be CALLED to Dr Jaime as soon as it is resulted.  I left our dr line for Verenice to reply.  Eneida Roldan RN

## 2019-08-21 NOTE — PROGRESS NOTES
ANTICOAGULATION FOLLOW-UP CLINIC VISIT    Patient Name:  Shanika Stahl  Date:  8/21/2019  Contact Type:  Fax [facility]    SUBJECTIVE:  Patient Findings     Positives:   Missed doses (Hold)    Comments:   Patient had 22.5 mg in the lat 7 days. Writer adjusted dose to 22.5 mg by the next INR.   Recheck in 5 days.         Clinical Outcomes     Comments:   Patient had 22.5 mg in the lat 7 days. Writer adjusted dose to 22.5 mg by the next INR.   Recheck in 5 days.            OBJECTIVE    INR   Date Value Ref Range Status   08/21/2019 3.25 (A) 0.86 - 1.14 Final       ASSESSMENT / PLAN  INR assessment SUPRA    Recheck INR In: 5 DAYS    INR Location Outside lab      Anticoagulation Summary  As of 8/21/2019    INR goal:   2.0-3.0   TTR:   81.6 % (4.4 y)   INR used for dosing:   3.25! (8/21/2019)   Warfarin maintenance plan:   2.5 mg (2.5 mg x 1) every Mon, Thu; 5 mg (2.5 mg x 2) all other days   Full warfarin instructions:   8/21: 2.5 mg; 8/24: 2.5 mg; 8/25: 2.5 mg; 8/26: 5 mg; 8/27: 2.5 mg; Otherwise 2.5 mg every Mon, Thu; 5 mg all other days   Weekly warfarin total:   30 mg   Plan last modified:   Carly Hernandez RN (7/2/2019)   Next INR check:   8/26/2019   Priority:   INR   Target end date:   Indefinite    Indications    Atrial fibrillation (H) (Resolved) [I48.91]  History of cerebral embolism with cerebral infarction - 2014 [I63.40]  Long term current use of anticoagulant therapy [Z79.01]             Anticoagulation Episode Summary     INR check location:       Preferred lab:       Send INR reminders to:   [place] PHONE [facility] POOL    Comments:   *  Resides at [facility] Fax AVS to [phone] (facility administers meds but cannot do half tabs). Give dose for day of INR draw.      Anticoagulation Care Providers     Provider Role Specialty Phone number    Milena Jaime DO [facility] Internal Medicine [phone]            See the Encounter Report to view Anticoagulation Flowsheet and Dosing Calendar (Go to  Encounters tab in chart review, and find the Anticoagulation Therapy Visit)        Dre Phillips RN

## 2019-08-21 NOTE — TELEPHONE ENCOUNTER
Left message for Verenice to call Dr Jaime directly with the urine culture results.  Has it resulted yet?    Routed to Dr Jaime ~ Have you been called with the result yet?    Eneida Roldan RN

## 2019-08-26 NOTE — PHARMACY-ANTICOAGULATION SERVICE
[facility] nurse Shraddha Nichole called @ 1709 with INR of 3.53 for this patient and wanted dosing. I suggested HOLDING dose today, Monday, August 26th and giving 1.25 mg (1/2 0f a 2.5 mg tablet) on Tuesday, Agust 27th. Another  INR will be drawn early Wednesday, August 28th with results to be called to the [facility].

## 2019-08-27 NOTE — PROGRESS NOTES
09/04/19  No INR since 8/27. Writer faxed over AVS with INR date for tomorrow.    Dre Phillips RN, BSN, N  Anticoagulation Clinic   [phone]        Writer noticed this message in staff messaging after the completed AVS was faxed to [facility].      Marianna Menjivar, [facility]  P [facility] nurse Shraddha Nichole called @ 1709 with INR of 3.53 for this patient and wanted dosing. I suggested HOLDING dose today, Monday, August 26th and giving 1.25 mg (1/2 0f a 2.5 mg tablet) on Tuesday, Agust 27th. Another  INR will be drawn early Wednesday, August 28th with results to be called to the  [facility].        ANTICOAGULATION FOLLOW-UP CLINIC VISIT    Patient Name:  Shanika Stahl  Date:  8/27/2019  Contact Type:  Fax [facility]    SUBJECTIVE:  Patient Findings     Comments:   No changes in medications, activity, health, or diet noted. No bleeding or increased bruising noted. Took warfarin as prescribed.  Patient had 25 mg in the last 7 days. Writer decreased dose to 22.5 mg by the next INR.   Recheck in 1 week.         Clinical Outcomes     Negatives:   Major bleeding event, Thromboembolic event, Anticoagulation-related hospital admission, Anticoagulation-related ED visit, Anticoagulation-related fatality    Comments:   No changes in medications, activity, health, or diet noted. No bleeding or increased bruising noted. Took warfarin as prescribed.  Patient had 25 mg in the last 7 days. Writer decreased dose to 22.5 mg by the next INR.   Recheck in 1 week.            OBJECTIVE    INR   Date Value Ref Range Status   08/27/2019 3.53 (A) 0.86 - 1.14 Final       ASSESSMENT / PLAN  INR assessment SUPRA    Recheck INR In: 1 WEEK    INR Location Outside lab      Anticoagulation Summary  As of 8/27/2019    INR goal:   2.0-3.0   TTR:   81.3 % (4.4 y)   INR used for dosing:   3.53! (8/27/2019)   Warfarin maintenance plan:   2.5 mg (2.5 mg x 1) every Mon, Thu; 5 mg (2.5 mg x 2) all other days   Full warfarin  instructions:   8/27: Hold; 8/31: 2.5 mg; 9/1: 2.5 mg; 9/2: 5 mg; Otherwise 2.5 mg every Mon, Thu; 5 mg all other days   Weekly warfarin total:   30 mg   Plan last modified:   Carly Hernandez RN (7/2/2019)   Next INR check:   9/3/2019   Priority:   INR   Target end date:   Indefinite    Indications    Atrial fibrillation (H) (Resolved) [I48.91]  History of cerebral embolism with cerebral infarction - 2014 [I63.40]  Long term current use of anticoagulant therapy [Z79.01]             Anticoagulation Episode Summary     INR check location:       Preferred lab:       Send INR reminders to:   [place] [employer] POOL    Comments:   *  Resides at [facility] Fax AVS to [phone] (facility administers meds but cannot do half tabs). Give dose for day of INR draw.      Anticoagulation Care Providers     Provider Role Specialty Phone number    Milena Jaime,  [facility] Internal Medicine [phone]            See the Encounter Report to view Anticoagulation Flowsheet and Dosing Calendar (Go to Encounters tab in chart review, and find the Anticoagulation Therapy Visit)        Dre Phillips, RN

## 2019-09-04 NOTE — PROGRESS NOTES
ANTICOAGULATION FOLLOW-UP CLINIC VISIT    Patient Name:  Shanika Stahl  Date:  2019  Contact Type:  Fax [facility]    SUBJECTIVE:  Patient Findings     Positives:   Missed doses             OBJECTIVE    INR   Date Value Ref Range Status   2019 2.4 (A) 0.86 - 1.14 Final       ASSESSMENT / PLAN  INR assessment THER    Recheck INR In: 1 WEEK    INR Location [facility] INR      Anticoagulation Summary  As of 2019    INR goal:   2.0-3.0   TTR:   81.2 % (4.4 y)   INR used for dosin.4 (2019)   Warfarin maintenance plan:   2.5 mg (2.5 mg x 1) every Mon, Wed, Fri; 5 mg (2.5 mg x 2) all other days   Full warfarin instructions:   2.5 mg every Mon, Wed, Fri; 5 mg all other days   Weekly warfarin total:   27.5 mg   Plan last modified:   Dre Phillips RN (2019)   Next INR check:   2019   Priority:   INR   Target end date:   Indefinite    Indications    Atrial fibrillation (H) (Resolved) [I48.91]  History of cerebral embolism with cerebral infarction -  [I63.40]  Long term current use of anticoagulant therapy [Z79.01]             Anticoagulation Episode Summary     INR check location:       Preferred lab:       Send INR reminders to:   [employer] POOL    Comments:   *  Resides at [facility] Fax AVS to [phone] (facility administers meds but cannot do half tabs). Give dose for day of INR draw.      Anticoagulation Care Providers     Provider Role Specialty Phone number    Milena Jaime,  [facility] Internal Medicine [phone]            See the Encounter Report to view Anticoagulation Flowsheet and Dosing Calendar (Go to Encounters tab in chart review, and find the Anticoagulation Therapy Visit)        Dre Phillips, RN

## 2019-09-06 PROBLEM — R53.1 WEAKNESS: Status: ACTIVE | Noted: 2019-01-01

## 2019-09-06 PROBLEM — R10.9 ABDOMINAL PAIN: Status: ACTIVE | Noted: 2019-01-01

## 2019-09-06 NOTE — ED AVS SNAPSHOT
[facility] Emergency Department  [address] [address]  Phone:  [phone]  Fax:  [phone]                                    Shanika Stahl   MRN: [id number]    Department:  [facility] Emergency Department   Date of Visit:  9/6/2019           After Visit Summary Signature Page    I have received my discharge instructions, and my questions have been answered. I have discussed any challenges I see with this plan with the nurse or doctor.    ..........................................................................................................................................  Patient/Patient Representative Signature      ..........................................................................................................................................  Patient Representative Print Name and Relationship to Patient    ..................................................               ................................................  Date                                   Time    ..........................................................................................................................................  Reviewed by Signature/Title    ...................................................              ..............................................  Date                                               Time          22EPIC Rev 08/18

## 2019-09-06 NOTE — ED NOTES
Lives in assisted living at [facility]. Left lower back pain radiates across abd, lower back, and to legs which started about 3 days ago. Weakness and needing help getting in and out of bed. Decreased appetite and drinking less fluids. Nausea yesterday evening. Last BM 2-3 days ago. Urinated last this AM which was dark in color.

## 2019-09-06 NOTE — TELEPHONE ENCOUNTER
Reason for Call: Request for an order or referral:    Order or referral being requested: UA/UC lab test    Date needed: as soon as possible    Has the patient been seen by the PCP for this problem? YES    Additional comments: Patient's nurse at [facility] need UA?UC ordered for patient who's urine has foul odor, back marta, weakness and increased confusion    Phone number Patient's nurse can be reached at:  Other phone number:  [phone]    Best Time:  Any    Can we leave a detailed message on this number?  YES    Call taken on 9/6/2019 at 11:37 AM by Leonor Kim

## 2019-09-06 NOTE — DISCHARGE INSTRUCTIONS
Straight cath patient three times daily if bladder scan > 400 and as needed for urinary retention

## 2019-09-06 NOTE — PHARMACY
Medication Reconciliation completed with paperwork from nursing home. Patient resides at [facility].     Medication List up to date.

## 2019-09-06 NOTE — TELEPHONE ENCOUNTER
Called assisted living, spoke with Verenice Meadows.  She confirmed that patient was having symptoms of urine has foul odor, back pain, weakness and increased confusion.  Advised her that patient had Urine culture done around 8/15/19 for urinary symptoms and Dr. Jaime advised that patient be evaluated if patient continues to have urinary symptoms.  With patient's current symptoms, patient should be evaluated in clinic or UC today.  Verenice verbalized understanding and reports that she will call patient's son and have him bring her in to be evaluated.    Katherine MOTTA Rn

## 2019-09-06 NOTE — LETTER
Transition Communication Hand-off for Care Transitions to Next Level of Care Provider    Name: Shanika Stahl  : 1924  MRN #: [id number]  Primary Care Provider: Milena Jaime  Primary Care MD Name: Dr. Jaime  Primary Clinic: [address] [address]  Primary Care Clinic Name: [facility]  Reason for Hospitalization:  Back pain [M54.9]  Generalized weakness [R53.1]  Elevated brain natriuretic peptide (BNP) level [R79.89]  Generalized muscle weakness [M62.81]  Admit Date/Time: 2019  2:13 PM  Discharge Date: 1-2 days  Payor Source: Payor: MEDICARE / Plan: MEDICARE / Product Type: Medicare /     Readmission Assessment Measure (KLAUDIA) Risk Score/category: Average    Reason for Communication Hand-off Referral: Discharge to a TCU.    Discharge Plan:  [facility] (Admissions phone: [phone] RN Report: [phone]  Main Phone: [phone] Fax: [phone])    Concern for non-adherence with plan of care:   Y/N No  Discharge Needs Assessment:  Needs      Most Recent Value   Other Resources  Transportation Services   Transportation Agency  [facility]   Skilled Nursing Facility  [facility] [phone], Fax: [phone]   PAS Number  [id number]          Follow-up plan:    Future Appointments   Date Time Provider Department Center   9/10/2019  3:00 PM Milena Jaime DO [facility]   10/11/2019 12:00 AM [facility] TE[facility]1 [facility]           Gregoria Walden RN    AVS/Discharge Summary is the source of truth; this is a helpful guide for improved communication of patient story

## 2019-09-06 NOTE — ED PROVIDER NOTES
History     Chief Complaint   Patient presents with     Back Pain     weakness, confusion, and urine dark color for a few days now.     PAULA Stahl is a 89 year old female with past medical history significant for atrial fibrillation, frequent falls, tricuspid regurgitation, rental artery HTN, pulmonary HTN, history of stroke on long term anticoagulant use with coumadin  who presents to the emergency department accompanied by son with concern over generalized weakness, confusion, and change in urination which family became aware of earlier today.  She also complains of bilateral low back pain, left greater than right which radiates to her abdomen bilaterally.  She has had a chronic cough and shortness of breath which she states is consistent with her baseline.  She has not had any objective fever.  No recent vomiting, diarrhea, melena, hematochezia.  She believes her last bowel movement was 2-3 days ago.  She has not had any recent injuries or falls.  She has been receiving her for normal home medications, Tylenol for discomfort.    History is supplemented by son who presents with patient due to her hearing changes and chronic declining cognitive status. He states that she was diagnosed with a burst fracture of her thoracic spine approximately 1 year ago and had bilateral wrist fractures earlier this year and is concerned about the possibility of atraumatic bony abnormality.    Allergies:  Allergies   Allergen Reactions     Allopurinol Unknown     Ampicillin Diarrhea     Cipro [Ciprofloxacin] Nausea and Vomiting     Levaquin GI Disturbance     Lisinopril Swelling     AngioEdema     Paxil [Paroxetine] Nausea and Vomiting     Penicillins Diarrhea     Sulfa Drugs GI Disturbance     Zithromax [Macrolides] Nausea and Vomiting     Zoloft Nausea and Vomiting     Problem List:    Patient Active Problem List    Diagnosis Date Noted     Falls frequently 05/08/2019     Priority: Medium     Cognitive impairment  05/08/2019     Priority: Medium     Worsened memory since ~2018.  Cognitive score 5/30 at TCU 4/2019       Wrist fracture, bilateral 04/23/2019     Priority: Medium     Head injury due to trauma 04/23/2019     Priority: Medium     Heart murmur, tricuspid regurgitation 01/09/2019     Priority: Medium     Tricuspid regurgitation on echo 2017       Oropharyngeal dysphagia 07/13/2018     Priority: Medium     Generalized weakness 07/13/2018     Priority: Medium     Pulmonary hypertension (H) 07/31/2017     Priority: Medium     Echo 2017 Right ventricular systolic pressure is approximated at 30.7 mmHg plus the right atrial pressure.       Sensorineural hearing loss, bilateral 04/06/2016     Priority: Medium     Chronic atrial fibrillation (H) 10/02/2015     Priority: Medium     On warfarin.  Discussed risk and benefits of continuing anticoagulation with patient and son 5/8/2019.  Opted to continue.       Pacemaker 10/02/2015     Priority: Medium     Placed 2014, recurrent syncope with Tachy-pernell       Long term current use of anticoagulant therapy 02/27/2015     Priority: Medium     History of cerebral embolism with cerebral infarction - 2014 02/10/2014     Priority: Medium     2014- Acute ischemic stroke due to occlusion of right M2 artery, sudden onset of left facial droop and slurred speech         Advance Care Planning 02/15/2012     Priority: Medium     Advance Care PlanningReceipt of ACP document:  Received: Health Care Directive which was witnessed or notarized on 5/14/2013.  Document not previously scanned.  Validation form completed and sent with document to be scanned.  Code Status reflects choices in most recent ACP document.  Confirmed/documented designated decision maker(s). See permanent comments section of demographics in clinical tab. View document(s) and details by clicking on code status. Added by Joanna Garcia on 5/21/2013.  Health Directive notarized  March 30 1999 Sent to scanning February 15, 2012  [facility] Ashley GODFREY MA         Hyperlipidemia LDL goal <100 10/31/2010     Priority: Medium     CHOL      175   7/6/2011  HDL       69   7/6/2011  LDL       93   7/6/2011  TRIG       61   7/6/2011  CHOLHDLRATIO      3.0   7/6/2011  On simvastatin 20mg       Abnormal CXR (chest x-ray)      Priority: Medium     cardiomegallly and emphysema       Renal hypertension 05/09/2005     Priority: Medium     Known STU--seen on 2001 MRA--did not want angioplasty in past  Controlled on diltiazem, losartan and spironolactone           Atherosclerosis of renal artery (H) 05/09/2005     Priority: Medium     STU by MRA (?records)--medical therapy elected by patient.       Paroxysmal supraventricular tachycardia (H) 05/09/2005     Priority: Medium     SVT-infrequent episode on holter in 2000  Negative stress echo in 2002       Age related osteoporosis 05/09/2005     Priority: Medium     Dx 2004 and put on fosamax   BMD 3/05 (T scores):  Lumbar -4.3, Right prox femur -4.0, Left prox femur -4.0  BMD 7/08 (T scores):  Lumbar -4.2, Right prox femur -3.9, Left prox femur -4.1  Fosamax changed to IV reclast and miacalcin 3/09 by Dr. Vanessa  Had second reclast injection 8/11  Received approximately greater than 10 years of bisphosphonate treatment.  Bilateral wrist fractures after falls 4/2018.  Further bisphosphonate treatment not likely to be helpful        Past Medical History:    Past Medical History:   Diagnosis Date     Basal cell carcinoma      Closed stable burst fracture of eleventh thoracic vertebra with routine healing 7/13/2018     Diarrhea 7/13/2018     ESSENTIAL TREMOR 5/9/2005     Pneumonia 2/2/2018     Postmenopausal atrophic vaginitis      Stroke (H) 2/10/2014     Syncope 10/9/2013     Past Surgical History:    Past Surgical History:   Procedure Laterality Date     EYE SURGERY  2/21/12    left eye cataract     HERNIA REPAIR  1965    FEMORAL HERNIA REPAIR     IMPLANT PACEMAKER  02/2014     Family History:     Family History   Problem Relation Age of Onset     Cerebrovascular Disease Mother      Diabetes Mother      Breast Cancer Sister      Heart Disease Brother      Cerebrovascular Disease Brother      Heart Disease Brother      Prostate Cancer Brother      Heart Disease Brother      Heart Disease Sister      Gynecology Daughter      Cancer Son         Lung     Social History:  Marital Status:   [5]  Social History     Tobacco Use     Smoking status: Never Smoker     Smokeless tobacco: Never Used   Substance Use Topics     Alcohol use: No     Drug use: No      Medications:      acetaminophen (TYLENOL) 500 MG tablet   alum & mag hydroxide-simethicone (MYLANTA/MAALOX) 200-200-20 MG/5ML SUSP suspension   artificial saliva (BIOTENE DRY MOUTHWASH) LIQD liquid   CALCIUM + D 600-200 MG-UNIT PO TABS   furosemide (LASIX) 20 MG tablet   loperamide (IMODIUM A-D) 2 MG tablet   loratadine (CLARITIN) 10 MG tablet   metoprolol succinate ER (TOPROL-XL) 50 MG 24 hr tablet   Nutritional Supplements (BOOST)   order for DME   polyethylene glycol (MIRALAX/GLYCOLAX) Packet   senna-docusate (SENOKOT-S/PERICOLACE) 8.6-50 MG tablet   simvastatin (ZOCOR) 20 MG tablet   VITAMIN D 1000 UNIT OR TABS   vitamin E (TOCOPHEROL) 1000 units (450 mg) capsule   warfarin (JANTOVEN) 2.5 MG tablet     Review of Systems   Constitutional: Positive for activity change and appetite change. Negative for diaphoresis and fever.   HENT: Positive for sneezing.    Eyes: Negative for visual disturbance.   Respiratory: Negative for cough, shortness of breath and wheezing.    Cardiovascular: Negative for chest pain.   Gastrointestinal: Positive for abdominal pain. Negative for diarrhea, nausea and vomiting.   Genitourinary: Negative for dysuria and frequency.        Change in urine color per assisted living staff   Musculoskeletal: Positive for back pain.   Skin: Negative for color change, rash and wound.   Neurological: Positive for weakness. Negative for  "dizziness, seizures, syncope, light-headedness, numbness and headaches.   Psychiatric/Behavioral: Positive for confusion.     Physical Exam   BP: (!) 179/83  Heart Rate: 96  Temp: 98.3  F (36.8  C)  Resp: 16  Height: 162.6 cm (5' 4\")  Weight: 35.8 kg (79 lb)  SpO2: 94 %  Physical Exam  GENERAL APPEARANCE: alert, interactive, weak appearing   EYES: EOMI,  PERRL, conjunctiva clear  HENT: ear canals and TM's normal.  Posterior pharynx is nonerythematous without exudate  NECK: supple, nontender, no lymphadenopathy, venous pulsation noted   RESP: lungs clear to auscultation - no rales, rhonchi or wheezes  CV: irregularly irregular rhythm, normal S1 S2, 3/6 holosystolic murmur beast heard left lower sternal border   ABDOMEN:  soft, nontender, no HSM or masses and bowel sounds normal  BACK: no focal reproducible tenderness to palpation overlying cervical, thoracic or lumbar spine  NEURO: has diffuse weakness, sensory exam grossly normal patient   SKIN: no suspicious lesions or rashes  ED Course        Procedures             EKG Interpretation:      Interpreted by Stephanie Marte PA-C and Dr. Cayden Lara  Time reviewed: 15:40  Symptoms at time of EKG: weakness, fatigue    Rhythm: atrial fibrillation - controlled  Rate: Normal 88  Axis: Normal  Ectopy: none  Conduction: normal  ST Segments/ T Waves: Non-specific ST-T wave changes  Q Waves: none  Comparison to prior: Unchanged from 2/1/18    Clinical Impression:atrial fibrillation (chronic) and no acute changes    Critical Care time:  none        Results for orders placed or performed during the hospital encounter of 09/06/19 (from the past 24 hour(s))   CBC with platelets differential   Result Value Ref Range    WBC 10.3 4.0 - 11.0 10e9/L    RBC Count 4.30 3.8 - 5.2 10e12/L    Hemoglobin 13.1 11.7 - 15.7 g/dL    Hematocrit 41.8 35.0 - 47.0 %    MCV 97 78 - 100 fl    MCH 30.5 26.5 - 33.0 pg    MCHC 31.3 (L) 31.5 - 36.5 g/dL    RDW 14.2 10.0 - 15.0 %    Platelet Count 227 150 " - 450 10e9/L    Diff Method Automated Method     % Neutrophils 80.9 %    % Lymphocytes 8.0 %    % Monocytes 8.9 %    % Eosinophils 1.1 %    % Basophils 0.3 %    % Immature Granulocytes 0.8 %    Nucleated RBCs 0 0 /100    Absolute Neutrophil 8.4 (H) 1.6 - 8.3 10e9/L    Absolute Lymphocytes 0.8 0.8 - 5.3 10e9/L    Absolute Monocytes 0.9 0.0 - 1.3 10e9/L    Absolute Eosinophils 0.1 0.0 - 0.7 10e9/L    Absolute Basophils 0.0 0.0 - 0.2 10e9/L    Abs Immature Granulocytes 0.1 0 - 0.4 10e9/L    Absolute Nucleated RBC 0.0    Comprehensive metabolic panel   Result Value Ref Range    Sodium 140 133 - 144 mmol/L    Potassium 3.6 3.4 - 5.3 mmol/L    Chloride 108 94 - 109 mmol/L    Carbon Dioxide 23 20 - 32 mmol/L    Anion Gap 9 3 - 14 mmol/L    Glucose 112 (H) 70 - 99 mg/dL    Urea Nitrogen 21 7 - 30 mg/dL    Creatinine 0.65 0.52 - 1.04 mg/dL    GFR Estimate 75 >60 mL/min/[1.73_m2]    GFR Estimate If Black 87 >60 mL/min/[1.73_m2]    Calcium 8.8 8.5 - 10.1 mg/dL    Bilirubin Total 0.9 0.2 - 1.3 mg/dL    Albumin 3.3 (L) 3.4 - 5.0 g/dL    Protein Total 7.4 6.8 - 8.8 g/dL    Alkaline Phosphatase 191 (H) 40 - 150 U/L    ALT 36 0 - 50 U/L    AST 60 (H) 0 - 45 U/L   Troponin I   Result Value Ref Range    Troponin I ES 0.024 0.000 - 0.045 ug/L   NT pro BNP   Result Value Ref Range    N-Terminal Pro BNP Inpatient 6,283 (H) 0 - 1,800 pg/mL   INR   Result Value Ref Range    INR 2.99 (H) 0.86 - 1.14   Chest XR,  PA & LAT    Narrative    CHEST TWO VIEWS 9/6/2019 3:45 PM     HISTORY: Shortness of breath, weakness.    COMPARISON: 4/23/2019    FINDINGS: The heart is enlarged, similar to prior. There is a  left-sided pacer device. The lungs are hyperexpanded. Hazy opacity  left lung base. No pneumothorax or pleural effusion.       Impression    IMPRESSION: Hazy opacity the left lung base may represent developing  pneumonia or partial atelectasis.     MICH LEVY MD   UA reflex to Microscopic   Result Value Ref Range    Color Urine Yellow      Appearance Urine Clear     Glucose Urine Negative NEG^Negative mg/dL    Bilirubin Urine Negative NEG^Negative    Ketones Urine 5 (A) NEG^Negative mg/dL    Specific Gravity Urine 1.029 1.003 - 1.035    Blood Urine Negative NEG^Negative    pH Urine 5.0 5.0 - 7.0 pH    Protein Albumin Urine 30 (A) NEG^Negative mg/dL    Urobilinogen mg/dL 0.0 0.0 - 2.0 mg/dL    Nitrite Urine Negative NEG^Negative    Leukocyte Esterase Urine Negative NEG^Negative    Source Catheterized Urine     RBC Urine 2 0 - 2 /HPF    WBC Urine 1 0 - 5 /HPF    Mucous Urine Present (A) NEG^Negative /LPF     Medications   lactated ringers infusion ( Intravenous New Bag 9/6/19 9067)     Assessments & Plan (with Medical Decision Making)     I have reviewed the nursing notes.  I have reviewed the findings, diagnosis, plan and need for follow up with the patient.      ED to Inpatient Handoff:    Discussed with Dr. Goss at 18:44  Patient accepted for Observation Stay  Pending studies include none  Code Status: Not Addressed       New Prescriptions    No medications on file     Final diagnoses:   Generalized weakness   Elevated brain natriuretic peptide (BNP) level   Back pain     89-year-old female who normally lives at assisted living facility presents to the emergency department accompanied by son with concern over increased generalized weakness, confusion, low back pain and urinary changes over the last 24 hours.  She was noted to have elevated blood pressure upon arrival,  remainder vital signs were within normal limits.  Physical exam findings as described above.  As part of evaluation patient had an EKG which confirmed chronic A. fib/flutter, no acute ischemic changes.  Laboratory testing included non-concerning CBC, CMP, troponin.  INR was therapeutic at 2.99, BNP was elevated at 6283.  She initially reported significant improvement of her pain while lying in bed in the department and plan was to consider discharging her home back to assisted  living facility with increase in her Lasix and close follow-up in clinic, point scheduled for 9/10/2019.  However, patient did have persistent weakness was unable to ambulate unassisted and required two person transfer requiring care needs that assisted living facility would not be able to address.  Spoke with hospitalist Dr. Goss who agreed to accept patient for admission for observation stay.  While he is checking on her in the department she complained of more significant back pain and had some nausea/voiting, orders for CT of abdomen and pelvis, lumbar and thoracic spine were ordered prior to her transfer to the floor.   I did also discuss case with ED physician Dr. Cayden Lara who independently examined patient and take over care while in the department due to shift change.     Disclaimer: This note consists of symbols derived from keyboarding, dictation, and/or voice recognition software. As a result, there may be errors in the script that have gone undetected.  Please consider this when interpreting information found in the chart.       9/6/2019   [facility] EMERGENCY DEPARTMENT     Stephanie Marte PA-C  09/06/19 1937

## 2019-09-07 NOTE — CONSULTS
Care Transition Initial Assessment - RN      Spoke with sonUlises.    DATA   Active Problems:    Weakness    Abdominal pain       Primary Care Clinic Name: [facility]  Primary Care MD Name: Dr. Jaime  Contact information and PCP information verified: Yes    ASSESSMENT  Cognitive Status: Confused.  Lives With: alone   Living Arrangements: assisted living     Description of Support System: Supportive, Involved   Who is your support system?: Children, Facility resident(s)/Staff   Support Assessment: Adequate family and caregiver support   Insurance Concerns: No Insurance issues identified      This writer spoke with son Ulises as the patient is confused, introduced self and role.  Discussed discharge planning and Medicare guidelines in regards to home care, TCU and LTC.   The patient lives at [facility] (Phone: [phone] Fax:[phone]).  [facility] reports the patient receives the following services: Dressing, toileting, escorts to meals and bathing.  Discussed TCU, he was provided with Medicare certified nursing home list. Ulises's choices are as follows: [facility] [facility] (Phone: [phone] Fax: [phone]), [facility] (informed family there is an extra private pay daily fee), (Admissions phone: [phone] RN Report: [phone]  Main Phone: [phone] Fax: [phone]) [facility] Phone (Main Phone:[phone] Admissions Phone:[phone] Fax: [phone]) and [facility] [place] (Main: [phone] Admissions: [phone] Fax: [phone]).  TCU referrals are pending.  Discussed the guidelines for the TCU Medicare benefit and the need to self pay if Medicare guidelines are not met.  Care Transitions will assist with discharge planning.    PLAN    TCU      Gregoria Walden RN, Care Coordinator [phone]

## 2019-09-07 NOTE — PLAN OF CARE
PT: Attempted eval x 2, pt is not appropriate at this time due to her reaction to the narcotics. Will attempt again tomorrow.

## 2019-09-07 NOTE — PHARMACY-ANTICOAGULATION SERVICE
Clinical Pharmacy - Warfarin Dosing Consult     Pharmacy has been consulted to manage this patient s warfarin therapy.  Indication: Atrial Fibrillation  Therapy Goal: INR 2-3  OP Anticoag Clinic: [facility]  Warfarin Prior to Admission: Yes  Warfarin PTA Regimen: 2.5 mg (2.5 mg x 1) every Mon, Wed, Fri; 5 mg (2.5 mg x 2) all other days or otherwise directed by Anticoagulation   Significant drug interactions: AZITH  Dose Comments:  1.25 mg for INR of 2.99     INR   Date Value Ref Range Status   09/06/2019 2.99 (H) 0.86 - 1.14 Final   09/04/2019 2.4 (A) 0.86 - 1.14 Final       Recommend warfarin 1.25 mg today.  Pharmacy will monitor Shanika Stahl daily and order warfarin doses to achieve specified goal.      Please contact pharmacy as soon as possible if the warfarin needs to be held for a procedure or if the warfarin goals change.      Sydney Wilcox [facility]

## 2019-09-07 NOTE — PLAN OF CARE
[place] [facility] [place]    Patient admitted to room 2404 at approximately 2100 via cart from emergency room. Patient was accompanied by son.     Verbal SBAR report received from Genna BELLO prior to patient arrival.     Patient trasferred to bed via air alen. Patient alert and oriented X unable to assess orientation due to garbled speech and Pt being [place] and battery being dead in one of her hearing aides. Pain is not well controlled.  Medication(s) being used: acetaminophen.  Admission vital signs:  Temp: 98.0 (Oral), Resp:18, HR: 91, BP: 166/63 (left arm), O2: 86% on RA, 92% on 3L NC. Patient was oriented to plan of care, call light, bed controls, tv, telephone, bathroom and visiting hours.     Risk Assessment    The following safety risks were identified during admission: fall and isolation. Yellow risk band applied: YES.     Skin Initial Assessment    This writer admitted this patient and completed a full skin assessment and  score in the Adult PCS flowsheet. Appropriate interventions initiated as needed.     Secondary skin check completed by Daily BELLO.     Risk Assessment  Sensory Perception: 3-->slightly limited  Moisture: 4-->rarely moist  Activity: 2-->chairfast(at this time due to pain)  Mobility: 3-->slightly limited(due to pain)  Nutrition: 3-->adequate(decreased oral intake today and yesterday though)  Friction and Shear: 3-->no apparent problem   Score: 18  Bed Support Surface: Atmos Air mattress   Intervention(s) Implemented: draw sheets, HOB elevated 30 degrees or less, heels suspended, incontinence care, pillows between bony prominences, repositioned/turned q2hr    Mabel Read RN

## 2019-09-07 NOTE — PLAN OF CARE
Pt appeared to sleep well overnight between cares. Did not use the call light this shift. Up to the bedside commode by a pivot assist of two. Pt was a difficult transfer. She was unable to stand, it was difficult to assess if this was because of weakness or if she didn't understand. Pt did not void. Small amount of incontinent urine in brief. Pt was able to say that her back was still hurting her this AM medicated with 2.5 mg oxycodone and she was able to fall back asleep. BP still slightly elevated. On 3L O2 via NC with sat's 94%. Pulse ox on overnight. Afebrile.

## 2019-09-07 NOTE — PROGRESS NOTES
"[facility] Hospitalist Service      Subjective:  She endorses difficulty breathing  She has pain with movement.  She doesn't offer other complaints      Review of Systems:  CONSTITUTIONAL: NEGATIVE for fever, chills, change in weight  INTEGUMENTARY/SKIN: NEGATIVE for worrisome rashes, moles or lesions  EYES: NEGATIVE for vision changes or irritation  ENT/MOUTH: NEGATIVE for ear, mouth and throat problems  RESP:above  BREAST: NEGATIVE for masses, tenderness or discharge  CV: NEGATIVE for chest pain, palpitations or peripheral edema  GI: NEGATIVE for nausea, abdominal pain, heartburn, or change in bowel habits  : NEGATIVE for frequency, dysuria, or hematuria  MUSCULOSKELETAL:above  NEURO: NEGATIVE for weakness, dizziness or paresthesias  ENDOCRINE: NEGATIVE for temperature intolerance, skin/hair changes  HEME: NEGATIVE for bleeding problems  PSYCHIATRIC: NEGATIVE for changes in mood or affect    Physical Exam:  Vitals Were Reviewed    Patient Vitals for the past 16 hrs:   BP Temp Temp src Pulse Resp SpO2 Height Weight   09/07/19 0356 (!) 155/82 98.5  F (36.9  C) Oral 89 18 94 % -- --   09/06/19 2300 -- -- -- -- 16 -- -- --   09/06/19 2259 (!) 176/93 98.7  F (37.1  C) Oral 88 18 95 % -- --   09/06/19 2210 -- -- -- -- -- 96 % -- --   09/06/19 2131 -- -- -- -- -- -- 1.626 m (5' 4\") --   09/06/19 2118 -- -- -- -- -- 92 % -- --   09/06/19 2114 (!) 166/63 98  F (36.7  C) Oral 91 18 (!) 86 % -- 39.4 kg (86 lb 13.8 oz)   09/06/19 1900 -- -- -- -- -- -- -- 37.6 kg (83 lb)   09/06/19 1800 (!) 181/97 -- -- 89 -- 90 % -- --   09/06/19 1745 (!) 171/92 -- -- 97 -- 95 % -- --   09/06/19 1730 (!) 188/94 -- -- 81 -- 92 % -- --   09/06/19 1715 (!) 182/91 -- -- 95 -- 92 % -- --   09/06/19 1700 (!) 169/81 -- -- 92 -- (!) 87 % -- --   09/06/19 1645 (!) 175/81 -- -- 74 -- 95 % -- --   09/06/19 1630 (!) 165/83 -- -- 97 -- 96 % -- --   09/06/19 1615 (!) 187/104 -- -- 88 -- 98 % -- --       No intake or output data in the 24 hours " ending 09/07/19 0752    GENERAL APPEARANCE: frail. confused  EYES: conjunctiva clear, eyes grossly normal  RESP: lungs clear to auscultation - no rales, rhonchi or wheezes  CV: loud sys murmur at apex  ABDOMEN: soft, nontender, no HSM or masses and bowel sounds normal  MS: no clubbing, cyanosis; no edema  SKIN: clear without significant rashes or lesions  NEURO: disoriented and confused    Lab:  Recent Labs   Lab Test 09/07/19  0604 09/06/19  1501    140   POTASSIUM 3.7 3.6   CHLORIDE 111* 108   CO2 24 23   ANIONGAP 8 9   GLC 93 112*   BUN 21 21   CR 0.58 0.65   BLAISE 8.1* 8.8     CBC RESULTS:   Recent Labs   Lab Test 09/07/19  0604 09/06/19  1501   WBC 8.5 10.3   RBC 3.55* 4.30   HGB 10.8* 13.1   HCT 34.2* 41.8    227       Results for orders placed or performed during the hospital encounter of 09/06/19 (from the past 24 hour(s))   CBC with platelets differential   Result Value Ref Range    WBC 10.3 4.0 - 11.0 10e9/L    RBC Count 4.30 3.8 - 5.2 10e12/L    Hemoglobin 13.1 11.7 - 15.7 g/dL    Hematocrit 41.8 35.0 - 47.0 %    MCV 97 78 - 100 fl    MCH 30.5 26.5 - 33.0 pg    MCHC 31.3 (L) 31.5 - 36.5 g/dL    RDW 14.2 10.0 - 15.0 %    Platelet Count 227 150 - 450 10e9/L    Diff Method Automated Method     % Neutrophils 80.9 %    % Lymphocytes 8.0 %    % Monocytes 8.9 %    % Eosinophils 1.1 %    % Basophils 0.3 %    % Immature Granulocytes 0.8 %    Nucleated RBCs 0 0 /100    Absolute Neutrophil 8.4 (H) 1.6 - 8.3 10e9/L    Absolute Lymphocytes 0.8 0.8 - 5.3 10e9/L    Absolute Monocytes 0.9 0.0 - 1.3 10e9/L    Absolute Eosinophils 0.1 0.0 - 0.7 10e9/L    Absolute Basophils 0.0 0.0 - 0.2 10e9/L    Abs Immature Granulocytes 0.1 0 - 0.4 10e9/L    Absolute Nucleated RBC 0.0    Comprehensive metabolic panel   Result Value Ref Range    Sodium 140 133 - 144 mmol/L    Potassium 3.6 3.4 - 5.3 mmol/L    Chloride 108 94 - 109 mmol/L    Carbon Dioxide 23 20 - 32 mmol/L    Anion Gap 9 3 - 14 mmol/L    Glucose 112 (H) 70 - 99  mg/dL    Urea Nitrogen 21 7 - 30 mg/dL    Creatinine 0.65 0.52 - 1.04 mg/dL    GFR Estimate 75 >60 mL/min/[1.73_m2]    GFR Estimate If Black 87 >60 mL/min/[1.73_m2]    Calcium 8.8 8.5 - 10.1 mg/dL    Bilirubin Total 0.9 0.2 - 1.3 mg/dL    Albumin 3.3 (L) 3.4 - 5.0 g/dL    Protein Total 7.4 6.8 - 8.8 g/dL    Alkaline Phosphatase 191 (H) 40 - 150 U/L    ALT 36 0 - 50 U/L    AST 60 (H) 0 - 45 U/L   Troponin I   Result Value Ref Range    Troponin I ES 0.024 0.000 - 0.045 ug/L   NT pro BNP   Result Value Ref Range    N-Terminal Pro BNP Inpatient 6,283 (H) 0 - 1,800 pg/mL   INR   Result Value Ref Range    INR 2.99 (H) 0.86 - 1.14   Chest XR,  PA & LAT    Narrative    CHEST TWO VIEWS 9/6/2019 3:45 PM     HISTORY: Shortness of breath, weakness.    COMPARISON: 4/23/2019    FINDINGS: The heart is enlarged, similar to prior. There is a  left-sided pacer device. The lungs are hyperexpanded. Hazy opacity  left lung base. No pneumothorax or pleural effusion.       Impression    IMPRESSION: Hazy opacity the left lung base may represent developing  pneumonia or partial atelectasis.     MICH LEVY MD   Urine Culture   Result Value Ref Range    Specimen Description Catheterized Urine     Special Requests Specimen received in preservative     Culture Micro PENDING    UA reflex to Microscopic   Result Value Ref Range    Color Urine Yellow     Appearance Urine Clear     Glucose Urine Negative NEG^Negative mg/dL    Bilirubin Urine Negative NEG^Negative    Ketones Urine 5 (A) NEG^Negative mg/dL    Specific Gravity Urine 1.029 1.003 - 1.035    Blood Urine Negative NEG^Negative    pH Urine 5.0 5.0 - 7.0 pH    Protein Albumin Urine 30 (A) NEG^Negative mg/dL    Urobilinogen mg/dL 0.0 0.0 - 2.0 mg/dL    Nitrite Urine Negative NEG^Negative    Leukocyte Esterase Urine Negative NEG^Negative    Source Catheterized Urine     RBC Urine 2 0 - 2 /HPF    WBC Urine 1 0 - 5 /HPF    Mucous Urine Present (A) NEG^Negative /LPF   CT Abdomen Pelvis w  Contrast    Narrative    CT ABDOMEN PELVIS WITH CONTRAST   9/6/2019 8:25 PM     HISTORY: Generalized weakness, back pain, vomiting.    TECHNIQUE: 40 ml Isovue 370. CT images of the abdomen and pelvis  following nonionic intravenous contrast. Radiation dose for this scan  was reduced using automated exposure control, adjustment of the mA  and/or kV according to patient size, or iterative reconstruction  technique.    COMPARISON: 1/8/2019    FINDINGS:   Fibrotic abnormalities are present at the lung bases. There are  nodular airspace opacities in the right middle lobe with some nodular  airspace opacity also present in the lower lobes. The heart is  markedly enlarged.    No focal liver lesions. There is reflux of contrast into the hepatic  veins compatible with right heart dysfunction. The spleen, pancreas,  and adrenal glands are unremarkable. No hydronephrosis of either  kidney. There are bilateral renal cysts, similar to prior. Moderate  nonaneurysmal aortic atherosclerosis. No free air. No ascites. No  evidence of bowel obstruction. No intra-abdominal abscess  demonstrated. No abdominal or retroperitoneal lymphadenopathy. There  are degenerative changes of the skeletal structures. There is  compression deformity of T11, similar to the prior study.      Impression    IMPRESSION:  1. Nodular opacities in the lung bases, particularly the right middle  lobe, are new compared to the prior study and likely related to  pneumonia.  2. Marked cardiomegaly.    MICH LEVY MD   CT Thoracic Spine w/o Contrast    Narrative    CT THORACIC SPINE WITHOUT CONTRAST   9/6/2019 8:26 PM     HISTORY: Back pain, prior history of T11 burst fracture.     TECHNIQUE: Axial images of the thoracic spine were obtained without  intravenous contrast. Multiplanar reformations were performed.   Radiation dose for this scan was reduced using automated exposure  control, adjustment of the mA and/or kV according to patient size, or  iterative  reconstruction technique.    COMPARISON: 7/13/2018    FINDINGS:  There is exaggerated thoracic kyphosis. Healed T11 burst  fracture is worse than on the previous exam with vertebra plana  appearance and an air-filled cleft anteriorly. Retropulsion of the  vertebral body into the spinal canal has not changed.    An old T6 compression fracture involving the superior endplate is  unchanged.    No new compression fracture is seen elsewhere. There is no soft tissue  swelling.      Impression    IMPRESSION:  1. No evidence of a new fracture.  2. No change in T6 superior endplate compression fracture.  3. Worsening of loss of vertebral body height at T11, now with a  vertebra plana appearance. Retropulsion of the vertebral body into the  spinal canal is unchanged.    DAMION CARUSO MD   Lumbar spine CT w/o contrast    Narrative    CT LUMBAR SPINE WITHOUT CONTRAST  9/6/2019  8:26 PM     HISTORY: Back pain, risk factors (osteoporosis or chronic steroid use  or elderly).     TECHNIQUE: Axial images of the lumbar spine were obtained without  intravenous contrast. Multiplanar reformations were performed.  Radiation dose for this scan was reduced using automated exposure  control, adjustment of the mA and/or kV according to patient size, or  iterative reconstruction technique.    COMPARISON: None.    FINDINGS: There is no vertebral body fracture or subluxation. However,  there is a fracture of the right sacral ala anteriorly, new since the  previous exam. Sclerotic lesion in the right posterior aspect of the  L3 vertebral body without expansion is unchanged. The distal spinal  cord and conus medullaris appear normal. The paraspinous soft tissues  appear normal.    T12-L1: Normal disc, facet joints, spinal canal and neural foramina.     L1-L2: Normal disc, facet joints, spinal canal and neural foramina.     L2-L3: Normal disc, facet joints, spinal canal and neural foramina.     L3-L4: Normal disc, facet joints, spinal canal and  neural foramina.     L4-L5: Normal disc, facet joints, spinal canal and neural foramina.     L5-S1: Severe degenerative disc disease and bilateral moderate  degenerative facet arthropathy.      Impression    IMPRESSION:   1. New fracture of the right sacral ala anteriorly.  2. No lumbar fracture.  3. Degenerative changes mainly at L5-S1, unchanged.    DAMION CARUSO MD   Blood culture   Result Value Ref Range    Specimen Description Blood Right Arm     Special Requests Aerobic and anaerobic bottles received     Culture Micro No growth after 8 hours    Blood culture   Result Value Ref Range    Specimen Description Blood Left Arm     Special Requests Aerobic and anaerobic bottles received     Culture Micro No growth after 8 hours    Comprehensive metabolic panel   Result Value Ref Range    Sodium 143 133 - 144 mmol/L    Potassium 3.7 3.4 - 5.3 mmol/L    Chloride 111 (H) 94 - 109 mmol/L    Carbon Dioxide 24 20 - 32 mmol/L    Anion Gap 8 3 - 14 mmol/L    Glucose 93 70 - 99 mg/dL    Urea Nitrogen 21 7 - 30 mg/dL    Creatinine 0.58 0.52 - 1.04 mg/dL    GFR Estimate 78 >60 mL/min/[1.73_m2]    GFR Estimate If Black >90 >60 mL/min/[1.73_m2]    Calcium 8.1 (L) 8.5 - 10.1 mg/dL    Bilirubin Total 0.7 0.2 - 1.3 mg/dL    Albumin 2.5 (L) 3.4 - 5.0 g/dL    Protein Total 5.9 (L) 6.8 - 8.8 g/dL    Alkaline Phosphatase 141 40 - 150 U/L    ALT 27 0 - 50 U/L    AST 48 (H) 0 - 45 U/L   CBC with platelets   Result Value Ref Range    WBC 8.5 4.0 - 11.0 10e9/L    RBC Count 3.55 (L) 3.8 - 5.2 10e12/L    Hemoglobin 10.8 (L) 11.7 - 15.7 g/dL    Hematocrit 34.2 (L) 35.0 - 47.0 %    MCV 96 78 - 100 fl    MCH 30.4 26.5 - 33.0 pg    MCHC 31.6 31.5 - 36.5 g/dL    RDW 13.9 10.0 - 15.0 %    Platelet Count 164 150 - 450 10e9/L       Assessment and Plan:     Lower lobe pneumonias, suspect community-acquired with acute hypoxic resp failure.  On  Rocephin and Zithromax.Weak and anorexic upon admit, which suggests a systemic illness.   Afebrile, blood  cultures NGTD, PCT pending, WBC is normal. Requiring oxygen three liters currently.    Acute right ala fracture.  This is nondisplaced, but suspect this is the cause of her back pain.  Weightbearing as tolerated.  Tylenol 1000 mg t.i.d. plus oxycodone 2.5 mg q.2h. p.r.n.  Try to use sparingly.  Advised that this is high risk for significant side effects in someone her age.  Son understands this.      Permanent atrial fibrillation.  She is on warfarin.  We will continue that.  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      Mild cognitive impairment versus  Alzheimer disease. Worse over last 6 months-likely Alzheimers.    Murmur  2017 echo did not show significant valvular ds.     Pulmonary hypertension.  This just puts her at risk of hypotension if she does not have a good preload. Hydrated with a liter and a half of lactated Ringer's upon admit.     CODE STATUS:  DNR/DNI per POLST.      PROPHYLAXIS:  On warfarin.      Plan-continue atb's. Will reduce iv to 50 and stop fluids later at 1500 hour.  Discussed with son--he prefers narcotic and pain control even if it causes some increased confusion-will order tramadol 25 prn.

## 2019-09-07 NOTE — H&P
Admitted:     09/06/2019      CHIEF COMPLAINT:  Weakness and back pain.      HISTORY OF PRESENT ILLNESS:  The patient lives in assisted living and had been doing reasonably well lately.  Son saw her on Tuesday, 3 days ago, and she was at her baseline functional status, which is slightly confused but functional and eating.  For the last 24 hours she has been anorexic, complaining of upset stomach, nausea, no abdominal pain, but increasing back pain.  She really cannot tell me where it is, but it is definitely worse when she sits up for a time, better when she is lying down.  She tells me it has been going on a week, but the son notes that it has been at most 2 or 3 days since the complaint was not there on Tuesday.  Today she was finally unable to even get up and walk at all.  She has not taken in any nutrition today.  She has been quite miserable, general malaise.      Son seemed to think she was also more labored breathing.  No cough.  She has been retching some since she came to the ED.      PAST MEDICAL HISTORY:   1.  Burst fracture T11 last year.  At that time she had almost the exact same complaints of weakness, anorexia, nausea and abdominal pain at that time.  It was finally determined due to a T11 burst fracture.   2.  Hypertension secondary to renal artery stenosis.   3.  Permanent atrial fibrillation, on warfarin.   4.  History of stroke secondary to embolic disease with sudden onset of left facial droop and slurred speech.  No significant residual.   5.  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   6.  Pulmonary hypertension.   7.  Sensorineural hearing loss.   8.  Tricuspid regurg on echo 2017.   9.  Bilateral wrist fractures 01/2019.   10.  Mild cognitive impairment with gradually worsening memory.      MEDICATIONS PRIOR TO ADMISSION:   1.  Tylenol 1000 mg t.i.d.   2.  Maalox 20 mL t.i.d.    3.  Boost, 1 bottle b.i.d.   4.  Lasix 20 mg daily.   5.  Imodium 2 mg q.i.d. p.r.n. diarrhea.   6.  Claritin  10 mg daily.   7.  Metoprolol succinate 50 mg b.i.d.   8.  MiraLax 17 mg daily p.r.n.   9.  Senokot 2 tablets at bedtime.   10.  Simvastatin 20 mg at bedtime.   11.  Calcium/vitamin 600/200 daily.   12.  Vitamin D 1000 units daily.   13.  Vitamin E 2000 units daily.   14.  Warfarin 2.5 mg Monday, Wednesday, Friday, 5 mg all other days.      ALLERGIES:  MULTIPLE ALLERGIES WERE REVIEWED, SEE EPIC.  SHE HAD ANGIOEDEMA FROM LISINOPRIL, NAUSEA AND VOMITING FROM SSRIS.  PENICILLINS HAVE CAUSED DIARRHEA.  ZITHROMAX CAUSED NAUSEA AND VOMITING.      SOCIAL HISTORY:  She lives in assisted living.  Her sons check on her regularly.  She is a never smoker.  No alcohol use.      FAMILY HISTORY:  As reviewed in Epic, please see Epic.  It is noncontributory at this point.      REVIEW OF SYSTEMS:  Somewhat difficult since she really cannot remember much.  Ten-point was attempted.  She really denies any coughing or shortness of breath.  She is somewhat inconsistent in her complaints of abdominal pain versus back pain, though it is primarily back.  Points actually to the left low back as the source of the pain.  She is markedly anorexic as noted.      OBJECTIVE:   GENERAL:  She is awake, alert, in obvious distress.  She is keeping the emesis bag right near and retching as I see her.  She was better for a while, while she was lying down, but as she got up to actually go home the symptoms recurred significantly.  She is oriented x 1, does not know the place, date, month, year.   VITAL SIGNS:  She is afebrile, pulse 92, respirations 16, blood pressure 183/93, O2 sat 93% on room air, down to 90% on room air.   HEENT:  Eyes show pupils equal and reactive, EOMs intact.  TMs normal.  Nasal mucosa is normal.  Throat is dry, red, but without exudates.   NECK:  Supple, without masses, nodes or thyromegaly.   CHEST:  Really clear to A and P.   CARDIOVASCULAR:  Regular rate and rhythm with a 2/6 systolic murmur at the left sternal border,  nonradiating.  No JVD, HJR.  No edema.  Pulses are thready but palpable.   ABDOMEN:  Soft, really completely nontender without HSM or masses.   MUSCULOSKELETAL:  Exam of the back shows really no significant paraspinous tenderness that I can feel.  Range of motion was not done.  Straight leg raising was negative.   NEUROLOGIC:  Shows good finger-nose, rapid alternating movements, good facial nerve.  Speech is clear.  EOMs intact and no diplopia.  Lower extremities show reasonable strength to confrontation.  Sensation is intact.      IMAGING:  Chest x-ray showed some hazy opacity at the left lung base, possibly representing pneumonia versus atelectasis.        CT pelvis confirms some nodular opacity at both lung bases, particularly the right middle lobe, new compared to prior study, suggestive of pneumonia.  This was reviewed by me.        CT thoracic spine showed no change in T6 superior endplate compression fracture, but a gradual worsening in the body height of a T11 fracture, but no evidence of acute change.        CT lumbar spine shows severe degenerative disk disease and moderate degenerative facet arthropathy at L5-S1 but a new right sacral ala fracture anteriorly.      LABORATORY DATA:  White count is normal.  Hemoglobin 13.1.  Chemistries normal with the exception of albumin slightly low, 3.3; alkaline phosphatase elevated at 191.  AST slightly elevated at 60.  UA is really negative.  NT BNP 6000.  INR 2.99.      ASSESSMENT:   1.  Lower lobe pneumonias, suspect community-acquired.  We will treat with Rocephin and Zithromax.  Blood cultures obtained.  We will attempt a sputum culture.  Would like to get a procalcitonin to see if that correlates with this since she has no white count, no fever and no cough.  She is, however, quite weak and anorexic, which suggests a systemic illness.      2.  New right ala fracture.  This is nondisplaced, but suspect this is the cause of her back pain.  Weightbearing as  tolerated.  Tylenol 1000 mg t.i.d. plus oxycodone 2.5 mg q.2h. p.r.n.  Try to use sparingly.  Advised that this is high risk for significant side effects in someone her age.  Son understands this.      3.  Permanent atrial fibrillation.  She is on warfarin.  We will continue that.  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      4.  Mild cognitive impairment versus richard Alzheimer disease.  It seems like she is getting worse over the last 6 months so I am guessing that she is meeting criteria for Alzheimer's.  This is likely due to advanced age.      5.  Pulmonary hypertension.  This just puts her at risk of hypotension if she does not have a good preload.  Watch for dehydration.  We will hydrate with a liter and a half of lactated Ringer's over the next 15 hours.      CODE STATUS:  DNR/DNI per POLST.      PROPHYLAXIS:  On warfarin.      DISPOSITION:  She needs inpatient care for these multiple issues.  She will likely need TCU on discharge.  Son understands the tenuous nature of her health status at this point.         MARIBEL PADILLA MD             D: 2019   T: 2019   MT: HEATHER      Name:     JULITO NEGRON   MRN:      [id number]        Account:      [id number]   :      [id number]        Admitted:     2019                   Document: [id number]       cc: Milena Jaime DO

## 2019-09-07 NOTE — PLAN OF CARE
Patient continues to have generalized weakness.  Oxycodone 2.5 mg given this morning for complaints of back pain.  Patient slept majority of shift following administration.  Requiring assistance with feeding.  Ate about 75% of lunch.  Up to commode with heavy assist of 2.

## 2019-09-08 NOTE — PROGRESS NOTES
Discharge Planner PT   Patient plan for discharge: unable to verbalize  Current status: Transfers supine > sit with mod A of 1, sit > supine with mod-max A of 2, sit <> stand with mod-max A of 2, pivot transfer with mod-max A of 2. Performs LE exs x 10.  Barriers to return to prior living situation: incr need for assistance, impaired mobility and gait  Recommendations for discharge: TCU  Rationale for recommendations: incr need for assistance, impaired mobility and gait       Entered by: Leonor Winchester 09/08/2019 11:59 AM      Physical Therapy Evaluation       09/08/19 1100   Quick Adds   Type of Visit Initial PT Evaluation   Living Environment   Lives With alone   Living Arrangements assisted living   Home Accessibility no concerns   Functional Level Prior   Ambulation 1-->assistive equipment   Transferring 1-->assistive equipment   Toileting 2-->assistive person  (only when she has diarrhea)   Bathing 2-->assistive person   Communication 2-->difficulty speaking (not related to language barrier)  (difficulty finding words since stroke)   Swallowing 0-->swallows foods/liquids without difficulty  (pills are crushed in applesauce )   Cognition 1 - attention or memory deficits   Fall history within last six months yes   Number of times patient has fallen within last six months 1   Which of the above functional risks had a recent onset or change? ambulation;transferring  (today was the biggest change with this)   General Information   Onset of Illness/Injury or Date of Surgery - Date 09/06/19   Referring Physician Dr Goss   Patient/Family Goals Statement return to intermediate   Pertinent History of Current Problem (include personal factors and/or comorbidities that impact the POC) Per H&P:  The patient lives in assisted living and had been doing reasonably well lately.  Son saw her on Tuesday, 3 days ago, and she was at her baseline functional status, which is slightly confused but functional and eating.  For the last 24 hours  "she has been anorexic, complaining of upset stomach, nausea, no abdominal pain, but increasing back pain.  She really cannot tell me where it is, but it is definitely worse when she sits up for a time, better when she is lying down.  She tells me it has been going on a week, but the son notes that it has been at most 2 or 3 days since the complaint was not there on Tuesday.  Today she was finally unable to even get up and walk at all.  She has not taken in any nutrition today.  She has been quite miserable, general malaise.    General Info Comments Pt has a R sacral ala fx   Cognitive Status Examination   Orientation person   Level of Consciousness alert   Follows Commands and Answers Questions 75% of the time   Personal Safety and Judgment intact   Pain Assessment   Patient Currently in Pain Yes, see Vital Sign flowsheet  (R LBP pt reports \"a lot\")   Posture    Posture Forward head position;Protracted shoulders;Kyphosis   Range of Motion (ROM)   ROM Comment WFL for age   Strength   Strength Comments generally 4/5 throughout   Gait   Gait Comments Transfers supine > sit with mod A of 1, sit > supine with mod-max A of 2, sit <> stand with mod-max A of 2, pivot transfer with mod-max A of 2.   Balance   Balance Comments fair with RW   General Therapy Interventions   Planned Therapy Interventions bed mobility training;gait training;ROM;strengthening   Clinical Impression   Criteria for Skilled Therapeutic Intervention yes, treatment indicated   PT Diagnosis R LBP, weakness   Influenced by the following impairments pain and weakness   Functional limitations due to impairments mobility and gait   Clinical Presentation Stable/Uncomplicated   Clinical Presentation Rationale clinical judgement   Clinical Decision Making (Complexity) Low complexity   Therapy Frequency Daily   Predicted Duration of Therapy Intervention (days/wks) 3 days   Anticipated Discharge Disposition Transitional Care Facility   Risk & Benefits of therapy " "have been explained Yes   Patient, Family & other staff in agreement with plan of care Yes   [facility] AM-PAC TM \"6 Clicks\"   2016, Trustees of [facility], under license to [employer].  All rights reserved.   6 Clicks Short Forms Basic Mobility Inpatient Short Form   [facility] AM-PAC  \"6 Clicks\" V.2 Basic Mobility Inpatient Short Form   1. Turning from your back to your side while in a flat bed without using bedrails? 2 - A Lot   2. Moving from lying on your back to sitting on the side of a flat bed without using bedrails? 2 - A Lot   3. Moving to and from a bed to a chair (including a wheelchair)? 2 - A Lot   4. Standing up from a chair using your arms (e.g., wheelchair, or bedside chair)? 2 - A Lot   5. To walk in hospital room? 2 - A Lot   6. Climbing 3-5 steps with a railing? 1 - Total   Basic Mobility Raw Score (Score out of 24.Lower scores equate to lower levels of function) 11   Total Evaluation Time   Total Evaluation Time (Minutes) 15     See Care Plan for Goals.    Leonor Winchester PT      "

## 2019-09-08 NOTE — PLAN OF CARE
Patient pain appears to be controlled with PRN tramadol and scheduled Tylenol.  Able to void large amount on commode this afternoon.  Had soft BM x2 this shift.  Poor appetite today.  Encouraging intake of fluids.  Moving better this afternoon.  Taking a few steps with assist of 2, gait belt, and walker.

## 2019-09-08 NOTE — PLAN OF CARE
Patient very confused. Wearing nasal cannula at 3 lpm with saturation in low 90.s.  Weak and painful back. Up with assist of 2 to bedside commode, but did not void. Bladder scanned for 310. Will get her up to commode this am and try again.  Pain improved with Ultram.   Reposition multiple times however patient always moves to her left side.   Bed alarm on for safety. IV running tko.

## 2019-09-08 NOTE — PROGRESS NOTES
[facility] Hospitalist Service      Subjective:  She does complain of back pain  Nurses report sedation from oxycodone  No fever  No chills  Reportedly anorexic  bm today      Review of Systems:  Unable to fully obtain due to confusion  She does complain of back pain intermittently    Physical Exam:  Vitals Were Reviewed    Patient Vitals for the past 16 hrs:   BP Temp Temp src Pulse Resp SpO2   09/08/19 0706 (!) 162/69 98.7  F (37.1  C) Oral 99 18 95 %   09/08/19 0200 -- -- -- -- 18 --   09/08/19 0115 -- -- -- -- 18 --   09/08/19 0005 127/59 98.8  F (37.1  C) Oral 79 18 91 %   09/07/19 2109 137/67 -- -- 100 18 91 %         Intake/Output Summary (Last 24 hours) at 9/8/2019 1121  Last data filed at 9/8/2019 0115  Gross per 24 hour   Intake 1612 ml   Output 150 ml   Net 1462 ml       GENERAL APPEARANCE: alert, confused, picking at things  EYES: conjunctiva clear, eyes grossly normal  RESP: some bibasilar crackles, hard to get a good exam since she doesn't cooperate with taking deep breaths  CV: irregular with grade three sys murmur at apex  ABDOMEN: soft, nontender, no HSM or masses and bowel sounds normal  MS: no clubbing, cyanosis; no edema  SKIN: clear without significant rashes or lesions    Lab:  Recent Labs   Lab Test 09/08/19  0626 09/07/19  0604    143   POTASSIUM 3.9 3.7   CHLORIDE 111* 111*   CO2 24 24   ANIONGAP 8 8   * 93   BUN 25 21   CR 0.53 0.58   BLAISE 8.3* 8.1*     CBC RESULTS:   Recent Labs   Lab Test 09/08/19  0626 09/07/19  0604   WBC 8.1 8.5   RBC 3.70* 3.55*   HGB 11.2* 10.8*   HCT 36.8 34.2*    164       Results for orders placed or performed during the hospital encounter of 09/06/19 (from the past 24 hour(s))   INR   Result Value Ref Range    INR 2.99 (H) 0.86 - 1.14   CBC with platelets   Result Value Ref Range    WBC 8.1 4.0 - 11.0 10e9/L    RBC Count 3.70 (L) 3.8 - 5.2 10e12/L    Hemoglobin 11.2 (L) 11.7 - 15.7 g/dL    Hematocrit 36.8 35.0 - 47.0 %     78 - 100  fl    MCH 30.3 26.5 - 33.0 pg    MCHC 30.4 (L) 31.5 - 36.5 g/dL    RDW 13.9 10.0 - 15.0 %    Platelet Count 194 150 - 450 10e9/L   Basic metabolic panel   Result Value Ref Range    Sodium 143 133 - 144 mmol/L    Potassium 3.9 3.4 - 5.3 mmol/L    Chloride 111 (H) 94 - 109 mmol/L    Carbon Dioxide 24 20 - 32 mmol/L    Anion Gap 8 3 - 14 mmol/L    Glucose 108 (H) 70 - 99 mg/dL    Urea Nitrogen 25 7 - 30 mg/dL    Creatinine 0.53 0.52 - 1.04 mg/dL    GFR Estimate 81 >60 mL/min/[1.73_m2]    GFR Estimate If Black >90 >60 mL/min/[1.73_m2]    Calcium 8.3 (L) 8.5 - 10.1 mg/dL       Assessment and Plan:     Lower lobe pneumonias, suspect community-acquired with acute hypoxic resp failure.  On  Rocephin and Zithromax.Weak and anorexic upon admit, which suggests a systemic illness.   Afebrile, blood cultures NGTD, PCT 0.84, WBC is normal. Requiring oxygen three liters currently.     Acute right ala fracture related to presumed osteoporosis.  This is nondisplaced, but suspect this is the cause of her back pain.  Weightbearing as tolerated.  Tylenol 1000 mg t.i.d.   Oxycodone causes sedation-use tramadol instead    Permanent atrial fibrillation.  She is on warfarin.  We will continue that.  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      Mild cognitive impairment versus  Alzheimer disease/with acute metabolic encephalopathy related to illness. Worse over last 6 months-likely Alzheimers.     Murmur  2017 echo did not show significant valvular ds.     Pulmonary hypertension.  This just puts her at risk of hypotension if she does not have a good preload. Hydrated with a liter and a half of lactated Ringer's upon admit.     CODE STATUS:  DNR/DNI per POLST.      PROPHYLAXIS:  On warfarin.   FEN- iv is locked     Plan-continue atb's. Discussed with son 9/7/19 --he prefers narcotic and pain control even if it causes some increased confusion-using tramadol currently. Probably about two more days in patient.

## 2019-09-08 NOTE — PLAN OF CARE
Patient states pain is tolerable at this time, receiving PRN tramadol and scheduled tylenol.  Repositioning patient in bed.  Patient had moderate, loose bowel movement this evening.  Lung sounds diminished, oxygen sats 96% on 3lpm nasal cannula.  Patient up with assist of 2 using walker, encouraging fluids and food intake.      1915: /78. Gave scheduled metoprolol 50mg ER. Notified on-call provider of elevated BP.  Per MD recheck blood pressure in about an hour - recheck was 159/85, no further orders needed at this time.

## 2019-09-09 NOTE — PROGRESS NOTES
[facility]    Medicine Progress Note - Hospitalist Service       Date of Admission:  9/6/2019  Assessment & Plan     Acute hypoxic respiratory failure with hypoxemia, suspect bilateral lower lobe pneumonias due to community acquired   Increased work of breathing, CT scan suggested possible pneumonia. No convincing infiltrate on CXR. BNP also elevated though appears volume down currently. Started on ceftriaxone and azithromycin on admission for presumed community acquired pneumonia given generalized weakness and anorexia upon admit, which suggests a systemic illness as well as mildly elevated procalcitonin. Afebrile, blood cultures NGTD, WBC is normal. Requiring oxygen three liters currently. Will order repeat echocardiogram to evaluate for possible CHF and repeat CXR today.  - antibiotics: IV ceftriaxone and azithromycin (started 9/6/19, day 4 today)  - titrate oxygen to maintain O2 sats > 90%  - duonebs Q4 hours prn, will trial this afternoon to see if any benefit though lungs relatively clear today  - daily CBC, BMP  - order echo  - repeat CXR     Acute right ala fracture related to presumed osteoporosis  Stable T6 compression fracture    CT thoracic/lumbar spine showed stable T6 fracture, degenerative lumbar disease as well as new non-displaced sacral ala fracture. Suspect new sacral ala fracture is causing back pain. Pain managed on acetaminophen and tramadol, avoid oxycodone if able as causes sedation.   - Weightbearing as tolerated  - scheduled Tylenol 1000 mg t.i.d  - tramadol 25 - 50 mg every 6 hours prn, if not improving pain trial oxycodone again  - trial lidocaine patch    Generalized weakness  Due to current illness. May need transitional care unit.  - PT/OT/care transitions consult    Mild urinary retention, possible poor urine output  Difficult to fully monitor as she is incontinent at times though nursing reports infrequent urination. She was scanned for > 400 ml 9/8/19  without intervention. May be related to tramadol vs undiagnosed chronic issues with urinary retention vs poor intake.  - bladder scan Q shift  - straight cath for retention > 350 ml      Acute metabolic encephalopathy related to illness  Mild cognitive impairment versus alzheimer disease  Pleasantly confused, picking at things in hospital. Worse over last 6 months-likely Alzheimers.  - monitor  - avoid over sedation with pain medications while still adequately treating pain, discussed with son 9/7/19    Permanent atrial fibrillation  Anticoagulated on warfarin, history of cardio-embolic stroke. Rate managed on metoprolol, currently 90s-100s.  - continue home warfarin, pharmacy to dose  - continue home metoprolol, increasing dose today due to mildly elevated blood pressure and heart rate    Hypertension  Chronic, history of renal artery stenosis without intervention. Blood pressures reviewed, mildly elevated likely related to pain  - continue home furosemide  - increase home metoprolol to 62.5 BID today (home dose 50 mg BID)    Cerebrovascular disease, CVA due to cardioembolic stroke  Hyperlipidemia   History of cardioembolic stroke without residual deficits.  - continue home simvastatin, warfarin     Murmur  2017 echo did not show significant valvular ds.     Pulmonary hypertension.    This just puts her at risk of hypotension if she does not have a good preload. Hydrated with a liter and a half of lactated Ringer's upon admit.      Diet: Advance Diet as Tolerated: Regular Diet Adult    DVT Prophylaxis: Warfarin  Chauhan Catheter: not present  Code Status: DNR/DNI, per POLST form    Disposition Plan   Expected discharge: 1-2 days, recommended to transitional care unit once tolerating adequate oral intake, oxyenation improves, recovering appropriately from pneumonia and safe disposition identified. Slow recovery, reluctant to mobilize much due to pain.   Entered: Milena Gonzalez PA-C 09/09/2019, 11:12 AM     The  patient's care was discussed with the Attending Physician, Dr. Nahum Jensen, Patient and Patient's Family. Update son, Ulises, he would prefer that patient be kept comfortable with pain medications even if there is a risk of worsening her confusion. He is agreeable to increased dose of tramadol today and trial of oxycodone again if that does not improve her symptoms.    Milena Gonzalez PA-C  Hospitalist Service  [facility]  ______________________________________________________________________    Interval History   Complains of back pain which is limiting her ability to walk due to pain. Possible left leg pain though unclear based on description. Still confused at times, unclear how reliable she is reporting history. Possible abdominal pain though may just be back pain, again difficult historian. No shortness of breath, cough, chest pain, fever, chills. Reports poor appetite, though is drinking fluids.     Data reviewed today: I reviewed all medications, new labs and imaging results over the last 24 hours. I personally reviewed no images or EKG's today.    Physical Exam   Vital Signs: Temp: 96.5  F (35.8  C) Temp src: Axillary BP: (!) 156/84 Pulse: 91 Heart Rate: 112 Resp: 16 SpO2: 97 % O2 Device: Nasal cannula Oxygen Delivery: 3 LPM  Weight: 86 lbs 13.78 oz  Constitutional: pleasantly confused, awake, alert, cooperative, intermittently grimacing due to pain, and appears stated age  ENT: Normocephalic, without obvious abnormality, atraumatic, oral pharynx with moist mucous membranes.  Respiratory: No increased work of breathing, good air exchange, clear to auscultation bilaterally, no crackles or wheezing  Cardiovascular: regular rate and rhythm, and no murmur noted. No lower extremity edema.  GI:  bowel sounds present, thin, soft, non-distended, no clear tenderness to palpation  Skin: normal skin color, texture, turgor  Neuropsychiatric: confused as above    Data   Recent Labs   Lab  09/09/19  0450 09/08/19  0626 09/07/19  0920 09/07/19  0604 09/06/19  1501   WBC 9.3 8.1  --  8.5 10.3   HGB 11.9 11.2*  --  10.8* 13.1   * 100  --  96 97    194  --  164 227   INR 2.76* 2.99* 3.53*  --  2.99*    143  --  143 140   POTASSIUM 4.2 3.9  --  3.7 3.6   CHLORIDE 111* 111*  --  111* 108   CO2 23 24  --  24 23   BUN 26 25  --  21 21   CR 0.51* 0.53  --  0.58 0.65   ANIONGAP 9 8  --  8 9   BLAISE 8.5 8.3*  --  8.1* 8.8   * 108*  --  93 112*   ALBUMIN  --   --   --  2.5* 3.3*   PROTTOTAL  --   --   --  5.9* 7.4   BILITOTAL  --   --   --  0.7 0.9   ALKPHOS  --   --   --  141 191*   ALT  --   --   --  27 36   AST  --   --   --  48* 60*   TROPI  --   --   --   --  0.024     No results found for this or any previous visit (from the past 24 hour(s)).  Medications     - MEDICATION INSTRUCTIONS -       Warfarin Therapy Reminder         acetaminophen  1,000 mg Oral TID     alum & mag hydroxide-simethicone  20 mL Oral TID w/meals     azithromycin  250 mg Oral Daily     BOOST  1 Bottle Oral BID     cefTRIAXone  1 g Intravenous Q24H     furosemide  20 mg Oral Every Other Day     loratadine  10 mg Oral Daily     metoprolol succinate ER  50 mg Oral BID     senna-docusate  2 tablet Oral At Bedtime     simvastatin  20 mg Oral At Bedtime     sodium chloride (PF)  3 mL Intracatheter Q8H

## 2019-09-09 NOTE — PLAN OF CARE
PT Cancel: Upon attempt this afternoon, RN just getting patient back into bed as she just returned to room from xray and RN reports patient has another test coming up shortly, requesting pt just rest at this time.

## 2019-09-09 NOTE — PLAN OF CARE
Pt alert to self. Confused at times with some illogical speaking at times. Complains of lower back pain, doesn't advcate for pain management, tylenol and prn ultram given. Went for repeat chest xr ao2 to pivot, pain reported with repositioning. Pt was due to void, pt bladder was distended, was able to void on bedpan. Pvr was 183mL after. Alarms on, anticipate needs, total need for cares including eating.

## 2019-09-09 NOTE — PROGRESS NOTES
SPIRITUAL HEALTH SERVICES  SPIRITUAL ASSESSMENT Progress Note  [facility] - Med/Surg    - Shanika or family had requested  visit on admission    - I spoke with son, Antoine, and  Isaac, from [facility], outside Shanika's room before visit.    - Antoine stated that Shanika would be discharging to [facility] tomorrow and asked where that was located.  I assured him it was an excellent facility in the [facility].  He stated that his brother, Ulises, knew someone who's loved one had gone there and gave high recommendation.    - I provided short visit with Shanika.  I have visited with her on previous admissions and she nodded that she remembered our visits.  Antoine had stated that she was showing some confusion but she was pleasant and welcomed the visit and prayer.    - I will be available for on-going support during Shanika's stay.    Rik George M.A., [facility]  Staff   [facility]  Office: [phone]  Cell: [phone]  Pager [phone]

## 2019-09-09 NOTE — PROGRESS NOTES
Care Transitions Progress Note:    Reason for Follow up:  Disposition.  The patient has been accepted at [facility] (Admissions phone: [phone] RN Report: [phone]  Main Phone: [phone] Fax: [phone]) per [facility], Admissions [phone].  Discussed the plan with son's Ulises and Antoine and the patient, they are in agreement with the discharge plan.  Care Transitions will arrange transportation upon discharge.     PAS-RR:  Per DHS regulation, CTS team completed and submitted PAS-RR to [place] [employer] Direct Connect via the [employer]. CTS team advised SNF and they are aware a PAS-RR has been submitted.   CTS team reviewed with pt or health care agent that they may be contacted for a follow up appointment within 10 days of hospital discharge if SNF stay is <30 days. Contact information for [employer] was also provided.   Pt or health care agent verbalized understanding.   PAS-RR # [id number]    Discharge Plan:  [facility] TCU    Discharge Planner   Discharge Plans in progress: TCU  Barriers to discharge plan: Medical stability  Follow up plan: Referral placed for Clinic Care Coordination.  Follow up with PCP.       Entered by: Gregoria Walden 09/09/2019 10:41 AM         Gregoria Walden RN, Care Coordinator  [phone]

## 2019-09-09 NOTE — PLAN OF CARE
Patient pleasantly confused.  Alert to self.  Talking to people not in room and about random subjects.  Up with assist of 2 to bedside commode.   Does not void very often, bladder scans > 400 ml. Dr aware of this yesterday.  Turns and moves about in bed independently .  Oxygen low to mid 90's on 3 lpm nasal cannula. She will frequently take off nasal cannula and saturation drops to 70's.   Bed alarm on for safety.

## 2019-09-10 NOTE — DISCHARGE SUMMARY
[facility]ist Discharge Summary    Shanika Stahl MRN# [id number]   Age: 89 year old YOB: 1924     Date of Admission:  9/6/2019  Date of Discharge::  9/10/2019  Admitting Physician:  Elias Goss MD  Discharge Physician:  Nahum Jensen MD  Primary Physician: Milena Jaime  Transferring Facility: N/A     Home clinic: [facility]          Admission Diagnoses:   Back pain [M54.9]  Generalized weakness [R53.1]  Elevated brain natriuretic peptide (BNP) level [R79.89]  Generalized muscle weakness [M62.81]          Discharge Diagnosis:   Principle diagnosis: CAP likely due to infectious organism  Secondary diagnoses:  Patient Active Problem List   Diagnosis     Renal hypertension     Atherosclerosis of renal artery (H)     Paroxysmal supraventricular tachycardia (H)     Age related osteoporosis     Abnormal CXR (chest x-ray)     Hyperlipidemia LDL goal <100     Advance Care Planning     History of cerebral embolism with cerebral infarction - 2014     Long term current use of anticoagulant therapy     Chronic atrial fibrillation (H)     Pacemaker     Sensorineural hearing loss, bilateral     Pulmonary hypertension (H)     Oropharyngeal dysphagia     Generalized weakness     Heart murmur, tricuspid regurgitation     Wrist fracture, bilateral     Head injury due to trauma     Falls frequently     Cognitive impairment     Weakness     Abdominal pain          Brief History of Presenting Illness:   As per admit hx  The patient lives in assisted living and had been doing reasonably well lately.  Son saw her on Tuesday, 3 days ago, and she was at her baseline functional status, which is slightly confused but functional and eating.  For the last 24 hours she has been anorexic, complaining of upset stomach, nausea, no abdominal pain, but increasing back pain.  She really cannot tell me where it is, but it is definitely worse when she sits up for a time, better when she is lying down.  She  tells me it has been going on a week, but the son notes that it has been at most 2 or 3 days since the complaint was not there on Tuesday.  Today she was finally unable to even get up and walk at all.  She has not taken in any nutrition today.  She has been quite miserable, general malaise.     Recent Results (from the past 24 hour(s))   Chest 2 Views*    Narrative    CHEST TWO VIEWS 9/9/2019 1:51 PM     HISTORY: Question pneumonia on CT, persistent hypoxemia, evaluate for  congestive heart failure.      Impression    IMPRESSION:   1. Pulmonary hyperinflation suggesting COPD.  2. Vascular congestion in the upper lobes which could be cardiogenic.  The cardiac silhouette appears to be enlarged with intracardiac leads  noted in the right atrium and ventricle, unchanged in appearance from  9/6/2019.  3. Increasing opacity at the left lung base which could represent  combination of pleural effusion and adjacent parenchymal opacity.  Pneumonia cannot be excluded.    EDWARDO RAUSCH MD            Hospital Course:   Acute hypoxic respiratory failure with hypoxemia, suspect bilateral lower lobe pneumonias due to community acquired   Increased work of breathing, CT scan suggested possible pneumonia. CXR also showed small LLL infiltrate. Was  Started on ceftriaxone and azithromycin on admission. Has remeined Afebrile, blood cultures NGTD, WBC is normal. Still Requiring oxygen 2 liters currently. f/u CXR 9/9 as above-increasing LLL opacity  Has completed 4 days iv rocephin and zithromax. Will discharge on ceftin x 7 days and one more day of zithromax. Continue o2.     Acute right ala fracture related to presumed osteoporosis  Stable T6 compression fracture    CT thoracic/lumbar spine showed stable T6 fracture, degenerative lumbar disease as well as new non-displaced sacral ala fracture. Suspect new sacral ala fracture is causing back pain. Pain managed on acetaminophen and tramadol   - Weightbearing as tolerated  - scheduled  Tylenol 1000 mg t.i.d  - tramadol 25 - 50 mg every 6 hours prn, if not improving pain trial oxycodone again  - lidocaine patch     Generalized weakness  Due to current illness. May need transitional care unit.  - PT/OT/care transitions seen. Going to TCU     Mild urinary retention, possible poor urine output  Difficult to fully monitor as she is incontinent at times though nursing reports infrequent urination. She was scanned for > 400 ml 9/8/19 without intervention. May be related to tramadol vs undiagnosed chronic issues with urinary retention vs poor intake.  Still retaining 400cc and needs st cath q shift      Acute metabolic encephalopathy related to illness  Mild cognitive impairment versus alzheimer disease  Pleasantly confused, picking at things in hospital. Worse over last 6 months-likely Alzheimers.  At baseline.     Permanent atrial fibrillation  Anticoagulated on warfarin, history of cardio-embolic stroke. Rate managed on metoprolol, currently 90s-100s.  - continue home warfarin, continue home metoprolol- increased dose today due to mildly elevated blood pressure and heart rate. BP still elevated. Hence will start amlodipine on discharge.     Hypertension  Chronic, history of renal artery stenosis without intervention. Blood pressures reviewed, mildly elevated likely related to pain  - continue home furosemide  - increased  home metoprolol to 62.5 BID-BP still high, add amlodipine on discharge.      Cerebrovascular disease, CVA due to cardioembolic stroke  Hyperlipidemia   History of cardioembolic stroke without residual deficits.  - continue home simvastatin, warfarin     Murmur  2017 echo did not show significant valvular ds.     Pulmonary hypertension.    This just puts her at risk of hypotension if she does not have a good preload. Hydrated with a liter and a half of lactated Ringer's upon admit.              Procedures:   No procedures performed during this admission         Allergies:       Allergies   Allergen Reactions     Allopurinol Unknown     Ampicillin Diarrhea     Cipro [Ciprofloxacin] Nausea and Vomiting     Levaquin GI Disturbance     Lisinopril Swelling     AngioEdema     Paxil [Paroxetine] Nausea and Vomiting     Penicillins Diarrhea     Sulfa Drugs GI Disturbance     Zithromax [Macrolides] Nausea and Vomiting     Zoloft Nausea and Vomiting             Medications Prior to Admission:     Medications Prior to Admission   Medication Sig Dispense Refill Last Dose     acetaminophen (TYLENOL) 500 MG tablet Take 1,000 mg by mouth 3 times daily   9/6/2019 at 1115     alum & mag hydroxide-simethicone (MYLANTA/MAALOX) 200-200-20 MG/5ML SUSP suspension Take 20 mLs by mouth 3 times daily (with meals)   9/6/2019 at 1115     CALCIUM + D 600-200 MG-UNIT PO TABS 1 tablet by mouth daily   9/6/2019 at 0834     furosemide (LASIX) 20 MG tablet Take 1 tablet (20 mg) by mouth every other day 45 tablet 3 9/6/2019 at 0834     loratadine (CLARITIN) 10 MG tablet Take 1 tablet (10 mg) by mouth daily 90 tablet 3 9/6/2019 at 0834     metoprolol succinate ER (TOPROL-XL) 50 MG 24 hr tablet TAKE 1 TAB BY MOUTH TWICE A  tablet 3 9/6/2019 at 0834     simvastatin (ZOCOR) 20 MG tablet TAKE 1 TAB BY MOUTH AT BEDTIME 90 tablet 3 9/5/2019 at 1917     VITAMIN D 1000 UNIT OR TABS 1 TABLET by mouth DAILY 30 0 9/6/2019 at 0834     vitamin E (TOCOPHEROL) 1000 units (450 mg) capsule Take 1 capsule (1,000 Units) by mouth daily 90 capsule 3 9/6/2019 at 0834     warfarin (JANTOVEN) 2.5 MG tablet 2.5 mg (2.5 mg x 1) every Mon, Wed, Fri; 5 mg (2.5 mg x 2) all other days or otherwise directed by Anticoagulation Clinic. 10 tablet 0 9/5/2019 at 1917     artificial saliva (BIOTENE DRY MOUTHWASH) LIQD liquid Take by mouth every 2 hours as needed for dry mouth   Unknown at Unknown time     loperamide (IMODIUM A-D) 2 MG tablet Take 1 tablet (2 mg) by mouth 4 times daily as needed for diarrhea 30 tablet 1 8/26/2019 at 1900      Nutritional Supplements (BOOST) Take 1 Bottle by mouth 2 times daily 60 each 11 Taking     order for DME Knee high compression stockings 10-15 mm Hg 1 each 1 Taking     polyethylene glycol (MIRALAX/GLYCOLAX) Packet Take 17 g by mouth daily as needed for constipation   More than a month at Unknown time     senna-docusate (SENOKOT-S/PERICOLACE) 8.6-50 MG tablet Take 2 tablets by mouth At Bedtime , stop taking if loose stools develop. 60 tablet 0 9/4/2019 at 1924             Discharge Medications:     Current Discharge Medication List      START taking these medications    Details   amLODIPine (NORVASC) 10 MG tablet Take 1 tablet (10 mg) by mouth daily    Associated Diagnoses: Essential hypertension      azithromycin (ZITHROMAX) 250 MG tablet Take 1 tablet (250 mg) by mouth daily for 1 day  Qty: 1 tablet, Refills: 0    Associated Diagnoses: Pneumonia of both lower lobes due to infectious organism (H)      cefuroxime (CEFTIN) 500 MG tablet Take 1 tablet (500 mg) by mouth 2 times daily for 7 days  Qty: 14 tablet, Refills: 0    Associated Diagnoses: Pneumonia of both lower lobes due to infectious organism (H)      !! Lidocaine (LIDOCARE) 4 % Patch Place 1 patch onto the skin every 24 hours To prevent lidocaine toxicity, patient should be patch free for 12 hrs daily.    Associated Diagnoses: Back pain, unspecified back location, unspecified back pain laterality, unspecified chronicity      !! Lidocaine (LIDOCARE) 4 % Patch To prevent lidocaine toxicity, patient should be patch free for 12 hrs daily. Apply patch q24 hrs    Associated Diagnoses: Back pain, unspecified back location, unspecified back pain laterality, unspecified chronicity      traMADol (ULTRAM) 50 MG tablet Take 0.5-1 tablets (25-50 mg) by mouth every 6 hours as needed for moderate pain    Associated Diagnoses: Back pain, unspecified back location, unspecified back pain laterality, unspecified chronicity       !! - Potential duplicate medications found.  Please discuss with provider.      CONTINUE these medications which have NOT CHANGED    Details   acetaminophen (TYLENOL) 500 MG tablet Take 1,000 mg by mouth 3 times daily      alum & mag hydroxide-simethicone (MYLANTA/MAALOX) 200-200-20 MG/5ML SUSP suspension Take 20 mLs by mouth 3 times daily (with meals)      CALCIUM + D 600-200 MG-UNIT PO TABS 1 tablet by mouth daily      furosemide (LASIX) 20 MG tablet Take 1 tablet (20 mg) by mouth every other day  Qty: 45 tablet, Refills: 3    Associated Diagnoses: Leg swelling      loratadine (CLARITIN) 10 MG tablet Take 1 tablet (10 mg) by mouth daily  Qty: 90 tablet, Refills: 3    Associated Diagnoses: Allergic rhinitis, unspecified seasonality, unspecified trigger      metoprolol succinate ER (TOPROL-XL) 50 MG 24 hr tablet TAKE 1 TAB BY MOUTH TWICE A DAY  Qty: 180 tablet, Refills: 3    Associated Diagnoses: Chronic atrial fibrillation (H)      simvastatin (ZOCOR) 20 MG tablet TAKE 1 TAB BY MOUTH AT BEDTIME  Qty: 90 tablet, Refills: 3    Associated Diagnoses: Hyperlipidemia LDL goal <100      VITAMIN D 1000 UNIT OR TABS 1 TABLET by mouth DAILY  Qty: 30, Refills: 0      vitamin E (TOCOPHEROL) 1000 units (450 mg) capsule Take 1 capsule (1,000 Units) by mouth daily  Qty: 90 capsule, Refills: 3    Associated Diagnoses: Cognitive impairment      warfarin (JANTOVEN) 2.5 MG tablet 2.5 mg (2.5 mg x 1) every Mon, Wed, Fri; 5 mg (2.5 mg x 2) all other days or otherwise directed by Anticoagulation Clinic.  Qty: 10 tablet, Refills: 0    Associated Diagnoses: Chronic atrial fibrillation (H)      artificial saliva (BIOTENE DRY MOUTHWASH) LIQD liquid Take by mouth every 2 hours as needed for dry mouth      loperamide (IMODIUM A-D) 2 MG tablet Take 1 tablet (2 mg) by mouth 4 times daily as needed for diarrhea  Qty: 30 tablet, Refills: 1    Associated Diagnoses: Diarrhea, unspecified type      Nutritional Supplements (BOOST) Take 1 Bottle by mouth 2 times daily  Qty: 60 each, Refills: 11  "   Comments: Type per patient preference  Associated Diagnoses: Generalized weakness      order for DME Knee high compression stockings 10-15 mm Hg  Qty: 1 each, Refills: 1    Associated Diagnoses: Leg swelling      polyethylene glycol (MIRALAX/GLYCOLAX) Packet Take 17 g by mouth daily as needed for constipation      senna-docusate (SENOKOT-S/PERICOLACE) 8.6-50 MG tablet Take 2 tablets by mouth At Bedtime , stop taking if loose stools develop.  Qty: 60 tablet, Refills: 0    Associated Diagnoses: Slow transit constipation                   Consultations:   No consultations were requested during this admission            Discharge Exam:   Blood pressure (!) 180/94, pulse 106, temperature 98  F (36.7  C), temperature source Oral, resp. rate 20, height 1.626 m (5' 4\"), weight 39.4 kg (86 lb 13.8 oz), SpO2 91 %, not currently breastfeeding.  GENERAL APPEARANCE: healthy, alert and no distress  EYES: conjunctiva clear, eyes grossly normal  HENT: external ears and nose normal   NECK: supple, no masses or adenopathy  RESP: lungs -crackles/ decreased BS B bases - no rales, rhonchi or wheezes  CV: regular rate and rhythm, normal S1 S2, no S3 or S4 and no murmur, click or rub   ABDOMEN: soft, nontender, no HSM or masses and bowel sounds normal  MS: no clubbing, cyanosis; no edema  SKIN: clear without significant rashes or lesions  NEURO: Normal strength and tone, sensory exam grossly normal, mentation intact and speech normal    Unresulted Labs Ordered in the Past 30 Days of this Admission     Date and Time Order Name Status Description    9/6/2019 2129 Blood culture Preliminary     9/6/2019 2129 Blood culture Preliminary           Recent Results (from the past 24 hour(s))   Chest 2 Views*    Narrative    CHEST TWO VIEWS 9/9/2019 1:51 PM     HISTORY: Question pneumonia on CT, persistent hypoxemia, evaluate for  congestive heart failure.      Impression    IMPRESSION:   1. Pulmonary hyperinflation suggesting COPD.  2. Vascular " congestion in the upper lobes which could be cardiogenic.  The cardiac silhouette appears to be enlarged with intracardiac leads  noted in the right atrium and ventricle, unchanged in appearance from  9/6/2019.  3. Increasing opacity at the left lung base which could represent  combination of pleural effusion and adjacent parenchymal opacity.  Pneumonia cannot be excluded.    EDWARDO RAUSCH MD            Pending Tests at Discharge:   None         Discharge Instructions and Follow-Up:   Discharge diet: Regular   Discharge activity: Activity as tolerated   Discharge follow-up: F/u JONATHAN MILAN on rounds           Discharge Disposition:   Discharged to short-term care facility      Attestation:  I have reviewed today's vital signs, notes, medications, labs and imaging.    Time Spent on this Encounter   I, Nahum Jensen MD, personally saw the patient today and spent greater than 30 minutes discharging this patient.    Nahum Jensen MD

## 2019-09-10 NOTE — PROGRESS NOTES
ANTICOAGULATION FOLLOW-UP CLINIC VISIT    Patient Name:  Shanika Stahl  Date:  9/10/2019  Contact Type:  Chart Review    SUBJECTIVE:  Patient Findings     Positives:   Change in medications, Hospital admission    Comments:   Date of Admission:                  9/6/2019  Date of Discharge: 9/10/2019   Discharge Diagnosis: Principle diagnosis: CAP likely due to infectious organism    START taking these medications:  amLODIPine (NORVASC) 10 MG tablet Take 1 tablet (10 mg) by mouth daily    Associated Diagnoses: Essential hypertension   azithromycin (ZITHROMAX) 250 MG tablet Take 1 tablet (250 mg) by mouth daily for 1 day  Associated Diagnoses: Pneumonia of both lower lobes due to infectious organism (H)   cefuroxime (CEFTIN) 500 MG tablet Take 1 tablet (500 mg) by mouth 2 times daily for 7 days   Associated Diagnoses: Pneumonia of both lower lobes due to infectious organism (H)   !! Lidocaine (LIDOCARE) 4 % Patch Place 1 patch onto the skin every 24 hours To prevent lidocaine toxicity, patient should be patch free for 12 hrs daily.    Associated Diagnoses: Back pain, unspecified back location, unspecified back pain laterality, unspecified chronicity   !! Lidocaine (LIDOCARE) 4 % Patch To prevent lidocaine toxicity, patient should be patch free for 12 hrs daily. Apply patch q24 hrs    Associated Diagnoses: Back pain, unspecified back location, unspecified back pain laterality, unspecified chronicity   traMADol (ULTRAM) 50 MG tablet Take 0.5-1 tablets (25-50 mg) by mouth every 6 hours as needed for moderate pain    Permanent atrial fibrillation  Anticoagulated on warfarin, history of cardio-embolic stroke. Rate managed on metoprolol, currently 90s-100s.  - continue home warfarin, continue home metoprolol- increased dose today due to mildly elevated blood pressure and heart rate. BP still elevated. Hence will start amlodipine on discharge.                Clinical Outcomes     Comments:   Date of Admission:                   9/6/2019  Date of Discharge: 9/10/2019   Discharge Diagnosis: Principle diagnosis: CAP likely due to infectious organism    START taking these medications:  amLODIPine (NORVASC) 10 MG tablet Take 1 tablet (10 mg) by mouth daily    Associated Diagnoses: Essential hypertension   azithromycin (ZITHROMAX) 250 MG tablet Take 1 tablet (250 mg) by mouth daily for 1 day  Associated Diagnoses: Pneumonia of both lower lobes due to infectious organism (H)   cefuroxime (CEFTIN) 500 MG tablet Take 1 tablet (500 mg) by mouth 2 times daily for 7 days   Associated Diagnoses: Pneumonia of both lower lobes due to infectious organism (H)   !! Lidocaine (LIDOCARE) 4 % Patch Place 1 patch onto the skin every 24 hours To prevent lidocaine toxicity, patient should be patch free for 12 hrs daily.    Associated Diagnoses: Back pain, unspecified back location, unspecified back pain laterality, unspecified chronicity   !! Lidocaine (LIDOCARE) 4 % Patch To prevent lidocaine toxicity, patient should be patch free for 12 hrs daily. Apply patch q24 hrs    Associated Diagnoses: Back pain, unspecified back location, unspecified back pain laterality, unspecified chronicity   traMADol (ULTRAM) 50 MG tablet Take 0.5-1 tablets (25-50 mg) by mouth every 6 hours as needed for moderate pain    Permanent atrial fibrillation  Anticoagulated on warfarin, history of cardio-embolic stroke. Rate managed on metoprolol, currently 90s-100s.  - continue home warfarin, continue home metoprolol- increased dose today due to mildly elevated blood pressure and heart rate. BP still elevated. Hence will start amlodipine on discharge.                   OBJECTIVE    INR   Date Value Ref Range Status   09/10/2019 3.68 (H) 0.86 - 1.14 Final       ASSESSMENT / PLAN  No question data found.  Anticoagulation Summary  As of 9/10/2019    INR goal:   2.0-3.0   TTR:   81.2 % (4.4 y)   INR used for dosing:   No new INR was available at the time of this encounter.   Warfarin  maintenance plan:   2.5 mg (2.5 mg x 1) every Mon, Wed, Fri; 5 mg (2.5 mg x 2) all other days   Full warfarin instructions:   2.5 mg every Mon, Wed, Fri; 5 mg all other days   Weekly warfarin total:   27.5 mg   No change documented:   Dre Phillips RN   Plan last modified:   Dre Phillips RN (9/4/2019)   Next INR check:   9/11/2019   Priority:   INR   Target end date:   Indefinite    Indications    Atrial fibrillation (H) (Resolved) [I48.91]  History of cerebral embolism with cerebral infarction - 2014 [I63.40]  Long term current use of anticoagulant therapy [Z79.01]             Anticoagulation Episode Summary     INR check location:       Preferred lab:       Send INR reminders to:   [place] PHONE ANTICOAG POOL    Comments:   *  Resides at [facility] Fax AVS to [phone] (facility administers meds but cannot do half tabs). Give dose for day of INR draw.      Anticoagulation Care Providers     Provider Role Specialty Phone number    Milena Jaime,  [facility] [phone]            See the Encounter Report to view Anticoagulation Flowsheet and Dosing Calendar (Go to Encounters tab in chart review, and find the Anticoagulation Therapy Visit)        Dre Phillips RN

## 2019-09-10 NOTE — PLAN OF CARE
OT: Orders received appropriate to initiate OT at next level of care (TCU) will defer IP OT eval at this time. Current recommendation and plan is TCU.

## 2019-09-10 NOTE — PROGRESS NOTES
Name: Shanika Stahl    MRN#: [id number]    Reason for Hospitalization: Back pain [M54.9]  Generalized weakness [R53.1]  Elevated brain natriuretic peptide (BNP) level [R79.89]  Generalized muscle weakness [M62.81]    Discharge Date: 9/10/2019    Patient / Family response to discharge plan: Talked with Ulises, patient's son ([phone]) who is in agreement for discharge today at 1400 to [facility] (Admissions phone: [phone] RN Report: [phone]  Main Phone: [phone] Fax: [phone]).  Discussed private pay for transport oxygen and transport.  [facility] transport arranged for 1400.  Confirmed with Genaro, admissions at [facility] TCU plan for 1400 transport. Provider and team updated with discharge time and transport.    Other Providers (Care Coordinator, County Services, PCA services etc): No    CTS Hand Off Completed: Yes    PAS #: RR # [id number]    KLAUDIA Score: Average    Future Appointments:   Future Appointments   Date Time Provider Department Center   10/11/2019 12:00 AM LOVE [address] [facility]       Discharge Disposition: transitional care unit    Discharge Planner   Discharge Plans in progress: [facility] Transport at 1400 with oxygen  Barriers to discharge plan: None  Follow up plan: CTS to follow through discharge.       Entered by: Ashley Gant 09/10/2019 10:17 AM       Ashley Gant, STU, RN, CNE, PHN  RN Care Coordinator  [facility] [phone]  [facility] [phone]

## 2019-09-10 NOTE — PLAN OF CARE
Physical Therapy Discharge Summary    Reason for therapy discharge:    Discharged to transitional care facility.    Progress towards therapy goal(s). See goals on Care Plan in [facility] electronic health record for goal details.  Goals partially met.  Barriers to achieving goals:   discharge on same date as initial evaluation.    Therapy recommendation(s):    Continued therapy is recommended.  Rationale/Recommendations:  continued PT at TCU to improve mobility status.

## 2019-09-10 NOTE — PLAN OF CARE
[facility] DISCHARGE NOTE    Patient discharged to transitional care unit at 2:10 PM via wheel chair. Accompanied by other:[facility]  and staff. Discharge instructions reviewed with caregiver, opportunity offered to ask questions. Prescriptions sent with patient to fill . All belongings sent with patient.  Patient was confused all shift. Alarms on for safety. No attempts at self transfers. Eating only bites and sips of fluids. Has been awake, just no appetite. Voided small amount of urine on commode mixed with stool. Bladder scan for 186 ml this shift. Transfers to chair with 2 assist, increased pain with weight bearing. Medicated with extra strength tylenol and lidocaine patch to right low back. Report called to Krishan at [facility] TCU in SBAR format.     Raven Jimenez RN

## 2019-09-10 NOTE — PHARMACY-ANTICOAGULATION SERVICE
Clinical Pharmacy- Warfarin Discharge Note  This patient is currently on warfarin for the treatment of Atrial fibrillation.  INR Goal= 2-3  Expected length of therapy undetermined.    Warfarin PTA Regimen: 2.5 mg (2.5 mg x 1) every Mon, Wed, Fri; 5 mg (2.5 mg x 2) all other days or otherwise directed by Anticoagulation       Anticoagulation Dose History     Recent Dosing and Labs Latest Ref Rng & Units 8/27/2019 9/4/2019 9/6/2019 9/7/2019 9/8/2019 9/9/2019 9/10/2019    Warfarin 0.5 mg - - - - - 0.5 mg - -    Warfarin 1.25 mg - - - 1.25 mg - - 1.25 mg -    INR 0.86 - 1.14 3.53(A) 2.4(A) 2.99(H) 3.53(H) 2.99(H) 2.76(H) 3.68(H)    INR 0.86 - 1.14 - - - - - - -          Vitamin K doses administered during the last 7 days: none  FFP administered during the last 7 days: none  Recommend holding dose today and following up with [facility] clinic tomorrow due to fluctuating INR on ~25% her PTA dose while inpatient. Left instructions for TCU in AVS.

## 2019-09-10 NOTE — PLAN OF CARE
Patient incontinent of small amount. Patient was bladder scanned for 366ml. Straight cath 400 ml dark yellow output. Patient tolerated well.

## 2019-09-11 NOTE — PROGRESS NOTES
Clinic Care Coordination Contact  Care Coordination Transition Communication     Referral source: IP Handoff    Clinical Data: Patient was hospitalized at [facility] from 9/6 to 9/10 with diagnosis of community acquired pneumonia likely due to infectious organism     Transition to Facility:              [facility] (Main Phone: [phone] Fax: [phone]).     Plan: RN/SW Care Coordinator will await notification from facility staff informing RN/SW Care Coordinator of patient's discharge plans/needs. RN/SW Care Coordinator will review chart and outreach to facility staff every 4 weeks and as needed.     CLAUS Lucia, RN   [facility] - RN Care Coordinator  Phone: [phone]

## 2019-09-12 NOTE — TELEPHONE ENCOUNTER
Writer spoke to an admission rep at [facility]. He stated it is likely patient will end up in the LTC area at their facility, however if she is discharged back to the [facility], they will notify [facility] and send her warfarin dosing to our clinic. The contact information is on their discharge paperwork from the hospital.      Pete JETT RN, CACP 9/12/2019 at 4:14 PM

## 2019-09-30 NOTE — TELEPHONE ENCOUNTER
Reason for Call:  Other orders    Detailed comments: Orders for : Skilled nurse visits 2 times a week for 2 weeks, 1 time a week for 2 weeks, for pain management and nutrition monitoring, OT, PT, speech eval this week. Would Dr. Jaime be willing to change oxycodone to am and pm along with PRN.    Phone Number Patient can be reached at: Other phone number:  [phone]    Best Time: any    Can we leave a detailed message on this number? YES    Call taken on 9/30/2019 at 2:33 PM by Crissy Santoro

## 2019-09-30 NOTE — TELEPHONE ENCOUNTER
Oxycodone is not listed in chart. Need to know what she is taking and how much. It looks like there was a prescription noted while inpatient.     Left message for Verenice to return call to clinic.      RADHA BritoN, RN

## 2019-09-30 NOTE — TELEPHONE ENCOUNTER
Verenice with [facility] called back.  1.  Yes, can authorize the request for skilled nursing as noted below.  2.  For oxycodone request, pt has been taking this for her fractured sacrum.  Pt returned (to home/assisted living?) on 9/27/19.  Pt is consistently rating her pain at 8-10 of 10.  Verenice is wondering if pt would benefit from a short course of scheduled oxycodone.  BID for 10 days??  Verenice explains that her cognitive impairments make it difficult for pt to remember to ask for pain medication more regularly.  Pt was discharged with oxycodone, 2.5 mg, 1/2 tab every 4 hours prn.    Routed to provider.    Also, left message asking Verenice to clarify pt's living arrangement.    Eneida Roldan RN

## 2019-10-01 NOTE — PROGRESS NOTES
ANTICOAGULATION FOLLOW-UP CLINIC VISIT    Patient Name:  Shanika Stahl  Date:  10/1/2019  Contact Type:  Fax [place]/Telephone Verenice BELLO    SUBJECTIVE:  Patient Findings     Comments:   Patient was previously dose in house post hospitalization.   Patient has been taking 1 mg for the last week.   INR today is 1.7.   Writer increased dose to 8 mg by the next INR.   Recheck in 3 days.        Clinical Outcomes     Negatives:   Major bleeding event, Thromboembolic event, Anticoagulation-related hospital admission, Anticoagulation-related ED visit, Anticoagulation-related fatality    Comments:   Patient was previously dose in house post hospitalization.   Patient has been taking 1 mg for the last week.   INR today is 1.7.   Writer increased dose to 8 mg by the next INR.   Recheck in 3 days.           OBJECTIVE    INR   Date Value Ref Range Status   2019 1.7 (A) 0.86 - 1.14 Final       ASSESSMENT / PLAN  INR assessment SUB    Recheck INR In: 3 DAYS    INR Location [facility] INR      Anticoagulation Summary  As of 10/1/2019    INR goal:   2.0-3.0   TTR:   80.9 % (4.5 y)   INR used for dosin.7! 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 Give dose for day of INR draw.      Anticoagulation Care Providers     Provider Role Specialty Phone number    Milena Jaime,  Critical access hospital Internal Medicine [phone]            See the Encounter Report to view Anticoagulation Flowsheet and Dosing Calendar (Go to Encounters tab in chart review, and find the Anticoagulation Therapy Visit)        Dre Phillips RN

## 2019-10-01 NOTE — TELEPHONE ENCOUNTER
Attempted to contact EUGENIA Caldera with [facility], no answer, left voice message to call back.

## 2019-10-02 NOTE — TELEPHONE ENCOUNTER
Reason for Call: Request for an order:    Order or referral being requested: PT 2x/wk for 6 wk and 1x/wk for 2 weeks to work on strength and balance    Date needed: as soon as possible    Has the patient been seen by the PCP for this problem? YES    Additional comments: Blood pressure 144/84 today.    Phone number Patient can be reached at:  Other phone number:  [phone] [place]-JAZ    Best Time:  Any    Can we leave a detailed message on this number?  YES    Call taken on 10/2/2019 at 1:29 PM by Leonor Kim

## 2019-10-02 NOTE — TELEPHONE ENCOUNTER
Verenice notified of HC orders.  Patient is at [facility] so called them to get pharmacy - [facility] [place], this is ready.    RN will fax the oxycodone orders to [place] [phone] once signed.    Ivett MOTTA RN

## 2019-10-03 NOTE — TELEPHONE ENCOUNTER
Orders for speech therapy 1 time a week for 1 week, 2 times a week for 1 week, 1 time a week for 2 weeks for treatment of dysphagia and cognitive testing.    OK for orders?     RADHA BritoN, RN

## 2019-10-03 NOTE — TELEPHONE ENCOUNTER
Reason for Call:  Other orders    Detailed comments: Orders for speech therapy 1 time a week for 1 week, 2 times a week for 1 week, 1 time a week for 2 weeks for treatment of dysphagia and cognitive testing.    Phone Number Leticia can be reached at: Other phone number:  [phone]    Best Time: any    Can we leave a detailed message on this number? YES    Call taken on 10/3/2019 at 10:21 AM by Crissy Santoro

## 2019-10-03 NOTE — TELEPHONE ENCOUNTER
PT calling to report elevated BP of 144/84 and also that patient had fall last month. Family is considering hospice at this time but would like to try PT to see if things will change.     OK for orders?    Order or referral being requested: PT 2x/wk for 6 wk and 1x/wk for 2 weeks to work on strength and balance     RADHA BritoN, RN

## 2019-10-04 NOTE — PROGRESS NOTES
ANTICOAGULATION FOLLOW-UP CLINIC VISIT    Patient Name:  Shanika Stahl  Date:  10/4/2019  Contact Type:  Anticoag Report faxed to [facility]  warfarin orders faxed back to [facility]    SUBJECTIVE:  Patient Findings     Comments:   No acute changes in santiago, medications, diet (vitamin K intake), or activity noted. Took warfarin as instructed, denies any missed doses -- all written on faxed Anticoagulation Report sent by [facility].     It is unclear how long patient has been taking the lower warfarin dose, she was at a TCU then LTC facility -- our clinic does not have access to. Will plan to increase warfarin dose by ~25% (up to 10mg in 7 days), however her prior maintenance dose was a 63% difference (27.5mg/week).     Per Epic, patient's son is requesting hospice orders.        Clinical Outcomes     Negatives:   Major bleeding event, Thromboembolic event, Anticoagulation-related hospital admission, Anticoagulation-related ED visit, Anticoagulation-related fatality    Comments:   No acute changes in santiago, medications, diet (vitamin K intake), or activity noted. Took warfarin as instructed, denies any missed doses -- all written on faxed Anticoagulation Report sent by [facility].     It is unclear how long patient has been taking the lower warfarin dose, she was at a TCU then LTC facility -- our clinic does not have access to. Will plan to increase warfarin dose by ~25% (up to 10mg in 7 days), however her prior maintenance dose was a 63% difference (27.5mg/week).     Per Epic, patient's son is requesting hospice orders.           OBJECTIVE    INR   Date Value Ref Range Status   10/04/2019 1.69 (A) 0.8 - 1.1 Final       ASSESSMENT / PLAN  No question data found.  Anticoagulation Summary  As of 10/4/2019    INR goal:   2.0-3.0   TTR:   80.7 % (4.5 y)   INR used for dosin.69! 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 Give dose for day of INR draw.      Anticoagulation Care Providers     Provider Role Specialty Phone number    Milena Jaime Taylor,  Responsible Internal Medicine [phone]            See the Encounter Report to view Anticoagulation Flowsheet and Dosing Calendar (Go to Encounters tab in chart review, and find the Anticoagulation Therapy Visit)        Tori Toledo RN [facility]

## 2019-10-04 NOTE — TELEPHONE ENCOUNTER
"Reason for Call:  call back    Detailed comments: Patient's son Ulises, called stating that patient has been in the hospital and memory care and that \"everyone\" is recommending that patient be put on hospice care.  Ulises would like a referral or order for this and/or more information.  Please call patient and advise.         Phone Number Patient can be reached at: Other phone number:  [phone]    Best Time: Any    Can we leave a detailed message on this number? YES    Call taken on 10/4/2019 at 1:23 PM by Mag Ruelas     "

## 2019-10-04 NOTE — TELEPHONE ENCOUNTER
S-(situation): Son requesting Hospice referral.     B-(background): Last Office visit: 6/19/19 with Rafaela.     A-(assessment): Pended Hospice referral, does patient need an Office visit within the next 30 days?    R-(recommendations): Provider please review and advise. Thank you.

## 2019-10-08 NOTE — TELEPHONE ENCOUNTER
If an order is still needed I printed it off from the previous telephone encounter and it is in my as needed box

## 2019-10-08 NOTE — PROGRESS NOTES
ANTICOAGULATION FOLLOW-UP CLINIC VISIT    Patient Name:  Shanika Stahl  Date:  10/8/2019  Contact Type:  Fax [place]    SUBJECTIVE:  Patient Findings     Comments:   Patient had 10 mg in the last 7 days. Writer adjusted dose to 10 mg weekly.   Recheck in 3 days.         Clinical Outcomes     Negatives:   Major bleeding event, Thromboembolic event, Anticoagulation-related hospital admission, Anticoagulation-related ED visit, Anticoagulation-related fatality    Comments:   Patient had 10 mg in the last 7 days. Writer adjusted dose to 10 mg weekly.   Recheck in 3 days.            OBJECTIVE    INR   Date Value Ref Range Status   10/07/2019 2.01 (A) 0.86 - 1.14 Final       ASSESSMENT / PLAN  INR assessment THER    Recheck INR In: 3 DAYS    INR Location Outside lab      Anticoagulation Summary  As of 10/8/2019    INR goal:   2.0-3.0   TTR:   80.5 % (4.5 y)   INR used for dosin.01 (10/7/2019)   Warfarin maintenance plan:   2 mg (1 mg x 2) every Mon, Wed, Fri; 1 mg (1 mg x 1) all other days   Full warfarin instructions:   10/8: 1 mg; 10/9: 2 mg; 10/10: 1 mg; Otherwise 2 mg every Mon, Wed, Fri; 1 mg all other days   Weekly warfarin total:   10 mg   Plan last modified:   Dre Phillips RN (10/8/2019)   Next INR check:   10/11/2019   Priority:   INR   Target end date:   Indefinite    Indications    Atrial fibrillation (H) (Resolved) [I48.91]  History of cerebral embolism with cerebral infarction -  [I63.40]  Long term current use of anticoagulant therapy [Z79.01]             Anticoagulation Episode Summary     INR check location:       Preferred lab:       Send INR reminders to:   [place] PHONE [employer] POOL    Comments:   *  Resides at [facility] Fax AVS to [phone] (facility administers meds but cannot do half tabs). Give dose for day of INR draw.      Anticoagulation Care Providers     Provider Role Specialty Phone number    Milena Jaime DO [facility] Internal Medicine [phone]             See the Encounter Report to view Anticoagulation Flowsheet and Dosing Calendar (Go to Encounters tab in chart review, and find the Anticoagulation Therapy Visit)        Dre Phillips RN

## 2019-10-08 NOTE — TELEPHONE ENCOUNTER
From My Chart Message  Shanika Stahl would like to request a referral.   Reason: Recent hospitalization and TCU stay   Requested provider: Dr. Milena Jaime   Comment:   I Called Friday to request a referral from Dr. Jaime for Shanika to be admitted to Hospice.  Hospice was recommended by the Doctor and  at [facility] in [place] as well as Therapists from [facility] at her Memory Care facility at [facility].   If Dr. Jaime is able to do that today, will you please fax it to [facility] at [phone].  Additional information about Shanika's condition is available on request from the Director of Health and Wellness at [facility]: Verenice Bobby RN, [phone] or [email or web address]   Thank you,    Ulises Stahl (son)   [phone] (c)         Referral signed

## 2019-10-08 NOTE — TELEPHONE ENCOUNTER
I placed in a previous telephone call.  I am unsure if she needs a face to face in 30 days.  We will wait and see - maybe clarify w/the hospice agency

## 2019-10-08 NOTE — TELEPHONE ENCOUNTER
"This RN and a second RN cannot locate any hospice referral/order in this pt's chart.  Called [facility] in [place] [phone] and spoke with Shraddha (nurse). Asked if they received an order for pt to be start hospice: \"They were going through that today.\" Shraddha says there was an [facility] nurse out there today and she plans to sign on with [facility]. Dr. Mays was signing off on the orders for hospice.    Leora RODRIGUEZ RN      "

## 2019-10-09 NOTE — TELEPHONE ENCOUNTER
Writer called [facility] again this morning and left a message for Nurse Caldera requesting a call back to clinic.    Lew Cruz RN

## 2019-10-09 NOTE — TELEPHONE ENCOUNTER
Writer called [facility] in [place] and left a message for the Nurse to call back to clinic.    When nurse calls back to clinic please confirm that the patient does have hospice order from Dr Phillips with [facility].    If patient still needs Hospice Order from Dr Jaime, we do have that and it can be sent.    Lew Cruz RN

## 2019-10-09 NOTE — TELEPHONE ENCOUNTER
Verenice from [facility] called back and confirmed they did get the Hospice order from Dr Mays.  Things are moving along for the patient.  Verenice stated that we could shred the hospice order we had.    No further action necessary.  Encounter closed.    Lew Cruz RN

## 2019-10-10 NOTE — TELEPHONE ENCOUNTER
Routing refill request to provider for review/approval because:  Drug not on the [facility] refill protocol     Bella Jack RN

## 2019-10-10 NOTE — PROGRESS NOTES
Clinic Care Coordination Contact    Follow Up Progress Note      Background: Patient was hospitalized at [facility] from 9/6 to 9/10 with diagnosis of community acquired pneumonia likely due to infectious organism. [facility] but has since transitioned back to [facility] in [place] on 9/30/19.     Assessment: RN CC made outreach call to patient's son Ulises (consent to communicate on file). He reports that patient is scheduled to transition primary care to [facility] Care team tomorrow, 10/11 and also enroll into hospice through [facility].  He shared this was a difficult decision as she very much has loved Dr. Jaime at [facility]. Explained to Ulises that we support patients and families in making a decision that best suits their needs. He shared that she is becoming weaker and it's hard for him to imagine getting her to [place] for clinic appointments. Explained that we support her decision to transition primary care to the on-site team and we remain available if the circumstances change in the future.     Intervention/Education provided during outreach: RN CC will update PCP as FYI       Plan:   Patient is transitioning to [facility] primary care tomorrow, 10/11 and enrolling in [facility]. No further outreaches planned by clinic care coordination    CLAUS Lucia, RN   [facility] - RN Care Coordinator  Phone: [phone]

## 2019-10-10 NOTE — TELEPHONE ENCOUNTER
Requested Prescriptions   Pending Prescriptions Disp Refills     oxyCODONE (ROXICODONE) 5 MG tablet 10 tablet 0     Sig: Take 0.5 tablets (2.5 mg) by mouth 2 times daily       There is no refill protocol information for this order        Last Written Prescription Date:  10/2/19  Last Fill Quantity: 10,  # refills: 0   Last office visit: 10/2/2012 with prescribing provider:  Addison Earl Office Visit:

## 2019-10-15 NOTE — PROGRESS NOTES
Writer spoke with EUGENIA Pearson with [facility]. The warfarin management has been switched to their clinic. [facility] will no longer follow patient.     If patient is to switch back to [facility], a new INR clinic referral will need to be placed.    Will route to PCP as BALDEMAR.    Pete JETT RN, CACP 10/15/2019 at 3:38 PM

## 2020-01-09 NOTE — TELEPHONE ENCOUNTER
Agree with plan for home care   How Severe Is Your Skin Lesion?: mild Have Your Skin Lesions Been Treated?: not been treated Is This A New Presentation, Or A Follow-Up?: Skin Lesions

## 2020-01-20 NOTE — TELEPHONE ENCOUNTER
S:  Ronda from [facility] calling with request for order    B:  Per Ronda patient's hydralazine was discontinued today.    A:  Ronda is requesting a written order to discontinue hydralazine and blood pressure checks 3x/day be sent to [facility].      Ronda would like this order faxed to [phone] attn nursing.    R:  Writer pended a letter for review giving order to discontinue hydralazine and BP checks 3x/day.  Routed to provider:  Please review letter, sign if appropriate and have faxed to [facility].    Lew Cruz RN              Acitretin Pregnancy And Lactation Text: This medication is Pregnancy Category X and should not be given to women who are pregnant or may become pregnant in the future. This medication is excreted in breast milk.

## 2020-05-22 NOTE — TELEPHONE ENCOUNTER
Plan of Care from [facility] signed and faxed back.     Rosie Linton on 2/5/2019 at 11:00 AM     Script sent to  Pharmacy for Valium to take per instructions before MRI

## 2021-05-28 NOTE — TELEPHONE ENCOUNTER
ANTICOAGULATION  MANAGEMENT    Assessment     Today's INR result of 4.8 is Supratherapeutic (goal INR of 2.0-3.0)        Previous INR was Therapeutic    Warfarin given as previously instructed    No new health/diet changes affecting INR    Interaction between Doxycycline and warfarin may be affecting INR- will finish tmr 5/3.     Continues to tolerate warfarin with no reported s/s of bleeding or thromboembolism       Plan:     Warfarin Dosing Orders:  Hold today and tmr 5/3; give 2.5 mg on Sat and Sun.     Next INR: Mon 5/6.     Telephone orders given to nurseAyana.  Orders read back correctly.     Olu Cook RN    Subjective/Objective:      Shanika Stahl, a 89 y.o. female is on warfarin. Facility nurse reports for Shanika:    Other anticoagulants: No    Medication changes: No     Missed warfarin doses since last INR: No     Abnormal bleeding since last INR: No    New symptoms, injury or illness: No     Upcoming surgery, procedure or cardioversion: No    Recent INR Results:    Lab Results   Component Value Date    INR 4.80 (!) 05/02/2019    INR 2.10 (!) 04/29/2019    INR 1.30 (!) 04/25/2019       Anticoagulation Episode Summary     Current INR goal:   2.0-3.0   TTR:   --   Next INR check:   5/6/2019   INR from last check:   4.80! (5/2/2019)   Weekly max warfarin dose:      Target end date:      INR check location:      Preferred lab:      Send INR reminders to:   ANTICOAGULATION POOL E ([facility])    Indications    Chronic atrial fibrillation (H) [I48.2]           Comments:            Anticoagulation Care Providers     Provider Role Specialty Phone number    Rik Fulton DO [facility] Family Medicine [phone]

## 2021-05-28 NOTE — TELEPHONE ENCOUNTER
ANTICOAGULATION  MANAGEMENT    Assessment     Today's INR result of 1.6 is Subtherapeutic (goal INR of 2.0-3.0)        Previous INR was Supratherapeutic    Warfarin given as previously instructed    No new health/diet changes affecting INR    Completed Doxy on 5/3    Continues to tolerate warfarin with no reported s/s of bleeding or thromboembolism       Plan:     Warfarin Dosing Orders: Give 5 mg Mon, Tues, Wed.    Next INR: Thurs 5/9.     Telephone orders given to nurseEstevan.  Orders read back correctly.     Olu Cook RN    Subjective/Objective:      Shanika Stahl, a 89 y.o. female is on warfarin. Facility nurse reports for Shanika:    Other anticoagulants: No    Medication changes: No     Missed warfarin doses since last INR: No     Abnormal bleeding since last INR: No    New symptoms, injury or illness: No     Upcoming surgery, procedure or cardioversion: No    Recent INR Results:    Lab Results   Component Value Date    INR 1.60 (!) 05/06/2019    INR 4.80 (!) 05/02/2019    INR 2.10 (!) 04/29/2019       Anticoagulation Episode Summary     Current INR goal:   2.0-3.0   TTR:   50.0 % (1 d)   Next INR check:   5/9/2019   INR from last check:   1.60! (5/6/2019)   Weekly max warfarin dose:      Target end date:      INR check location:      Preferred lab:      Send INR reminders to:   ANTICOAGULATION POOL E (MEDICAL CARE FOR SENIORS)    Indications    Chronic atrial fibrillation (H) [I48.2]           Comments:            Anticoagulation Care Providers     Provider Role Specialty Phone number    Rik Fulton DO [facility] Family Medicine [phone]

## 2021-05-28 NOTE — PROGRESS NOTES
Code Status:  DNR/DNI  Visit Type: Follow Up     Facility:  [facility] [[id number]]        Facility Type: SNF (Skilled Nursing Facility, TCU)    History of Present Illness: Shanika Stahl is a 89 y.o. female seen today for TCU follow-up.  She has a past medical history for hypertension, HLD, osteoporosis, chronic afibrillation, hypertension, pulmonary hypertension.  She was recently admitted to the hospital on 4/23/2019 after a mechanical fall at home.  She developed bilateral arm pain and did hit her head on the wall and sustained a large bruising hematoma of her left side of her face.  CT was negative except for scalp hematoma and she is on Coumadin for her atrial fibrillation.  CT of the head and neck were negative.  She was found to have bilateral wrist fractures in which orthopedics recommended conservative management with splinting and a follow-up tomorrow.    Today, she reports her pain is about average and no worse.  She reports the PRN acetaminophen does improve her discomfort.  Her biggest complaint is mostly that her arms are heavy.  She does have good sensation to her fingers which are warm to the touch and she can wiggle them.  Her blood pressures are variable with SBP is ranging from 1 30-1 70s.  She is on metoprolol XL 25 mg twice daily, Lasix 20 mg daily and she was just recently put on hydralazine 10 mg every 8 hours as needed.  She denies any dizziness or lightheadedness.  She does state that she has this chest discomfort it appears to be more epigastric or substernal however she denies any heartburn.  She states this discomfort started after her fall.  I do not see any EKGs done on her information sent from the hospital however she does state she was told that her heart was good.  She reports her appetite is improving.  She is on doxycycline which appears to be due to UTI and this is to and on 5/3/2019.    Review of Systems   Patient denies fever, chills, headache,  lightheadedness, dizziness, rhinorrhea, cough, congestion, shortness of breath, palpitations, abdominal pain, n/v, diarrhea, constipation, change in appetite, dysuria, frequency, burning or pain with urination.  Other than stated in HPI all other review of systems is negative.         Physical Exam   Vital signs: /82, heart rate 89, temp 97.3.  GENERAL APPEARANCE: Thin, elderly woman in arm casts, in no acute distress.  HEENT: normocephalic, atraumatic, large bruising extending from left forehead, temporal area to cheekbone.  Non-boggy and nontender.  PERRL, sclerae anicteric, conjunctivae clear and moist, EOM intact  Mouth mucosa is moist  NECK: Supple and symmetric. Trachea is midline, no thyromegaly, no adenopathy, and no tenderness  LUNGS: Lung sounds CTA, no adventitious sounds, respiratory effort normal.  Chest showed no bruising or marks at the area of discomfort.  No increase in pain with palpation.  CARD: RRR, S1, S2, without murmurs, gallops, rubs, no JVD  ABD: Soft, nondistended and nontender with normal bowel sounds.   MSK: Muscle strength and tone were normal.  EXTREMITIES: No cyanosis, clubbing or edema.  NEURO: Alert and oriented x 3. Normal affect.  Face is symmetric.  SKIN: Inspection of the skin reveals no rashes, ulcerations or petechiae.  PSYCH: euthymic          Labs:    Recent Results (from the past 240 hour(s))   INR   Result Value Ref Range    INR 1.98 (!) 0.9 - 1.1   INR   Result Value Ref Range    INR 1.68 (!) 0.9 - 1.1   INR   Result Value Ref Range    INR 1.30 (!) 0.9 - 1.1   White Blood Count (WBC)   Result Value Ref Range    WBC 8.1 4.0 - 11.0 thou/uL   Automated Differential   Result Value Ref Range    Neutrophils % 75 (H) 50 - 70 %    Lymphocytes % 16 (L) 20 - 40 %    Monocytes % 8 2 - 10 %    Eosinophils % 2 0 - 6 %    Basophils % 0 0 - 2 %    Neutrophils Absolute 6.0 2.0 - 7.7 thou/uL    Lymphocytes Absolute 1.3 0.8 - 4.4 thou/uL    Monocytes Absolute 0.7 0.0 - 0.9 thou/uL     Eosinophils Absolute 0.1 0.0 - 0.4 thou/uL    Basophils Absolute 0.0 0.0 - 0.2 thou/uL   Basic Metabolic Panel   Result Value Ref Range    Sodium 139 136 - 145 mmol/L    Potassium 3.9 3.5 - 5.0 mmol/L    Chloride 106 98 - 107 mmol/L    CO2 23 22 - 31 mmol/L    Anion Gap, Calculation 10 5 - 18 mmol/L    Glucose 100 70 - 125 mg/dL    Calcium 8.9 8.5 - 10.5 mg/dL    BUN 22 8 - 28 mg/dL    Creatinine 0.68 0.60 - 1.10 mg/dL    GFR MDRD Af Amer >60 >60 mL/min/1.73m2    GFR MDRD Non Af Amer >60 >60 mL/min/1.73m2   White Blood Count (WBC)   Result Value Ref Range    WBC 7.6 4.0 - 11.0 thou/uL   Automated Differential   Result Value Ref Range    Neutrophils % 68 50 - 70 %    Lymphocytes % 22 20 - 40 %    Monocytes % 7 2 - 10 %    Eosinophils % 2 0 - 6 %    Basophils % 0 0 - 2 %    Neutrophils Absolute 5.2 2.0 - 7.7 thou/uL    Lymphocytes Absolute 1.7 0.8 - 4.4 thou/uL    Monocytes Absolute 0.5 0.0 - 0.9 thou/uL    Eosinophils Absolute 0.2 0.0 - 0.4 thou/uL    Basophils Absolute 0.0 0.0 - 0.2 thou/uL   INR   Result Value Ref Range    INR 2.10 (!) 0.9 - 1.1         Assessment:  1. Closed fracture of right wrist with routine healing, subsequent encounter     2. Fall, subsequent encounter     3. Closed fracture of left wrist with routine healing, subsequent encounter     4. Pain management     5. Essential hypertension     6. Chronic atrial fibrillation (H)     7. Discomfort in chest         Plan:   Wrist fractures: Continue with therapies, will follow-up with orthopedics tomorrow.  Fall: No falls at the facility we will continue to supervise and work with therapies.  Pain management: Adequate control with acetaminophen at this time counseled on resting and elevating arms to improve the heavy feeling.  Hypertension: We will continue to monitor 3 times a day and continue with as needed hydralazine.  Hypertension could be related to discomfort.    Chronic A. fib: Controlled continue with prior dose of metoprolol XL 25 mg twice  daily.  Coumadin clinic to dose warfarin.  Will need to have ongoing discussion in regards to risk to benefit of warfarin and falls.    Chest discomfort: Differential angina versus GERD versus musculoskeletal discomfort from fall.  Will have an EKG done and start on Maalox 3 times daily to see if it improves her symptoms.        Electronically signed by: Micaela Diallo, LUIS MIGUEL

## 2021-05-28 NOTE — TELEPHONE ENCOUNTER
ANTICOAGULATION  MANAGEMENT    Assessment     Today's INR result of 1.3 is Subtherapeutic (goal INR of 2.0-3.0)        Previous INR was Subtherapeutic    Warfarin recently held in hospital which may be affecting INR    Acute health changes, wrist fx, and closed head injury, may be affecting INR    No new medication/supplements affecting INR    Continues to tolerate warfarin with no reported s/s of bleeding or thromboembolism       Plan:     Warfarin Dosing Orders:  Give 5 mg Thu, Fri, Sat, Sun.  Next INR: Mon 4/29.     Telephone orders given to nurseUmm.  Orders read back correctly.     Olu Cook RN    Subjective/Objective:      Shanika Stahl, a 89 y.o. female is on warfarin recently admitted to TCU under care of [facility].  Chart reviewed:    Outpatient anticoagulation information:     Anticoagulation management provider: [facility]    Reason for anticoagulation: Atrial Fibrillation    Home INR goal: 2-3    Home warfarin dose:  2.5 mg daily on Mon, Fri; and 5 mg daily rest of week     Recent hospitalization review:    Reason for hospitalization prior to TCU admission: fall, wrist fx, closed head injury    Hospital warfarin management: Held and resumed on 4/24    Hospital medication changes pertinent to anticoagulation: No    Health changes pertinent to anticoagulation during hospitalization: Yes, recent injuries      TCU Facility nurse report since admission:    Other anticoagulants: No    Medication changes: No    Missed warfarin doses since last INR: No     Abnormal bleeding since last INR: No    New symptoms, injury or illness: No     Upcoming surgery, procedure or cardioversion: No      Recent INR Results:    Lab Results   Component Value Date    INR 1.30 (!) 04/25/2019    INR 1.68 (!) 04/24/2019    INR 1.98 (!) 04/23/2019       Anticoagulation Episode Summary     Current INR goal:   2.0-3.0   TTR:   --   Next INR check:   4/29/2019   INR from last check:    1.30! (4/25/2019)   Weekly max warfarin dose:      Target end date:      INR check location:      Preferred lab:      Send INR reminders to:   ANTICOAGULATION POOL E ([facility] FOR [facility])    Indications    Chronic atrial fibrillation (H) [I48.2]           Comments:            Anticoagulation Care Providers     Provider Role Specialty Phone number    Rik Fulton DO Referring Family Medicine [phone]

## 2021-05-28 NOTE — TELEPHONE ENCOUNTER
[facility] Dr[facility]ee ordered to hold warfarin today 4/26 only due to recent fall prior to hospital admission. Will recheck INR 4/29 as planned.    Today starting Doxycycline x7 days.

## 2021-05-28 NOTE — PROGRESS NOTES
[facility]    Facility:   [facility] [[id number]]   Code Status: DNR/DNI  PCP: Milena Jaime DO   Phone: [phone]   Fax: [phone]      CHIEF COMPLAINT/REASON FOR VISIT:  Chief Complaint   Patient presents with     Discharge Summary       HISTORY COURSE:  Shanika is a 89 y.o. female who I was able to visit with secondary to her hospitalization April 23 through April 24, 2019 secondary to mechanical fall.  She also [place] and at that point unable to recall the events.  She did have acute encephalopathy/delirium secondary to the head injury she was anticoagulated with warfarin there is a hematoma on the left forehead.  The CT of the head was negative.  During her hospitalization she did have a CT of the head without contrast did not show any evidence of acute intracranial trauma she did have a small sized hematoma on the scalp.  CT of the cervical spine did show moderate degenerative changes of the cervical spine.  The left shoulder x-ray did not show any acute fracture or malalignment of the left shoulder although did show diffuse osteopenia.  Bilateral wrist x-rays did show a mildly impacted acute transverse fracture of the distal right radius and a nondisplaced acute fracture of the right ulnar styloid process.  Acute comminuted fracture of the distal left radius and ulna and the radius fracture does involve the radiocarpal joint.  Diffuse osteopenia.  Chest x-ray did not show any evidence of any active cardiopulmonary disease.  The wrist x-rays bilaterally were comes treated conservative management with splints.  She does have a history of renal hypertension she is on furosemide and metoprolol blood pressures were elevated.  A history of hyperlipidemia with a history of cerebral embolism with cerebral infarct in 2014 she is on Zocor as well as warfarin.  She does have a pacemaker.  Sodium was 138 potassium 3.9 BUN 18 creatinine 0.67  hemoglobin 10.1.        Review of Systems  Currently denies chills and fever coughing wheezing chest pain dizziness vertigo nausea vomiting diarrhea dysuria flulike symptoms headache or stiff neck.  History of cerebral embolism hyper lipidemia A. fib pacemaker hypertension.    There were no vitals filed for this visit.  Blood pressure 133/74 pulse 87 respiratory 18 temperature 97.7  Physical Exam  Constitutional: No distress.   HENT:  Neck: Neck supple. No thyromegaly present.   Cardiovascular:   A. fib.  Pacemaker.   Pulmonary/Chest: Breath sounds normal.   Abdominal: Bowel sounds are normal. There is no tenderness. There is no guarding.   Musculoskeletal:   Bilateral wrist fractures.  In cast.  Good CMS to her hands.  Bruising.   Neurological: She is alert.   Skin: Skin is warm and dry. No rash noted.   Left forehead and temporal area of bruising and changing colors.   Psychiatric: Her behavior is normal.   Lab Results   Component Value Date    WBC 7.6 04/27/2019     No results found for: HGBA1C  Results for orders placed or performed in visit on 04/26/19   Basic Metabolic Panel   Result Value Ref Range    Sodium 139 136 - 145 mmol/L    Potassium 3.9 3.5 - 5.0 mmol/L    Chloride 106 98 - 107 mmol/L    CO2 23 22 - 31 mmol/L    Anion Gap, Calculation 10 5 - 18 mmol/L    Glucose 100 70 - 125 mg/dL    Calcium 8.9 8.5 - 10.5 mg/dL    BUN 22 8 - 28 mg/dL    Creatinine 0.68 0.60 - 1.10 mg/dL    GFR MDRD Af Amer >60 >60 mL/min/1.73m2    GFR MDRD Non Af Amer >60 >60 mL/min/1.73m2         MEDICATION LIST:  Current Outpatient Medications   Medication Sig     hydrALAZINE (APRESOLINE) 10 MG tablet Take 10 mg by mouth 3 (three) times a day as needed.     acetaminophen (TYLENOL) 325 MG tablet Take 650 mg by mouth 3 (three) times a day.     aluminum-magnesium hydroxide-simethicone (MAALOX ADVANCED) 200-200-20 mg/5 mL Susp Take 30 mL by mouth 4 (four) times a day as needed.     calcium carbonate-vitamin D3 (CALCIUM 600 + D,3,) 600  mg(1,500mg) -200 unit per tablet Take 1 tablet by mouth daily.     cholecalciferol, vitamin D3, 1,000 unit tablet Take 1,000 Units by mouth daily.     furosemide (LASIX) 20 MG tablet Take 20 mg by mouth every other day.     metoprolol succinate (TOPROL-XL) 25 MG Take 50 mg by mouth 2 (two) times a day.            polyethylene glycol (MIRALAX) 17 gram packet Take 17 g by mouth daily as needed.     senna-docusate (SENNOSIDES-DOCUSATE SODIUM) 8.6-50 mg tablet Take 2 tablets by mouth at bedtime.     simvastatin (ZOCOR) 20 MG tablet Take 20 mg by mouth at bedtime.     warfarin sodium (WARFARIN ORAL) Take by mouth. 5 mg daily. Recheck INR 4/29/19       DISCHARGE DIAGNOSIS:    ICD-10-CM    1. Closed fracture of both wrists with routine healing, subsequent encounter S62.101D     S62.102D    2. Cerebral embolism I66.9    3. Atrial fibrillation, unspecified type (H) I48.91    4. Closed head injury, subsequent encounter S09.90XD        MEDICAL EQUIPMENT NEEDS:  None    DISCHARGE PLAN/FACE TO FACE:  I certify that services are/were furnished while this patient was under the care of a physician and that a physician or an allowed non-physician practitioner (NPP), had a face-to-face encounter that meets the physician face-to-face encounter requirements. The encounter was in whole, or in part, related to the primary reason for home health. The patient is confined to his/her home and needs intermittent skilled nursing, physical therapy, speech-language pathology, or the continued need for occupational therapy. A plan of care has been established by a physician and is periodically reviewed by a physician.  Date of Face-to-Face Encounter: May 6, 2019    I certify that, based on my findings, the following services are medically necessary home health services: She will be discharging to home on May 7, 2019 with current medications.    My clinical findings support the need for the above skilled services because: (Please write a brief  narrative summary that describes what the RN, PT, SLP, or other services will be doing in the home. A list of diagnoses in this section does not meet the CMS requirements.)  At this time does appear that she will need some skilled services secondary to her history of closed head injury along with bilateral wrist fractures along with a history of cerebral embolism but the final care agency and services is not yet been identified.      This patient is homebound because: (Please write a brief narrative summary describing the functional limitations as to why this patient is homebound and specifically what makes this patient homebound.)  She more than likely will be homebound secondary to her history of close head injury along with a history of cerebral embolism along with a current bilateral wrist fractures along with medication management self-care deficits safety and medication management secondary to hypertension.    The patient is, or has been, under my care and I have initiated the establishment of the plan of care. This patient will be followed by a physician who will periodically review the plan of care.    Schedule follow up visit with primary care provider within 7 days to reestablish care.  She will follow-up with her primary care doctor regarding medication management and any future laboratory studies.  Her last visit orthopedics was on April 30, 2019.  She will also follow-up with cardiology as previously arranged.  She did not have any other questions as we talk about her stay on the transitional care unit certainly being observant of her blood pressures are pacemaker and her bilateral wrist fractures.  She will need an adult walker with wheels.  She has mobility limitations significantly impairs her ability to participate in one more mobility related activities of daily living in the home.  She is able to use equipment safely to complete MR ADLs.  The functional mobility deficit will be sufficiently  resolved by the use of this medical equipment.  Her height 5 feet 4 inches tall her weight 80 pounds.  She will also need a platform walker/crutches attachment.  She does have mobility limitation that significantly impairs her ability to participate in one or more mobility related activities of daily living in the home.  She is able to safely use the equipment and also safely complete MR ADLs.  Her height 5 feet 4 inches tall her weight 80 pounds.  She will also require a manual wheelchair 18 inch x 18 inch standard manual wheelchair because of her impairment and ability to participate in MR ADLs such as toileting, feeding, dressing, grooming and bathing and customary locations in the home.  The mobility limitation cannot be sufficiently and safely resolved by the use of a cane or walker secondary to her bilateral wrist fractures as well as her history of cerebral embolism.  She or her caregiver are able to safely use a manual wheelchair.  Her functional mobility deficit can be sufficiently resolved by the use of a manual wheelchair.  She will require an 18 inch x 18 and standard with standard foot rest and 18 inch x 18 inch question with her height of 5 feet 4 inches tall with a weight of 80 pounds.  She needed for lifetime use.    Discharge coordination of care greater than 30 minutes.  Electronically signed by: Michael Duane Johnson, CNP

## 2021-05-28 NOTE — TELEPHONE ENCOUNTER
ANTICOAGULATION  MANAGEMENT    Assessment     Today's INR result of 2.1 is Therapeutic (goal INR of 2.0-3.0)        Previous INR was Subtherapeutic    Warfarin given as previously instructed    No new health/diet changes affecting INR    Interaction between Doxycycline and warfarin may be affecting INR- started 4/26 x7 days     Continues to tolerate warfarin with no reported s/s of bleeding or thromboembolism       Plan:     Warfarin Dosing Orders: Give 2.5 mg on Mon and Wed; 5 mg on Tues.    Next INR: Thurs 5/2.     Telephone orders given to nurseUmm.  Orders read back correctly.     Olu Cook RN    Subjective/Objective:      Shanika Stahl, a 89 y.o. female is on warfarin. Facility nurse reports for Shanika:    Other anticoagulants: No    Medication changes: Yes, 4/26 started Doxy x7 days.      Missed warfarin doses since last INR: No     Abnormal bleeding since last INR: No    New symptoms, injury or illness: No     Upcoming surgery, procedure or cardioversion: No    Recent INR Results:    Lab Results   Component Value Date    INR 2.10 (!) 04/29/2019    INR 1.30 (!) 04/25/2019    INR 1.68 (!) 04/24/2019       Anticoagulation Episode Summary     Current INR goal:   2.0-3.0   TTR:   --   Next INR check:   5/2/2019   INR from last check:   2.10 (4/29/2019)   Weekly max warfarin dose:      Target end date:      INR check location:      Preferred lab:      Send INR reminders to:   ANTICOAGULATION POOL E ([facility] FOR [facility])    Indications    Chronic atrial fibrillation (H) [I48.2]           Comments:            Anticoagulation Care Providers     Provider Role Specialty Phone number    Rik Fulton DO [facility] Family Medicine [phone]

## 2021-05-28 NOTE — PROGRESS NOTES
[facility]      Facility:    [facility] [[id number]]  Code Status: UNKNOWN      Chief Complaint/Reason for Visit:  Chief Complaint   Patient presents with     H & P     Fractures, scalp hematoma with ecchymosis, chronic atrial fibrillation and anticoagulation, pulmonary hypertension, esophageal dysphasia,.       HPI:   Shanika is a 89 y.o. female who was recently admitted to the hospital on 4/23/2019.  She did have a mechanical fall without any prodrome of chest pain shortness of breath dizziness syncope or near syncope.  She is brought to emergency department with bilateral arm pain and she did hit her head on the wall.  She did not recall what happened at this time however CT scan of the head was negative except for scalp hematoma.  She is anticoagulated with Coumadin.  She did have bilateral wrist fractures but CT scan of the neck x-ray that showed a CT scan of the head with pretty much negative.  Ortho did see patient recommend conservative management and splints in place will follow-up next Tuesday.  She does have some renal hypertension some pulmonary hypertension with known renal artery stenosis and blood pressure somewhat elevated today.  She currently is on appropriate blood pressure medications at this time.  Her pain was well managed he does have chronic atrial fibrillation is current only on anticoagulation at this time.  Coumadin was held but then was restarted.  Coumadin per discharge summary was restarted and came in on her discharge medications.  She may be at risk of falls would make sense that hold the Coumadin for now.  She is currently doing okay at this time and denies any other issues.  Claims her pain is well managed.    Past Medical History:  Past Medical History:   Diagnosis Date     Acute encephalopathy      Basal cell carcinoma      Cerebral embolism with cerebral infarction (H)      Chronic atrial fibrillation (H)      Closed stable burst fracture  of eleventh thoracic vertebra (H)      Essential tremor      HLD (hyperlipidemia)      HTN (hypertension)      Oropharyngeal dysphagia      Osteoporosis      Postmenopausal atrophic vaginitis      Pulmonary hypertension (H)      Renal hypertension      Syncope      Tachy-pernell syndrome (H)      Wrist fracture, bilateral            Surgical History:  Past Surgical History:   Procedure Laterality Date     CARDIAC PACEMAKER PLACEMENT       CATARACT EXTRACTION       HERNIA REPAIR         Family History:   Family History   Problem Relation Age of Onset     Stroke Mother      Diabetes Mother      Breast cancer Sister      Heart disease Brother      Heart disease Brother      Prostate cancer Brother      Heart disease Brother      Heart disease Sister      Lung cancer Son        Social History:    Social History     Socioeconomic History     Marital status:      Spouse name: None     Number of children: None     Years of education: None     Highest education level: None   Occupational History     None   Social Needs     Financial resource strain: None     Food insecurity:     Worry: None     Inability: None     Transportation needs:     Medical: None     Non-medical: None   Tobacco Use     Smoking status: Never Smoker     Smokeless tobacco: Never Used   Substance and Sexual Activity     Alcohol use: Not Currently     Drug use: Never     Sexual activity: None   Lifestyle     Physical activity:     Days per week: None     Minutes per session: None     Stress: None   Relationships     Social connections:     Talks on phone: None     Gets together: None     Attends Faith service: None     Active member of club or organization: None     Attends meetings of clubs or organizations: None     Relationship status: None     Intimate partner violence:     Fear of current or ex partner: None     Emotionally abused: None     Physically abused: None     Forced sexual activity: None   Other Topics Concern     None   Social  History Narrative     None          Review of Systems   Constitutional:        Patient claims her pain is under adequate control she denies any fevers chills nausea vomiting diarrhea change in vision hearing taste or smell weakness one side of the chest pain shortness of breath.  She denies incontinence or polyphagia positive for polyuria depression or anxiety in the main review of systems is negative.       Vitals:    04/25/19 2206   BP: 179/84   Pulse: 77   Resp: 18   Temp: 99.5  F (37.5  C)   SpO2: 92%       Physical Exam   Constitutional: No distress.   HENT:   Nose: Nose normal.   Mouth/Throat: No oropharyngeal exudate.   Side of the face had ecchymosis that was pretty dark with hematoma on the scalp.  Extraocular motion was intact and pupils equal round reactive to light.   Eyes: Conjunctivae and EOM are normal.   Neck: Neck supple. No thyromegaly present.   Cardiovascular:   Irregularly irregular with adequate rate control.   Pulmonary/Chest: Effort normal and breath sounds normal. No respiratory distress. She has no wheezes.   Abdominal: Soft. Bowel sounds are normal. She exhibits no distension. There is no tenderness.   Musculoskeletal: She exhibits no edema or tenderness.   Bilateral splints are all right and fingers are neurovascularly intact.   Neurological: She is alert.   Skin: Skin is warm and dry. She is not diaphoretic.   Psychiatric: She has a normal mood and affect. Her behavior is normal.       Medication List:  Current Outpatient Medications   Medication Sig     acetaminophen (TYLENOL) 325 MG tablet Take 650 mg by mouth 3 (three) times a day.     calcium carbonate-vitamin D3 (CALCIUM 600 + D,3,) 600 mg(1,500mg) -200 unit per tablet Take 1 tablet by mouth daily.     cholecalciferol, vitamin D3, 1,000 unit tablet Take 1,000 Units by mouth daily.     furosemide (LASIX) 20 MG tablet Take 20 mg by mouth every other day.     metoprolol succinate (TOPROL-XL) 25 MG Take 25 mg by mouth 2 (two) times a  day.     polyethylene glycol (MIRALAX) 17 gram packet Take 17 g by mouth daily as needed.     senna-docusate (SENNOSIDES-DOCUSATE SODIUM) 8.6-50 mg tablet Take 2 tablets by mouth at bedtime.     simvastatin (ZOCOR) 20 MG tablet Take 20 mg by mouth at bedtime.     warfarin sodium (WARFARIN ORAL) Take by mouth. 5 mg daily. Recheck INR 4/29/19       Labs:      Assessment:    ICD-10-CM    1. Fall, subsequent encounter W19.XXXD    2. Closed fracture of right wrist with routine healing, subsequent encounter S62.101D    3. Closed fracture of left wrist with routine healing, subsequent encounter S62.102D    4. Pain management R52    5. Essential hypertension I10    6. Chronic atrial fibrillation (H) I48.2        Plan: Blood pressures every 8 hours over the next couple of days.  She is chronic atrial fibrillation is anticoagulated consideration stop anticoagulation will be appropriate at this time secondary to falls.  She denies having multiple falls at this time but since she did have trauma would recommend holding for couple of days.  Pain is under adequate control at this time and she will follow-up with orthopedic surgery in 1 week or next Tuesday for follow-up to see if she will need surgery.  I will continue to monitor above medical problems no other changes to care plan at this time.  Care plan was reviewed and is appropriate.        Electronically signed by: Rik Fulton DO

## 2021-05-28 NOTE — PROGRESS NOTES
[facility]    Facility:   [facility] [[id number]]   Code Status: DNR/DNI      CHIEF COMPLAINT/REASON FOR VISIT:  Chief Complaint   Patient presents with     Follow Up     rehab ,htn       HISTORY:      HPI: Shanika is a 89 y.o. female who I was able to visit with secondary to her hospitalization April 23 through April 24, 2019 secondary to mechanical fall.  She also [place] and at that point unable to recall the events.  She did have acute encephalopathy/delirium secondary to the head injury she was anticoagulated with warfarin there is a hematoma on the left forehead.  The CT of the head was negative.  During her hospitalization she did have a CT of the head without contrast did not show any evidence of acute intracranial trauma she did have a small sized hematoma on the scalp.  CT of the cervical spine did show moderate degenerative changes of the cervical spine.  The left shoulder x-ray did not show any acute fracture or malalignment of the left shoulder although did show diffuse osteopenia.  Bilateral wrist x-rays did show a mildly impacted acute transverse fracture of the distal right radius and a nondisplaced acute fracture of the right ulnar styloid process.  Acute comminuted fracture of the distal left radius and ulna and the radius fracture does involve the radiocarpal joint.  Diffuse osteopenia.  Chest x-ray did not show any evidence of any active cardiopulmonary disease.  The wrist x-rays bilaterally were comes treated conservative management with splints.  She does have a history of renal hypertension she is on furosemide and metoprolol blood pressures were elevated.  A history of hyperlipidemia with a history of cerebral embolism with cerebral infarct in 2014 she is on Zocor as well as warfarin.  She does have a pacemaker.  Sodium was 138 potassium 3.9 BUN 18 creatinine 0.67 hemoglobin 10.1.  She currently is in the transitional care unit has  had hypertensive issues with systolic blood pressures ranging anywhere from 138- 180s.  She does get hydralazine as needed at 10 mg and she usually does take 1 or 2 doses per day with systolic blood pressure greater than 150 she currently is on metoprolol 25 mg twice daily will now increase that to 50 mg twice daily and she is on warfarin was held for a day.  She is also on furosemide 20 mg.  She is not having any significant pain can have Tylenol thousand milligrams 3 times daily.  She does seem to be in pretty good spirits overall.  She states her appetite to be fine did see orthopedics on the 30th does not remember the complete visit.  Her bilateral wrists are in casts.  Good CMS to her fingers.  There is bruising throughout her fingers as well as the left side of her head but the color is changing nicely.  Nontender to her hands.    Past Medical History:   Diagnosis Date     Acute encephalopathy      Basal cell carcinoma      Cerebral embolism with cerebral infarction (H)      Chronic atrial fibrillation (H)      Closed stable burst fracture of eleventh thoracic vertebra (H)      Essential tremor      HLD (hyperlipidemia)      HTN (hypertension)      Oropharyngeal dysphagia      Osteoporosis      Postmenopausal atrophic vaginitis      Pulmonary hypertension (H)      Renal hypertension      Syncope      Tachy-pernell syndrome (H)      Wrist fracture, bilateral              Family History   Problem Relation Age of Onset     Stroke Mother      Diabetes Mother      Breast cancer Sister      Heart disease Brother      Heart disease Brother      Prostate cancer Brother      Heart disease Brother      Heart disease Sister      Lung cancer Son      Social History     Socioeconomic History     Marital status:      Spouse name: Not on file     Number of children: Not on file     Years of education: Not on file     Highest education level: Not on file   Occupational History     Not on file   Social Needs     Financial  resource strain: Not on file     Food insecurity:     Worry: Not on file     Inability: Not on file     Transportation needs:     Medical: Not on file     Non-medical: Not on file   Tobacco Use     Smoking status: Never Smoker     Smokeless tobacco: Never Used   Substance and Sexual Activity     Alcohol use: Not Currently     Drug use: Never     Sexual activity: Not on file   Lifestyle     Physical activity:     Days per week: Not on file     Minutes per session: Not on file     Stress: Not on file   Relationships     Social connections:     Talks on phone: Not on file     Gets together: Not on file     Attends Anabaptist service: Not on file     Active member of club or organization: Not on file     Attends meetings of clubs or organizations: Not on file     Relationship status: Not on file     Intimate partner violence:     Fear of current or ex partner: Not on file     Emotionally abused: Not on file     Physically abused: Not on file     Forced sexual activity: Not on file   Other Topics Concern     Not on file   Social History Narrative     Not on file         Review of Systems  Currently denies chills and fever coughing wheezing chest pain dizziness vertigo nausea vomiting diarrhea dysuria flulike symptoms headache or stiff neck.  History of cerebral embolism hyper lipidemia A. fib pacemaker hypertension.  Current Outpatient Medications   Medication Sig     acetaminophen (TYLENOL) 325 MG tablet Take 650 mg by mouth 3 (three) times a day.     aluminum-magnesium hydroxide-simethicone (MAALOX ADVANCED) 200-200-20 mg/5 mL Susp Take 30 mL by mouth 4 (four) times a day as needed.     calcium carbonate-vitamin D3 (CALCIUM 600 + D,3,) 600 mg(1,500mg) -200 unit per tablet Take 1 tablet by mouth daily.     cholecalciferol, vitamin D3, 1,000 unit tablet Take 1,000 Units by mouth daily.     furosemide (LASIX) 20 MG tablet Take 20 mg by mouth every other day.     metoprolol succinate (TOPROL-XL) 25 MG Take 50 mg by mouth 2  (two) times a day.            polyethylene glycol (MIRALAX) 17 gram packet Take 17 g by mouth daily as needed.     senna-docusate (SENNOSIDES-DOCUSATE SODIUM) 8.6-50 mg tablet Take 2 tablets by mouth at bedtime.     simvastatin (ZOCOR) 20 MG tablet Take 20 mg by mouth at bedtime.     warfarin sodium (WARFARIN ORAL) Take by mouth. 5 mg daily. Recheck INR 4/29/19       .There were no vitals filed for this visit.  Blood pressure 138/62 pulse 78 respirations 20 temperature 98.4  Physical Exam   Constitutional: No distress.   HENT:   Head: Normocephalic.   Eyes: Pupils are equal, round, and reactive to light.   Neck: Neck supple. No thyromegaly present.   Cardiovascular:   A. fib.  Pacemaker.   Pulmonary/Chest: Breath sounds normal.   Abdominal: Bowel sounds are normal. There is no tenderness. There is no guarding.   Musculoskeletal:   Bilateral wrist fractures.  In cast.  Good CMS to her hands.  Bruising.   Lymphadenopathy:     She has no cervical adenopathy.   Neurological: She is alert.   Skin: Skin is warm and dry. No rash noted.   Left forehead and temporal area of bruising and changing colors.   Psychiatric: Her behavior is normal.         LABS:   Lab Results   Component Value Date    WBC 7.6 04/27/2019     Results for orders placed or performed in visit on 04/26/19   Basic Metabolic Panel   Result Value Ref Range    Sodium 139 136 - 145 mmol/L    Potassium 3.9 3.5 - 5.0 mmol/L    Chloride 106 98 - 107 mmol/L    CO2 23 22 - 31 mmol/L    Anion Gap, Calculation 10 5 - 18 mmol/L    Glucose 100 70 - 125 mg/dL    Calcium 8.9 8.5 - 10.5 mg/dL    BUN 22 8 - 28 mg/dL    Creatinine 0.68 0.60 - 1.10 mg/dL    GFR MDRD Af Amer >60 >60 mL/min/1.73m2    GFR MDRD Non Af Amer >60 >60 mL/min/1.73m2       Lab Results   Component Value Date    ALKPHOS 156 (H) 10/12/2018         ASSESSMENT:      ICD-10-CM    1. Closed fracture of both wrists with routine healing, subsequent encounter S62.101D     S62.102D    2. Hypertension,  unspecified type I10    3. Atrial fibrillation, unspecified type (H) I48.91    4. Physical debility R53.81        PLAN:    Increasing metoprolol 50 mg XL twice daily rather than 25 mg XL twice daily.  Continue the hydralazine as needed we may have to add the hydralazine scheduled pending the intervention of the Toprol-XL.  Her pain is managed.  Continue with the warfarin.  Continue with the rehabilitation process.      Electronically signed by: Michael Duane Johnson, CNP

## 2021-05-28 NOTE — TELEPHONE ENCOUNTER
ANTICOAGULATION  MANAGEMENT PROGRAM    Shanika Stahl is being discharged from the [facility] Anticoagulation Management Program (AC).    Reason for discharge: discharged from [facility]; returning to pre-admission warfarin management    ACM referral closed, anticoagulation episode resolved and INR standing order discontinued.     If Shanika needs warfarin management in the future, please send a new referral.    Olu Cook RN

## 2021-06-07 NOTE — PROGRESS NOTES
Please advise   This writer was present during the initial skin assessment and agrees with the documented findings.

## 2021-06-19 NOTE — LETTER
Letter by Johnson, Michael Duane, CNP at      Author: Johnson, Michael Duane, CNP Service: -- Author Type: --    Filed:  Encounter Date: 5/1/2019 Status: (Other)         Patient: Shanika Stahl   MR Number: [id number]   YOB: 1924   Date of Visit: 5/1/2019     [facility]    Facility:   [facility] [[id number]]   Code Status: DNR/DNI      CHIEF COMPLAINT/REASON FOR VISIT:  Chief Complaint   Patient presents with   ? Follow Up     rehab ,htn       HISTORY:      HPI: Shanika is a 89 y.o. female who I was able to visit with secondary to her hospitalization April 23 through April 24, 2019 secondary to mechanical fall.  [place] also [place] and at that point unable to recall the events.  She did have acute encephalopathy/delirium secondary to the head injury she was anticoagulated with warfarin there is a hematoma on the left forehead.  The CT of the head was negative.  During her hospitalization she did have a CT of the head without contrast did not show any evidence of acute intracranial trauma she did have a small sized hematoma on the scalp.  CT of the cervical spine did show moderate degenerative changes of the cervical spine.  The left shoulder x-ray did not show any acute fracture or malalignment of the left shoulder although did show diffuse osteopenia.  Bilateral wrist x-rays did show a mildly impacted acute transverse fracture of the distal right radius and a nondisplaced acute fracture of the right ulnar styloid process.  Acute comminuted fracture of the distal left radius and ulna and the radius fracture does involve the radiocarpal joint.  Diffuse osteopenia.  Chest x-ray did not show any evidence of any active cardiopulmonary disease.  The wrist x-rays bilaterally were comes treated conservative management with splints.  She does have a history of renal hypertension she is on furosemide and metoprolol blood pressures were elevated.  A  history of hyperlipidemia with a history of cerebral embolism with cerebral infarct in 2014 she is on Zocor as well as warfarin.  She does have a pacemaker.  Sodium was 138 potassium 3.9 BUN 18 creatinine 0.67 hemoglobin 10.1.  She currently is in the transitional care unit has had hypertensive issues with systolic blood pressures ranging anywhere from 138- 180s.  She does get hydralazine as needed at 10 mg and she usually does take 1 or 2 doses per day with systolic blood pressure greater than 150 she currently is on metoprolol 25 mg twice daily will now increase that to 50 mg twice daily and she is on warfarin was held for a day.  She is also on furosemide 20 mg.  She is not having any significant pain can have Tylenol thousand milligrams 3 times daily.  She does seem to be in pretty good spirits overall.  She states her appetite to be fine did see orthopedics on the 30th does not remember the complete visit.  Her bilateral wrists are in casts.  Good CMS to her fingers.  There is bruising throughout her fingers as well as the left side of her head but the color is changing nicely.  Nontender to her hands.    Past Medical History:   Diagnosis Date   ? Acute encephalopathy    ? Basal cell carcinoma    ? Cerebral embolism with cerebral infarction (H)    ? Chronic atrial fibrillation (H)    ? Closed stable burst fracture of eleventh thoracic vertebra (H)    ? Essential tremor    ? HLD (hyperlipidemia)    ? HTN (hypertension)    ? Oropharyngeal dysphagia    ? Osteoporosis    ? Postmenopausal atrophic vaginitis    ? Pulmonary hypertension (H)    ? Renal hypertension    ? Syncope    ? Tachy-pernell syndrome (H)    ? Wrist fracture, bilateral              Family History   Problem Relation Age of Onset   ? Stroke Mother    ? Diabetes Mother    ? Breast cancer Sister    ? Heart disease Brother    ? Heart disease Brother    ? Prostate cancer Brother    ? Heart disease Brother    ? Heart disease Sister    ? Lung cancer Son       Social History     Socioeconomic History   ? Marital status:      Spouse name: Not on file   ? Number of children: Not on file   ? Years of education: Not on file   ? Highest education level: Not on file   Occupational History   ? Not on file   Social Needs   ? Financial resource strain: Not on file   ? Food insecurity:     Worry: Not on file     Inability: Not on file   ? Transportation needs:     Medical: Not on file     Non-medical: Not on file   Tobacco Use   ? Smoking status: Never Smoker   ? Smokeless tobacco: Never Used   Substance and Sexual Activity   ? Alcohol use: Not Currently   ? Drug use: Never   ? Sexual activity: Not on file   Lifestyle   ? Physical activity:     Days per week: Not on file     Minutes per session: Not on file   ? Stress: Not on file   Relationships   ? Social connections:     Talks on phone: Not on file     Gets together: Not on file     Attends [employer] service: Not on file     Active member of club or organization: Not on file     Attends meetings of clubs or organizations: Not on file     Relationship status: Not on file   ? Intimate partner violence:     Fear of current or ex partner: Not on file     Emotionally abused: Not on file     Physically abused: Not on file     Forced sexual activity: Not on file   Other Topics Concern   ? Not on file   Social History Narrative   ? Not on file         Review of Systems  Currently denies chills and fever coughing wheezing chest pain dizziness vertigo nausea vomiting diarrhea dysuria flulike symptoms headache or stiff neck.  History of cerebral embolism hyper lipidemia A. fib pacemaker hypertension.  Current Outpatient Medications   Medication Sig   ? acetaminophen (TYLENOL) 325 MG tablet Take 650 mg by mouth 3 (three) times a day.   ? aluminum-magnesium hydroxide-simethicone (MAALOX ADVANCED) 200-200-20 mg/5 mL Susp Take 30 mL by mouth 4 (four) times a day as needed.   ? calcium carbonate-vitamin D3 (CALCIUM 600 + D,3,) 600  mg(1,500mg) -200 unit per tablet Take 1 tablet by mouth daily.   ? cholecalciferol, vitamin D3, 1,000 unit tablet Take 1,000 Units by mouth daily.   ? furosemide (LASIX) 20 MG tablet Take 20 mg by mouth every other day.   ? metoprolol succinate (TOPROL-XL) 25 MG Take 50 mg by mouth 2 (two) times a day.          ? polyethylene glycol (MIRALAX) 17 gram packet Take 17 g by mouth daily as needed.   ? senna-docusate (SENNOSIDES-DOCUSATE SODIUM) 8.6-50 mg tablet Take 2 tablets by mouth at bedtime.   ? simvastatin (ZOCOR) 20 MG tablet Take 20 mg by mouth at bedtime.   ? warfarin sodium (WARFARIN ORAL) Take by mouth. 5 mg daily. Recheck INR 4/29/19       .There were no vitals filed for this visit.  Blood pressure 138/62 pulse 78 respirations 20 temperature 98.4  Physical Exam   Constitutional: No distress.   HENT:   Head: Normocephalic.   Eyes: Pupils are equal, round, and reactive to light.   Neck: Neck supple. No thyromegaly present.   Cardiovascular:   A. fib.  Pacemaker.   Pulmonary/Chest: Breath sounds normal.   Abdominal: Bowel sounds are normal. There is no tenderness. There is no guarding.   Musculoskeletal:   Bilateral wrist fractures.  In cast.  Good CMS to her hands.  Bruising.   Lymphadenopathy:     She has no cervical adenopathy.   Neurological: She is alert.   Skin: Skin is warm and dry. No rash noted.   Left forehead and temporal area of bruising and changing colors.   Psychiatric: Her behavior is normal.         LABS:   Lab Results   Component Value Date    WBC 7.6 04/27/2019     Results for orders placed or performed in visit on 04/26/19   Basic Metabolic Panel   Result Value Ref Range    Sodium 139 136 - 145 mmol/L    Potassium 3.9 3.5 - 5.0 mmol/L    Chloride 106 98 - 107 mmol/L    CO2 23 22 - 31 mmol/L    Anion Gap, Calculation 10 5 - 18 mmol/L    Glucose 100 70 - 125 mg/dL    Calcium 8.9 8.5 - 10.5 mg/dL    BUN 22 8 - 28 mg/dL    Creatinine 0.68 0.60 - 1.10 mg/dL    GFR MDRD Af Amer >60 >60  mL/min/1.73m2    GFR MDRD Non Af Amer >60 >60 mL/min/1.73m2       Lab Results   Component Value Date    ALKPHOS 156 (H) 10/12/2018         ASSESSMENT:      ICD-10-CM    1. Closed fracture of both wrists with routine healing, subsequent encounter S62.101D     S62.102D    2. Hypertension, unspecified type I10    3. Atrial fibrillation, unspecified type (H) I48.91    4. Physical debility R53.81        PLAN:    Increasing metoprolol 50 mg XL twice daily rather than 25 mg XL twice daily.  Continue the hydralazine as needed we may have to add the hydralazine scheduled pending the intervention of the Toprol-XL.  Her pain is managed.  Continue with the warfarin.  Continue with the rehabilitation process.      Electronically signed by: Michael Duane Johnson, CNP

## 2021-06-19 NOTE — LETTER
Letter by Rik Fulton DO at      Author: Rik Fulton DO Service: -- Author Type: --    Filed:  Encounter Date: 4/25/2019 Status: (Other)         Patient: Shanika Stahl   MR Number: [id number]   YOB: 1924   Date of Visit: 4/25/2019     [facility]      Facility:    [facility] [[id number]]  Code Status: UNKNOWN      Chief Complaint/Reason for Visit:  Chief Complaint   Patient presents with   ? H & P     Fractures, scalp hematoma with ecchymosis, chronic atrial fibrillation and anticoagulation, pulmonary hypertension, esophageal dysphasia,.       HPI:   Shanika is a 89 y.o. female who was recently admitted to the hospital on 4/23/2019.  She did have a mechanical fall without any prodrome of chest pain shortness of breath dizziness syncope or near syncope.  She is brought to emergency department with bilateral arm pain and she did hit her head on the wall.  She did not recall what happened at this time however CT scan of the head was negative except for scalp hematoma.  She is anticoagulated with Coumadin.  She did have bilateral wrist fractures but CT scan of the neck x-ray that showed a CT scan of the head with pretty much negative.  Ortho did see patient recommend conservative management and splints in place will follow-up next Tuesday.  She does have some renal hypertension some pulmonary hypertension with known renal artery stenosis and blood pressure somewhat elevated today.  She currently is on appropriate blood pressure medications at this time.  Her pain was well managed he does have chronic atrial fibrillation is current only on anticoagulation at this time.  Coumadin was held but then was restarted.  Coumadin per discharge summary was restarted and came in on her discharge medications.  She may be at risk of falls would make sense that hold the Coumadin for now.  She is currently doing okay at this time and denies any other issues.  Claims  her pain is well managed.    Past Medical History:  Past Medical History:   Diagnosis Date   ? Acute encephalopathy    ? Basal cell carcinoma    ? Cerebral embolism with cerebral infarction (H)    ? Chronic atrial fibrillation (H)    ? Closed stable burst fracture of eleventh thoracic vertebra (H)    ? Essential tremor    ? HLD (hyperlipidemia)    ? HTN (hypertension)    ? Oropharyngeal dysphagia    ? Osteoporosis    ? Postmenopausal atrophic vaginitis    ? Pulmonary hypertension (H)    ? Renal hypertension    ? Syncope    ? Tachy-pernell syndrome (H)    ? Wrist fracture, bilateral            Surgical History:  Past Surgical History:   Procedure Laterality Date   ? CARDIAC PACEMAKER PLACEMENT     ? CATARACT EXTRACTION     ? HERNIA REPAIR         Family History:   Family History   Problem Relation Age of Onset   ? Stroke Mother    ? Diabetes Mother    ? Breast cancer Sister    ? Heart disease Brother    ? Heart disease Brother    ? Prostate cancer Brother    ? Heart disease Brother    ? Heart disease Sister    ? Lung cancer Son        Social History:    Social History     Socioeconomic History   ? Marital status:      Spouse name: None   ? Number of children: None   ? Years of education: None   ? Highest education level: None   Occupational History   ? None   Social Needs   ? Financial resource strain: None   ? Food insecurity:     Worry: None     Inability: None   ? Transportation needs:     Medical: None     Non-medical: None   Tobacco Use   ? Smoking status: Never Smoker   ? Smokeless tobacco: Never Used   Substance and Sexual Activity   ? Alcohol use: Not Currently   ? Drug use: Never   ? Sexual activity: None   Lifestyle   ? Physical activity:     Days per week: None     Minutes per session: None   ? Stress: None   Relationships   ? Social connections:     Talks on phone: None     Gets together: None     Attends Restorationism service: None     Active member of club or organization: None     Attends meetings of  clubs or organizations: None     Relationship status: None   ? Intimate partner violence:     Fear of current or ex partner: None     Emotionally abused: None     Physically abused: None     Forced sexual activity: None   Other Topics Concern   ? None   Social History Narrative   ? None          Review of Systems   Constitutional:        Patient claims her pain is under adequate control she denies any fevers chills nausea vomiting diarrhea change in vision hearing taste or smell weakness one side of the chest pain shortness of breath.  She denies incontinence or polyphagia positive for polyuria depression or anxiety in the main review of systems is negative.       Vitals:    04/25/19 2206   BP: 179/84   Pulse: 77   Resp: 18   Temp: 99.5  F (37.5  C)   SpO2: 92%       Physical Exam   Constitutional: No distress.   HENT:   Nose: Nose normal.   Mouth/Throat: No oropharyngeal exudate.   Side of the face had ecchymosis that was pretty dark with hematoma on the scalp.  Extraocular motion was intact and pupils equal round reactive to light.   Eyes: Conjunctivae and EOM are normal.   Neck: Neck supple. No thyromegaly present.   Cardiovascular:   Irregularly irregular with adequate rate control.   Pulmonary/Chest: Effort normal and breath sounds normal. No respiratory distress. She has no wheezes.   Abdominal: Soft. Bowel sounds are normal. She exhibits no distension. There is no tenderness.   Musculoskeletal: She exhibits no edema or tenderness.   Bilateral splints are all right and fingers are neurovascularly intact.   Neurological: She is alert.   Skin: Skin is warm and dry. She is not diaphoretic.   Psychiatric: She has a normal mood and affect. Her behavior is normal.       Medication List:  Current Outpatient Medications   Medication Sig   ? acetaminophen (TYLENOL) 325 MG tablet Take 650 mg by mouth 3 (three) times a day.   ? calcium carbonate-vitamin D3 (CALCIUM 600 + D,3,) 600 mg(1,500mg) -200 unit per tablet Take 1  tablet by mouth daily.   ? cholecalciferol, vitamin D3, 1,000 unit tablet Take 1,000 Units by mouth daily.   ? furosemide (LASIX) 20 MG tablet Take 20 mg by mouth every other day.   ? metoprolol succinate (TOPROL-XL) 25 MG Take 25 mg by mouth 2 (two) times a day.   ? polyethylene glycol (MIRALAX) 17 gram packet Take 17 g by mouth daily as needed.   ? senna-docusate (SENNOSIDES-DOCUSATE SODIUM) 8.6-50 mg tablet Take 2 tablets by mouth at bedtime.   ? simvastatin (ZOCOR) 20 MG tablet Take 20 mg by mouth at bedtime.   ? warfarin sodium (WARFARIN ORAL) Take by mouth. 5 mg daily. Recheck INR 4/29/19       Labs:      Assessment:    ICD-10-CM    1. Fall, subsequent encounter W19.XXXD    2. Closed fracture of right wrist with routine healing, subsequent encounter S62.101D    3. Closed fracture of left wrist with routine healing, subsequent encounter S62.102D    4. Pain management R52    5. Essential hypertension I10    6. Chronic atrial fibrillation (H) I48.2        Plan: Blood pressures every 8 hours over the next couple of days.  She is chronic atrial fibrillation is anticoagulated consideration stop anticoagulation will be appropriate at this time secondary to falls.  She denies having multiple falls at this time but since she did have trauma would recommend holding for couple of days.  Pain is under adequate control at this time and she will follow-up with orthopedic surgery in 1 week or next Tuesday for follow-up to see if she will need surgery.  I will continue to monitor above medical problems no other changes to care plan at this time.  Care plan was reviewed and is appropriate.        Electronically signed by: Rik Fulton DO

## 2021-06-19 NOTE — LETTER
Letter by Johnson, Michael Duane, CNP at      Author: Johnson, Michael Duane, CNP Service: -- Author Type: --    Filed:  Encounter Date: 5/6/2019 Status: (Other)         Patient: Shanika Stahl   MR Number: [id number]   YOB: 1924   Date of Visit: 5/6/2019     [facility]    Facility:   [facility] [[id number]]   Code Status: DNR/DNI  PCP: Milena Jaime DO   Phone: [phone]   Fax: [phone]      CHIEF COMPLAINT/REASON FOR VISIT:  Chief Complaint   Patient presents with   ? Discharge Summary       HISTORY COURSE:  Shanika is a 89 y.o. female who I was able to visit with secondary to her hospitalization April 23 through April 24, 2019 secondary to mechanical fall.  [place] also [place] and at that point unable to recall the events.  She did have acute encephalopathy/delirium secondary to the head injury she was anticoagulated with warfarin there is a hematoma on the left forehead.  The CT of the head was negative.  During her hospitalization she did have a CT of the head without contrast did not show any evidence of acute intracranial trauma she did have a small sized hematoma on the scalp.  CT of the cervical spine did show moderate degenerative changes of the cervical spine.  The left shoulder x-ray did not show any acute fracture or malalignment of the left shoulder although did show diffuse osteopenia.  Bilateral wrist x-rays did show a mildly impacted acute transverse fracture of the distal right radius and a nondisplaced acute fracture of the right ulnar styloid process.  Acute comminuted fracture of the distal left radius and ulna and the radius fracture does involve the radiocarpal joint.  Diffuse osteopenia.  Chest x-ray did not show any evidence of any active cardiopulmonary disease.  The wrist x-rays bilaterally were comes treated conservative management with splints.  She does have a history of renal hypertension she is on  furosemide and metoprolol blood pressures were elevated.  A history of hyperlipidemia with a history of cerebral embolism with cerebral infarct in 2014 she is on Zocor as well as warfarin.  She does have a pacemaker.  Sodium was 138 potassium 3.9 BUN 18 creatinine 0.67 hemoglobin 10.1.        Review of Systems  Currently denies chills and fever coughing wheezing chest pain dizziness vertigo nausea vomiting diarrhea dysuria flulike symptoms headache or stiff neck.  History of cerebral embolism hyper lipidemia A. fib pacemaker hypertension.    There were no vitals filed for this visit.  Blood pressure 133/74 pulse 87 respiratory 18 temperature 97.7  Physical Exam  Constitutional: No distress.   HENT:  Neck: Neck supple. No thyromegaly present.   Cardiovascular:   A. fib.  Pacemaker.   Pulmonary/Chest: Breath sounds normal.   Abdominal: Bowel sounds are normal. There is no tenderness. There is no guarding.   Musculoskeletal:   Bilateral wrist fractures.  In cast.  Good CMS to her hands.  Bruising.   Neurological: She is alert.   Skin: Skin is warm and dry. No rash noted.   Left forehead and temporal area of bruising and changing colors.   Psychiatric: Her behavior is normal.   Lab Results   Component Value Date    WBC 7.6 04/27/2019     No results found for: HGBA1C  Results for orders placed or performed in visit on 04/26/19   Basic Metabolic Panel   Result Value Ref Range    Sodium 139 136 - 145 mmol/L    Potassium 3.9 3.5 - 5.0 mmol/L    Chloride 106 98 - 107 mmol/L    CO2 23 22 - 31 mmol/L    Anion Gap, Calculation 10 5 - 18 mmol/L    Glucose 100 70 - 125 mg/dL    Calcium 8.9 8.5 - 10.5 mg/dL    BUN 22 8 - 28 mg/dL    Creatinine 0.68 0.60 - 1.10 mg/dL    GFR MDRD Af Amer >60 >60 mL/min/1.73m2    GFR MDRD Non Af Amer >60 >60 mL/min/1.73m2         MEDICATION LIST:  Current Outpatient Medications   Medication Sig   ? hydrALAZINE (APRESOLINE) 10 MG tablet Take 10 mg by mouth 3 (three) times a day as needed.   ?  acetaminophen (TYLENOL) 325 MG tablet Take 650 mg by mouth 3 (three) times a day.   ? aluminum-magnesium hydroxide-simethicone (MAALOX ADVANCED) 200-200-20 mg/5 mL Susp Take 30 mL by mouth 4 (four) times a day as needed.   ? calcium carbonate-vitamin D3 (CALCIUM 600 + D,3,) 600 mg(1,500mg) -200 unit per tablet Take 1 tablet by mouth daily.   ? cholecalciferol, vitamin D3, 1,000 unit tablet Take 1,000 Units by mouth daily.   ? furosemide (LASIX) 20 MG tablet Take 20 mg by mouth every other day.   ? metoprolol succinate (TOPROL-XL) 25 MG Take 50 mg by mouth 2 (two) times a day.          ? polyethylene glycol (MIRALAX) 17 gram packet Take 17 g by mouth daily as needed.   ? senna-docusate (SENNOSIDES-DOCUSATE SODIUM) 8.6-50 mg tablet Take 2 tablets by mouth at bedtime.   ? simvastatin (ZOCOR) 20 MG tablet Take 20 mg by mouth at bedtime.   ? warfarin sodium (WARFARIN ORAL) Take by mouth. 5 mg daily. Recheck INR 4/29/19       DISCHARGE DIAGNOSIS:    ICD-10-CM    1. Closed fracture of both wrists with routine healing, subsequent encounter S62.101D     S62.102D    2. Cerebral embolism I66.9    3. Atrial fibrillation, unspecified type (H) I48.91    4. Closed head injury, subsequent encounter S09.90XD        MEDICAL EQUIPMENT NEEDS:  None    DISCHARGE PLAN/FACE TO FACE:  I certify that services are/were furnished while this patient was under the care of a physician and that a physician or an allowed non-physician practitioner (NPP), had a face-to-face encounter that meets the physician face-to-face encounter requirements. The encounter was in whole, or in part, related to the primary reason for home health. The patient is confined to his/her home and needs intermittent skilled nursing, physical therapy, speech-language pathology, or the continued need for occupational therapy. A plan of care has been established by a physician and is periodically reviewed by a physician.  Date of Face-to-Face Encounter: May 6, 2019    I  certify that, based on my findings, the following services are medically necessary home health services: She will be discharging to home on May 7, 2019 with current medications.    My clinical findings support the need for the above skilled services because: (Please write a brief narrative summary that describes what the RN, PT, SLP, or other services will be doing in the home. A list of diagnoses in this section does not meet the CMS requirements.)  At this time does appear that she will need some skilled services secondary to her history of closed head injury along with bilateral wrist fractures along with a history of cerebral embolism but the final care agency and services is not yet been identified.      This patient is homebound because: (Please write a brief narrative summary describing the functional limitations as to why this patient is homebound and specifically what makes this patient homebound.)  She more than likely will be homebound secondary to her history of close head injury along with a history of cerebral embolism along with a current bilateral wrist fractures along with medication management self-care deficits safety and medication management secondary to hypertension.    The patient is, or has been, under my care and I have initiated the establishment of the plan of care. This patient will be followed by a physician who will periodically review the plan of care.    Schedule follow up visit with primary care provider within 7 days to reestablish care.  She will follow-up with her primary care doctor regarding medication management and any future laboratory studies.  Her last visit orthopedics was on April 30, 2019.  She will also follow-up with cardiology as previously arranged.  She did not have any other questions as we talk about her stay on the transitional care unit certainly being observant of her blood pressures are pacemaker and her bilateral wrist fractures.  She will need an adult  walker with wheels.  She has mobility limitations significantly impairs her ability to participate in one more mobility related activities of daily living in the home.  She is able to use equipment safely to complete MR ADLs.  The functional mobility deficit will be sufficiently resolved by the use of this medical equipment.  Her height 5 feet 4 inches tall her weight 80 pounds.  She will also need a platform walker/crutches attachment.  She does have mobility limitation that significantly impairs her ability to participate in one or more mobility related activities of daily living in the home.  She is able to safely use the equipment and also safely complete MR ADLs.  Her height 5 feet 4 inches tall her weight 80 pounds.  She will also require a manual wheelchair 18 inch x 18 inch standard manual wheelchair because of her impairment and ability to participate in MR ADLs such as toileting, feeding, dressing, grooming and bathing and customary locations in the home.  The mobility limitation cannot be sufficiently and safely resolved by the use of a cane or walker secondary to her bilateral wrist fractures as well as her history of cerebral embolism.  She or her caregiver are able to safely use a manual wheelchair.  Her functional mobility deficit can be sufficiently resolved by the use of a manual wheelchair.  She will require an 18 inch x 18 and standard with standard foot rest and 18 inch x 18 inch question with her height of 5 feet 4 inches tall with a weight of 80 pounds.  She needed for lifetime use.    Discharge coordination of care greater than 30 minutes.  Electronically signed by: Michael Duane Johnson, CNP

## 2021-06-19 NOTE — LETTER
Letter by Micaela Diallo CNP at      Author: Micaela Diallo CNP Service: -- Author Type: --    Filed:  Encounter Date: 4/29/2019 Status: (Other)         Patient: Shanika Stahl   MR Number: [id number]   YOB: 1924   Date of Visit: 4/29/2019     Code Status:  DNR/DNI  Visit Type: Follow Up     Facility:  [facility] [[id number]]        Facility Type: SNF (Skilled Nursing Facility, TCU)    History of Present Illness: Shanika Stahl is a 89 y.o. female seen today for TCU follow-up.  She has a past medical history for hypertension, HLD, osteoporosis, chronic afibrillation, hypertension, pulmonary hypertension.  She was recently admitted to the hospital on 4/23/2019 after a mechanical fall at home.  She developed bilateral arm pain and did hit her head on the wall and sustained a large bruising hematoma of her left side of her face.  CT was negative except for scalp hematoma and she is on Coumadin for her atrial fibrillation.  CT of the head and neck were negative.  She was found to have bilateral wrist fractures in which orthopedics recommended conservative management with splinting and a follow-up tomorrow.    Today, she reports her pain is about average and no worse.  She reports the PRN acetaminophen does improve her discomfort.  Her biggest complaint is mostly that her arms are heavy.  She does have good sensation to her fingers which are warm to the touch and she can wiggle them.  Her blood pressures are variable with SBP is ranging from 1 30-1 70s.  She is on metoprolol XL 25 mg twice daily, Lasix 20 mg daily and she was just recently put on hydralazine 10 mg every 8 hours as needed.  She denies any dizziness or lightheadedness.  She does state that she has this chest discomfort it appears to be more epigastric or substernal however she denies any heartburn.  She states this discomfort started after her fall.  I do not see any EKGs done on her information sent from the hospital  however she does state she was told that her heart was good.  She reports her appetite is improving.  She is on doxycycline which appears to be due to UTI and this is to and on 5/3/2019.    Review of Systems   Patient denies fever, chills, headache, lightheadedness, dizziness, rhinorrhea, cough, congestion, shortness of breath, palpitations, abdominal pain, n/v, diarrhea, constipation, change in appetite, dysuria, frequency, burning or pain with urination.  Other than stated in HPI all other review of systems is negative.         Physical Exam   Vital signs: /82, heart rate 89, temp 97.3.  GENERAL APPEARANCE: Thin, elderly woman in arm casts, in no acute distress.  HEENT: normocephalic, atraumatic, large bruising extending from left forehead, temporal area to cheekbone.  Non-boggy and nontender.  PERRL, sclerae anicteric, conjunctivae clear and moist, EOM intact  Mouth mucosa is moist  NECK: Supple and symmetric. Trachea is midline, no thyromegaly, no adenopathy, and no tenderness  LUNGS: Lung sounds CTA, no adventitious sounds, respiratory effort normal.  Chest showed no bruising or marks at the area of discomfort.  No increase in pain with palpation.  CARD: RRR, S1, S2, without murmurs, gallops, rubs, no JVD  ABD: Soft, nondistended and nontender with normal bowel sounds.   MSK: Muscle strength and tone were normal.  EXTREMITIES: No cyanosis, clubbing or edema.  NEURO: Alert and oriented x 3. Normal affect.  Face is symmetric.  SKIN: Inspection of the skin reveals no rashes, ulcerations or petechiae.  PSYCH: euthymic          Labs:    Recent Results (from the past 240 hour(s))   INR   Result Value Ref Range    INR 1.98 (!) 0.9 - 1.1   INR   Result Value Ref Range    INR 1.68 (!) 0.9 - 1.1   INR   Result Value Ref Range    INR 1.30 (!) 0.9 - 1.1   White Blood Count (WBC)   Result Value Ref Range    WBC 8.1 4.0 - 11.0 thou/uL   Automated Differential   Result Value Ref Range    Neutrophils % 75 (H) 50 - 70 %     Lymphocytes % 16 (L) 20 - 40 %    Monocytes % 8 2 - 10 %    Eosinophils % 2 0 - 6 %    Basophils % 0 0 - 2 %    Neutrophils Absolute 6.0 2.0 - 7.7 thou/uL    Lymphocytes Absolute 1.3 0.8 - 4.4 thou/uL    Monocytes Absolute 0.7 0.0 - 0.9 thou/uL    Eosinophils Absolute 0.1 0.0 - 0.4 thou/uL    Basophils Absolute 0.0 0.0 - 0.2 thou/uL   Basic Metabolic Panel   Result Value Ref Range    Sodium 139 136 - 145 mmol/L    Potassium 3.9 3.5 - 5.0 mmol/L    Chloride 106 98 - 107 mmol/L    CO2 23 22 - 31 mmol/L    Anion Gap, Calculation 10 5 - 18 mmol/L    Glucose 100 70 - 125 mg/dL    Calcium 8.9 8.5 - 10.5 mg/dL    BUN 22 8 - 28 mg/dL    Creatinine 0.68 0.60 - 1.10 mg/dL    GFR MDRD Af Amer >60 >60 mL/min/1.73m2    GFR MDRD Non Af Amer >60 >60 mL/min/1.73m2   White Blood Count (WBC)   Result Value Ref Range    WBC 7.6 4.0 - 11.0 thou/uL   Automated Differential   Result Value Ref Range    Neutrophils % 68 50 - 70 %    Lymphocytes % 22 20 - 40 %    Monocytes % 7 2 - 10 %    Eosinophils % 2 0 - 6 %    Basophils % 0 0 - 2 %    Neutrophils Absolute 5.2 2.0 - 7.7 thou/uL    Lymphocytes Absolute 1.7 0.8 - 4.4 thou/uL    Monocytes Absolute 0.5 0.0 - 0.9 thou/uL    Eosinophils Absolute 0.2 0.0 - 0.4 thou/uL    Basophils Absolute 0.0 0.0 - 0.2 thou/uL   INR   Result Value Ref Range    INR 2.10 (!) 0.9 - 1.1         Assessment:  1. Closed fracture of right wrist with routine healing, subsequent encounter     2. Fall, subsequent encounter     3. Closed fracture of left wrist with routine healing, subsequent encounter     4. Pain management     5. Essential hypertension     6. Chronic atrial fibrillation (H)     7. Discomfort in chest         Plan:   Wrist fractures: Continue with therapies, will follow-up with orthopedics tomorrow.  Fall: No falls at the facility we will continue to supervise and work with therapies.  Pain management: Adequate control with acetaminophen at this time counseled on resting and elevating arms to improve  the heavy feeling.  Hypertension: We will continue to monitor 3 times a day and continue with as needed hydralazine.  Hypertension could be related to discomfort.    Chronic A. fib: Controlled continue with prior dose of metoprolol XL 25 mg twice daily.  Coumadin clinic to dose warfarin.  Will need to have ongoing discussion in regards to risk to benefit of warfarin and falls.    Chest discomfort: Differential angina versus GERD versus musculoskeletal discomfort from fall.  Will have an EKG done and start on Maalox 3 times daily to see if it improves her symptoms.        Electronically signed by: Micaela Diallo, LUIS MIGUEL

## 2021-06-19 NOTE — LETTER
Letter by Micaela Diallo CNP at      Author: Micaela Diallo CNP Service: -- Author Type: --    Filed:  Encounter Date: 4/29/2019 Status: (Other)         Patient: Shanika Stahl   MR Number: [id number]   YOB: 1924   Date of Visit: 4/29/2019     error